# Patient Record
Sex: FEMALE | Race: WHITE | Employment: OTHER | URBAN - METROPOLITAN AREA
[De-identification: names, ages, dates, MRNs, and addresses within clinical notes are randomized per-mention and may not be internally consistent; named-entity substitution may affect disease eponyms.]

---

## 2017-02-17 ENCOUNTER — HOSPITAL ENCOUNTER (OUTPATIENT)
Dept: MAMMOGRAPHY | Age: 77
Discharge: HOME | End: 2017-02-17
Attending: FAMILY MEDICINE
Payer: MEDICARE

## 2017-02-17 DIAGNOSIS — Z78.0 POSTMENOPAUSAL: ICD-10-CM

## 2017-02-17 PROCEDURE — 77080 DXA BONE DENSITY AXIAL: CPT

## 2017-07-28 ENCOUNTER — HOSPITAL ENCOUNTER (OUTPATIENT)
Dept: GENERAL RADIOLOGY | Age: 77
Discharge: HOME | End: 2017-07-28
Attending: FAMILY MEDICINE
Payer: MEDICARE

## 2017-07-28 ENCOUNTER — HOSPITAL ENCOUNTER (OUTPATIENT)
Dept: CT IMAGING | Age: 77
Discharge: HOME | End: 2017-07-28
Attending: FAMILY MEDICINE
Payer: MEDICARE

## 2017-07-28 DIAGNOSIS — G89.29 CHRONIC PAIN OF BOTH KNEES: ICD-10-CM

## 2017-07-28 DIAGNOSIS — M25.562 CHRONIC PAIN OF BOTH KNEES: ICD-10-CM

## 2017-07-28 DIAGNOSIS — M25.561 CHRONIC PAIN OF BOTH KNEES: ICD-10-CM

## 2017-07-28 DIAGNOSIS — R05.9 COUGH: ICD-10-CM

## 2017-07-28 PROCEDURE — 71020 XR CHEST PA LAT: CPT

## 2017-07-28 PROCEDURE — 73700 CT LOWER EXTREMITY W/O DYE: CPT

## 2017-07-28 NOTE — PROGRESS NOTES
Per Dr. Cornelio Arteaga: Shows a small area that is not getting full air to lung tissue; no tumor or infection. Radiologist recommended recheck CXR in 4 weeks. - Notified, verbalizes understanding. Future CXR ordered.

## 2017-07-28 NOTE — PROGRESS NOTES
Per Dr. Cathy Bone: Shows significant arthritis. No tear. Would follow up with ortho as discussed. - Left detailed message on pt's voicemail, per merced.

## 2017-08-16 ENCOUNTER — HOSPITAL ENCOUNTER (OUTPATIENT)
Dept: GENERAL RADIOLOGY | Age: 77
Discharge: HOME | End: 2017-08-16
Attending: FAMILY MEDICINE
Payer: MEDICARE

## 2017-08-16 DIAGNOSIS — J98.11 ATELECTASIS: ICD-10-CM

## 2017-08-16 PROCEDURE — 71020 XR CHEST PA LAT: CPT

## 2017-08-17 NOTE — PROGRESS NOTES
Per Dr. Elvia Romano: Shows scarring; likely from previous infection. Radiologist suggested CT to confirm.- Notified, verbalizes understanding. CT ordered.

## 2017-08-25 ENCOUNTER — HOSPITAL ENCOUNTER (OUTPATIENT)
Dept: CT IMAGING | Age: 77
Discharge: HOME | End: 2017-08-25
Attending: FAMILY MEDICINE
Payer: MEDICARE

## 2017-08-25 DIAGNOSIS — R93.89 ABNORMAL CXR: ICD-10-CM

## 2017-08-25 PROCEDURE — 71250 CT THORAX DX C-: CPT

## 2017-11-02 ENCOUNTER — HOSPITAL ENCOUNTER (OUTPATIENT)
Age: 77
Setting detail: OUTPATIENT SURGERY
Discharge: HOME | End: 2017-11-02
Attending: INTERNAL MEDICINE | Admitting: INTERNAL MEDICINE
Payer: MEDICARE

## 2017-11-02 VITALS
DIASTOLIC BLOOD PRESSURE: 57 MMHG | HEART RATE: 70 BPM | RESPIRATION RATE: 16 BRPM | TEMPERATURE: 99.1 F | SYSTOLIC BLOOD PRESSURE: 140 MMHG | BODY MASS INDEX: 30.29 KG/M2 | WEIGHT: 193 LBS | OXYGEN SATURATION: 95 % | HEIGHT: 67 IN

## 2017-11-02 DIAGNOSIS — R93.89 ABNORMAL CT OF THE CHEST: ICD-10-CM

## 2017-11-02 PROCEDURE — 77030022556 HC FCPS BIOP TIS ENDOSC BSC -B: Performed by: INTERNAL MEDICINE

## 2017-11-02 PROCEDURE — 77030012699 HC VLV SUC CNTRL OCOA -A: Performed by: INTERNAL MEDICINE

## 2017-11-02 PROCEDURE — 88341 IMHCHEM/IMCYTCHM EA ADD ANTB: CPT | Performed by: INTERNAL MEDICINE

## 2017-11-02 PROCEDURE — 31624 DX BRONCHOSCOPE/LAVAGE: CPT | Performed by: INTERNAL MEDICINE

## 2017-11-02 PROCEDURE — 88342 IMHCHEM/IMCYTCHM 1ST ANTB: CPT | Performed by: INTERNAL MEDICINE

## 2017-11-02 PROCEDURE — 99153 MOD SED SAME PHYS/QHP EA: CPT | Performed by: INTERNAL MEDICINE

## 2017-11-02 PROCEDURE — 88112 CYTOPATH CELL ENHANCE TECH: CPT | Performed by: INTERNAL MEDICINE

## 2017-11-02 PROCEDURE — 74011250636 HC RX REV CODE- 250/636: Performed by: INTERNAL MEDICINE

## 2017-11-02 PROCEDURE — 88305 TISSUE EXAM BY PATHOLOGIST: CPT | Performed by: INTERNAL MEDICINE

## 2017-11-02 PROCEDURE — 74011250636 HC RX REV CODE- 250/636

## 2017-11-02 PROCEDURE — 76040000025: Performed by: INTERNAL MEDICINE

## 2017-11-02 PROCEDURE — 31628 BRONCHOSCOPY/LUNG BX EACH: CPT | Performed by: INTERNAL MEDICINE

## 2017-11-02 PROCEDURE — G0500 MOD SEDAT ENDO SERVICE >5YRS: HCPCS | Performed by: INTERNAL MEDICINE

## 2017-11-02 PROCEDURE — 74011000250 HC RX REV CODE- 250: Performed by: INTERNAL MEDICINE

## 2017-11-02 RX ORDER — LIDOCAINE HYDROCHLORIDE 40 MG/ML
SOLUTION TOPICAL AS NEEDED
Status: DISCONTINUED | OUTPATIENT
Start: 2017-11-02 | End: 2017-11-02 | Stop reason: HOSPADM

## 2017-11-02 RX ORDER — SODIUM CHLORIDE 9 MG/ML
25 INJECTION, SOLUTION INTRAVENOUS CONTINUOUS
Status: DISCONTINUED | OUTPATIENT
Start: 2017-11-02 | End: 2017-11-02 | Stop reason: HOSPADM

## 2017-11-02 RX ORDER — LIDOCAINE HYDROCHLORIDE 20 MG/ML
JELLY TOPICAL AS NEEDED
Status: DISCONTINUED | OUTPATIENT
Start: 2017-11-02 | End: 2017-11-02 | Stop reason: HOSPADM

## 2017-11-02 RX ORDER — SODIUM CHLORIDE 0.9 % (FLUSH) 0.9 %
5-10 SYRINGE (ML) INJECTION AS NEEDED
Status: DISCONTINUED | OUTPATIENT
Start: 2017-11-02 | End: 2017-11-02 | Stop reason: HOSPADM

## 2017-11-02 RX ORDER — FENTANYL CITRATE 50 UG/ML
50 INJECTION, SOLUTION INTRAMUSCULAR; INTRAVENOUS
Status: DISCONTINUED | OUTPATIENT
Start: 2017-11-02 | End: 2017-11-02 | Stop reason: HOSPADM

## 2017-11-02 RX ORDER — SODIUM CHLORIDE 0.9 % (FLUSH) 0.9 %
5-10 SYRINGE (ML) INJECTION EVERY 8 HOURS
Status: DISCONTINUED | OUTPATIENT
Start: 2017-11-02 | End: 2017-11-02 | Stop reason: HOSPADM

## 2017-11-02 RX ORDER — MIDAZOLAM HYDROCHLORIDE 1 MG/ML
.25-5 INJECTION, SOLUTION INTRAMUSCULAR; INTRAVENOUS
Status: DISCONTINUED | OUTPATIENT
Start: 2017-11-02 | End: 2017-11-02 | Stop reason: HOSPADM

## 2017-11-02 RX ADMIN — MIDAZOLAM HYDROCHLORIDE 1 MG: 1 INJECTION, SOLUTION INTRAMUSCULAR; INTRAVENOUS at 13:52

## 2017-11-02 RX ADMIN — LIDOCAINE HYDROCHLORIDE 1 ML: 20 JELLY TOPICAL at 13:36

## 2017-11-02 RX ADMIN — FENTANYL CITRATE 50 MCG: 50 INJECTION, SOLUTION INTRAMUSCULAR; INTRAVENOUS at 13:47

## 2017-11-02 RX ADMIN — FENTANYL CITRATE 50 MCG: 50 INJECTION, SOLUTION INTRAMUSCULAR; INTRAVENOUS at 13:28

## 2017-11-02 RX ADMIN — MIDAZOLAM HYDROCHLORIDE 1 MG: 1 INJECTION, SOLUTION INTRAMUSCULAR; INTRAVENOUS at 13:28

## 2017-11-02 RX ADMIN — FENTANYL CITRATE 25 MCG: 50 INJECTION, SOLUTION INTRAMUSCULAR; INTRAVENOUS at 13:35

## 2017-11-02 RX ADMIN — FENTANYL CITRATE 25 MCG: 50 INJECTION, SOLUTION INTRAMUSCULAR; INTRAVENOUS at 13:34

## 2017-11-02 RX ADMIN — LIDOCAINE HYDROCHLORIDE 5 ML: 40 SOLUTION TOPICAL at 13:35

## 2017-11-02 RX ADMIN — FENTANYL CITRATE 50 MCG: 50 INJECTION, SOLUTION INTRAMUSCULAR; INTRAVENOUS at 13:41

## 2017-11-02 RX ADMIN — SODIUM CHLORIDE 25 ML/HR: 900 INJECTION, SOLUTION INTRAVENOUS at 13:32

## 2017-11-02 RX ADMIN — FENTANYL CITRATE 25 MCG: 50 INJECTION, SOLUTION INTRAMUSCULAR; INTRAVENOUS at 13:31

## 2017-11-02 RX ADMIN — MIDAZOLAM HYDROCHLORIDE 1 MG: 1 INJECTION, SOLUTION INTRAMUSCULAR; INTRAVENOUS at 13:31

## 2017-11-02 NOTE — H&P
HISTORY AND PHYSICAL      [facility]    [facility]    Date of Admission:  11/2/2017    The patient's chart is reviewed and the patient is discussed with the staff. Subjective:     Patient is a 76 y.o.  female presents for Bronch. Patient has lesion on chest CT in RUL area and need to r/o mass. Review of Systems:  -Fever  -Headaches  -Chest pain  -Dyspnea,- wheezing,+ cough  -Abdominal pain,- constipation  -Leg swelling  All other organ systems grossly normal.      Patient Active Problem List   Diagnosis Code    CAD (coronary artery disease) I25.10    Hypertension I10    GERD (gastroesophageal reflux disease) K21.9    Spinal stenosis M48.00    Peripheral neuropathy G62.9    Arthritis M19.90    Chronic pain G89.29    Obesity (BMI 30-39. 9) E66.9    Pacemaker Z95.0    Encounter for weaning from ventilator ([address] Z99.11    S/P AVR Z95.2    Critical aortic valve stenosis I35.0    Paroxysmal atrial fibrillation (HCC) I48.0    Anemia, unspecified D64.9    Postoperative anemia due to acute blood loss D62    Nonrheumatic aortic valve stenosis I35.0    Atrial fibrillation (HCC) I48.91    Cardiac pacemaker Z95.0    Sick sinus syndrome (HCC) I49.5       Prior to Admission Medications   Prescriptions Last Dose Informant Patient Reported? Taking? ELIQUIS 5 mg tablet 10/26/2017 at Unknown time  No Yes   Sig: TAKE 1 TABLET TWICE DAILY   Medical Information ID Tag misc Unknown at Unknown time  No No   Sig: DISABLED PLACARD   amLODIPine (NORVASC) 5 mg tablet 11/2/2017 at Unknown time  No Yes   Sig: Take 1 Tab by mouth daily. aspirin 81 mg CpDR 10/26/2017 at Unknown time  Yes Yes   Sig: Take 1 Tab by mouth every morning. Indications: continue   budesonide-formoterol (SYMBICORT) 160-4.5 mcg/actuation HFAA 11/1/2017 at Unknown time  Yes Yes   Sig: Take 2 Puffs by inhalation two (2) times a day.    docusate sodium (COLACE) 100 mg capsule 11/1/2017 at Unknown time  Yes Yes Sig: Take 100 mg by mouth as needed. furosemide (LASIX) 20 mg tablet 11/1/2017 at Unknown time  No Yes   Sig: Take 1 Tab by mouth every morning. Patient taking differently: Take 20 mg by mouth every morning. As needed   nitroglycerin (NITROSTAT) 0.4 mg SL tablet Unknown at Unknown time  Yes No   Sig: by SubLINGual route every five (5) minutes as needed for Chest Pain. omeprazole (PRILOSEC) 20 mg capsule 11/1/2017 at Unknown time  No Yes   Sig: TAKE 1 CAPSULE TWICE DAILY   pravastatin (PRAVACHOL) 10 mg tablet 11/1/2017 at Unknown time  No Yes   Sig: Take 1 Tab by mouth nightly. Indications: hypercholesterolemia   sotalol (BETAPACE) 160 mg tablet 11/2/2017 at Unknown time  No Yes   Sig: Take 1 Tab by mouth two (2) times a day. Facility-Administered Medications: None       Past Medical History:   Diagnosis Date    [facility]ib [facility]     NILA (acute kidney injury) ([facility] 6/8/2013    Anemia, unspecified 8/14/2015    Aortic stenosis     Arthritis     osteoarthritis    Atrial fibrillation ([facility] 9/30/2015    MAZE     Blurry vision, bilateral 6/8/2013    Cardiac pacemaker     Biotronik MRI compatible (dual chamber)     Constipation     Critical aortic valve stenosis 8/14/2015    8/13/15 (Dr Tanmay Ashby) 1. Left and right sided maze. Please note that the left pulmonary veins were not done due to difficult anatomy. 2. Clipping, left atrial appendage. 3. Aortic valve replacement with a 23 mm Magna Ease Goyo-Oneill pericardial valve.     GERD (gastroesophageal reflux disease)     Hypertension     Hyponatremia 0/3/1112    Metabolic encephalopathy 7/5/3518    Neuropathy     Pacemaker 8/13/2015    Pancreatic cyst     Paroxysmal atrial fibrillation (HCC) 8/14/2015    Rheumatic aortic stenosis     Sick sinus syndrome (HCC)     SOB (shortness of breath) on exertion     cannot climb flight of stairs or walk to mailbox    Syncope and collapse     Syncope and collapse     TIA (transient ischemic attack) Past Surgical History:   Procedure Laterality Date    HX AORTIC VALVE REPLACEMENT  8/2015    magna Ease Goyo -Oneill valve    HX CHOLECYSTECTOMY      HX COLONOSCOPY  April 2013    with EGD, Dr. Arnold Delarosa [id number]  2013, 2015    HX HYSTERECTOMY      HX PACEMAKER  9/24/14    Biotronik MRI compatible (dual chamber)     HX TONSILLECTOMY       Social History     Social History    Marital status:      Spouse name: N/A    Number of children: N/A    Years of education: N/A     Occupational History    has never worked outside the home      Social History Main Topics    Smoking status: Never Smoker    Smokeless tobacco: Never Used    Alcohol use No    Drug use: No    Sexual activity: Not on file     Other Topics Concern    Not on file     Social History Narrative    Lives with her daughter     Family History   Problem Relation Age of Onset    Diabetes Mother     Heart Disease Mother     Hypertension Mother     Diabetes Sister     Hypertension Sister     Heart Disease Sister     Diabetes Brother     Cancer Brother      pancreatic cancer    Heart Disease Father      Allergies   Allergen Reactions    Sulfa (Sulfonamide Antibiotics) Rash    Augmentin [Amoxicillin-Pot Clavulanate] Nausea Only    Lamisil [Terbinafine] Rash    Lisinopril Cough    Prevacid [Lansoprazole] Rash     flushed       Current Facility-Administered Medications   Medication Dose Route Frequency    lidocaine (XYLOCAINE) 4 % (40 mg/mL) topical solution   Topical PRN    lidocaine (XYLOCAINE) 2 % jelly   Mucous Membrane PRN    sodium chloride (NS) flush 5-10 mL  5-10 mL IntraVENous Q8H    sodium chloride (NS) flush 5-10 mL  5-10 mL IntraVENous PRN    0.9% sodium chloride infusion  25 mL/hr IntraVENous CONTINUOUS    midazolam (VERSED) injection 0.25-5 mg  0.25-5 mg IntraVENous Multiple    fentaNYL citrate (PF) injection 50 mcg  50 mcg IntraVENous Multiple           Objective: Vitals:    11/02/17 1353 11/02/17 1354 11/02/17 1358 11/02/17 1403   BP: 195/88 177/78 137/63 155/67   Pulse: 69 69 69 70   Resp: 14 15 13 14   Temp:       SpO2: 97% 96% 97% 99%   Weight:       Height:           PHYSICAL EXAM     Constitutional:  the patient is well developed and in no acute distress  EENMT:  Sclera clear, pupils equal, oral mucosa moist  Respiratory:decreased sounds. NO wheezing. Cardiovascular:  RRR without M,G,R  Gastrointestinal: soft and non-tender; with positive bowel sounds. Musculoskeletal: warm without cyanosis. There is no lower leg edema. Skin:  no jaundice or rashes, no* wounds   Neurologic: no gross neuro deficits     Psychiatric:  alert and oriented x 3    CXR:      No results for input(s): WBC, HGB, HCT, PLT, INR, HGBEXT, HCTEXT, PLTEXT in the last 72 hours. No lab exists for component: INREXT  No results for input(s): NA, K, CL, GLU, CO2, BUN, CREA, MG, PHOS, CA, TROIQ, ALB, TBIL, TBILI, GPT, ALT, SGOT, BNPP in the last 72 hours. No lab exists for component: TROIP  No results for input(s): PH, PCO2, PO2, HCO3 in the last 72 hours. No results for input(s): LCAD, LAC in the last 72 hours. Assessment:  (Medical Decision Making)     Hospital Problems  Date Reviewed: 9/29/2017    None        RUL lung lesion  --see below    Lung nodules  --in RUL as well    Plan:  (Medical Decision Making)     --bronch to inspect RUL today with possible biopsy    More than 50% of the time documented was spent in face-to-face contact with the patient and in the care of the patient on the floor/unit where the patient is located.     Sarah Montoya MD

## 2017-11-02 NOTE — ROUTINE PROCESS
Discharge instructions given to friend, [facility]. IV discontinued and skin c/d/i. Pt is on room air with oxygen saturation level at 92%.

## 2017-11-02 NOTE — DISCHARGE INSTRUCTIONS
RESPIRATORY CARE - BRONCHOSCOPY - DISCHARGE INSTRUCTIONS      You received a lot of numbing medication for your throat and nose, and you also received medication to make you sleepy during your procedure. Because of this and because the bronchoscopy may have irritated your airways, we ask that you follow these directions:    1. Do not eat or drink until  330 . After that, you may have what you please. You may want to try some liquids first, because your throat may be a little sore. 2. Medication may cause drowsiness for several hours, therefore, do not drive or                  operate machinery for remainder of the day. No alcohol today. 3. You may cough up more mucus than usual and you may see some blood, but                   this is expected and should subside by the following day. 4. If severe throat irritation, coughing, or bleeding continue, call your doctor. 5.         If you run a fever greater than 102, call [facility] at [phone]. 6.         Dr. Ara Rangel has asked that you:                A. Call the doctor's office at [phone] for problems. B. Call Dr Campos Reid next week for results of bronchoscopy at [phone]. Discharge Medications:  Resume Eliquis on Saturday       Any additional instructions:  Motrin or tylenol for fever. See Dr Campos Reid in office as scheduled.     Instructions given to Bijal Harlan and other family members  Instructions given by:  Lavelle Ovalle RT

## 2017-11-02 NOTE — PROCEDURES
Procedure: Bronchoscopy    Time Out Done -- Correct Patient Identified. Everyone Agrees. Anesthesia- 3mg Versed, 225 mcg Fentanyl  Indication- abnormal CT with ? RUL lung lesion. Post Procedure diagnosis: abnormal CT with ? RUL lung lesion. Instrument-  Olympus bronchosope     Procedure   The flexible fiberoptic bronchoscope was introduced through the mouth with bite block . Advanced to vocal cords and noted with normal anatomy and function. Advanced down trachea to diamond. Diamond sharp without any pathology noted. Advanced to left main stem bronchus and down to JOHN and LLL. Noted normal anatomy and segments. Minimal secretions cleared with saline. Scope then passed to Right main stem bronchus. Noted normal RUL anatomy and noted to have cobblestone pattern in RUL superior segment. BAL done and then biopsy x 2 done. Had bleeding but stopped with cold saline. Then advanced to bronchus intermedius and noted RML and RLL with normal segments. Did have secretion taht were thicker in most right sided segment especially from cobble stone segment. Patient did have moderate secretions that needed to be cleared with normal saline. Patient tolerated procedure well, no complications. EBL -- about 5 ml and stopped with cold saline.      BAL: sent for  Gram Stain  Cultures both routine and fungal.   AFB  Cytology  CD4:CD8      Manas Donovan MD

## 2017-11-02 NOTE — IP AVS SNAPSHOT
[address] [id number] [address] 
[phone] Patient: Delilah Richard MRN: [id number] About your hospitalization You were admitted on:  2017 You last received care in the:  [facility] You were discharged on:  2017 Why you were hospitalized Your primary diagnosis was:  Not on File Your diagnoses also included:  Abnormal Ct Of The Chest  
  
Things You Need To Do (next 8 weeks) Tuesday Dec 19, 2017 REMOTE DEVICE CHECK ORDERS ONLY ENCOUNTER with JOEL REMOTE PACER 34 at  9:15 AM  
Please remember THIS REMOTE CHECK IS COMPLETED FROM HOME - YOU WILL NOT COME TO THE OFFICE FOR THIS APPOINTMENT. In preparation for this check, please ensure your monitor box is working appropriately. If your monitor requires you to send a transmission, please make sure it is sent by 11:00AM on this day so we can have it processed and resulted to your doctor without delay. If you have a question or problem with the monitor box, please contact your respective company:   [address] [place] [address]/[employer] - [phone]  [employer]/Home [employer] - [phone]  [employer] - [phone]  [employer]/[facility] [phone]  If you have any further questions or need to move this appointment, we are happy to help and can be reached at [phone]. Where:  [facility] ([address] [place] [address]) Thursday Dec 21, 2017 Office Visit with Alejandro Wallace MD at  2:15 PM  
Where:  [address] ([address] [place] [address]) Discharge Orders None A check lars indicates which time of day the medication should be taken. My Medications ASK your physician about these medications Instructions Each Dose to Equal  
 Morning Noon Evening Bedtime  
 amLODIPine 5 mg tablet Commonly known as:  Mary Ellen Cardenas Your last dose was: Your next dose is: Take 1 Tab by mouth daily. 5 mg  
    
   
   
   
  
 aspirin 81 mg Cpdr  
   
Your last dose was: Your next dose is: Take 1 Tab by mouth every morning. Indications: continue 1 Tab  
    
   
   
   
  
 docusate sodium 100 mg capsule Commonly known as:  Aide Caban Your last dose was: Your next dose is: Take 100 mg by mouth as needed. 100 mg  
    
   
   
   
  
 ELIQUIS 5 mg tablet Generic drug:  apixaban Your last dose was: Your next dose is: TAKE 1 TABLET TWICE DAILY  
     
   
   
   
  
 furosemide 20 mg tablet Commonly known as:  LASIX Your last dose was: Your next dose is: Take 1 Tab by mouth every morning. 20 mg  
    
   
   
   
  
 Medical Information ID Tag Misc Your last dose was: Your next dose is:    
   
   
 DISABLED PLACARD  
     
   
   
   
  
 nitroglycerin 0.4 mg SL tablet Commonly known as:  NITROSTAT Your last dose was: Your next dose is:    
   
   
 by SubLINGual route every five (5) minutes as needed for Chest Pain. omeprazole 20 mg capsule Commonly known as:  PRILOSEC Your last dose was: Your next dose is: TAKE 1 CAPSULE TWICE DAILY pravastatin 10 mg tablet Commonly known as:  PRAVACHOL Your last dose was: Your next dose is: Take 1 Tab by mouth nightly. Indications: hypercholesterolemia 10 mg  
    
   
   
   
  
 sotalol 160 mg tablet Commonly known as:  Alphia Sin Your last dose was: Your next dose is: Take 1 Tab by mouth two (2) times a day. 160 mg  
    
   
   
   
  
 SYMBICORT 160-4.5 mcg/actuation [facility]aa Generic drug:  budesonide-formoterol Your last dose was: Your next dose is: Take 2 Puffs by inhalation two (2) times a day. 2 Puff Discharge Instructions RESPIRATORY CARE - BRONCHOSCOPY - DISCHARGE INSTRUCTIONS You received a lot of numbing medication for your throat and nose, and you also received medication to make you sleepy during your procedure. Because of this and because the bronchoscopy may have irritated your airways, we ask that you follow these directions: 1. Do not eat or drink until  330 . After that, you may have what you please. You may want to try some liquids first, because your throat may be a little sore. 2. Medication may cause drowsiness for several hours, therefore, do not drive or                  operate machinery for remainder of the day. No alcohol today. 3. You may cough up more mucus than usual and you may see some blood, but                   this is expected and should subside by the following day. 4. If severe throat irritation, coughing, or bleeding continue, call your doctor. 5.         If you run a fever greater than 102, call [facility] at [phone]. 6.         Dr. Annie Palacio has asked that you: A. Call the doctor's office at [phone] for problems. B. Call Dr Rex Ventura next week for results of bronchoscopy at [phone]. Discharge Medications: 
Resume Eliquis on Saturday Any additional instructions:  Motrin or tylenol for fever. See Dr Rex Ventura in office as scheduled. Instructions given to Sae Loy and other family members Instructions given by:  RT Josiah Introducing [facility]! Melissa Shah introduces [employer] patient portal. Now you can access parts of your medical record, email your doctor's office, and request medication refills online. 1. In your internet browser, go to [email or web address] 2. Click on the First Time User? Click Here link in the Sign In box.  You will see the New Member Sign Up page. 3. Enter your [employer] Access Code exactly as it appears below. You will not need to use this code after youve completed the sign-up process. If you do not sign up before the expiration date, you must request a new code. · [employer] Access Code: [email or web address] Expires: 1/22/2018  2:16 PM 
 
4. Enter the last four digits of your Social Security Number (xxxx) and Date of Birth (mm/dd/yyyy) as indicated and click Submit. You will be taken to the next sign-up page. 5. Create a WiN MSt ID. This will be your [employer]ge login ID and cannot be changed, so think of one that is secure and easy to remember. 6. Create a [employer] password. You can change your password at any time. 7. Enter your Password Reset Question and Answer. This can be used at a later time if you forget your password. 8. Enter your e-mail address. You will receive e-mail notification when new information is available in [address] 9. Click Sign Up. You can now view and download portions of your medical record. 10. Click the Download Summary menu link to download a portable copy of your medical information. If you have questions, please visit the Frequently Asked Questions section of the [employer]ge website. Remember, [employer]ge is NOT to be used for urgent needs. For medical emergencies, dial 911. Now available from your iPhone and Android! Providers Seen During Your Hospitalization Provider Specialty Primary office phone Skylar Rangel, MD Pulmonary Disease [phone] Your Primary Care Physician (PCP) Primary Care Physician Office Phone Office Fax Ness Ley [phone] You are allergic to the following Allergen Reactions Sulfa (Sulfonamide Antibiotics) Rash Augmentin (Amoxicillin-Pot Clavulanate) Nausea Only Lamisil (Terbinafine) Rash Lisinopril Cough Prevacid (Lansoprazole) Rash  
 flushed Recent Documentation Height Weight BMI OB Status Smoking Status 1.702 m 87.5 kg 30.23 kg/m2 Hysterectomy Never Smoker Emergency Contacts Name Discharge Info Relation Home Work Mobile Zaria Clemons  Other Relative [6] [phone] Braydon Jenkins  Son [22] [phone] Anne Morocho DISCHARGE CAREGIVER [3] Friend [5]   [phone] Patient Belongings The following personal items are in your possession at time of discharge: 
  Dental Appliances: Lowers, Uppers  Visual Aid: Glasses Please provide this summary of care documentation to your next provider. Signatures-by signing, you are acknowledging that this After Visit Summary has been reviewed with you and you have received a copy. Patient Signature:  ____________________________________________________________ Date:  ____________________________________________________________  
  
[facility] Provider Signature:  ____________________________________________________________ Date:  ____________________________________________________________

## 2018-01-15 ENCOUNTER — HOSPITAL ENCOUNTER (OUTPATIENT)
Dept: CT IMAGING | Age: 78
Discharge: HOME | End: 2018-01-15
Attending: INTERNAL MEDICINE
Payer: MEDICARE

## 2018-01-15 DIAGNOSIS — R91.1 LUNG NODULE: ICD-10-CM

## 2018-01-15 PROCEDURE — 71250 CT THORAX DX C-: CPT

## 2018-05-14 ENCOUNTER — HOSPITAL ENCOUNTER (OUTPATIENT)
Dept: SURGERY | Age: 78
Discharge: HOME | End: 2018-05-14
Payer: MEDICARE

## 2018-05-14 ENCOUNTER — HOSPITAL ENCOUNTER (OUTPATIENT)
Dept: PHYSICAL THERAPY | Age: 78
Discharge: HOME | End: 2018-05-14
Payer: MEDICARE

## 2018-05-14 DIAGNOSIS — E66.9 OBESITY (BMI 30-39.9): Chronic | ICD-10-CM

## 2018-05-14 DIAGNOSIS — R06.83 SNORING: Primary | ICD-10-CM

## 2018-05-14 LAB
ANION GAP SERPL CALC-SCNC: 13 MMOL/L (ref 7–16)
APPEARANCE UR: CLEAR
APTT PPP: 42.4 SEC (ref 23.2–35.3)
BACTERIA SPEC CULT: NORMAL
BACTERIA URNS QL MICRO: 0 /HPF
BILIRUB UR QL: NEGATIVE
BUN SERPL-MCNC: 13 MG/DL (ref 8–23)
CALCIUM SERPL-MCNC: 9.1 MG/DL (ref 8.3–10.4)
CASTS URNS QL MICRO: ABNORMAL /LPF
CHLORIDE SERPL-SCNC: 96 MMOL/L (ref 98–107)
CO2 SERPL-SCNC: 24 MMOL/L (ref 21–32)
COLOR UR: YELLOW
CREAT SERPL-MCNC: 1.11 MG/DL (ref 0.6–1)
EPI CELLS #/AREA URNS HPF: ABNORMAL /HPF
ERYTHROCYTE [DISTWIDTH] IN BLOOD BY AUTOMATED COUNT: 13.5 % (ref 11.9–14.6)
GLUCOSE SERPL-MCNC: 136 MG/DL (ref 65–100)
GLUCOSE UR STRIP.AUTO-MCNC: NEGATIVE MG/DL
HCT VFR BLD AUTO: 37.7 % (ref 35.8–46.3)
HGB BLD-MCNC: 12.6 G/DL (ref 11.7–15.4)
HGB UR QL STRIP: NEGATIVE
INR PPP: 1.7
KETONES UR QL STRIP.AUTO: NEGATIVE MG/DL
LEUKOCYTE ESTERASE UR QL STRIP.AUTO: ABNORMAL
MCH RBC QN AUTO: 27.6 PG (ref 26.1–32.9)
MCHC RBC AUTO-ENTMCNC: 33.4 G/DL (ref 31.4–35)
MCV RBC AUTO: 82.7 FL (ref 79.6–97.8)
NITRITE UR QL STRIP.AUTO: NEGATIVE
PH UR STRIP: 5.5 [PH] (ref 5–9)
PLATELET # BLD AUTO: 404 K/UL (ref 150–450)
PMV BLD AUTO: 9.4 FL (ref 10.8–14.1)
POTASSIUM SERPL-SCNC: 4 MMOL/L (ref 3.5–5.1)
PROT UR STRIP-MCNC: NEGATIVE MG/DL
PROTHROMBIN TIME: 20.8 SEC (ref 11.5–14.5)
RBC # BLD AUTO: 4.56 M/UL (ref 4.05–5.25)
RBC #/AREA URNS HPF: ABNORMAL /HPF
SERVICE CMNT-IMP: NORMAL
SODIUM SERPL-SCNC: 133 MMOL/L (ref 136–145)
SP GR UR REFRACTOMETRY: 1.01 (ref 1–1.02)
UROBILINOGEN UR QL STRIP.AUTO: 1 EU/DL (ref 0.2–1)
WBC # BLD AUTO: 17 K/UL (ref 4.3–11.1)
WBC URNS QL MICRO: ABNORMAL /HPF

## 2018-05-14 PROCEDURE — 36415 COLL VENOUS BLD VENIPUNCTURE: CPT | Performed by: ORTHOPAEDIC SURGERY

## 2018-05-14 PROCEDURE — 80048 BASIC METABOLIC PNL TOTAL CA: CPT | Performed by: ORTHOPAEDIC SURGERY

## 2018-05-14 PROCEDURE — 87641 MR-STAPH DNA AMP PROBE: CPT | Performed by: ORTHOPAEDIC SURGERY

## 2018-05-14 PROCEDURE — 85610 PROTHROMBIN TIME: CPT | Performed by: ORTHOPAEDIC SURGERY

## 2018-05-14 PROCEDURE — 97161 PT EVAL LOW COMPLEX 20 MIN: CPT

## 2018-05-14 PROCEDURE — 81001 URINALYSIS AUTO W/SCOPE: CPT | Performed by: ORTHOPAEDIC SURGERY

## 2018-05-14 PROCEDURE — 85730 THROMBOPLASTIN TIME PARTIAL: CPT | Performed by: ORTHOPAEDIC SURGERY

## 2018-05-14 PROCEDURE — 77030027138 HC INCENT SPIROMETER -A

## 2018-05-14 PROCEDURE — G8978 MOBILITY CURRENT STATUS: HCPCS

## 2018-05-14 PROCEDURE — G8979 MOBILITY GOAL STATUS: HCPCS

## 2018-05-14 PROCEDURE — 85027 COMPLETE CBC AUTOMATED: CPT | Performed by: ORTHOPAEDIC SURGERY

## 2018-05-14 PROCEDURE — G8980 MOBILITY D/C STATUS: HCPCS

## 2018-05-14 RX ORDER — BENZONATATE 100 MG/1
100 CAPSULE ORAL
COMMUNITY
End: 2018-05-15 | Stop reason: SDUPTHER

## 2018-05-14 RX ORDER — GUAIFENESIN AND PSEUDOEPHEDRINE HCL 1200; 120 MG/1; MG/1
1 TABLET, EXTENDED RELEASE ORAL DAILY
COMMUNITY
End: 2018-05-15 | Stop reason: ALTCHOICE

## 2018-05-14 NOTE — PERIOP NOTES
Lab results within anesthesia guidelines except for elevated WBC result and elevated PT/PTT- Ann Marie Maldonado, joint [place] NP notified of pt's elevated WBC result- came to discuss POC with pt- pt will contact PCP today for treatment and follow-up for possible infection. Will have anesthesia review elevated PT/PTT per protocol. MRSA swab result pending. All results faxed to pt's PCP, Carmelina Haji MD, per surgeon's order.      Recent Results (from the past 12 hour(s))   CBC W/O DIFF    Collection Time: 05/14/18 10:07 AM   Result Value Ref Range    WBC 17.0 (H) 4.3 - 11.1 K/uL    RBC 4.56 4.05 - 5.25 M/uL    HGB 12.6 11.7 - 15.4 g/dL    HCT 37.7 35.8 - 46.3 %    MCV 82.7 79.6 - 97.8 FL    MCH 27.6 26.1 - 32.9 PG    MCHC 33.4 31.4 - 35.0 g/dL    RDW 13.5 11.9 - 14.6 %    PLATELET 927 503 - 673 K/uL    MPV 9.4 (L) 10.8 - 20.5 FL   METABOLIC PANEL, BASIC    Collection Time: 05/14/18 10:07 AM   Result Value Ref Range    Sodium 133 (L) 136 - 145 mmol/L    Potassium 4.0 3.5 - 5.1 mmol/L    Chloride 96 (L) 98 - 107 mmol/L    CO2 24 21 - 32 mmol/L    Anion gap 13 7 - 16 mmol/L    Glucose 136 (H) 65 - 100 mg/dL    BUN 13 8 - 23 MG/DL    Creatinine 1.11 (H) 0.6 - 1.0 MG/DL    GFR est AA >60 >60 ml/min/1.73m2    GFR est non-AA 50 (L) >60 ml/min/1.73m2    Calcium 9.1 8.3 - 10.4 MG/DL   PROTHROMBIN TIME + INR    Collection Time: 05/14/18 10:07 AM   Result Value Ref Range    Prothrombin time 20.8 (H) 11.5 - 14.5 sec    INR 1.7     PTT    Collection Time: 05/14/18 10:07 AM   Result Value Ref Range    aPTT 42.4 (H) 23.2 - 35.3 SEC   URINALYSIS W/ RFLX MICROSCOPIC    Collection Time: 05/14/18 10:07 AM   Result Value Ref Range    Color YELLOW      Appearance CLEAR      Specific gravity 1.010 1.001 - 1.023      pH (UA) 5.5 5.0 - 9.0      Protein NEGATIVE  NEG mg/dL    Glucose NEGATIVE  mg/dL    Ketone NEGATIVE  NEG mg/dL    Bilirubin NEGATIVE  NEG      Blood NEGATIVE  NEG      Urobilinogen 1.0 0.2 - 1.0 EU/dL    Nitrites NEGATIVE NEG      Leukocyte Esterase SMALL (A) NEG      WBC 3-5 0 /hpf    RBC 0-3 0 /hpf    Epithelial cells 0-3 0 /hpf    Bacteria 0 0 /hpf    Casts 0-3 0 /lpf

## 2018-05-14 NOTE — PROGRESS NOTES
Deepali Coats  : [birth year](20 y.o.) [address] at [address] [place] [address], [address]86 [facility]  [phone]       Physical Therapy Prehab Plan of Treatment and Evaluation Summary:2018    ICD-10: Treatment Diagnosis:   · Pain in Right Knee (M25.561)  · Stiffness of Right Knee, Not elsewhere classified (M25.661)  · Difficulty in walking, Not elsewhere classified (R26.2)  · Other abnormalities of gait and mobility (R26.89)  Precautions/Allergies:   Sulfa (sulfonamide antibiotics); Augmentin [amoxicillin-pot clavulanate]; Lamisil [terbinafine]; Lisinopril; and Prevacid [lansoprazole]  MEDICAL/REFERRING DIAGNOSIS:  Unilateral primary osteoarthritis, right knee [M17.11]  REFERRING PHYSICIAN: Donato Lopez., *  DATE OF SURGERY: 18   Assessment:   Comments:  Pain in right knee joint with decreased independence with functional mobility. Pt plans to go to her niece's home following hospital stay. PROBLEM LIST (Impacting functional limitations):  Ms. Sparkle Beth presents with the following right lower extremity(s) problems:  1. Transfers  2. Gait  3. Strength  4. Range of Motion  5. Pain   INTERVENTIONS PLANNED:  1. Home Exercise Program  2. Educational Discussion     TREATMENT PLAN: Effective Dates: 2018 TO 2018. Frequency/Duration: Patient to continue to perform home exercise program at least twice per day up until her surgery. GOALS: (Goals have been discussed and agreed upon with patient.)  Discharge Goals: Time Frame: 1 Day  1. Patient will demonstrate independence with a home exercise program designed to increase strength, range of motion and pain control to minimize functional deficits and optimize patient for total joint replacement. Rehabilitation Potential For Stated Goals: Good  Regarding [facility] therapy, I certify that the treatment plan above will be carried out by a therapist or under their direction.   Thank you for this referral,  Naresh Cleary PT               HISTORY:   Present Symptoms:  Pain Intensity 1: 0   History of Present Injury/Illness (Reason for Referral):  Medical/Referring Diagnosis: Unilateral primary osteoarthritis, right knee [M17.11]   Past Medical History/Comorbidities:   Ms. Leandra Lopes  has a past medical history of Adverse effect of anesthesia; [facility]) [facility] (06/08/2013); Allergic rhinitis; Anemia, unspecified (8/14/2015); Aortic stenosis; Atrial fibrillation ([facility] (09/30/2015); Bell's palsy (2013); Blurry vision, bilateral (6/8/2013); Cardiac pacemaker; Constipation; Critical aortic valve stenosis (8/14/2015); GERD (gastroesophageal reflux disease); Hypertension; Hyponatremia (9/7/2013); Metabolic encephalopathy (0/9/3658); Neuropathy; Osteoarthritis; Pancreatic cyst; Paroxysmal atrial fibrillation ([facility] (8/14/2015); Pulmonary nodule; Sick sinus syndrome (HCC); SOB (shortness of breath) on exertion (2015); Spinal stenosis, lumbar; Syncope and collapse (2015); and TIA (transient ischemic attack) (2013). Ms. Leandra Lopes  has a past surgical history that includes hx hysterectomy (1994); hx cholecystectomy (2000); hx tonsillectomy (1951); hx colonoscopy (April 2013); hx pacemaker (9/24/14); hx endoscopy; hx heart catheterization (2013, 2015); and hx aortic valve replacement (8/2015).   Social History/Living Environment:   Home Environment: Private residence  # Steps to Enter: 0  One/Two Story Residence: One story  Living Alone: No  Support Systems: None (niece)  Patient Expects to be Discharged to[de-identified] Private residence  Current DME Used/Available at Home: Raised toilet seat  Tub or Shower Type: Tub/Shower combination  Work/Activity:  retired  Dominant Side:  RIGHT  Current Medications:  See Pre-assessment nursing note   Number of Personal Factors/Comorbidities that affect the Plan of Care: 0: LOW COMPLEXITY   EXAMINATION:   ADLs (Current Functional Status):   Ambulation:  [x] Independent  [] Walk Indoors Only  [] Walk Outdoors  [] Use Assistive Device  [] Use Wheelchair Only Dressing:  [x] [address] from Someone for:  [] Sock/Shoes  [] Pants  [] Everything   Bathing/Showering:   [x] Independent  [] Requires Assistance from Someone  [] [address] Only Household Activities:  [] Routine house and yard work  [x] Light Housework Only  [] None   Observation/Orthostatic Postural Assessment:   Within defined limits   ROM/Flexibility:   Gross Assessment: Yes  AROM: Generally decreased, functional                LLE Assessment  LLE Assessment (WDL): Within defined limits      RLE AROM  R Knee Flexion: 105  R Knee Extension: 20   Strength:   Gross Assessment: Yes  Strength: Generally decreased, functional                  Functional Mobility:    Gross Assessment: Yes  Coordination: Generally decreased, functional    Gait Description (WDL): Within defined limits  Stand to Sit: Independent  Sit to Stand: Independent  Distance (ft): 1000 Feet (ft)  Ambulation - Level of Assistance: Independent  Gait Abnormalities: Antalgic          Balance:    Sitting: Intact  Standing: Intact   Body Structures Involved:  1. Bones  2. Joints  3. Muscles  4. Ligaments Body Functions Affected:  1. Neuromusculoskeletal  2. Movement Related Activities and Participation Affected:  1. Mobility   Number of elements that affect the Plan of Care: 4+: HIGH COMPLEXITY   CLINICAL PRESENTATION:   Presentation: Stable and uncomplicated: LOW COMPLEXITY   CLINICAL DECISION MAKING:   Outcome Measure: Tool Used: Lower Extremity Functional Scale (LEFS)  Score:  Initial: 36/80 Most Recent: X/80 (Date: -- )   Interpretation of Score: 20 questions each scored on a 5 point scale with 0 representing \"extreme difficulty or unable to perform\" and 4 representing \"no difficulty\". The lower the score, the greater the functional disability. 80/80 represents no disability. Minimal detectable change is 9 points.   Score 80 79-65 64-49 48-33 32-17 16-1 0   Modifier CH CI CJ CK CL CM CN     ? Mobility - Walking and Moving Around:     - CURRENT STATUS: CK - 40%-59% impaired, limited or restricted    - GOAL STATUS: CK - 40%-59% impaired, limited or restricted    - D/C STATUS:  CK - 40%-59% impaired, limited or restricted  Medical Necessity:   · Ms. Rey Amaya is expected to optimize her lower extremity strength and ROM in preparation for joint replacement surgery. Reason for Services/Other Comments:  · Achieve baseline assesment of musculoskeletal system, functional mobility and home environment. , educate in PT HEP in preparation for surgery, educate in hospital plan of care. Use of outcome tool(s) and clinical judgement create a POC that gives a: Clear prediction of patient's progress: LOW COMPLEXITY   TREATMENT:   Treatment/Session Assessment:  Patient was instructed in PT- HEP to increase strength and ROM in LEs. Answered all questions. · Post session pain:  0  · Compliance with Program/Exercises: compliant most of the time.   Total Treatment Duration:  PT Patient Time In/Time Out  Time In: 1000  Time Out: [address] [facility]

## 2018-05-14 NOTE — PERIOP NOTES
Patient verified name, , and surgery as listed in [facility]. Type 3 surgery, joint PAT assessment complete. Labs per surgeon: CBC, BMP, PT/PTT, UA and MRSA swab collected  Labs per anesthesia protocol: no additional labs needed  EKG: done 17 at [address] 121 Cardiology- tracing in EMR and within anesthesia guidelines    Pt with hx of atrial fibrillation and aortic stenosis- s/p pacemaker () and AVR (). Last cardiac office note (17), ECHO (17), remote pacer check (3/26/18)- normal fxn, in office pacer check (17)- normal fxn and not pacer dependent in EMR for anesthesia reference. Telephone encounter 18 from cardiologist states \"low risk for surgery, ok to hold eliquis. \"    Hibiclens and instructions to return bottle on DOS given per hospital policy. Nasal Swab collected per MD order and instructions for Mupirocin nasal ointment if required. Patient provided with handouts including Guide to Surgery, Pain Management, Hand Hygiene, Blood Transfusion Education, and [place] [facility] Brochure. Patient answered medical/surgical history questions at their best of ability. All prior to admission medications documented in Connect [facility]. Original medication prescription bottles visualized during patient appointment. Patient instructed to hold all vitamins 7 days prior to surgery and NSAIDS 5 days prior to surgery. Medications to be held: eliquis x 3 days. Patient instructed to continue previous medications as prescribed prior to surgery and to take the following medications the day of surgery according to anesthesia guidelines with a small sip of water: amlodipine, sotalol, symbicort, omeprazole, tessalon perles, mucinex PRN. Patient teach back successful and patient demonstrates knowledge of instruction.

## 2018-05-15 VITALS
RESPIRATION RATE: 14 BRPM | DIASTOLIC BLOOD PRESSURE: 76 MMHG | OXYGEN SATURATION: 97 % | HEART RATE: 70 BPM | WEIGHT: 185.38 LBS | BODY MASS INDEX: 29.1 KG/M2 | SYSTOLIC BLOOD PRESSURE: 173 MMHG | TEMPERATURE: 97.5 F | HEIGHT: 67 IN

## 2018-05-15 NOTE — PERIOP NOTES
Dr. Tanner Cochran, anesthesia, reviewed PT/PTT from (5/14/18) - ordered repeat PT/PTT upon arrival DOS.

## 2018-05-15 NOTE — PERIOP NOTES
5/14/2018  1:09 PM - Av, Lab In [employer]   Component Results   Component Value Flag Ref Range Units Status   Special Requests: NO SPECIAL REQUESTS     Final   Culture result:      Final   SA target not detected.                                 A MRSA NEGATIVE, SA NEGATIVE test result does not preclude MRSA or SA nasal colonization.

## 2018-05-17 NOTE — ADVANCED PRACTICE NURSE
Total Joint Surgery Preoperative Chart Review      Patient ID:  Elan Mancini  [id number]  68 y.o.  1940  Surgeon: Dr. Alexis Young  Date of Surgery: 6/6/2018  Procedure: Total Right Knee Arthroplasty  Primary Care Physician: Addison Rico [phone]  Specialty Physician(s):      Subjective:   Elan Mancini is a [de-identified] y.o. WHITE OR  female who presents for preoperative evaluation for Total Right Knee arthroplasty. This is a preoperative chart review note based on data collected by the nurse at the surgical Pre-Assessment visit. Past Medical History:   Diagnosis Date    Adverse effect of anesthesia     delayed awakening    [facility]A [facility]) ([facility] 06/08/2013    pt reports after TIA    Allergic rhinitis     has inhaler daily (pt denies asthma)    Anemia, unspecified 8/14/2015    Aortic stenosis     sp AVR 2015    Atrial fibrillation ([facility] 09/30/2015    s/p MAZE     Bell's palsy 2013    Blurry vision, bilateral 6/8/2013    Cardiac pacemaker     Biotronik MRI compatible (dual chamber)     Constipation     Critical aortic valve stenosis 8/14/2015    8/13/15 (Dr Stephani Reyna) 1. Left and right sided maze. Please note that the left pulmonary veins were not done due to difficult anatomy. 2. Clipping, left atrial appendage. 3. Aortic valve replacement with a 23 mm Magna Ease Goyo-Oneill pericardial valve.     GERD (gastroesophageal reflux disease)     managed with medication    Hypertension     Hyponatremia 0/3/2090    Metabolic encephalopathy 3/6/4726    Neuropathy     Osteoarthritis     Pancreatic cyst     Paroxysmal atrial fibrillation (HCC) 8/14/2015    Pulmonary nodule     benign per pulm note (1/2018)    Sick sinus syndrome (HCC)     SOB (shortness of breath) on exertion 2015    cannot climb flight of stairs or walk to mailbox- s/p AVR and pacemaker (pt reports no problems at this time 5/2018)    Spinal stenosis, lumbar     Syncope and collapse 2015    TIA (transient ischemic attack) 2013    pt reports bells palsy started at same time      Past Surgical History:   Procedure Laterality Date    HX AORTIC VALVE REPLACEMENT  8/2015    magna Ease Goyo -Oneill valve    HX CHOLECYSTECTOMY  2000    HX COLONOSCOPY  April 2013    with EGD, Dr. Damien Delarosa [facility]  2013, 2015    HX HYSTERECTOMY  1994    HX PACEMAKER  9/24/14    Biotronik MRI compatible (dual chamber)     HX TONSILLECTOMY  1951     Family History   Problem Relation Age of Onset    Diabetes Mother     Heart Disease Mother     Hypertension Mother     Diabetes Sister     Hypertension Sister     Heart Disease Sister     Diabetes Brother     Cancer Brother      pancreatic cancer    Heart Disease Father     Diabetes Son     Diabetes Son       Social History   Substance Use Topics    Smoking status: Never Smoker    Smokeless tobacco: Never Used    Alcohol use No       Prior to Admission medications    Medication Sig Start Date End Date Taking? Authorizing Provider   omeprazole (PRILOSEC) 20 mg capsule Take 1 Cap by mouth two (2) times a day. 2/9/18  Yes Samuel Maldonado MD   sotalol (BETAPACE) 160 mg tablet Take 1 Tab by mouth two (2) times a day. 12/21/17  Yes Samuel Maldonado MD   pravastatin (PRAVACHOL) 10 mg tablet Take 1 Tab by mouth nightly. Indications: hypercholesterolemia 12/21/17  Yes Samuel Maldonado MD   furosemide (LASIX) 20 mg tablet Take 1 Tab by mouth every morning. 12/21/17  Yes Smauel Maldonado MD   apixaban (ELIQUIS) 5 mg tablet TAKE 1 TABLET TWICE DAILY 12/21/17  Yes Samuel Maldonado MD   budesonide-formoterol (SYMBICORT) 160-4.5 mcg/actuation HFAA Take 2 Puffs by inhalation two (2) times a day. Yes Historical Provider   nitroglycerin (NITROSTAT) 0.4 mg SL tablet by SubLINGual route every five (5) minutes as needed for Chest Pain. Yes Historical Provider   aspirin 81 mg CpDR Take 1 Tab by mouth nightly.  Indications: continue   Yes Historical Provider   docusate sodium (COLACE) 100 mg capsule Take 100 mg by mouth every evening. Yes Phys Other, MD   amLODIPine (NORVASC) 5 mg tablet Take 1 Tab by mouth daily. 5/17/18   Ludwin Smith MD   benzonatate (TESSALON) 100 mg capsule Take 1 Cap by mouth every four (4) hours as needed for Cough. 5/15/18   Lucas Girard MD   fluticasone (CUTIVATE) 0.05 % topical cream Apply  to affected area two (2) times a day for 10 days. 5/15/18 5/25/18  Lucas Girard MD   albuterol (PROVENTIL HFA, VENTOLIN HFA, PROAIR HFA) 90 mcg/actuation inhaler Take 2 Puffs by inhalation every four (4) hours as needed for Wheezing or Shortness of Breath. 5/15/18   Lucas Girard MD   azithromycin (ZITHROMAX Z-INDIA) 250 mg tablet TAD 5/15/18 5/20/18  Lucas Girard MD   [facility]) 10 mg dose pack TAD over 6 days 5/16/18 5/22/18  Lucas Girard MD   Medical Information ID Tag misc DISABLED PLACARD 7/25/17   Lucas Girard MD     Allergies   Allergen Reactions    Sulfa (Sulfonamide Antibiotics) Rash    Augmentin [Amoxicillin-Pot Clavulanate] Nausea Only    Lamisil [Terbinafine] Rash    Lisinopril Cough    Prevacid [Lansoprazole] Rash     flushed          Objective:     Physical Exam:   Visit Vitals    /76 (BP 1 Location: Left arm, BP Patient Position: At rest;Sitting)    Pulse 70    Temp 97.5 °F (36.4 °C)    Resp 14    Ht 5' 7\" (1.702 m)    Wt 84.1 kg (185 lb 6 oz)    SpO2 97%    BMI 29.03 kg/m2         ECG:    EKG Results     None          Data Review:   Labs:   Results for Nasir Mitchell (MRN [id number]) as of 5/17/2018 11:47   Ref.  Range 5/14/2018 10:07   WBC Latest Ref Range: 4.3 - 11.1 K/uL 17.0 (H)   RBC Latest Ref Range: 4.05 - 5.25 M/uL 4.56   HGB Latest Ref Range: 11.7 - 15.4 g/dL 12.6   HCT Latest Ref Range: 35.8 - 46.3 % 37.7   MCV Latest Ref Range: 79.6 - 97.8 FL 82.7   MCH Latest Ref Range: 26.1 - 32.9 PG 27.6   MCHC Latest Ref Range: 31.4 - 35.0 g/dL 33.4   RDW Latest Ref Range: 11.9 - 14.6 % 13.5   PLATELET Latest Ref Range: 150 - 450 K/uL 404   MPV Latest Ref Range: 10.8 - 14.1 FL 9.4 (L)     Results for Sheng Gonzales (MRN [id number]) as of 5/17/2018 11:47   Ref. Range 5/14/2018 10:07   Sodium Latest Ref Range: 136 - 145 mmol/L 133 (L)   Potassium Latest Ref Range: 3.5 - 5.1 mmol/L 4.0   Chloride Latest Ref Range: 98 - 107 mmol/L 96 (L)   CO2 Latest Ref Range: 21 - 32 mmol/L 24   Anion gap Latest Ref Range: 7 - 16 mmol/L 13   Glucose Latest Ref Range: 65 - 100 mg/dL 136 (H)   BUN Latest Ref Range: 8 - 23 MG/DL 13   Creatinine Latest Ref Range: 0.6 - 1.0 MG/DL 1.11 (H)   Calcium Latest Ref Range: 8.3 - 10.4 MG/DL 9.1   GFR est non-AA Latest Ref Range: >60 ml/min/1.73m2 50 (L)   GFR est AA Latest Ref Range: >60 ml/min/1.73m2 >60       Problem List:  )  Patient Active Problem List   Diagnosis Code    CAD (coronary artery disease) I25.10    Hypertension I10    GERD (gastroesophageal reflux disease) K21.9    Spinal stenosis M48.00    Peripheral neuropathy G62.9    Arthritis M19.90    Chronic pain G89.29    Obesity (BMI 30-39. 9) E66.9    Pacemaker Z95.0    S/P AVR Z95.2    Paroxysmal atrial fibrillation (HCC) I48.0    Anemia, unspecified D64.9    Atrial fibrillation (HCC) I48.91    Cardiac pacemaker Z95.0    Sick sinus syndrome (HCC) I49.5    Abnormal CT of the chest R93.8       Total Joint Surgery Pre-Assessment Recommendations:           Patient with multiple comorbidities including:  Advanced age [de-identified], TIA, Atrial fibrillation and CAD  Patient would benefit from inpatient hospitalization with total knee surgery. Patient not feeling well in general with chest congestion and elevated WBC. Discussed with patient that she needed to be evaluated by PCP. Albuterol every 6 hours as need during hospitalization.    Flutter valve treatment for possible URI    Signed By: EM Aaron    May 17, 2018

## 2018-05-21 NOTE — PERIOP NOTES
Pt had appointment with PCP 5/15/18- dx bronchitis, Rx proair, zithromax zpack , and medrol dose pack. Pt to f/u in 10 days (May 25).

## 2018-05-29 NOTE — H&P
[id number] [facility]  Pre Operative History and Physical Exam    Patient ID:  Juan Hargrove  [id number]  63 y.o.  1940    Today: May 29, 2018       Assessment:   1. Arthritis of the right knee        Plan:    1. Proceed with scheduled Procedure(s) (LRB):  RIGHT KNEE ARTHROPLASTY TOTAL/MAYA (Right)            CC:  Right knee pain    HPI:   The patient has end stage arthritis of the right knee. The patient was evaluated and examined during a consultation prior to this office visit. There have been no changes to the patient's orthopedic condition since the initial consultation. The patient has failed previous conservative treatment for this condition including antiinflammatories , and lifestyle modifications. The necessity for joint replacement is present. The patient will be admitted the day of surgery for Procedure(s) (LRB):  RIGHT KNEE ARTHROPLASTY TOTAL/MAYA (Right)      Past Medical/Surgical History:  Past Medical History:   Diagnosis Date    Adverse effect of anesthesia     delayed awakening    NILA (acute kidney injury) ([facility] [address] 06/08/2013    pt reports after TIA    Allergic rhinitis     has inhaler daily (pt denies asthma)    Anemia, unspecified 8/14/2015    Aortic stenosis     sp AVR 2015    Atrial fibrillation ([facility] [address] 09/30/2015    s/p MAZE     Bell's palsy 2013    Blurry vision, bilateral 6/8/2013    Cardiac pacemaker     Biotronik MRI compatible (dual chamber)     Constipation     Critical aortic valve stenosis 8/14/2015    8/13/15 (Dr Jose Cruz Mckeon) 1. Left and right sided maze. Please note that the left pulmonary veins were not done due to difficult anatomy. 2. Clipping, left atrial appendage. 3. Aortic valve replacement with a 23 mm Magna Ease Goyo-Oneill pericardial valve.     GERD (gastroesophageal reflux disease)     managed with medication    Hypertension     Hyponatremia 2/0/2805    Metabolic encephalopathy 0/2/2390    Neuropathy     Osteoarthritis     Pancreatic cyst     Paroxysmal atrial fibrillation ([facility] [address] 8/14/2015    Pulmonary nodule     benign per pulm note (1/2018)    Sick sinus syndrome (HCC)     SOB (shortness of breath) on exertion 2015    cannot climb flight of stairs or walk to mailbox- s/p AVR and pacemaker (pt reports no problems at this time 5/2018)    Spinal stenosis, lumbar     Syncope and collapse 2015    TIA (transient ischemic attack) 2013    pt reports bells palsy started at same time     Past Surgical History:   Procedure Laterality Date    HX AORTIC VALVE REPLACEMENT  8/2015    magna Ease Goyo -Oneill valve    HX CHOLECYSTECTOMY  2000    HX COLONOSCOPY  April 2013    with EGD, Dr. Russell Ramsay  2013, 2015    HX HYSTERECTOMY  1994    HX PACEMAKER  9/24/14    Biotronik MRI compatible (dual chamber)     HX TONSILLECTOMY  1951        Allergies: Allergies   Allergen Reactions    Sulfa (Sulfonamide Antibiotics) Rash    Augmentin [Amoxicillin-Pot Clavulanate] Nausea Only    Lamisil [Terbinafine] Rash    Lisinopril Cough    Prevacid [Lansoprazole] Rash     flushed        Physical Exam:   General: NAD, Alert, Oriented, Appears their stated age     [de-identified]: NC/AT, PERRL    Skin: No rashes, lesions or wounds seen      Psych: normal affect      Heart: Regular Rate, Rhythm     Lungs: unlabored respirations, normal breath sounds     Abdomen: Soft and non-distended     Ortho: Pain with limited ROM of the right knee    Neuro: no focal defects, sensation is equal bilaterally     Lymph: no lymphadenopathy     Meds:   No current facility-administered medications for this encounter. Current Outpatient Prescriptions   Medication Sig    Brompheniramine-Pseudoeph-DM (BROMFED DM) 2-30-10 mg/5 mL syrup Take 5 mL by mouth every six (6) hours as needed for Other (congestion) for up to 10 days.  amLODIPine (NORVASC) 5 mg tablet Take 1 Tab by mouth daily.     benzonatate (TESSALON) 100 mg capsule Take 1 Cap by mouth every four (4) hours as needed for Cough.  albuterol (PROVENTIL HFA, VENTOLIN HFA, PROAIR HFA) 90 mcg/actuation inhaler Take 2 Puffs by inhalation every four (4) hours as needed for Wheezing or Shortness of Breath.  omeprazole (PRILOSEC) 20 mg capsule Take 1 Cap by mouth two (2) times a day.  sotalol (BETAPACE) 160 mg tablet Take 1 Tab by mouth two (2) times a day.  pravastatin (PRAVACHOL) 10 mg tablet Take 1 Tab by mouth nightly. Indications: hypercholesterolemia    furosemide (LASIX) 20 mg tablet Take 1 Tab by mouth every morning.  apixaban (ELIQUIS) 5 mg tablet TAKE 1 TABLET TWICE DAILY    budesonide-formoterol (SYMBICORT) 160-4.5 mcg/actuation HFAA Take 2 Puffs by inhalation two (2) times a day.  Medical Information ID Tag misc DISABLED PLACARD    nitroglycerin (NITROSTAT) 0.4 mg SL tablet by SubLINGual route every five (5) minutes as needed for Chest Pain.  aspirin 81 mg CpDR Take 1 Tab by mouth nightly. Indications: continue    docusate sodium (COLACE) 100 mg capsule Take 100 mg by mouth every evening.          Labs:  Hospital Outpatient Visit on 05/14/2018   Component Date Value Ref Range Status    WBC 05/14/2018 17.0* 4.3 - 11.1 K/uL Final    RBC 05/14/2018 4.56  4.05 - 5.25 M/uL Final    HGB 05/14/2018 12.6  11.7 - 15.4 g/dL Final    HCT 05/14/2018 37.7  35.8 - 46.3 % Final    MCV 05/14/2018 82.7  79.6 - 97.8 FL Final    MCH 05/14/2018 27.6  26.1 - 32.9 PG Final    MCHC 05/14/2018 33.4  31.4 - 35.0 g/dL Final    RDW 05/14/2018 13.5  11.9 - 14.6 % Final    PLATELET 42/48/4962 915  150 - 450 K/uL Final    MPV 05/14/2018 9.4* 10.8 - 14.1 FL Final    Sodium 05/14/2018 133* 136 - 145 mmol/L Final    Potassium 05/14/2018 4.0  3.5 - 5.1 mmol/L Final    Chloride 05/14/2018 96* 98 - 107 mmol/L Final    CO2 05/14/2018 24  21 - 32 mmol/L Final    Anion gap 05/14/2018 13  7 - 16 mmol/L Final    Glucose 05/14/2018 136* 65 - 100 mg/dL Final    Comment: 47 - 60 mg/dl Consistent with, but not fully diagnostic of hypoglycemia. 101 - 125 mg/dl Impaired fasting glucose/consistent with pre-diabetes mellitus  > 126 mg/dl Fasting glucose consistent with overt diabetes mellitus      BUN 05/14/2018 13  8 - 23 MG/DL Final    Creatinine 05/14/2018 1.11* 0.6 - 1.0 MG/DL Final    GFR est AA 05/14/2018 >60  >60 ml/min/1.73m2 Final    GFR est non-AA 05/14/2018 50* >60 ml/min/1.73m2 Final    Comment: (NOTE)  Estimated GFR is calculated using the Modification of Diet in Renal   Disease (MDRD) Study equation, reported for both  Americans   (GFRAA) and non- Americans (GFRNA), and normalized to 1.73m2   body surface area. The physician must decide which value applies to   the patient. The MDRD study equation should only be used in   individuals age 25 or older. It has not been validated for the   following: pregnant women, patients with serious comorbid conditions,   or on certain medications, or persons with extremes of body size,   muscle mass, or nutritional status.       Calcium 05/14/2018 9.1  8.3 - 10.4 MG/DL Final    Prothrombin time 05/14/2018 20.8* 11.5 - 14.5 sec Final    INR 05/14/2018 1.7    Final    Comment: Suggested therapeutic INR range:  Venous thrombosis and embolus  2.0-3.0  Prosthetic heart valve         2.5-3.5  ** Note new reference range and method **      aPTT 05/14/2018 42.4* 23.2 - 35.3 SEC Final    Comment: Heparin Therapeutic Range = 74 - 123 seconds  In addition to factor deficiency, monitoring heparin therapy, etc., evaluation of a prolonged aPTT result should include consideration of preanalytic variables such as heparin flush contamination, specimen integrity issues, etc.  ** Note new reference range and method **      Color 05/14/2018 YELLOW    Final    Appearance 05/14/2018 CLEAR    Final    Specific gravity 05/14/2018 1.010  1.001 - 1.023   Final    pH (UA) 05/14/2018 5.5  5.0 - 9.0   Final    Protein 05/14/2018 NEGATIVE   NEG mg/dL Final    Glucose 05/14/2018 NEGATIVE   mg/dL Final    Ketone 05/14/2018 NEGATIVE   NEG mg/dL Final    Bilirubin 05/14/2018 NEGATIVE   NEG   Final    Blood 05/14/2018 NEGATIVE   NEG   Final    Urobilinogen 05/14/2018 1.0  0.2 - 1.0 EU/dL Final    Nitrites 05/14/2018 NEGATIVE   NEG   Final    Leukocyte Esterase 05/14/2018 SMALL* NEG   Final    WBC 05/14/2018 3-5  0 /hpf Final    RBC 05/14/2018 0-3  0 /hpf Final    Epithelial cells 05/14/2018 0-3  0 /hpf Final    Bacteria 05/14/2018 0  0 /hpf Final    Casts 05/14/2018 0-3  0 /lpf Final    HYALINE    Special Requests: 05/14/2018 NO SPECIAL REQUESTS    Final    Culture result: 05/14/2018 SA target not detected. A MRSA NEGATIVE, SA NEGATIVE test result does not preclude MRSA or SA nasal colonization. Final                 Patient Active Problem List   Diagnosis Code    CAD (coronary artery disease) I25.10    Hypertension I10    GERD (gastroesophageal reflux disease) K21.9    Spinal stenosis M48.00    Peripheral neuropathy G62.9    Arthritis M19.90    Chronic pain G89.29    Obesity (BMI 30-39. 9) E66.9    Pacemaker Z95.0    S/P AVR Z95.2    Paroxysmal atrial fibrillation (HCC) I48.0    Anemia, unspecified D64.9    Atrial fibrillation [facility]) I48.91    Cardiac pacemaker Z95.0    Sick sinus syndrome (HCC) I49.5    Abnormal CT of the chest R93.8         Signed By: GATITO Ennis  May 29, 2018

## 2018-05-30 NOTE — PERIOP NOTES
Per EMR, pt contacted PCP office 5/21/18- still with s/sx. Per communication note in EMR:    Jaden Nguyen RN        5/21/18 1:01 PM   Note      Per Dr. Berry Salvage: Add Bomfed DM 6oz 1 tsp q6h prn congestion; can still use tessalon pearles and inhaler as needed. F/u if no improvement or if s/s worsen. - Notified, verbalizes understanding. Bromfed ERx.

## 2018-06-05 ENCOUNTER — ANESTHESIA EVENT (OUTPATIENT)
Dept: SURGERY | Age: 78
DRG: 470 | End: 2018-06-05
Payer: MEDICARE

## 2018-06-06 ENCOUNTER — HOSPITAL ENCOUNTER (INPATIENT)
Age: 78
LOS: 2 days | Discharge: HOME HEALTH | DRG: 470 | End: 2018-06-08
Attending: ORTHOPAEDIC SURGERY | Admitting: ORTHOPAEDIC SURGERY
Payer: MEDICARE

## 2018-06-06 ENCOUNTER — HOME HEALTH ADMISSION (OUTPATIENT)
Dept: HOME HEALTH SERVICES | Facility: HOME HEALTH | Age: 78
End: 2018-06-06
Payer: MEDICARE

## 2018-06-06 ENCOUNTER — ANESTHESIA (OUTPATIENT)
Dept: SURGERY | Age: 78
DRG: 470 | End: 2018-06-06
Payer: MEDICARE

## 2018-06-06 DIAGNOSIS — Z96.651 STATUS POST TOTAL RIGHT KNEE REPLACEMENT: Primary | ICD-10-CM

## 2018-06-06 DIAGNOSIS — I48.0 PAROXYSMAL ATRIAL FIBRILLATION (HCC): Chronic | ICD-10-CM

## 2018-06-06 DIAGNOSIS — M17.11 PRIMARY OSTEOARTHRITIS OF RIGHT KNEE: ICD-10-CM

## 2018-06-06 DIAGNOSIS — I10 ESSENTIAL HYPERTENSION: ICD-10-CM

## 2018-06-06 DIAGNOSIS — Z95.0 PACEMAKER: Chronic | ICD-10-CM

## 2018-06-06 LAB
ABO + RH BLD: NORMAL
APTT PPP: 28.5 SEC (ref 23.2–35.3)
BLOOD GROUP ANTIBODIES SERPL: NORMAL
GLUCOSE BLD STRIP.AUTO-MCNC: 108 MG/DL (ref 65–100)
HGB BLD-MCNC: 10.6 G/DL (ref 11.7–15.4)
INR BLD: 1 (ref 0.9–1.2)
PT BLD: 11.7 SECS (ref 9.6–11.6)
SPECIMEN EXP DATE BLD: NORMAL

## 2018-06-06 PROCEDURE — 94760 N-INVAS EAR/PLS OXIMETRY 1: CPT

## 2018-06-06 PROCEDURE — 74011250636 HC RX REV CODE- 250/636: Performed by: PHYSICIAN ASSISTANT

## 2018-06-06 PROCEDURE — 77030002912 HC SUT ETHBND J&J -A: Performed by: ORTHOPAEDIC SURGERY

## 2018-06-06 PROCEDURE — 97161 PT EVAL LOW COMPLEX 20 MIN: CPT

## 2018-06-06 PROCEDURE — 77030003665 HC NDL SPN BBMI -A: Performed by: ANESTHESIOLOGY

## 2018-06-06 PROCEDURE — 97116 GAIT TRAINING THERAPY: CPT

## 2018-06-06 PROCEDURE — 77030034849: Performed by: ORTHOPAEDIC SURGERY

## 2018-06-06 PROCEDURE — 76010010054 HC POST OP PAIN BLOCK: Performed by: ORTHOPAEDIC SURGERY

## 2018-06-06 PROCEDURE — 77030011640 HC PAD GRND REM COVD -A: Performed by: ORTHOPAEDIC SURGERY

## 2018-06-06 PROCEDURE — 74011000250 HC RX REV CODE- 250

## 2018-06-06 PROCEDURE — 99221 1ST HOSP IP/OBS SF/LOW 40: CPT | Performed by: PHYSICAL MEDICINE & REHABILITATION

## 2018-06-06 PROCEDURE — 74011250636 HC RX REV CODE- 250/636

## 2018-06-06 PROCEDURE — 77030006720 HC BLD PAT RMR ZIMM -B: Performed by: ORTHOPAEDIC SURGERY

## 2018-06-06 PROCEDURE — 74011250636 HC RX REV CODE- 250/636: Performed by: ORTHOPAEDIC SURGERY

## 2018-06-06 PROCEDURE — 77030003602 HC NDL NRV BLK BBMI -B: Performed by: ANESTHESIOLOGY

## 2018-06-06 PROCEDURE — 74011250636 HC RX REV CODE- 250/636: Performed by: ANESTHESIOLOGY

## 2018-06-06 PROCEDURE — 76942 ECHO GUIDE FOR BIOPSY: CPT | Performed by: ORTHOPAEDIC SURGERY

## 2018-06-06 PROCEDURE — 77030035236 HC SUT PDS STRATFX BARB J&J -B: Performed by: ORTHOPAEDIC SURGERY

## 2018-06-06 PROCEDURE — 76210000016 HC OR PH I REC 1 TO 1.5 HR: Performed by: ORTHOPAEDIC SURGERY

## 2018-06-06 PROCEDURE — 94762 N-INVAS EAR/PLS OXIMTRY CONT: CPT

## 2018-06-06 PROCEDURE — 97165 OT EVAL LOW COMPLEX 30 MIN: CPT

## 2018-06-06 PROCEDURE — 77030013727 HC IRR FAN PULSVC ZIMM -B: Performed by: ORTHOPAEDIC SURGERY

## 2018-06-06 PROCEDURE — 77030006789 HC BLD SAW OSC STRY -C: Performed by: ORTHOPAEDIC SURGERY

## 2018-06-06 PROCEDURE — 74011000250 HC RX REV CODE- 250: Performed by: ANESTHESIOLOGY

## 2018-06-06 PROCEDURE — 74011000250 HC RX REV CODE- 250: Performed by: ORTHOPAEDIC SURGERY

## 2018-06-06 PROCEDURE — 86900 BLOOD TYPING SEROLOGIC ABO: CPT | Performed by: PHYSICIAN ASSISTANT

## 2018-06-06 PROCEDURE — 77030019557 HC ELECTRD VES SEAL MEDT -F: Performed by: ORTHOPAEDIC SURGERY

## 2018-06-06 PROCEDURE — 85730 THROMBOPLASTIN TIME PARTIAL: CPT | Performed by: ORTHOPAEDIC SURGERY

## 2018-06-06 PROCEDURE — 65270000029 HC RM PRIVATE

## 2018-06-06 PROCEDURE — C1776 JOINT DEVICE (IMPLANTABLE): HCPCS | Performed by: ORTHOPAEDIC SURGERY

## 2018-06-06 PROCEDURE — 74011000258 HC RX REV CODE- 258: Performed by: ORTHOPAEDIC SURGERY

## 2018-06-06 PROCEDURE — 77030037363 HC FEM INST CR  DISP STRY -C: Performed by: ORTHOPAEDIC SURGERY

## 2018-06-06 PROCEDURE — 77030011208: Performed by: ORTHOPAEDIC SURGERY

## 2018-06-06 PROCEDURE — 76010000162 HC OR TIME 1.5 TO 2 HR INTENSV-TIER 1: Performed by: ORTHOPAEDIC SURGERY

## 2018-06-06 PROCEDURE — 77030002966 HC SUT PDS J&J -A: Performed by: ORTHOPAEDIC SURGERY

## 2018-06-06 PROCEDURE — 77030020782 HC GWN BAIR PAWS FLX 3M -B: Performed by: ANESTHESIOLOGY

## 2018-06-06 PROCEDURE — 77030025452 HC KT TIB SZR TRTH DSP STRY -B: Performed by: ORTHOPAEDIC SURGERY

## 2018-06-06 PROCEDURE — 0SRC0J9 REPLACEMENT OF RIGHT KNEE JOINT WITH SYNTHETIC SUBSTITUTE, CEMENTED, OPEN APPROACH: ICD-10-PCS | Performed by: ORTHOPAEDIC SURGERY

## 2018-06-06 PROCEDURE — 85018 HEMOGLOBIN: CPT | Performed by: PHYSICIAN ASSISTANT

## 2018-06-06 PROCEDURE — 77030013819 HC MX SYS CEM ZIMM -B: Performed by: ORTHOPAEDIC SURGERY

## 2018-06-06 PROCEDURE — 77030031139 HC SUT VCRL2 J&J -A: Performed by: ORTHOPAEDIC SURGERY

## 2018-06-06 PROCEDURE — 85610 PROTHROMBIN TIME: CPT

## 2018-06-06 PROCEDURE — 77030012935 HC DRSG AQUACEL BMS -B: Performed by: ORTHOPAEDIC SURGERY

## 2018-06-06 PROCEDURE — 77030018836 HC SOL IRR NACL ICUM -A: Performed by: ORTHOPAEDIC SURGERY

## 2018-06-06 PROCEDURE — 82962 GLUCOSE BLOOD TEST: CPT

## 2018-06-06 PROCEDURE — 76060000034 HC ANESTHESIA 1.5 TO 2 HR: Performed by: ORTHOPAEDIC SURGERY

## 2018-06-06 PROCEDURE — 74011250637 HC RX REV CODE- 250/637: Performed by: PHYSICIAN ASSISTANT

## 2018-06-06 PROCEDURE — 77030008467 HC STPLR SKN COVD -B: Performed by: ORTHOPAEDIC SURGERY

## 2018-06-06 PROCEDURE — 77030037364 HC TIB INST CR  DISP STRY -C: Performed by: ORTHOPAEDIC SURGERY

## 2018-06-06 PROCEDURE — C1713 ANCHOR/SCREW BN/BN,TIS/BN: HCPCS | Performed by: ORTHOPAEDIC SURGERY

## 2018-06-06 PROCEDURE — 77030007880 HC KT SPN EPDRL BBMI -B: Performed by: ANESTHESIOLOGY

## 2018-06-06 DEVICE — FEMORAL DISTAL FIXATION PEG
Type: IMPLANTABLE DEVICE | Site: KNEE | Status: FUNCTIONAL
Brand: TRIATHLON

## 2018-06-06 DEVICE — UNIVERSAL TIBIAL BASEPLATE
Type: IMPLANTABLE DEVICE | Site: KNEE | Status: FUNCTIONAL
Brand: TRIATHLON

## 2018-06-06 DEVICE — TIBIAL BEARING INSERT
Type: IMPLANTABLE DEVICE | Site: KNEE | Status: FUNCTIONAL
Brand: TRIATHLON

## 2018-06-06 DEVICE — POSTERIOR STABILIZED FEMORAL
Type: IMPLANTABLE DEVICE | Site: KNEE | Status: FUNCTIONAL
Brand: TRIATHLON

## 2018-06-06 DEVICE — CEMENT BNE FL 20ML MONMR 40GM -- SIMPLEX P: Type: IMPLANTABLE DEVICE | Site: KNEE | Status: FUNCTIONAL

## 2018-06-06 DEVICE — COMPNT PAT ASYM TRIATHLN 38X11 --: Type: IMPLANTABLE DEVICE | Site: KNEE | Status: FUNCTIONAL

## 2018-06-06 RX ORDER — CEFAZOLIN SODIUM/WATER 2 G/20 ML
2 SYRINGE (ML) INTRAVENOUS ONCE
Status: COMPLETED | OUTPATIENT
Start: 2018-06-06 | End: 2018-06-06

## 2018-06-06 RX ORDER — AMOXICILLIN 250 MG
2 CAPSULE ORAL DAILY
Status: DISCONTINUED | OUTPATIENT
Start: 2018-06-07 | End: 2018-06-08 | Stop reason: HOSPADM

## 2018-06-06 RX ORDER — SODIUM CHLORIDE 9 MG/ML
50 INJECTION, SOLUTION INTRAVENOUS CONTINUOUS
Status: DISCONTINUED | OUTPATIENT
Start: 2018-06-06 | End: 2018-06-06 | Stop reason: HOSPADM

## 2018-06-06 RX ORDER — ROPIVACAINE HYDROCHLORIDE 5 MG/ML
INJECTION, SOLUTION EPIDURAL; INFILTRATION; PERINEURAL AS NEEDED
Status: DISCONTINUED | OUTPATIENT
Start: 2018-06-06 | End: 2018-06-06 | Stop reason: HOSPADM

## 2018-06-06 RX ORDER — ACETAMINOPHEN 500 MG
1000 TABLET ORAL EVERY 6 HOURS
Status: DISCONTINUED | OUTPATIENT
Start: 2018-06-07 | End: 2018-06-08 | Stop reason: HOSPADM

## 2018-06-06 RX ORDER — KETOROLAC TROMETHAMINE 30 MG/ML
INJECTION, SOLUTION INTRAMUSCULAR; INTRAVENOUS AS NEEDED
Status: DISCONTINUED | OUTPATIENT
Start: 2018-06-06 | End: 2018-06-06 | Stop reason: HOSPADM

## 2018-06-06 RX ORDER — HYDROMORPHONE HYDROCHLORIDE 2 MG/ML
0.5 INJECTION, SOLUTION INTRAMUSCULAR; INTRAVENOUS; SUBCUTANEOUS
Status: DISCONTINUED | OUTPATIENT
Start: 2018-06-06 | End: 2018-06-06 | Stop reason: HOSPADM

## 2018-06-06 RX ORDER — SODIUM CHLORIDE 0.9 % (FLUSH) 0.9 %
5-10 SYRINGE (ML) INJECTION EVERY 8 HOURS
Status: DISCONTINUED | OUTPATIENT
Start: 2018-06-06 | End: 2018-06-08 | Stop reason: HOSPADM

## 2018-06-06 RX ORDER — DIPHENHYDRAMINE HCL 25 MG
25 CAPSULE ORAL
Status: DISCONTINUED | OUTPATIENT
Start: 2018-06-06 | End: 2018-06-08 | Stop reason: HOSPADM

## 2018-06-06 RX ORDER — ONDANSETRON 2 MG/ML
INJECTION INTRAMUSCULAR; INTRAVENOUS AS NEEDED
Status: DISCONTINUED | OUTPATIENT
Start: 2018-06-06 | End: 2018-06-06 | Stop reason: HOSPADM

## 2018-06-06 RX ORDER — FAMOTIDINE 20 MG/1
20 TABLET, FILM COATED ORAL ONCE
Status: DISCONTINUED | OUTPATIENT
Start: 2018-06-06 | End: 2018-06-06 | Stop reason: HOSPADM

## 2018-06-06 RX ORDER — NITROGLYCERIN 0.4 MG/1
0.4 TABLET SUBLINGUAL
Status: DISCONTINUED | OUTPATIENT
Start: 2018-06-06 | End: 2018-06-08 | Stop reason: HOSPADM

## 2018-06-06 RX ORDER — MIDAZOLAM HYDROCHLORIDE 1 MG/ML
2 INJECTION, SOLUTION INTRAMUSCULAR; INTRAVENOUS
Status: DISCONTINUED | OUTPATIENT
Start: 2018-06-06 | End: 2018-06-06 | Stop reason: HOSPADM

## 2018-06-06 RX ORDER — OXYCODONE HYDROCHLORIDE 5 MG/1
5 TABLET ORAL
Status: DISCONTINUED | OUTPATIENT
Start: 2018-06-06 | End: 2018-06-06 | Stop reason: HOSPADM

## 2018-06-06 RX ORDER — SOTALOL HYDROCHLORIDE 80 MG/1
160 TABLET ORAL 2 TIMES DAILY
Status: DISCONTINUED | OUTPATIENT
Start: 2018-06-06 | End: 2018-06-08 | Stop reason: HOSPADM

## 2018-06-06 RX ORDER — DEXAMETHASONE SODIUM PHOSPHATE 10 MG/ML
INJECTION INTRAMUSCULAR; INTRAVENOUS AS NEEDED
Status: DISCONTINUED | OUTPATIENT
Start: 2018-06-06 | End: 2018-06-06 | Stop reason: HOSPADM

## 2018-06-06 RX ORDER — HYDROMORPHONE HYDROCHLORIDE 2 MG/1
2 TABLET ORAL
Qty: 40 TAB | Refills: 0 | Status: SHIPPED | OUTPATIENT
Start: 2018-06-06 | End: 2018-06-08

## 2018-06-06 RX ORDER — LIDOCAINE HYDROCHLORIDE 10 MG/ML
0.1 INJECTION INFILTRATION; PERINEURAL AS NEEDED
Status: DISCONTINUED | OUTPATIENT
Start: 2018-06-06 | End: 2018-06-06 | Stop reason: HOSPADM

## 2018-06-06 RX ORDER — DOCUSATE SODIUM 100 MG/1
100 CAPSULE, LIQUID FILLED ORAL EVERY EVENING
Status: DISCONTINUED | OUTPATIENT
Start: 2018-06-06 | End: 2018-06-08 | Stop reason: HOSPADM

## 2018-06-06 RX ORDER — HYDROMORPHONE HYDROCHLORIDE 2 MG/1
2 TABLET ORAL
Status: DISCONTINUED | OUTPATIENT
Start: 2018-06-06 | End: 2018-06-07

## 2018-06-06 RX ORDER — NALOXONE HYDROCHLORIDE 0.4 MG/ML
.2-.4 INJECTION, SOLUTION INTRAMUSCULAR; INTRAVENOUS; SUBCUTANEOUS
Status: DISCONTINUED | OUTPATIENT
Start: 2018-06-06 | End: 2018-06-08 | Stop reason: HOSPADM

## 2018-06-06 RX ORDER — AMLODIPINE BESYLATE 5 MG/1
5 TABLET ORAL DAILY
Status: DISCONTINUED | OUTPATIENT
Start: 2018-06-07 | End: 2018-06-08 | Stop reason: HOSPADM

## 2018-06-06 RX ORDER — HYDROMORPHONE HYDROCHLORIDE 1 MG/ML
1 INJECTION, SOLUTION INTRAMUSCULAR; INTRAVENOUS; SUBCUTANEOUS
Status: DISCONTINUED | OUTPATIENT
Start: 2018-06-06 | End: 2018-06-07

## 2018-06-06 RX ORDER — SODIUM CHLORIDE, SODIUM LACTATE, POTASSIUM CHLORIDE, CALCIUM CHLORIDE 600; 310; 30; 20 MG/100ML; MG/100ML; MG/100ML; MG/100ML
100 INJECTION, SOLUTION INTRAVENOUS CONTINUOUS
Status: DISCONTINUED | OUTPATIENT
Start: 2018-06-06 | End: 2018-06-06 | Stop reason: HOSPADM

## 2018-06-06 RX ORDER — DIPHENHYDRAMINE HYDROCHLORIDE 50 MG/ML
12.5 INJECTION, SOLUTION INTRAMUSCULAR; INTRAVENOUS ONCE
Status: DISCONTINUED | OUTPATIENT
Start: 2018-06-06 | End: 2018-06-06 | Stop reason: HOSPADM

## 2018-06-06 RX ORDER — ALBUTEROL SULFATE 0.83 MG/ML
2.5 SOLUTION RESPIRATORY (INHALATION)
Status: DISCONTINUED | OUTPATIENT
Start: 2018-06-06 | End: 2018-06-08 | Stop reason: HOSPADM

## 2018-06-06 RX ORDER — SODIUM CHLORIDE 0.9 % (FLUSH) 0.9 %
5-10 SYRINGE (ML) INJECTION AS NEEDED
Status: DISCONTINUED | OUTPATIENT
Start: 2018-06-06 | End: 2018-06-06 | Stop reason: HOSPADM

## 2018-06-06 RX ORDER — PROPOFOL 10 MG/ML
INJECTION, EMULSION INTRAVENOUS
Status: DISCONTINUED | OUTPATIENT
Start: 2018-06-06 | End: 2018-06-06 | Stop reason: HOSPADM

## 2018-06-06 RX ORDER — HYDRALAZINE HYDROCHLORIDE 20 MG/ML
10 INJECTION INTRAMUSCULAR; INTRAVENOUS
Status: DISCONTINUED | OUTPATIENT
Start: 2018-06-06 | End: 2018-06-08 | Stop reason: HOSPADM

## 2018-06-06 RX ORDER — DEXAMETHASONE SODIUM PHOSPHATE 100 MG/10ML
10 INJECTION INTRAMUSCULAR; INTRAVENOUS ONCE
Status: ACTIVE | OUTPATIENT
Start: 2018-06-07 | End: 2018-06-08

## 2018-06-06 RX ORDER — OXYCODONE AND ACETAMINOPHEN 5; 325 MG/1; MG/1
1 TABLET ORAL AS NEEDED
Status: DISCONTINUED | OUTPATIENT
Start: 2018-06-06 | End: 2018-06-06 | Stop reason: HOSPADM

## 2018-06-06 RX ORDER — SODIUM CHLORIDE 0.9 % (FLUSH) 0.9 %
5-10 SYRINGE (ML) INJECTION EVERY 8 HOURS
Status: DISCONTINUED | OUTPATIENT
Start: 2018-06-06 | End: 2018-06-06 | Stop reason: HOSPADM

## 2018-06-06 RX ORDER — ALBUTEROL SULFATE 2.5 MG/.5ML
2.5 SOLUTION RESPIRATORY (INHALATION)
Status: DISCONTINUED | OUTPATIENT
Start: 2018-06-06 | End: 2018-06-06

## 2018-06-06 RX ORDER — ACETAMINOPHEN 10 MG/ML
1000 INJECTION, SOLUTION INTRAVENOUS ONCE
Status: COMPLETED | OUTPATIENT
Start: 2018-06-06 | End: 2018-06-06

## 2018-06-06 RX ORDER — BENZONATATE 100 MG/1
100 CAPSULE ORAL
Status: DISCONTINUED | OUTPATIENT
Start: 2018-06-06 | End: 2018-06-08 | Stop reason: HOSPADM

## 2018-06-06 RX ORDER — ASPIRIN 81 MG/1
81 TABLET ORAL DAILY
Status: COMPLETED | OUTPATIENT
Start: 2018-06-06 | End: 2018-06-06

## 2018-06-06 RX ORDER — ALBUTEROL SULFATE 90 UG/1
2 AEROSOL, METERED RESPIRATORY (INHALATION)
Status: DISCONTINUED | OUTPATIENT
Start: 2018-06-06 | End: 2018-06-08 | Stop reason: HOSPADM

## 2018-06-06 RX ORDER — ROPIVACAINE HYDROCHLORIDE 2 MG/ML
INJECTION, SOLUTION EPIDURAL; INFILTRATION; PERINEURAL AS NEEDED
Status: DISCONTINUED | OUTPATIENT
Start: 2018-06-06 | End: 2018-06-06 | Stop reason: HOSPADM

## 2018-06-06 RX ORDER — SODIUM CHLORIDE 0.9 % (FLUSH) 0.9 %
5-10 SYRINGE (ML) INJECTION AS NEEDED
Status: DISCONTINUED | OUTPATIENT
Start: 2018-06-06 | End: 2018-06-08 | Stop reason: HOSPADM

## 2018-06-06 RX ORDER — BUPIVACAINE HYDROCHLORIDE 7.5 MG/ML
INJECTION, SOLUTION INTRASPINAL AS NEEDED
Status: DISCONTINUED | OUTPATIENT
Start: 2018-06-06 | End: 2018-06-06 | Stop reason: HOSPADM

## 2018-06-06 RX ORDER — SODIUM CHLORIDE 9 MG/ML
100 INJECTION, SOLUTION INTRAVENOUS CONTINUOUS
Status: DISPENSED | OUTPATIENT
Start: 2018-06-06 | End: 2018-06-08

## 2018-06-06 RX ORDER — PANTOPRAZOLE SODIUM 40 MG/1
40 TABLET, DELAYED RELEASE ORAL
Status: DISCONTINUED | OUTPATIENT
Start: 2018-06-07 | End: 2018-06-08 | Stop reason: HOSPADM

## 2018-06-06 RX ORDER — FENTANYL CITRATE 50 UG/ML
25 INJECTION, SOLUTION INTRAMUSCULAR; INTRAVENOUS ONCE
Status: COMPLETED | OUTPATIENT
Start: 2018-06-06 | End: 2018-06-06

## 2018-06-06 RX ORDER — FUROSEMIDE 20 MG/1
20 TABLET ORAL DAILY
Status: DISCONTINUED | OUTPATIENT
Start: 2018-06-07 | End: 2018-06-08 | Stop reason: HOSPADM

## 2018-06-06 RX ORDER — ONDANSETRON 2 MG/ML
4 INJECTION INTRAMUSCULAR; INTRAVENOUS
Status: DISCONTINUED | OUTPATIENT
Start: 2018-06-06 | End: 2018-06-08 | Stop reason: HOSPADM

## 2018-06-06 RX ORDER — EPHEDRINE SULFATE 50 MG/ML
INJECTION, SOLUTION INTRAVENOUS AS NEEDED
Status: DISCONTINUED | OUTPATIENT
Start: 2018-06-06 | End: 2018-06-06 | Stop reason: HOSPADM

## 2018-06-06 RX ORDER — NEOMYCIN AND POLYMYXIN B SULFATES 40; 200000 MG/ML; [USP'U]/ML
SOLUTION IRRIGATION AS NEEDED
Status: DISCONTINUED | OUTPATIENT
Start: 2018-06-06 | End: 2018-06-06 | Stop reason: HOSPADM

## 2018-06-06 RX ORDER — CEFAZOLIN SODIUM/WATER 2 G/20 ML
2 SYRINGE (ML) INTRAVENOUS EVERY 8 HOURS
Status: COMPLETED | OUTPATIENT
Start: 2018-06-06 | End: 2018-06-07

## 2018-06-06 RX ORDER — BUDESONIDE 0.5 MG/2ML
2 INHALANT ORAL
Status: DISCONTINUED | OUTPATIENT
Start: 2018-06-06 | End: 2018-06-06 | Stop reason: ALTCHOICE

## 2018-06-06 RX ORDER — FLUTICASONE PROPIONATE AND SALMETEROL 250; 50 UG/1; UG/1
1 POWDER RESPIRATORY (INHALATION)
Status: DISCONTINUED | OUTPATIENT
Start: 2018-06-06 | End: 2018-06-08 | Stop reason: HOSPADM

## 2018-06-06 RX ORDER — MIDAZOLAM HYDROCHLORIDE 1 MG/ML
2 INJECTION, SOLUTION INTRAMUSCULAR; INTRAVENOUS ONCE
Status: COMPLETED | OUTPATIENT
Start: 2018-06-06 | End: 2018-06-06

## 2018-06-06 RX ADMIN — EPHEDRINE SULFATE 5 MG: 50 INJECTION, SOLUTION INTRAVENOUS at 10:32

## 2018-06-06 RX ADMIN — EPHEDRINE SULFATE 5 MG: 50 INJECTION, SOLUTION INTRAVENOUS at 10:38

## 2018-06-06 RX ADMIN — SODIUM CHLORIDE 100 ML/HR: 900 INJECTION, SOLUTION INTRAVENOUS at 14:27

## 2018-06-06 RX ADMIN — EPHEDRINE SULFATE 10 MG: 50 INJECTION, SOLUTION INTRAVENOUS at 11:30

## 2018-06-06 RX ADMIN — HYDROMORPHONE HYDROCHLORIDE 2 MG: 2 TABLET ORAL at 22:51

## 2018-06-06 RX ADMIN — SODIUM CHLORIDE, SODIUM LACTATE, POTASSIUM CHLORIDE, AND CALCIUM CHLORIDE: 600; 310; 30; 20 INJECTION, SOLUTION INTRAVENOUS at 09:01

## 2018-06-06 RX ADMIN — EPHEDRINE SULFATE 5 MG: 50 INJECTION, SOLUTION INTRAVENOUS at 11:07

## 2018-06-06 RX ADMIN — Medication 2 G: at 10:10

## 2018-06-06 RX ADMIN — DEXAMETHASONE SODIUM PHOSPHATE 5 MG: 10 INJECTION INTRAMUSCULAR; INTRAVENOUS at 09:45

## 2018-06-06 RX ADMIN — MIDAZOLAM HYDROCHLORIDE 1 MG: 1 INJECTION, SOLUTION INTRAMUSCULAR; INTRAVENOUS at 09:43

## 2018-06-06 RX ADMIN — LIDOCAINE HYDROCHLORIDE 0.1 ML: 10 INJECTION, SOLUTION INFILTRATION; PERINEURAL at 07:36

## 2018-06-06 RX ADMIN — ACETAMINOPHEN 1000 MG: 500 TABLET, FILM COATED ORAL at 22:52

## 2018-06-06 RX ADMIN — Medication 2 G: at 16:26

## 2018-06-06 RX ADMIN — ACETAMINOPHEN 1000 MG: 10 INJECTION, SOLUTION INTRAVENOUS at 16:25

## 2018-06-06 RX ADMIN — FLUTICASONE PROPIONATE AND SALMETEROL 1 PUFF: 250; 50 POWDER RESPIRATORY (INHALATION) at 20:00

## 2018-06-06 RX ADMIN — DOCUSATE SODIUM 100 MG: 100 CAPSULE, LIQUID FILLED ORAL at 16:25

## 2018-06-06 RX ADMIN — ASPIRIN 81 MG: 81 TABLET, COATED ORAL at 20:42

## 2018-06-06 RX ADMIN — SODIUM CHLORIDE, SODIUM LACTATE, POTASSIUM CHLORIDE, AND CALCIUM CHLORIDE: 600; 310; 30; 20 INJECTION, SOLUTION INTRAVENOUS at 10:31

## 2018-06-06 RX ADMIN — EPHEDRINE SULFATE 5 MG: 50 INJECTION, SOLUTION INTRAVENOUS at 10:43

## 2018-06-06 RX ADMIN — EPHEDRINE SULFATE 5 MG: 50 INJECTION, SOLUTION INTRAVENOUS at 10:48

## 2018-06-06 RX ADMIN — EPHEDRINE SULFATE 5 MG: 50 INJECTION, SOLUTION INTRAVENOUS at 10:18

## 2018-06-06 RX ADMIN — EPHEDRINE SULFATE 5 MG: 50 INJECTION, SOLUTION INTRAVENOUS at 10:26

## 2018-06-06 RX ADMIN — ONDANSETRON 4 MG: 2 INJECTION INTRAMUSCULAR; INTRAVENOUS at 11:47

## 2018-06-06 RX ADMIN — PROPOFOL 75 MCG/KG/MIN: 10 INJECTION, EMULSION INTRAVENOUS at 10:25

## 2018-06-06 RX ADMIN — BUPIVACAINE HYDROCHLORIDE 1.8 ML: 7.5 INJECTION, SOLUTION INTRASPINAL at 10:16

## 2018-06-06 RX ADMIN — EPHEDRINE SULFATE 5 MG: 50 INJECTION, SOLUTION INTRAVENOUS at 10:20

## 2018-06-06 RX ADMIN — SOTALOL HYDROCHLORIDE 160 MG: 80 TABLET ORAL at 20:42

## 2018-06-06 RX ADMIN — ROPIVACAINE HYDROCHLORIDE 10 ML: 5 INJECTION, SOLUTION EPIDURAL; INFILTRATION; PERINEURAL at 09:45

## 2018-06-06 RX ADMIN — FENTANYL CITRATE 25 MCG: 50 INJECTION INTRAMUSCULAR; INTRAVENOUS at 09:43

## 2018-06-06 RX ADMIN — SODIUM CHLORIDE, SODIUM LACTATE, POTASSIUM CHLORIDE, AND CALCIUM CHLORIDE 100 ML/HR: 600; 310; 30; 20 INJECTION, SOLUTION INTRAVENOUS at 07:24

## 2018-06-06 NOTE — PERIOP NOTES
TRANSFER - OUT REPORT:    Verbal report given to [facility], RN on Cris Grijalva  being transferred to [facility] for routine post - op       Report consisted of patients Situation, Background, Assessment and   Recommendations(SBAR). Information from the following report(s) OR Summary, Procedure Summary, Intake/Output and MAR was reviewed with the receiving nurse. Opportunity for questions and clarification was provided.       Patient transported with:   O2 @ 2 liters

## 2018-06-06 NOTE — PERIOP NOTES
TRANSFER - OUT REPORT:    Verbal report given to Pia Pablo on Debby Frausto  being transferred to [facility] for routine progression of care       Report consisted of patients Situation, Background, Assessment and   Recommendations(SBAR). Information from the following report(s) Kardex, MAR and Recent Results was reviewed with the receiving nurse. Lines:   Peripheral IV 06/06/18 Left Wrist (Active)   Site Assessment Clean, dry, & intact 6/6/2018  7:22 AM   Phlebitis Assessment 0 6/6/2018  7:22 AM   Infiltration Assessment 0 6/6/2018  7:22 AM   Dressing Status Clean, dry, & intact 6/6/2018  7:22 AM   Dressing Type Transparent;Tape 6/6/2018  7:22 AM   Hub Color/Line Status Pink;Patent; Infusing 6/6/2018  7:22 AM   Action Taken Blood drawn 6/6/2018  7:22 AM        Opportunity for questions and clarification was provided.       Patient transported with:   [employer]

## 2018-06-06 NOTE — PROGRESS NOTES
Problem: Self Care Deficits Care Plan (Adult)  Goal: *Acute Goals and Plan of Care (Insert Text)  GOALS:   DISCHARGE GOALS (in preparation for going home/rehab):  3 days  1. Ms. Rey Amaya will perform one lower body dressing activity with minimal assistance required to demonstrate improved functional mobility and safety. 2.  Ms. Rey Amaya will perform one lower body bathing activity with minimal assistance required to demonstrate improved functional mobility and safety. 3.  Ms. Rey Amaya will perform toileting/toilet transfer with contact guard assistance to demonstrate improved functional mobility and safety. 4.  Ms. Rey Amaya will perform shower transfer with contact guard assistance to demonstrate improved functional mobility and safety. JOINT CAMP OCCUPATIONAL THERAPY TKA: Initial Assessment and PM 6/6/2018  INPATIENT: Hospital Day: 1  Payor: Laura Palma / Plan: [facility] HUMANA MEDICARE CHOICE PPO/PFFS / Product Type: Managed Care Medicare /      NAME/AGE/GENDER: Juan Hargrove is a [de-identified] y.o. female   PRIMARY DIAGNOSIS:  Primary osteoarthritis of right knee [M17.11]   Procedure(s) and Anesthesia Type:     * RIGHT KNEE ARTHROPLASTY TOTAL - Spinal (Right)  ICD-10: Treatment Diagnosis:    · Pain in Right Knee (M25.561)  · Stiffness of Right Knee, Not elsewhere classified (M25.661)  · Generalized Muscle Weakness (M62.81)  · Other lack of cordination (R27.8)      ASSESSMENT:     Ms. Rey Amaya is s/p right TKA and presents with decreased weight bearing on right LE and decreased independence with functional mobility and activities of daily living as compared to baseline level of function and safety. Patient would benefit from skilled Occupational Therapy to maximize independence and safety with self-care task and functional mobility.   Pt would also benefit from education on adaptive equipment and safety precautions in preparation for going home or for recommendations for post-hospital rehab program.  Patient plans for further rehab at home with home health services and good family support . OT reviewed therapy schedule and plan of care with patient. Patient was able to transfer and preform self care skills as charted below. Patient instructed to call for assistance when needing to get up from the bed and all needs in reach. Patient verbalized understanding of call light. This section established at most recent assessment   PROBLEM LIST (Impairments causing functional limitations):  1. Decreased Strength  2. Decreased ADL/Functional Activities  3. Decreased Transfer Abilities  4. Increased Pain  5. Increased Fatigue  6. Decreased Flexibility/Joint Mobility  7. Decreased Knowledge of Precautions   INTERVENTIONS PLANNED: (Benefits and precautions of occupational therapy have been discussed with the patient.)  1. Activities of daily living training  2. Adaptive equipment training  3. Balance training  4. Clothing management  5. Donning&doffing training  6. Theraputic activity     TREATMENT PLAN: Frequency/Duration: Follow patient 1x to address above goals. Rehabilitation Potential For Stated Goals: Excellent     RECOMMENDED REHABILITATION/EQUIPMENT: (at time of discharge pending progress): Continue Skilled Therapy and Home Health: Physical Therapy. OCCUPATIONAL PROFILE AND HISTORY:   History of Present Injury/Illness (Reason for Referral): Pt presents this date s/p (Right) TKA. Past Medical History/Comorbidities:   Ms. Dewanda Scheuermann  has a past medical history of Adverse effect of anesthesia; NILA (acute kidney injury) ([facility] [address] (06/08/2013); Allergic rhinitis; Anemia, unspecified (8/14/2015); Aortic stenosis; Atrial fibrillation ([facility] [address] (09/30/2015); Bell's palsy (2013); Blurry vision, bilateral (6/8/2013); Cardiac pacemaker; Constipation; Critical aortic valve stenosis (8/14/2015); GERD (gastroesophageal reflux disease); Hypertension; Hyponatremia (9/7/2013); Metabolic encephalopathy (7/0/9347); Neuropathy; Osteoarthritis;  Pancreatic cyst; Paroxysmal atrial fibrillation ([facility] [address] (8/14/2015); Pulmonary nodule; Sick sinus syndrome (HCC); SOB (shortness of breath) on exertion (2015); Spinal stenosis, lumbar; 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 Ms. Alyx Butler  has a past surgical history that includes hx hysterectomy (1994); hx cholecystectomy (2000); hx tonsillectomy (1951); hx colonoscopy (April 2013); hx pacemaker (9/24/14); hx endoscopy; hx heart catheterization (2013, 2015); and hx aortic valve replacement (8/2015). Social History/Living Environment:   Home Environment: Private residence  # Steps to Enter: 0  One/Two Story Residence: One story  Living Alone: No  Support Systems: Family member(s)  Patient Expects to be Discharged to[de-identified] Private residence  Current DME Used/Available at Home: Raised toilet seat  Tub or Shower Type: Tub/Shower combination  Prior Level of Function/Work/Activity:  Indep with self care and functional mobility     Number of Personal Factors/Comorbidities that affect the Plan of Care: Brief history (0):  LOW COMPLEXITY   ASSESSMENT OF OCCUPATIONAL PERFORMANCE[de-identified]   Most Recent Physical Functioning:                        Coordination  Fine Motor Skills-Upper: Left Intact; Right Intact  Gross Motor Skills-Upper: Left Intact; Right Intact         Mental Status  Neurologic State: Alert  Orientation Level: Oriented X4  Cognition: Appropriate decision making; Appropriate for age attention/concentration; Appropriate safety awareness; Follows commands  Perception: Appears intact  Perseveration: No perseveration noted  Safety/Judgement: Awareness of environment; Fall prevention                Basic ADLs (From Assessment) Complex ADLs (From Assessment)   Basic ADL  Feeding: Supervision  Oral Facial Hygiene/Grooming: Supervision  Bathing: Moderate assistance  Upper Body Dressing: Minimum assistance  Lower Body Dressing: Moderate assistance  Toileting:  Total assistance     Grooming/Bathing/Dressing Activities of Daily Living     Cognitive Retraining  Safety/Judgement: Awareness of environment; Fall prevention                 Functional Transfers  Toilet Transfer : Minimum assistance  Shower Transfer: Minimum assistance     Bed/Mat Mobility  Supine to Sit: Contact guard assistance  Sit to Stand: Minimum assistance  Bed to Chair: Minimum assistance         Physical Skills Involved:  1. Range of Motion  2. Balance  3. Strength Cognitive Skills Affected (resulting in the inability to perform in a timely and safe manner):  1. None Psychosocial Skills Affected:  1. None   Number of elements that affect the Plan of Care: 1-3:  LOW COMPLEXITY   CLINICAL DECISION MAKIN[address] [place] [address] AM-PAC 6 Clicks   Daily Activity Inpatient Short Form  How much help from another person does the patient currently need. .. Total A Lot A Little None   1. Putting on and taking off regular lower body clothing? [] 1   [x] 2   [] 3   [] 4   2. Bathing (including washing, rinsing, drying)? [] 1   [x] 2   [] 3   [] 4   3. Toileting, which includes using toilet, bedpan or urinal?   [] 1   [x] 2   [] 3   [] 4   4. Putting on and taking off regular upper body clothing? [] 1   [] 2   [x] 3   [] 4   5. Taking care of personal grooming such as brushing teeth? [] 1   [] 2   [x] 3   [] 4   6. Eating meals? [] 1   [] 2   [] 3   [x] 4   © , Trustees of [address] [place] [address], under license to [employer]. All rights reserved     Score:  Initial: 16 Most Recent: X (Date: -- )    Interpretation of Tool:  Represents activities that are increasingly more difficult (i.e. Bed mobility, Transfers, Gait). Score 24 23 22-20 19-15 14-10 9-7 6     Modifier CH CI CJ CK CL CM CN      ?  Self Care:     - CURRENT STATUS: CK - 40%-59% impaired, limited or restricted    - GOAL STATUS: CJ - 20%-39% impaired, limited or restricted    - D/C STATUS:  ---------------To be determined---------------  Payor: Lilian Hairston / Plan: [facility] HUMANA MEDICARE CHOICE PPO/PFFS / Product Type: Managed Care Medicare /      Medical Necessity:     · Patient is expected to demonstrate progress in balance, coordination and functional technique to decrease assistance required with self care and functional mobility. Reason for Services/Other Comments:  · Patient continues to require skilled intervention due to decreased self care and functional mobility. Use of outcome tool(s) and clinical judgement create a POC that gives a: LOW COMPLEXITY            TREATMENT:   (In addition to Assessment/Re-Assessment sessions the following treatments were rendered)     Pre-treatment Symptoms/Complaints:  None  Pain: Initial:   Pain Intensity 1: 2  Pain Location 1: Knee  Pain Orientation 1: Right  Pain Intervention(s) 1: Repositioned  Post Session:  5     Assessment/Reassessment only, no treatment provided today    Treatment/Session Assessment:     Response to Treatment:  Tolerated well. Education:  [] Home Exercises  [x] Fall Precautions  [] Hip Precautions [] Going Home Video  [x] Knee/Hip Prosthesis Review  [x] Walker Management/Safety [x] Adaptive Equipment as Needed       Interdisciplinary Collaboration:   o Physical Therapist  o Occupational Therapist  o Registered Nurse    After treatment position/precautions:   o Up in chair  o Bed/Chair-wheels locked  o Caregiver at bedside  o Call light within reach  o RN notified  o Family at bedside     Compliance with Program/Exercises: compliant all of the time. Recommendations/Intent for next treatment session:  Treatment next visit will focus on increasing Ms. Tracey Damon independence with bed mobility, transfers, self care, functional mobility, modalities for pain, and patient education.       Total Treatment Duration:  OT Patient Time In/Time Out  Time In: 1410  [facility]: [facility] [place]

## 2018-06-06 NOTE — PERIOP NOTES
Betadine lavage: 17.5cc of betadine lot # [id number], exp. Date 06/19,  in 500cc of . 9NS Lot # [id number], exp.  Date : 1mar2021

## 2018-06-06 NOTE — PROGRESS NOTES
Spiritual Care initial visit made by [facility]. Prayer and support given. Leonides elliott. YIFAN Duff. Div  / Bereavement Coordinator

## 2018-06-06 NOTE — ANESTHESIA PROCEDURE NOTES
Spinal Block    Start time: 6/6/2018 10:11 AM  End time: 6/6/2018 10:16 AM  Performed by: Germania Corral  Authorized by: Germania Corral     Pre-procedure:   Indications: at surgeon's request and primary anesthetic  Preanesthetic Checklist: patient identified, risks and benefits discussed, anesthesia consent, patient being monitored and timeout performed    Timeout Time: 10:11          Spinal Block:   Patient Position:  Seated  Prep Region:  Lumbar  Prep: chlorhexidine      Location:  L3-4  Technique:  Single shot  Local:  Lidocaine 1%  Local Dose (mL):  5    Needle:   Needle Type:  Pencan  Needle Gauge:  25 G  Attempts:  2      Events: CSF confirmed, no blood with aspiration and no paresthesia        Assessment:  Insertion:  Uncomplicated  Patient tolerance:  Patient tolerated the procedure well with no immediate complications  Unsuccessful midline, successful right paramedian first attempt

## 2018-06-06 NOTE — PROGRESS NOTES
[facility] - INPATIENT  Face to Face Encounter    Patients Name: Elvia Reagan    YOB: 1940    Ordering Physician: Glo Flowers    Primary Diagnosis: Primary osteoarthritis of right knee [M17.11] S/P R TKA    Date of Face to Face:   6/6/2018                                  Face to Face Encounter findings are related to primary reason for home care:   yes. 1. I certify that the patient needs intermittent care as follows: physical therapy: strengthening, stretching/ROM, transfer training, gait/stair training, balance training and pt/caregiver education    2. I certify that this patient is homebound, that is: 1) patient requires the use of a walker device, special transportation, or assistance of another to leave the home; or 2) patient's condition makes leaving the home medically contraindicated; and 3) patient has a normal inability to leave the home and leaving the home requires considerable and taxing effort. Patient may leave the home for infrequent and short duration for medical reasons, and occasional absences for non-medical reasons. Homebound status is due to the following functional limitations: Patient's ambulation limited secondary to severe pain and requires the use of an assistive device and the assistance of a caregiver for safe completion. Patient with strength and ROM deficits limiting ambulation endurance requiring the use of an assistive device and the assistance of a caregiver. Patient deemed temporarily homebound secondary to increased risk for infection when leaving home and going out into the community. 3. I certify that this patient is under my care and that I, or a nurse practitioner or  [id number], or clinical nurse specialist, or certified nurse midwife, working with me, had a Face-to-Face Encounter that meets the physician Face-to-Face Encounter requirements.   The following are the clinical findings from the [address] [place] [address] encounter that support the need for skilled services and is a summary of the encounter:     See [address]  6/6/2018      THE FOLLOWING TO BE COMPLETED BY THE COMMUNITY PHYSICIAN:    I concur with the findings described above from the F2F encounter that this patient is homebound and in need of a skilled service.     Certifying Physician: _____________________________________      Printed Certifying Physician Name: _____________________________________    Date: _________________

## 2018-06-06 NOTE — CONSULTS
Physical Medicine & Rehabilitation Note-consult    Patient: Deepali Coats MRN: [id number]  SSN: [id number]    YOB: 1940  Age: [de-identified] y.o. Sex: female      Admit Date: 6/6/2018  Admitting Physician: Radha Wang MD    Medical Decision Making/Plan/Recommend: Gait impairment s/p right total knee arthroplasty. Post op PT/OT report no major barriers. Progress is steady. Continue PT, OT to focus on ROM, strengthening, mobility, transfers, gait training. Continued rehab at home via [facility] PT would be reasonable. She plans to discharge to niece's home temporarily. Chief Complaint : Gait dysfunction secondary to below. Admit Diagnosis: Primary osteoarthritis of right knee [M17.11]  right total knee arthroplasty  6/6/2018  Pain  DVT risk  Post op hemorrhagic anemia  Hypertension ()  Pacemaker (8/13/2015)  Paroxysmal atrial fibrillation ([address] (8/14/2015)      Overview: S/p MAZE  Osteoarthritis  Acute Rehab Dx:  Gait impairment  Mobility and ambulation deficits  Self Care/ADL deficits    Medical Dx:  Past Medical History:   Diagnosis Date    Adverse effect of anesthesia     delayed awakening    NILA (acute kidney injury) ([address] 06/08/2013    pt reports after TIA    Allergic rhinitis     has inhaler daily (pt denies asthma)    Anemia, unspecified 8/14/2015    Aortic stenosis     sp AVR 2015    Atrial fibrillation ([address] 09/30/2015    s/p MAZE     Bell's palsy 2013    Blurry vision, bilateral 6/8/2013    Cardiac pacemaker     Biotronik MRI compatible (dual chamber)     Constipation     Critical aortic valve stenosis 8/14/2015    8/13/15 (Dr Sudha Will) 1. Left and right sided maze. Please note that the left pulmonary veins were not done due to difficult anatomy. 2. Clipping, left atrial appendage. 3. Aortic valve replacement with a 23 mm Magna Ease Goyo-Oneill pericardial valve.     GERD (gastroesophageal reflux disease)     managed with medication    Hypertension     Hyponatremia 9/0/3692    Metabolic encephalopathy 0/1/0009    Neuropathy     Osteoarthritis     Pancreatic cyst     Paroxysmal atrial fibrillation (HCC) 8/14/2015    Pulmonary nodule     benign per pulm note (1/2018)    Sick sinus syndrome (HCC)     SOB (shortness of breath) on exertion 2015    cannot climb flight of stairs or walk to mailbox- s/p AVR and pacemaker (pt reports no problems at this time 5/2018)    Spinal stenosis, lumbar     Status post total right knee replacement 6/6/2018    Syncope and collapse 2015    TIA (transient ischemic attack) 2013    pt reports bells palsy started at same time     Subjective:     Date of Evaluation:  June 7, 2018    HPI: Long Phillips is a [de-identified] y.o. female patient at [address] [place] [address] who was admitted on 6/6/2018  by Bradly Duane., MD with below mentioned medical history, is being seen for Physical Medicine and Rehabilitation consult. Long Phillips  underwent a right total knee arthroplasty per Dr. Bradly Duane., MD on 6/6/2018 due to severe joint p[ain and debility. The patient's post operative course has been medically uncomplicated. Long Phillips is seen and examined today. Medical Records reviewed. Patient is made WBAT RLE. She is starting to mobilize with post op acute PT and OT and is making steady functional gains but still shows significant functional deficits due to right knee pain, decreased ROM and strength. Pt denies any history of DVT/PE. Patient denies any other pre morbid functional deficits. Patient has been independent with ambulation, prior to admission, limited by right knee pain.         Current Level of Function:   bed mobility - CGA, transfers - CGA, decreased balance , ambulation - 61' with RW and CGA    Prior Level of Function/Work/Activity:  Independent with ambulation    Family History   Problem Relation Age of Onset    Diabetes Mother     Heart Disease Mother     Hypertension Mother     Diabetes Sister    Deborah Penny Hypertension Sister     Heart Disease Sister     Diabetes Brother     Cancer Brother      pancreatic cancer    Heart Disease Father     Diabetes Son     Diabetes Son       Social History   Substance Use Topics    Smoking status: Never Smoker    Smokeless tobacco: Never Used    Alcohol use No     Past Surgical History:   Procedure Laterality Date    HX AORTIC VALVE REPLACEMENT  8/2015    magna Ease Goyo -Oneill valve    HX CHOLECYSTECTOMY  2000    HX COLONOSCOPY  April 2013    with EGD, Dr. Bhavna Mayes  2013, 2015    HX HYSTERECTOMY  1994    HX PACEMAKER  9/24/14    Biotronik MRI compatible (dual chamber)     HX TONSILLECTOMY  1951      Prior to Admission medications    Medication Sig Start Date End Date Taking? Authorizing Provider   HYDROmorphone (DILAUDID) 2 mg tablet Take 1 Tab by mouth every four (4) hours as needed. Max Daily Amount: 12 mg. 6/6/18  Yes GATITO Beth   amLODIPine (NORVASC) 5 mg tablet Take 1 Tab by mouth daily. 5/17/18  Yes Glenard Burkitt, MD   benzonatate (TESSALON) 100 mg capsule Take 1 Cap by mouth every four (4) hours as needed for Cough. 5/15/18  Yes Kayy Hernandez MD   albuterol (PROVENTIL HFA, VENTOLIN HFA, PROAIR HFA) 90 mcg/actuation inhaler Take 2 Puffs by inhalation every four (4) hours as needed for Wheezing or Shortness of Breath. 5/15/18  Yes Kayy Hernandez MD   omeprazole (PRILOSEC) 20 mg capsule Take 1 Cap by mouth two (2) times a day. 2/9/18  Yes Glenard Burkitt, MD   sotalol (BETAPACE) 160 mg tablet Take 1 Tab by mouth two (2) times a day. 12/21/17  Yes Glenard Burkitt, MD   pravastatin (PRAVACHOL) 10 mg tablet Take 1 Tab by mouth nightly. Indications: hypercholesterolemia 12/21/17  Yes Glenard Burkitt, MD   furosemide (LASIX) 20 mg tablet Take 1 Tab by mouth every morning.  12/21/17  Yes Glenard Burkitt, MD   budesonide-formoterol (SYMBICORT) 160-4.5 mcg/actuation HFAA Take 2 Puffs by inhalation two (2) times a day. Yes Historical Provider   Medical Information ID Tag misc DISABLED PLACARD 17  Yes Nereyda Chambers MD   aspirin 81 mg CpDR Take 1 Tab by mouth nightly. Indications: continue   Yes Historical Provider   docusate sodium (COLACE) 100 mg capsule Take 100 mg by mouth every evening. Yes Mehran Magana MD   apixaban (ELIQUIS) 5 mg tablet TAKE 1 TABLET TWICE DAILY 17   Juliet Berg MD   nitroglycerin (NITROSTAT) 0.4 mg SL tablet by SubLINGual route every five (5) minutes as needed for Chest Pain. Historical Provider     Allergies   Allergen Reactions    Sulfa (Sulfonamide Antibiotics) Rash    Augmentin [Amoxicillin-Pot Clavulanate] Nausea Only    Lamisil [Terbinafine] Rash    Lisinopril Cough    Prevacid [Lansoprazole] Rash     flushed        Review of Systems: +right knee pain, +antalgic gait. Denies chest pain, shortness of breath, cough, headache, visual problems, abdominal pain, dysurea, calf pain. Pertinent positives are as noted in the medical records and unremarkable otherwise. Objective:     Vitals:  Blood pressure 159/74, pulse 70, temperature 95.8 °F (35.4 °C), resp. rate 18, height 5' 7\" (1.702 m), weight 185 lb 6 oz (84.1 kg), SpO2 97 %, not currently breastfeeding. Temp (24hrs), Av.8 °F (36.6 °C), Min:95.8 °F (35.4 °C), Max:99.7 °F (37.6 °C)    BMI (calculated): 29 (18 0602)   Intake and Output:   1901 -  0700  In: 1800 [I.V.:1800]  Out: 9368 [Urine:3375]    Physical Exam:   General: Alert and age appropriately oriented. No acute cardio respiratory distress. HEENT: Normocephalic, no conjunctival pallor, no scleral icterus  Oral mucosa moist without cyanosis, no JVD   Lungs: Clear to auscultation bilaterally. Respiration even and unlabored   Heart: Regular rate and rhythm, S1, S2   No  murmurs, clicks, rub or gallops   Abdomen: Soft, non-tender, non-distended.     Genitourinary: defered   Neuromuscular:      Grossly no focal motor deficits. Right knee extension strong  Right ankle dorsiflexion 5/5  Right ankle plantarflexion 5/5  No sensory deficits distally BLE to soft touch. Skin/extremity: No calf tenderness BLE. No rashes, no marginal erythema.                                                                                          Labs/Studies:  Recent Results (from the past 72 hour(s))   POC PT/INR    Collection Time: 06/06/18  7:18 AM   Result Value Ref Range    Prothrombin time (POC) 11.7 (H) 9.6 - 11.6 SECS    INR (POC) 1.0 0.9 - 1.2     GLUCOSE, POC    Collection Time: 06/06/18  7:19 AM   Result Value Ref Range    Glucose (POC) 108 (H) 65 - 100 mg/dL   TYPE & SCREEN    Collection Time: 06/06/18  7:37 AM   Result Value Ref Range    Crossmatch Expiration 06/09/2018     ABO/Rh(D) A POSITIVE     Antibody screen NEG    PTT    Collection Time: 06/06/18  7:37 AM   Result Value Ref Range    aPTT 28.5 23.2 - 35.3 SEC   HEMOGLOBIN    Collection Time: 06/06/18  7:34 PM   Result Value Ref Range    HGB 10.6 (L) 11.7 - 15.4 g/dL   HEMOGLOBIN    Collection Time: 06/07/18  3:44 AM   Result Value Ref Range    HGB 9.3 (L) 11.7 - 94.3 g/dL   METABOLIC PANEL, BASIC    Collection Time: 06/07/18  3:44 AM   Result Value Ref Range    Sodium 135 (L) 136 - 145 mmol/L    Potassium 4.1 3.5 - 5.1 mmol/L    Chloride 103 98 - 107 mmol/L    CO2 25 21 - 32 mmol/L    Anion gap 7 7 - 16 mmol/L    Glucose 121 (H) 65 - 100 mg/dL    BUN 11 8 - 23 MG/DL    Creatinine 0.99 0.6 - 1.0 MG/DL    GFR est AA >60 >60 ml/min/1.73m2    GFR est non-AA 57 (L) >60 ml/min/1.73m2    Calcium 7.9 (L) 8.3 - 10.4 MG/DL       Functional Assessment:  Reviewed participation and progress in therapies  Gross Assessment  AROM: Generally decreased, functional (06/06/18 1455)  Strength: Generally decreased, functional (06/06/18 1455)  Coordination: Generally decreased, functional (06/06/18 1455)   Bed Mobility  Supine to Sit: Contact guard assistance (06/07/18 0800)  Sit to Supine:  (left up in chair) (06/07/18 0800)   Balance  Sitting: Intact (06/07/18 0854)  Standing: With support;Pull to stand (06/07/18 0854)   Grooming  Grooming Assistance: Supervision/set up (06/07/18 0850)  Brushing Teeth: Supervision/set-up (06/07/18 0850)  Brushing/Combing Hair: Supervision/set-up (06/07/18 0850)       Toileting  Toileting Assistance: Supervision/set up (06/07/18 3590)   Bed/Mat Mobility  Supine to Sit: Contact guard assistance (06/07/18 0800)  Sit to Supine:  (left up in chair) (06/07/18 0800)  Sit to Stand: Contact guard assistance (06/07/18 0854)  Bed to Chair: Contact guard assistance (06/07/18 0854)     Ambulation:  Gait  Gait Abnormalities: Antalgic (06/07/18 0800)  Ambulation - Level of Assistance: Contact guard assistance (06/07/18 0800)  Distance (ft): 60 Feet (ft) (06/07/18 0800)  Duration: 15 Minutes (06/07/18 0800)    Impression/Plan:     Principal Problem:    Status post total right knee replacement (6/6/2018)    Active Problems:    Hypertension ()      Pacemaker (8/13/2015)      Paroxysmal atrial fibrillation ([facility] (8/14/2015)      Overview: S/p MAZE      Sick sinus syndrome ([facility] (4/20/2016)      Osteoarthritis of right knee (6/6/2018)        Current Facility-Administered Medications   Medication Dose Route Frequency Provider Last Rate Last Dose    HYDROmorphone (DILAUDID) tablet 2 mg  2 mg Oral Q3H PRN Gissel Guaman MD   2 mg at 06/07/18 0540    albuterol (PROVENTIL VENTOLIN) nebulizer solution 2.5 mg  2.5 mg Nebulization Q6H PRN Gissel Guaman MD        albuterol (PROVENTIL HFA, VENTOLIN HFA, PROAIR HFA) inhaler 2 Puff (Patient Supplied)  2 Puff Inhalation Q4H PRN GATITO Nielsen        amLODIPine (NORVASC) tablet 5 mg  5 mg Oral DAILY GATITO Nielsen   5 mg at 06/07/18 0850    apixaban (ELIQUIS) tablet 5 mg  5 mg Oral Q12H GATITO Nielsen   5 mg at 06/07/18 0850    furosemide (LASIX) tablet 20 mg  20 mg Oral DAILY GATITO Nielsen   20 mg at 06/07/18 5871  docusate sodium (COLACE) capsule 100 mg  100 mg Oral QPM GATITO Cheek   100 mg at 06/06/18 1625    benzonatate (TESSALON) capsule 100 mg  100 mg Oral Q4H PRN GATITO Cheek        nitroglycerin (NITROSTAT) tablet 0.4 mg  0.4 mg SubLINGual Q5MIN PRN GATITO Cheek        pantoprazole (PROTONIX) tablet 40 mg  40 mg Oral ACB Paolo Cheek   40 mg at 06/07/18 0540    sotalol (BETAPACE) tablet 160 mg  160 mg Oral BID GATITO Cheek   160 mg at 06/07/18 0849    0.9% sodium chloride infusion  100 mL/hr IntraVENous CONTINUOUS GATITO Cheek 100 mL/hr at 06/06/18 1427 100 mL/hr at 06/06/18 1427    sodium chloride (NS) flush 5-10 mL  5-10 mL IntraVENous Q8H GATITO Cheek   5 mL at 06/07/18 0710    sodium chloride (NS) flush 5-10 mL  5-10 mL IntraVENous PRN GATITO Cheek        acetaminophen (TYLENOL) tablet 1,000 mg  1,000 mg Oral Q6H GATITO Cheek   1,000 mg at 06/07/18 0540    HYDROmorphone (PF) (DILAUDID) injection 1 mg  1 mg IntraVENous Q3H PRN GATITO Cheek        naloxone [facility]) injection 0.2-0.4 mg  0.2-0.4 mg IntraVENous Q10MIN PRN GATIOT Cheek        dexamethasone (DECADRON) injection 10 mg  10 mg IntraVENous ONCE Paolo Cheek        [facility]etron [facility] injection 4 mg  4 mg IntraVENous Q4H PRN GATITO Cheek   4 mg at 06/07/18 0709    diphenhydrAMINE (BENADRYL) capsule 25 mg  25 mg Oral Q4H PRN GATITO Cheek        senna-docusate (PERICOLACE) 8.6-50 mg per tablet 2 Tab  2 Tab Oral DAILY Paolo Cheek   2 Tab at 06/07/18 1409    hydrALAZINE (APRESOLINE) 20 mg/mL injection 10 mg  10 mg IntraVENous Q6H PRN Leatha Palacios MD        fluticasone-salmeterol (ADVAIR) 250mcg-50mcg/puff (Patient Supplied)  1 Puff Inhalation BID RT Jasmina Mills MD   1 Puff at 06/07/18 0906        Recommendations: Plan for home discharge with [place] PT.    Continue Acute Rehab Program.  Coordination of rehab/medical care.  Counseling of Physical Medicine & Rehab care issues management. Rehabilitation Management/ Medical Management: 1. Devices:Walkers, Type: Rolling Walker  2. Consult:Rehab team including PT, OT,  and . 3. Disposition Rehab-discussed with patient. 4. Thigh-high or knee-high ABELARDO's when out of bed. 5. DVT Prophylaxis - aspirin 81mg bid x 30days. 6. Incentive spirometer Q1H while awake  7. Post op hemorrhagic anemia- monitor. hgb 9.3  8. Activity: WBAT RLE  9. Planned Labs: CBC,BMP  10. Pain Control:  Continue with scheduled tylenol, celebrex and  PRN meds. Continue current management. 11. Wound Care: Keep right TKA wound clean and dry and reinforce dressing PRN. May remove Aquacel 1 week post op ad replace with new one. Remove staples 12-14 post surgery, when incision appears appropriately closed and apply benzoin and 1/2\" steristrips. Follow up with ORTHO per instructions. Thank you for the opportunity to participate in the care of this patient.     Signed By: Little Jones MD     June 7, 2018

## 2018-06-06 NOTE — IP AVS SNAPSHOT
[address] [place] [address] 
[phone] Patient: Nitin Welch MRN: [id number] KOFFI:[birth year] About your hospitalization You were admitted on:  June 6, 2018 You last received care in the:  [facility] You were discharged on:  June 8, 2018 Why you were hospitalized Your primary diagnosis was:  Status Post Total Right Knee Replacement Your diagnoses also included:  Osteoarthritis Of Right Knee, Sick Sinus Syndrome (Hcc), Pacemaker, Paroxysmal Atrial Fibrillation (Hcc), Hypertension Follow-up Information Follow up With Details Comments Contact Info Rhett Downing MD  As needed [address] 
[phone] Jasmina Mills MD  As scheduled by office [place] [address] 
[phone] [address]  Will contact you within 48 hrs [facility] [address] [facility] [address] 
[phone] Your Scheduled Appointments Tuesday June 26, 2018  2:00 PM EDT  
REMOTE DEVICE CHECK ORDERS ONLY ENCOUNTER with DWAINE REMOTE PACER [address] [facility] CARDIOLOGY [place] OFFICE ([address] [facility]) [address] 
[address] [place] [address]  
[phone] Please remember THIS REMOTE CHECK IS COMPLETED FROM HOME - YOU WILL NOT COME TO THE OFFICE FOR THIS APPOINTMENT. In preparation for this check, please ensure your monitor box is working appropriately. If your monitor requires you to send a transmission, please make sure it is sent by 11:00AM on this day so we can have it processed and resulted to your doctor without delay.   If you have a question or problem with the monitor box, please contact your respective company:   [employer] - [phone]  [employer] - [phone]  [employer] - [phone]  [facility] [phone]  If you have any further questions or need to move this appointment, we are happy to help and can be reached at [phone]. Friday June 29, 2018 11:45 AM EDT Office Visit with Morgan Rothman MD  
[address] ([address] [place] [address]) [address] [place] [address] 
[address] 79  
[phone] Discharge Orders Procedure Order Date Status Priority Quantity Spec Type Associated Dx CALL YOUR DOCTOR For: Temperature greater than 100.4., Severe uncontrolled pain. , Persistant nausea and vomiting., Persistant dizziness or light-headedness. , Hives, Difficulty breathing, headache, or visual disturbances. , Redness, tenderness, or s. .. 06/06/18 1424 Normal Routine 1  Status post total right knee replacement [[id number]] Questions: For:  Temperature greater than 100.4. For:  Severe uncontrolled pain. For:  Persistant nausea and vomiting. For:  Persistant dizziness or light-headedness. For:  Cordelia Quirk For:  Difficulty breathing, headache, or visual disturbances. For:  Redness, tenderness, or signs of infection. ACTIVITY AFTER DISCHARGE Patient should: Restrict driving, Restrict lifting, Other (specify) 06/06/18 1424 Normal Routine 1  Status post total right knee replacement [[id number]] Questions: Patient should:  Restrict driving Patient should:  Restrict lifting Patient should: Other (specify) DRESSING, CHANGE SPECIFY 06/06/18 1424 Normal Routine 1  Status post total right knee replacement [[id number]] Comments:  Routine dressing changes. Notify if excessive drainage. If staples are present they are to be removed 10 days post surgery and steri strips applied. REFERRAL TO HOME HEALTH 06/06/18 1424 Normal Routine 1  Status post total right knee replacement [[id number]]  REFERRAL TO PHYSICAL THERAPY 06/06/18 1424 Normal Routine 1  Status post total right knee replacement [[id number]] Comments:  Referral to Home PT A check lars indicates which time of day the medication should be taken. My Medications START taking these medications Instructions Each Dose to Equal  
 Morning Noon Evening Bedtime  
 traMADol 50 mg tablet Commonly known as:  ULTRAM  
Your next dose is: Today Take 1 Tab by mouth every six (6) hours as needed. Max Daily Amount: 200 mg.  
 50 mg CONTINUE taking these medications Instructions Each Dose to Equal  
 Morning Noon Evening Bedtime  
 albuterol 90 mcg/actuation inhaler Commonly known as:  PROVENTIL HFA, VENTOLIN HFA, PROAIR HFA Your next dose is: Today Take 2 Puffs by inhalation every four (4) hours as needed for Wheezing or Shortness of Breath. 2 Puff  
    
   
   
  
   
  
 amLODIPine 5 mg tablet Commonly known as:  Yves Inks Your next dose is:  Tomorrow Take 1 Tab by mouth daily. 5 mg  
    
  
   
   
   
  
 apixaban 5 mg tablet Commonly known as:  Chas Sylvia Your next dose is: Today TAKE 1 TABLET TWICE DAILY  
     
   
   
  
   
  
 benzonatate 100 mg capsule Commonly known as:  TESSALON Your next dose is: Today Take 1 Cap by mouth every four (4) hours as needed for Cough. 100 mg  
    
   
   
  
   
  
 docusate sodium 100 mg capsule Commonly known as:  Shadi Parody Your next dose is: Today Take 100 mg by mouth every evening. 100 mg  
    
   
   
  
   
  
 furosemide 20 mg tablet Commonly known as:  LASIX Your next dose is:  Tomorrow Take 1 Tab by mouth every morning. 20 mg  
    
  
   
   
   
  
 Medical Information ID Tag Misc DISABLED PLACARD  
     
   
   
   
  
 nitroglycerin 0.4 mg SL tablet Commonly known as:  NITROSTAT Your next dose is: Today  
   
 by SubLINGual route every five (5) minutes as needed for Chest Pain. omeprazole 20 mg capsule Commonly known as:  PRILOSEC Your next dose is: Today Take 1 Cap by mouth two (2) times a day. 20 mg  
    
   
   
  
   
  
 pravastatin 10 mg tablet Commonly known as:  PRAVACHOL Your next dose is: Today Take 1 Tab by mouth nightly. Indications: hypercholesterolemia 10 mg  
    
   
   
   
  
  
 sotalol 160 mg tablet Commonly known as:  Lysle Anselmo Your next dose is: Today Take 1 Tab by mouth two (2) times a day. 160 mg  
    
   
   
  
   
  
 SYMBICORT 160-4.5 mcg/actuation Hfaa Generic drug:  budesonide-formoterol Your next dose is: Today Take 2 Puffs by inhalation two (2) times a day. 2 Puff STOP taking these medications   
 aspirin 81 mg Cpdr  
   
  
  
  
Where to Get Your Medications Information on where to get these meds will be given to you by the nurse or doctor. ! Ask your nurse or doctor about these medications  
  traMADol 50 mg tablet Opioid Education Prescription Opioids: What You Need to Know: 
 
Prescription opioids can be used to help relieve moderate-to-severe pain and are often prescribed following a surgery or injury, or for certain health conditions. These medications can be an important part of treatment but also come with serious risks. Opioids are strong pain medicines. Examples include hydrocodone, oxycodone, fentanyl, and morphine. Heroin is an example of an illegal opioid. It is important to work with your health care provider to make sure you are getting the safest, most effective care. WHAT ARE THE RISKS AND SIDE EFFECTS OF OPIOID USE? Prescription opioids carry serious risks of addiction and overdose, especially with prolonged use. An opioid overdose, often marked by slow breathing, can cause sudden death. The use of prescription opioids can have a number of side effects as well, even when taken as directed. · Tolerance-meaning you might need to take more of a medication for the same pain relief · Physical dependence-meaning you have symptoms of withdrawal when the medication is stopped. Withdrawal symptoms can include nausea, sweating, chills, diarrhea, stomach cramps, and muscle aches. Withdrawal can last up to several weeks, depending on which drug you took and how long you took it. · Increased sensitivity to pain · Constipation · Nausea, vomiting, and dry mouth · Sleepiness and dizziness · Confusion · Depression · Low levels of testosterone that can result in lower sex drive, energy, and strength · Itching and sweating RISKS ARE GREATER WITH:      
· History of drug misuse, substance use disorder, or overdose · Mental health conditions (such as depression or anxiety) · Sleep apnea · Older age (72 years or older) · Pregnancy Avoid alcohol while taking prescription opioids. Also, unless specifically advised by your health care provider, medications to avoid include: · Benzodiazepines (such as Xanax or Valium) · Muscle relaxants (such as Soma or Flexeril) · Hypnotics (such as Ambien or Lunesta) · Other prescription opioids KNOW YOUR OPTIONS Talk to your health care provider about ways to manage your pain that don't involve prescription opioids. Some of these options may actually work better and have fewer risks and side effects. Options may include: 
· Pain relievers such as acetaminophen, ibuprofen, and naproxen · Some medications that are also used for depression or seizures · Physical therapy and exercise · Counseling to help patients learn how to cope better with triggers of pain and stress. · Application of heat or cold compress · Massage therapy · Relaxation techniques Be Informed Make sure you know the name of your medication, how much and how often to take it, and its potential risks & side effects.  
 
IF YOU ARE PRESCRIBED OPIOIDS FOR PAIN: 
 · Never take opioids in greater amounts or more often than prescribed. Remember the goal is not to be pain-free but to manage your pain at a tolerable level. · Follow up with your primary care provider to: · Work together to create a plan on how to manage your pain. · Talk about ways to help manage your pain that don't involve prescription opioids. · Talk about any and all concerns and side effects. · Help prevent misuse and abuse. · Never sell or share prescription opioids · Help prevent misuse and abuse. · Store prescription opioids in a secure place and out of reach of others (this may include visitors, children, friends, and family). · Safely dispose of unused/unwanted prescription opioids: Find your community drug take-back program or your pharmacy mail-back program, or flush them down the toilet, following guidance from the Food and Drug Administration (www.fda.gov/Drugs/ResourcesForYou). · Visit www.cdc.gov/drugoverdose to learn about the risks of opioid abuse and overdose. · If you believe you may be struggling with addiction, tell your health care provider and ask for guidance or call [employer] at 5-701-500-FJFN. Discharge Instructions [facility] [employer] Patient Discharge Instructions Viviana Pineda / [id number] : 1940 Admitted 2018 Discharged: 2018 IF YOU HAVE ANY PROBLEMS ONCE YOU ARE AT HOME CALL THE FOLLOWING NUMBERS:  
Main office number: [phone] Medications · The medications you are to continue on are listed on the medication reconciliation sheet. · Narcotic pain medications as well as supplemental iron can cause constipation. If this occurs try stopping the narcotic pain medication and/or the iron. · It is important that you take the medication exactly as they are prescribed. · Medications which increase your risk of blood clots are listed to stop for 5 weeks after surgery as well as medications or supplements which increase your risk of bleeding complications. · Keep your medication in the bottles provided by the pharmacist and keep a list of the medication names, dosages, and times to be taken in your wallet. · Do not take other medications without consulting your doctor. Important Information Do NOT smoke as this will greatly increase your risk of infection! Resume your prehospital diet. If you have excessive nausea or vomitting call your doctor's office Leg swelling and warmth is normal for 6 months after surgery. If you experience swelling in your leg elevate you leg while laying down with your toes above your heart. If you have sudden onset severe swelling with leg pain call our office. The stitches deep inside take approximately 6 months to dissolve. There will be sharp shooting, stinging and burning pain. This is normal and will resolve between 3-6 months after surgery. 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 You may try melatonin, an over-the-counter sleep aid or benadryl to help with sleep. Most patients will resume sleeping through the night 8 weeks after surgery. Home Physical Therapy is arranged. Home Health will contact you within 48 hrs of discharge that you have chosen. If you have not received a call within this time frame please contact that provider you chose. You should be given this information before you leave the hospital.  
 
You are at a risk for falls. Use the rolling walker when walking. Patients who have had a joint replacement should not drive if they are still taking narcotic pain mediation during the daytime hours. Most patients wean themselves off of pain medication within 2-5 weeks after surgery. When to Call the office - If you have a temperature greater then 101 - Uncontrolled vomiting - Loose control of your bladder or bowel function - Are unable to bear any weight  
- Need a pain medication refill DISCHARGE SUMMARY from Nurse The following personal items collected during your admission are returned to you:  
Dental Appliance: Dental Appliances: Lowers, Uppers, With patient Vision: Visual Aid: Glasses Hearing Aid:   na 
Jewelry: Jewelry: None Clothing: Clothing: At bedside Other Valuables: Other Valuables: Cell Phone (in car) Valuables sent to safe:   na 
 
PATIENT INSTRUCTIONS: 
 
After general anesthesia or intravenous sedation, for 24 hours or while taking prescription Narcotics: · Limit your activities · Do not drive and operate hazardous machinery · Do not make important personal or business decisions · Do  not drink alcoholic beverages · If you have not urinated within 8 hours after discharge, please contact your surgeon on call. Report the following to your surgeon: 
· Excessive pain, swelling, redness or odor of or around the surgical area · Temperature over 101 · Nausea and vomiting lasting longer than 4 hours or if unable to take medications · Any signs of decreased circulation or nerve impairment to extremity: change in color, persistent  numbness, tingling, coldness or increase pain · Any questions, call office @ [phone] Keep scheduled follow up appointment. If need to change, call office @ [phone]. *  Please give a list of your current medications to your Primary Care Provider. *  Please update this list whenever your medications are discontinued, doses are 
    changed, or new medications (including over-the-counter products) are added. *  Please carry medication information at all times in case of emergency situations. Total Knee Replacement: What to Expect at Home Your Recovery When you leave the hospital, you should be able to move around with a walker or crutches. But you will need someone to help you at home for the next few weeks or until you have more energy and can move around better. If there is no one to help you at home, you may go to a rehabilitation center. You will go home with a bandage and stitches or staples. Change the bandage as your doctor tells you to. Your doctor will remove your stitches or staples 10 to 21 days after your surgery. You may still have some mild pain, and the area may be swollen for 3 to 6 months after surgery. Your knee will continue to improve for 6 to 12 months. You will probably use a walker for 1 to 3 weeks and then use crutches. When you are ready, you can use a cane. You will probably be able to walk on your own in 4 to 8 weeks. You will need to do months of physical rehabilitation (rehab) after a knee replacement. Rehab will help you strengthen the muscles of the knee and help you regain movement. After you recover, your artificial knee will allow you to do normal daily activities with less pain or no pain at all. You may be able to hike, dance, ride a bike, and play golf. Talk to your doctor about whether you can do more strenuous activities. Always tell your caregivers that you have an artificial knee. How long it will take to walk on your own, return to normal activities, and go back to work depends on your health and how well your rehabilitation (rehab) program goes. The better you do with your rehab exercises, the quicker you will get your strength and movement back. This care sheet gives you a general idea about how long it will take for you to recover. But each person recovers at a different pace. Follow the steps below to get better as quickly as possible. How can you care for yourself at home? Activity ? · Rest when you feel tired. You may take a nap, but do not stay in bed all day. When you sit, use a chair with arms. You can use the arms to help you stand up. ? · Work with your physical therapist to find the best way to exercise. You may be able to take frequent, short walks using crutches or a walker. What you can do as your knee heals will depend on whether your new knee is cemented or uncemented. You may not be able to do certain things for a while if your new knee is uncemented. ? · After your knee has healed enough, you can do more strenuous activities with caution. ¨ You can golf, but use a golf cart, and do not wear shoes with spikes. ¨ You can bike on a flat road or on a stationary bike. Avoid biking up hills. ¨ Your doctor may suggest that you stay away from activities that put stress on your knee. These include tennis or badminton, squash or racquetball, contact sports like football, jumping (such as in basketball), jogging, or running. ¨ Avoid activities where you might fall. These include horseback riding, skiing, and mountain biking. ? · Do not sit for more than 1 hour at a time. Get up and walk around for a while before you sit again. If you must sit for a long time, prop up your leg with a chair or footstool. This will help you avoid swelling. ? · Ask your doctor when you can shower. You may need to take sponge baths until your stitches or staples have been removed. ? · Ask your doctor when you can drive again. It may take up to 8 weeks after knee replacement surgery before it is safe for you to drive. ? · When you get into a car, sit on the edge of the seat. Then pull in your legs, and turn to face the front. ? · You should be able to do many everyday activities 3 to 6 weeks after your surgery. You will probably need to take 4 to 16 weeks off from work. When you can go back to work depends on the type of work you do and how you feel. ? · Ask your doctor when it is okay for you to have sex. ? · Do not lift anything heavier than 10 pounds and do not lift weights for 12 weeks. Diet ? · By the time you leave the hospital, you should be eating your normal diet. If your stomach is upset, try bland, low-fat foods like plain rice, broiled chicken, toast, and yogurt. Your doctor may suggest that you take iron and vitamin supplements. ? · Drink plenty of fluids (unless your doctor tells you not to). ? · Eat healthy foods, and watch your portion sizes. Try to stay at your ideal weight. Too much weight puts more stress on your new knee. ? · You may notice that your bowel movements are not regular right after your surgery. This is common. Try to avoid constipation and straining with bowel movements. You may want to take a fiber supplement every day. If you have not had a bowel movement after a couple of days, ask your doctor about taking a mild laxative. Medicines ? · Your doctor will tell you if and when you can restart your medicines. He or she will also give you instructions about taking any new medicines. ? · If you take blood thinners, such as warfarin (Coumadin), clopidogrel (Plavix), or aspirin, be sure to talk to your doctor. He or she will tell you if and when to start taking those medicines again. Make sure that you understand exactly what your doctor wants you to do.  
? · Your doctor may give you a blood-thinning medicine to prevent blood clots. If you take a blood thinner, be sure you get instructions about how to take your medicine safely. Blood thinners can cause serious bleeding problems. This medicine could be in pill form or as a shot (injection). If a shot is necessary, your doctor will tell you how to do this. ? · Be safe with medicines. Take pain medicines exactly as directed. ¨ If the doctor gave you a prescription medicine for pain, take it as prescribed. ¨ If you are not taking a prescription pain medicine, ask your doctor if you can take an over-the-counter medicine. ¨ Plan to take your pain medicine 30 minutes before exercises.  It is easier to prevent pain before it starts than to stop it once it has started. ? · If you think your pain medicine is making you sick to your stomach: 
¨ Take your medicine after meals (unless your doctor has told you not to). ¨ Ask your doctor for a different pain medicine. ? · If your doctor prescribed antibiotics, take them as directed. Do not stop taking them just because you feel better. You need to take the full course of antibiotics. Incision care ? · You will have a bandage over the cut (incision) and staples or stitches. Take the bandage off when your doctor says it is okay. ? · Your doctor will remove the staples or stitches 10 days to 3 weeks after the surgery and replace them with strips of tape. Leave the tape on for a week or until it falls off. Exercise ? · Your rehab program will give you a number of exercises to do to help you get back your knee's range of motion and strength. Always do them as your therapist tells you. Ice and elevation ? · For pain and swelling, put ice or a cold pack on the area for 10 to 20 minutes at a time. Put a thin cloth between the ice and your skin. Other instructions ? · Continue to wear your support stockings as your doctor says. These help to prevent blood clots. The length of time that you will have to wear them depends on your activity level and the amount of swelling. ? · You have metal pieces in your knee. These may set off some airport metal detectors. Carry a medical alert card that says you have an artificial joint, just in case. Follow-up care is a key part of your treatment and safety. Be sure to make and go to all appointments, and call your doctor if you are having problems. It's also a good idea to know your test results and keep a list of the medicines you take. When should you call for help? Call 911 anytime you think you may need emergency care. For example, call if: 
? · You passed out (lost consciousness). ? · You have severe trouble breathing. ? · You have sudden chest pain and shortness of breath, or you cough up blood. ?Call your doctor now or seek immediate medical care if: 
? · You have signs of infection, such as: 
¨ Increased pain, swelling, warmth, or redness. ¨ Red streaks leading from the incision. ¨ Pus draining from the incision. ¨ A fever. ? · You have signs of a blood clot, such as: 
¨ Pain in your calf, back of the knee, thigh, or groin. ¨ Redness and swelling in your leg or groin. ? · Your incision comes open and begins to bleed, or the bleeding increases. ? · You have pain that does not get better after you take pain medicine. ? Watch closely for changes in your health, and be sure to contact your doctor if: 
? · You do not have a bowel movement after taking a laxative. Where can you learn more? Go to http://leif-luis.info/. Enter N110 in the search box to learn more about \"Total Knee Replacement: What to Expect at Home. \" Current as of: March 21, 2017 Content Version: 11.4 © 5980-1633 [employer]. Care instructions adapted under license by [employer] (which disclaims liability or warranty for this information). If you have questions about a medical condition or this instruction, always ask your healthcare professional. [address] any warranty or liability for your use of this information. These are general instructions for a healthy lifestyle: No smoking/ No tobacco products/ Avoid exposure to second hand smoke Surgeon General's Warning:  Quitting smoking now greatly reduces serious risk to your health. Obesity, smoking, and sedentary lifestyle greatly increases your risk for illness A healthy diet, regular physical exercise & weight monitoring are important for maintaining a healthy lifestyle You may be retaining fluid if you have a history of heart failure or if you experience any of the following symptoms:  Weight gain of 3 pounds or more overnight or 5 pounds in a week, increased swelling in our hands or feet or shortness of breath while lying flat in bed. Please call your doctor as soon as you notice any of these symptoms; do not wait until your next office visit. Recognize signs and symptoms of STROKE: 
 
F-face looks uneven A-arms unable to move or move even S-speech slurred or non-existent T-time-call 911 as soon as signs and symptoms begin-DO NOT go Back to bed or wait to see if you get better-TIME IS BRAIN. The discharge information has been reviewed with the patient. The patient verbalized understanding. Information obtained by : 
I understand that if any problems occur once I am at home I am to contact my physician. I understand and acknowledge receipt of the instructions indicated above. Physician's or R.N.'s Signature                                                                  Date/Time Patient or Representative Signature                                                          Date/Time Introducing [facility]! Dear Cedrick Hatfield: 
Thank you for requesting a Gradient X account. Our records indicate that you already have an active Gradient X account. You can access your account anytime at [email or web address] Did you know that you can access your hospital and ER discharge instructions at any time in Gradient X? You can also review all of your test results from your hospital stay or ER visit. Additional Information If you have questions, please visit the Frequently Asked Questions section of the [employer] website at [email or web address] [email or web address] "[email or web address][email or web address] Remember, NightOwlt is NOT to be used for urgent needs. For medical emergencies, dial 911. Now available from your iPhone and Android! Introducing Omari Huber As a [employer] patient, I wanted to make you aware of our electronic visit tool called Omari Huber. [employer] 24/7 allows you to connect within minutes with a medical provider 24 hours a day, seven days a week via a mobile device or tablet or logging into a secure website from your computer. You can access Omari Huber from anywhere in the [place]. A virtual visit might be right for you when you have a simple condition and feel like you just dont want to get out of bed, or cant get away from work for an appointment, when your regular [employer] provider is not available (evenings, weekends or holidays), or when youre out of town and need minor care. Electronic visits cost only $49 and if the [employer] 24/7 provider determines a prescription is needed to treat your condition, one can be electronically transmitted to a nearby pharmacy*. Please take a moment to enroll today if you have not already done so. The enrollment process is free and takes just a few minutes. To enroll, please download the [employer] 24/7 robert to your tablet or phone, or visit [email or web address] to enroll on your computer. And, as an [address] [place] [address] patient with a [employer] account, the results of your visits will be scanned into your electronic medical record and your primary care provider will be able to view the scanned results. We urge you to continue to see your regular [employer] provider for your ongoing medical care. And while your primary care provider may not be the one available when you seek a Omari Huber virtual visit, the peace of mind you get from getting a real diagnosis real time can be priceless. For more information on Omari Huber, view our Frequently Asked Questions (FAQs) at [email or web address]. Sincerely, 
 
Emre Franklin MD 
Chief Medical Officer [employer] *:  certain medications cannot be prescribed via Omari Huber Providers Seen During Your Hospitalization Provider Specialty Primary office phone Yovanny Delgado MD Orthopedic Surgery [phone] Your Primary Care Physician (PCP) Primary Care Physician Office Phone Office Fax Pilar Martinez [phone] You are allergic to the following Allergen Reactions Sulfa (Sulfonamide Antibiotics) Rash Augmentin (Amoxicillin-Pot Clavulanate) Nausea Only Lamisil (Terbinafine) Rash Lisinopril Cough Prevacid (Lansoprazole) Rash  
 flushed Recent Documentation Height Weight Breastfeeding? BMI OB Status Smoking Status 1.702 m 84.1 kg No 29.03 kg/m2 Hysterectomy Never Smoker Emergency Contacts Name Discharge Info Relation Home Work Mobile Ophelia Morse (Dtr In Law)  Other Relative [6] [phone] Bryan Perone  Son [22] [phone] Anne Morocho DISCHARGE CAREGIVER [3] Friend [5] [phone] Marilee Stark  Other Relative [6] [phone] Patient Belongings The following personal items are in your possession at time of discharge: 
  Dental Appliances: Lowers, Uppers, With patient  Visual Aid: Glasses   Hearing Aids/Status: Does not own  Home Medications: Kept at bedside   Jewelry: None  Clothing: At bedside    Other Valuables: Cell Phone (in car) Please provide this summary of care documentation to your next provider. Signatures-by signing, you are acknowledging that this After Visit Summary has been reviewed with you and you have received a copy.   
  
 
  
    
    
 Patient Signature: ____________________________________________________________ Date:  ____________________________________________________________  
  
Surjit Brake Provider Signature:  ____________________________________________________________ Date:  ____________________________________________________________

## 2018-06-06 NOTE — OP NOTES
[facility]  Total Knee Arthroplasty  Patient:Belinda Morelos   : 1940  Medical Record Number:[id number]  Pre-operative Diagnosis:  Primary osteoarthritis of right knee [M17.11]  Post-operative Diagnosis: Osteoarthritis of right knee  Location: [address] [place] [address]  Surgeon: Zandra Snider MD  Assistant: Cordelia Villagomez PA-C    Anesthesia: Spinal and FNB    Procedure: Procedure(s) (LRB):  RIGHT KNEE ARTHROPLASTY TOTAL (Right)    The complexity of the total joint surgery requires the use of a first assistant for positioning, retraction and expertise in closure. Tourniquet Time: 0 minutes  EBL: 250cc  Findings: severe degenerative arthritis, patellar osteophytes, posterior femoral osteophytes. This procedure required an IT band release   BMI: Body mass index is 29.03 kg/(m^2). Giovana Andrade was brought to the operating room and positioned on the operating table. She was anethestized with anesthesia. A freedman catheter was placed preoperatively and IV antibiotics was administered. Prior to the incision being made a timeout was called identifying the patient, procedure ,operative side and surgeon. .The operative leg was prepped and draped in the usual sterile manner. An anterior longitudinal incision was accomplished just medial to the tibial tubercle and extending approximal 6 centimeters proximal to the superior pole of the patella. A medial parapatellar capsular incision was performed. The medial capsular flap was elevated around to the insertion of the semimembranous tendon. The patella was everted and the knee flexed and externally rotated. The medial and external menisci were excised. The lateral half of the fat pad excised and the patella femoral ligament was released. The anterior cruciate ligament was resected and the posterior cruciate ligament was substituted. Using extramedullary instrumentation, the tibial cut was accomplished with appropriate posterior slope. Approxiamately 9mm of bone was removed from the high side of the tibia. The distal femur was next addressed. A drill hole was made above the intracondolar notch. Using appropriate intramedullary instrumentation,a five degree valgus distal cut was accomplished. The femur was sized. The anterior and posterior cuts were then made about the distal femur. The osteophytes were removed from the tibial and femoral surfaces. The flexion and extension gaps were assessed with the appropriate spacer blocks. Additional surgical procedures included: none. The flexion and extension gaps were deemed appropriately balanced. The appropriate cutting blocks were then utilized to perform the anterior chamfer, posterior chamfer and notch cuts, with appropriate lateral tranlation accomplished for the patellofemoral groove. The tibia baseplate was sized. The tibial base plate was pinned into place with the appropriate external rotation and stem site prepared. A preliminary range of motion was accomplished with  trial components. The patient was able to obtain full extension as well as appropriate flexion. The patient's ligaments were stable in flexion and extension to medial and lateral stressing and the alignment was through the appropriate mechanical axis. The patella was then everted. The bone was resected to accomodate the three peg  patella button. A trial reduction revealed appropriate tracking through the patellofemoral groove with no lateral retinacular release being accomplished. All trial components were removed. The knee was irrigated. There were no femoral deficiencies. There were no tibial deficiencies. No augmentation was utilized. Two packages of cement were mixed and the permanent Tibial and Femoral components were cemented into place. The patella component was then  cemented in place.     [place] Face knee was placed through range of motion and noted to be stable as mentioned above with the trail components. The wound was dry, therefore no drain was used. The operative knee was injected with 60cc of Naropin, 10 cc's of morphine and 1 cc of 30mg of Toradol. The knee was then soaked with a diluted betadine solution for approximately 3 min. This was then thoroughly irrigated. The capsular layer was closed using a #1 PDS suture. The knee joint was then injected with transexamic acid. The subcutaneous layers were closed using 2-0 Stratafix suture. Finally the skin was closed using 3-0 Vicryl and skin staples, which were applied in occlusive fashion and sterile bandage applied. An Iceman cryo pad was applied on the operative leg. Sponge count and needle counts were correct. Gamani Edilia left the operating room     Implants:   Implant Name Type Inv.  Item Serial No.  Lot No. LRB No. Used   COMPNT PAT ASYM TRIATHLN [id number] --  - [id number]  COMPNT PAT ASYM TRIATHLN 38X11 --  [id number] [employer] HOW [id number] Right 1   COMPNT FEM PS ABI TRIATHLN 4 R --  - [id number]  COMPNT FEM PS ABI TRIATHLN 4 R --  [id number] [employer] HOW [id number] Right 1   PEG FIX FEM DSTL TRIATHLN --  - [id number]  PEG FIX FEM DSTL TRIATHLN --  [id number] [employer] HOW [id number] Right 1   BASEPLT TIB UNIV TRIATHLN 5 --  - [id number]  BASEPLT TIB UNIV TRIATHLN 5 --  [id number] [employer] HOW [id number] Right 1   CEMENT BNE FL 20ML MONMR 40GM -- SIMPLEX P - [id number]  CEMENT BNE FL 20ML MONMR 40GM -- SIMPLEX P [id number] [employer] HOW [id number] Right 1   CEMENT BNE FL 20ML MONMR 40GM -- SIMPLEX P - [id number]  CEMENT BNE FL 20ML MONMR 40GM -- SIMPLEX P [id number] [employer] HOW [id number] Right 1   INSERT TIB PS TRIATHLN 5 9MM --  - [id number]   INSERT TIB PS TRIATHLN 5 9MM --  [id number] [employer] HOW [id number] Right 1           Signed By: Anjali Polo MD  6/6/2018,  12:02 PM

## 2018-06-06 NOTE — ANESTHESIA PROCEDURE NOTES
Peripheral Block    Start time: 6/6/2018 9:43 AM  End time: 6/6/2018 9:45 AM  Performed by: Jonh López  Authorized by: Jonh López       Pre-procedure: Indications: at surgeon's request and post-op pain management    Preanesthetic Checklist: patient identified, risks and benefits discussed, site marked, timeout performed, anesthesia consent given and patient being monitored    Timeout Time: 09:43          Block Type:   Block Type:   Adductor canal  Laterality:  Right  Monitoring:  Standard ASA monitoring, continuous pulse ox, frequent vital sign checks, heart rate, oxygen and responsive to questions  Injection Technique:  Single shot  Procedures: ultrasound guided and nerve stimulator    Patient Position: supine  Prep: chlorhexidine    Location:  Mid thigh  Needle Type:  Stimuplex  Needle Gauge:  22 G  Needle Localization:  Nerve stimulator and ultrasound guidance  Motor Response: minimal motor response >0.4 mA    Medication Injected:  0.25%  ropivacaine  Volume (mL):  20  dexamethasone    Assessment:  Number of attempts:  1  Injection Assessment:  Local visualized surrounding nerve on ultrasound, negative aspiration for blood, no intravascular symptoms, ultrasound image on chart and incremental injection every 5 mL  Patient tolerance:  Patient tolerated the procedure well with no immediate complications

## 2018-06-06 NOTE — PERIOP NOTES
TRANSFER - IN REPORT:    Verbal report received from [facility] on Elan Mancini  being received from ortho for routine progression of care      Report consisted of patients Situation, Background, Assessment and   Recommendations(SBAR). Information from the following report(s) SBAR was reviewed with the receiving nurse. Opportunity for questions and clarification was provided. Assessment completed upon patients arrival to unit and care assumed.

## 2018-06-06 NOTE — ANESTHESIA PREPROCEDURE EVALUATION
Anesthetic History   No history of anesthetic complications            Review of Systems / Medical History  Patient summary reviewed and pertinent labs reviewed    Pulmonary  Within defined limits                 Neuro/Psych         TIA     Cardiovascular    Hypertension: well controlled  Valvular problems/murmurs (s/p AV replacement  2015)      Dysrhythmias : atrial flutter  Pacemaker (pacer)    Exercise tolerance: <4 METS  Comments: SSS  Pacer checked 2 months ago, not dependent    2/17 EF normal   GI/Hepatic/Renal     GERD: well controlled    Renal disease (hx ARF after TIA): ARF       Endo/Other        Morbid obesity, arthritis and anemia     Other Findings   Comments: Palsy right facial tic  Neuropathy    Has been off Effient for 3 days         Physical Exam    Airway  Mallampati: I  TM Distance: 4 - 6 cm  Neck ROM: normal range of motion   Mouth opening: Normal     Cardiovascular  Regular rate and rhythm,  S1 and S2 normal,  no murmur, click, rub, or gallop  Rhythm: regular  Rate: normal         Dental    Dentition: Full lower dentures and Full upper dentures     Pulmonary  Breath sounds clear to auscultation               Abdominal  GI exam deferred       Other Findings            Anesthetic Plan    ASA: 3  Anesthesia type: spinal            Anesthetic plan and risks discussed with: Patient

## 2018-06-06 NOTE — ANESTHESIA POSTPROCEDURE EVALUATION
Post-Anesthesia Evaluation and Assessment    Patient: Jenny Wilkins MRN: [id number]  SSN: [id number]    YOB: 1940  Age: [de-identified] y.o. Sex: female       Cardiovascular Function/Vital Signs  Visit Vitals    /67    Pulse 69    Temp 37.6 °C (99.7 °F)    Resp 16    Ht 5' 7\" (1.702 m)    Wt 84.1 kg (185 lb 6 oz)    SpO2 99%    BMI 29.03 kg/m2       Patient is status post spinal anesthesia for Procedure(s):  RIGHT KNEE ARTHROPLASTY TOTAL. Nausea/Vomiting: None    Postoperative hydration reviewed and adequate. Pain:  Pain Scale 1: Numeric (0 - 10) (06/06/18 1255)  Pain Intensity 1: 0 (06/06/18 1255)   Managed    Neurological Status:   Neuro (WDL): Exceptions to WDL (06/06/18 1245)  Neuro  Neurologic State: Alert (06/06/18 1245)  Orientation Level: Oriented X4 (06/06/18 1245)  LUE Motor Response: Purposeful (06/06/18 1245)  LLE Motor Response: Pharmacologically paralyzed (06/06/18 1245)  RUE Motor Response: Purposeful (06/06/18 1245)  RLE Motor Response: Pharmacologically paralyzed (06/06/18 1245)   At baseline    Mental Status and Level of Consciousness: Arousable    Pulmonary Status:   O2 Device: Nasal cannula (06/06/18 1255)   Adequate oxygenation and airway patent    Complications related to anesthesia: None    Post-anesthesia assessment completed.  No concerns    Signed By: Panfilo Dunbar MD     June 6, 2018

## 2018-06-06 NOTE — PROGRESS NOTES
Problem: Mobility Impaired (Adult and Pediatric)  Goal: *Acute Goals and Plan of Care (Insert Text)  GOALS (1-4 days):  (1.)Ms. James Levi will move from supine to sit and sit to supine  in bed with INDEPENDENT. (2.)Ms. James Levi will transfer from bed to chair and chair to bed with INDEPENDENT using the least restrictive device. (3.)Ms. James Levi will ambulate with INDEPENDENT for 250 feet with the least restrictive device. (4.)Ms. James Levi will ambulate up/down 3 steps with bilateral  railing with MINIMAL ASSIST with no device. (5.)Ms. James Levi will increase right knee ROM to 5°-80°.  ________________________________________________________________________________________________    Outcome: Progressing Towards Goal    PHYSICAL THERAPY [facility]: Initial Assessment 6/6/2018  INPATIENT: Hospital Day: 1  Payor: Gabriel Oconnor / Plan: [facility] HU[employer]NA MEDICARE CHOICE PPO/PFFS / Product Type: Managed Care Medicare /      NAME/AGE/GENDER: Giovana Andrade is a [de-identified] y.o. female   PRIMARY DIAGNOSIS:  Primary osteoarthritis of right knee [M17.11]   Procedure(s) and Anesthesia Type:     * RIGHT KNEE ARTHROPLASTY TOTAL - Spinal (Right)  ICD-10: Treatment Diagnosis:    · Pain in Right Knee (M25.561)  · Stiffness of Right Knee, Not elsewhere classified (M25.661)  · Difficulty in walking, Not elsewhere classified (R26.2)  · Other abnormalities of gait and mobility (R26.89)      ASSESSMENT:     Ms. James Levi presents status post right total knee replacement with decreased independence with functional mobility. Therapy will maximize independence with functional mobility. Pt progressing with ambulation to bedside chair. This section established at most recent assessment   PROBLEM LIST (Impairments causing functional limitations):  1. Decreased Strength  2. Decreased Transfer Abilities  3. Decreased Ambulation Ability/Technique  4. Decreased Balance  5. Increased Pain  6.  Decreased Activity Tolerance   INTERVENTIONS PLANNED: (Benefits and precautions of physical therapy have been discussed with the patient.)  1. Bed Mobility  2. Gait Training  3. Home Exercise Program (HEP)  4. Therapeutic Exercise/Strengthening  5. Transfer Training  6. Range of Motion: active/assisted/passive  7. Therapeutic Activities  8. Group Therapy     TREATMENT PLAN: Frequency/Duration: Follow patient BID for duration of hospital stay to address above goals. Rehabilitation Potential For Stated Goals: Good     RECOMMENDED REHABILITATION/EQUIPMENT: (at time of discharge pending progress): Home Health: Physical Therapy. HISTORY:   History of Present Injury/Illness (Reason for Referral):  Pt is status post right total knee replacement. Past Medical History/Comorbidities:   Ms. Juliet Clark  has a past medical history of Adverse effect of anesthesia; NILA (acute kidney injury) ([facility] [address] (06/08/2013); Allergic rhinitis; Anemia, unspecified (8/14/2015); Aortic stenosis; Atrial fibrillation ([facility] [address] (09/30/2015); Bell's palsy (2013); Blurry vision, bilateral (6/8/2013); Cardiac pacemaker; Constipation; Critical aortic valve stenosis (8/14/2015); GERD (gastroesophageal reflux disease); Hypertension; Hyponatremia (9/7/2013); Metabolic encephalopathy (1/9/7532); Neuropathy; Osteoarthritis; Pancreatic cyst; Paroxysmal atrial fibrillation ([facility] [address] (8/14/2015); Pulmonary nodule; Sick sinus syndrome (HCC); SOB (shortness of breath) on exertion (2015); Spinal stenosis, lumbar; 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 Ms. Juliet Clark  has a past surgical history that includes hx hysterectomy (1994); hx cholecystectomy (2000); hx tonsillectomy (1951); hx colonoscopy (April 2013); hx pacemaker (9/24/14); hx endoscopy; hx heart catheterization (2013, 2015); and hx aortic valve replacement (8/2015).   Social History/Living Environment:   Home Environment: Private residence  # Steps to Enter: 0  One/Two Story Residence: One story  Living Alone: No  Support Systems: Family member(s)  Patient Expects to be Discharged to[de-identified] Private residence  Current DME Used/Available at Home: Raised toilet seat  Tub or Shower Type: Tub/Shower combination  Prior Level of Function/Work/Activity:  Independent with ambulation. Number of Personal Factors/Comorbidities that affect the Plan of Care: 0: LOW COMPLEXITY   EXAMINATION:   Most Recent Physical Functioning:   Gross Assessment: Yes  Gross Assessment  AROM: Generally decreased, functional  Strength: Generally decreased, functional  Coordination: Generally decreased, functional        RLE AROM  R Knee Flexion: 60  R Knee Extension: 30            Bed Mobility  Supine to Sit: Contact guard assistance    Transfers  Sit to Stand: Minimum assistance  Stand to Sit: Minimum assistance  Bed to Chair: Minimum assistance    Balance  Sitting: Intact  Standing: Pull to stand; With support    Posture  Posture (WDL): Within defined limits            Distance (ft): 60 Feet (ft)  Ambulation - Level of Assistance: Minimal assistance;Assist x2  Gait Abnormalities: Antalgic        Braces/Orthotics: none    Right Knee Cold  Type: Cryocuff      Body Structures Involved:  1. Bones  2. Joints  3. Muscles  4. Ligaments Body Functions Affected:  1. Neuromusculoskeletal  2. Movement Related Activities and Participation Affected:  1. Mobility   Number of elements that affect the Plan of Care: 4+: HIGH COMPLEXITY   CLINICAL PRESENTATION:   Presentation: Stable and uncomplicated: LOW COMPLEXITY   CLINICAL DECISION MAKIN [facility] [address] [id number] AM-PAC 6 Clicks   Basic Mobility Inpatient Short Form  How much difficulty does the patient currently have. .. Unable A Lot A Little None   1. Turning over in bed (including adjusting bedclothes, sheets and blankets)? [] 1   [] 2   [x] 3   [] 4   2. Sitting down on and standing up from a chair with arms ( e.g., wheelchair, bedside commode, etc.)   [] 1   [] 2   [x] 3   [] 4   3.   Moving from lying on back to sitting on the side of the bed? [] 1   [] 2   [x] 3   [] 4   How much help from another person does the patient currently need. .. Total A Lot A Little None   4. Moving to and from a bed to a chair (including a wheelchair)? [] 1   [] 2   [x] 3   [] 4   5. Need to walk in hospital room? [] 1   [] 2   [x] 3   [] 4   6. Climbing 3-5 steps with a railing? [] 1   [] 2   [x] 3   [] 4   © 2007, Trustees of [address] [place] [address] [address] [address], under license to [employer]. All rights reserved        Score:  Initial: 18 Most Recent: X (Date: -- )    Interpretation of Tool:  Represents activities that are increasingly more difficult (i.e. Bed mobility, Transfers, Gait). Score 24 23 22-20 19-15 14-10 9-7 6     Modifier CH CI CJ CK CL CM CN      ? Mobility - Walking and Moving Around:     - CURRENT STATUS: CK - 40%-59% impaired, limited or restricted    - GOAL STATUS: CK - 40%-59% impaired, limited or restricted    - D/C STATUS:  CK - 40%-59% impaired, limited or restricted  Payor: Shanel Alex / Plan: Marquez Granado PPO/PFFS / Product Type: [employer] Medicare /      Medical Necessity:     · Skilled intervention continues to be required due to decreased mobility ability. Reason for Services/Other Comments:  · Patient continues to require skilled intervention due to decreased mobility ability. Use of outcome tool(s) and clinical judgement create a POC that gives a: Clear prediction of patient's progress: LOW COMPLEXITY            TREATMENT:   (In addition to Assessment/Re-Assessment sessions the following treatments were rendered)     Pre-treatment Symptoms/Complaints:  2/10  Pain: Initial:   Pain Intensity 1: 2  Pain Location 1: Knee  Pain Orientation 1: Right  Post Session:  2/10     Gait Training (  15 minutes):  Gait training to improve and/or restore physical functioning as related to mobility.   Ambulated 60 Feet (ft) with Minimal assistance;Assist x2   Assessment/Reassessment only     Date:  6/6 Date:   Date:     ACTIVITY/EXERCISE AM PM AM PM AM PM   GROUP THERAPY  []  []  []  []  []  []   Ankle Pumps 10        Quad Sets 10        Gluteal Sets 10        Hip ABd/ADduction 10        Straight Leg Raises 10        Knee Slides 10        Short Arc Quads         Long Arc Quads         Chair Slides                  B = bilateral; AA = active assistive; A = active; P = passive      Treatment/Session Assessment:     Response to Treatment:  Pt agreeable to ambulate. Education:  [x] Home Exercises  [] Fall Precautions  [] Hip Precautions [] D/C Instruction Review  [] Knee/Hip Prosthesis Review  [x] Walker Management/Safety [] Adaptive Equipment as Needed       Interdisciplinary Collaboration:   o Physical Therapist  o Occupational Therapist  o Registered Nurse    After treatment position/precautions:   o Up in chair  o Bed/Chair-wheels locked  o Caregiver at bedside  o Call light within reach  o RN notified    Compliance with Program/Exercises: compliant most of the time. Recommendations/Intent for next treatment session:  Treatment next visit will focus on increasing Ms. Yogi Backer independence with bed mobility, transfers, gait training, strength/ROM exercises, modalities for pain, and patient education.       Total Treatment Duration:  PT Patient Time In/Time Out  Time In: 1420  Time Out: 777 [facility], PT

## 2018-06-06 NOTE — PROGRESS NOTES
TRANSFER - IN REPORT:    Verbal report received from Ramesh Johnson RN on Taveras Sickle  being received from PACU for routine post - op      Report consisted of patients Situation, Background, Assessment and   Recommendations(SBAR). Information from the following report(s) SBAR, Intake/Output and MAR was reviewed with the receiving nurse. Opportunity for questions and clarification was provided. Assessment completed upon patients arrival to unit and care assumed. Oriented to room, bed controls, and how to order meals. No complaints. Moving feet. Aquacel to R knee with iceman. SCDs to LEs. Yellow gripper socks to feet and instructed not to get up without staff to assist. Family member in room. Pt using IS. To call for pain medication when needed.

## 2018-06-06 NOTE — PROGRESS NOTES
Care Management Interventions  PCP Verified by CM: Yes  Mode of Transport at Discharge: Self  Transition of Care Consult (CM Consult): [address]: Yes  Discharge Durable Medical Equipment: Yes  Physical Therapy Consult: Yes  Current Support Network: Own Home, Family Lives [place], Relative's Home  Confirm Follow Up Transport: Family  Plan discussed with Pt/Family/Caregiver: Yes  Freedom of Choice Offered: Yes  Discharge Location  Discharge Placement: Home with home health    CM met with patient who is S/P Right TKA. Patient states that she's going to her niece's home following discharge. Niece's name is Krista Lea, and she lives at [address]. Patient states that she has a BSC and her son bought her a \"reacher. \" Patient states that she will need a rolling walker with fixed wheels. Order placed to [facility] Patient states that she's see by Dr. Mirna Joel, her last appointment was 3 weeks ago. Referral to [facility] per patient's request. No further needs identified at this time.      Nimco Tyson, [address] [facility]    [address] [facility]  [phone]

## 2018-06-06 NOTE — H&P
The patient has end stage arthritis of the right knee. The patient was see and examined and there are no changes to the patient's orthopedic condition. They have tried conservative treatment for this condition; including antiinflammatories and lifestyle modifications and have failed. The necessity for the joint replacement is still present, and the H&P from the office is still current.  The patient will be admitted today for Procedure(s) (LRB):  RIGHT KNEE ARTHROPLASTY TOTAL/MAYA/ FNB (Right)

## 2018-06-06 NOTE — PROGRESS NOTES
06/06/18 1408   Oxygen Therapy   O2 Sat (%) 99 %   Pulse via Oximetry 70 beats per minute   O2 Device Room air   O2 Flow Rate (L/min) 0 l/min   Pre-Treatment   Breath Sounds Bilateral Clear   Patient achieved  1500   Ml/sec on IS. Patient encouraged to do 10 breaths every hour while awake-patient agreed and demonstrated. No shortness of breath or distress noted. BS are clear b/l. [facility] notes reviewed- monitor #6 placed at pt's bedside.

## 2018-06-06 NOTE — CONSULTS
HOSPITALIST H&P/CONSULT  NAME:  Elan Mancini   Age:  [de-identified] y.o.  :   1940   MRN:   [id number]  PCP: Addison Rico MD  Consulting MD:  Treatment Team: Attending Provider: Raymond Kinsey MD; Consulting Provider: Augusto Wade MD; Utilization Review: Sunil Logan RN; Consulting Provider: Morena Meléndez MD; Consulting Provider: Maribel Sow MD  HPI:   Patient is a pleasant [de-identified] with PMH significant for, but not limited to, atrial fibrillation, SSS s/p pacemaker, hypertension, peripheral neuropathy who is s/p R TKA. Hospitalist Service was consulted for routine post-operative evaluation. Patient feels well, and is about to eat lunch on my evaluation. She reports that she has already walked out to the carter. She has no complaints. Denies any chest pain or shortness of breath. We discussed her heart history, including h/o afib, pacemaker, and she has follow-up already scheduled with him in the next month or so. Patient completed pre-surgical therapy, and is doing excellent thus far post-operatively. Complete ROS done and is as stated in HPI or otherwise negative  Past Medical History:   Diagnosis Date    Adverse effect of anesthesia     delayed awakening    NILA (acute kidney injury) ([address] 2013    pt reports after TIA    Allergic rhinitis     has inhaler daily (pt denies asthma)    Anemia, unspecified 2015    Aortic stenosis     sp AVR     Atrial fibrillation ([address] 2015    s/p MAZE     Bell's palsy     Blurry vision, bilateral 2013    Cardiac pacemaker     Biotronik MRI compatible (dual chamber)     Constipation     Critical aortic valve stenosis 2015    8/13/15 (Dr Stephani Reyna) 1. Left and right sided maze. Please note that the left pulmonary veins were not done due to difficult anatomy. 2. Clipping, left atrial appendage. 3. Aortic valve replacement with a 23 mm Magna Ease Goyo-Oneill pericardial valve.     GERD (gastroesophageal reflux disease)     managed with medication    Hypertension     Hyponatremia 0/7/3852    Metabolic encephalopathy 6/2/9924    Neuropathy     Osteoarthritis     Pancreatic cyst     Paroxysmal atrial fibrillation (HCC) 8/14/2015    Pulmonary nodule     benign per pulm note (1/2018)    Sick sinus syndrome (HCC)     SOB (shortness of breath) on exertion 2015    cannot climb flight of stairs or walk to mailbox- s/p AVR and pacemaker (pt reports no problems at this time 5/2018)    Spinal stenosis, lumbar     Status post total right knee replacement 6/6/2018    Syncope and collapse 2015    TIA (transient ischemic attack) 2013    pt reports bells palsy started at same time      Past Surgical History:   Procedure Laterality Date    HX AORTIC VALVE REPLACEMENT  8/2015    magna Ease Goyo -Oneill valve    HX CHOLECYSTECTOMY  2000    HX COLONOSCOPY  April 2013    with EGD, Dr. Ryan Chow  2013, 2015    HX HYSTERECTOMY  1994    HX PACEMAKER  9/24/14    Biotronik MRI compatible (dual chamber)     HX TONSILLECTOMY  1951      Prior to Admission Medications   Prescriptions Last Dose Informant Patient Reported? Taking? Medical Information ID Tag misc 6/6/2018 at Unknown time  No Yes   Sig: DISABLED PLACARD   albuterol (PROVENTIL HFA, VENTOLIN HFA, PROAIR HFA) 90 mcg/actuation inhaler 6/5/2018 at Unknown time  No Yes   Sig: Take 2 Puffs by inhalation every four (4) hours as needed for Wheezing or Shortness of Breath. amLODIPine (NORVASC) 5 mg tablet 6/6/2018 at Unknown time  No Yes   Sig: Take 1 Tab by mouth daily. apixaban (ELIQUIS) 5 mg tablet 6/3/2018  No No   Sig: TAKE 1 TABLET TWICE DAILY   aspirin 81 mg CpDR 6/6/2018 at Unknown time  Yes Yes   Sig: Take 1 Tab by mouth nightly. Indications: continue   benzonatate (TESSALON) 100 mg capsule 6/6/2018 at Unknown time  No Yes   Sig: Take 1 Cap by mouth every four (4) hours as needed for Cough. budesonide-formoterol (SYMBICORT) 160-4.5 mcg/actuation HFAA 2018 at Unknown time  Yes Yes   Sig: Take 2 Puffs by inhalation two (2) times a day. docusate sodium (COLACE) 100 mg capsule 2018 at Unknown time  Yes Yes   Sig: Take 100 mg by mouth every evening. furosemide (LASIX) 20 mg tablet 2018 at Unknown time  No Yes   Sig: Take 1 Tab by mouth every morning. nitroglycerin (NITROSTAT) 0.4 mg SL tablet Not Taking at Unknown time  Yes No   Sig: by SubLINGual route every five (5) minutes as needed for Chest Pain. omeprazole (PRILOSEC) 20 mg capsule 2018 at Unknown time  No Yes   Sig: Take 1 Cap by mouth two (2) times a day. pravastatin (PRAVACHOL) 10 mg tablet 2018 at Unknown time  No Yes   Sig: Take 1 Tab by mouth nightly. Indications: hypercholesterolemia   sotalol (BETAPACE) 160 mg tablet 2018 at Unknown time  No Yes   Sig: Take 1 Tab by mouth two (2) times a day.       Facility-Administered Medications: None     Allergies   Allergen Reactions    Sulfa (Sulfonamide Antibiotics) Rash    Augmentin [Amoxicillin-Pot Clavulanate] Nausea Only    Lamisil [Terbinafine] Rash    Lisinopril Cough    Prevacid [Lansoprazole] Rash     flushed      Social History   Substance Use Topics    Smoking status: Never Smoker    Smokeless tobacco: Never Used    Alcohol use No      Family History   Problem Relation Age of Onset    Diabetes Mother     Heart Disease Mother     Hypertension Mother     Diabetes Sister     Hypertension Sister     Heart Disease Sister     Diabetes Brother     Cancer Brother      pancreatic cancer    Heart Disease Father     Diabetes Son     Diabetes Son       Objective:     Visit Vitals    /81    Pulse 70    Temp 97.4 °F (36.3 °C)    Resp 16    Ht 5' 7\" (1.702 m)    Wt 84.1 kg (185 lb 6 oz)    SpO2 99%    Breastfeeding No    BMI 29.03 kg/m2      Temp (24hrs), Av.5 °F (36.9 °C), Min:97.4 °F (36.3 °C), Max:99.7 °F (37.6 °C)    Oxygen Therapy  O2 Sat (%): 99 % (06/06/18 1408)  Pulse via Oximetry: 70 beats per minute (06/06/18 1408)  O2 Device: Room air (06/06/18 1408)  O2 Flow Rate (L/min): 0 l/min (06/06/18 1408)  Physical Exam:  General:    Alert, cooperative, no distress, appears stated age. Head:   Normocephalic, without obvious abnormality, atraumatic. Nose:  Nares normal. No drainage or sinus tenderness. Lungs:   Clear to auscultation bilaterally. No Wheezing or Rhonchi. No rales. Heart:   Regular rate and rhythm,  no murmur, rub or gallop. Abdomen:   Soft, non-tender. Not distended. Bowel sounds normal.   Extremities: No cyanosis. No edema. No clubbing  Skin:     Texture, turgor normal. No rashes or lesions. Not Jaundiced  Neurologic: Alert and oriented x 3, no focal deficits   Data Review:   Recent Results (from the past 24 hour(s))   POC PT/INR    Collection Time: 06/06/18  7:18 AM   Result Value Ref Range    Prothrombin time (POC) 11.7 (H) 9.6 - 11.6 SECS    INR (POC) 1.0 0.9 - 1.2     GLUCOSE, POC    Collection Time: 06/06/18  7:19 AM   Result Value Ref Range    Glucose (POC) 108 (H) 65 - 100 mg/dL   TYPE & SCREEN    Collection Time: 06/06/18  7:37 AM   Result Value Ref Range    Crossmatch Expiration 06/09/2018     ABO/Rh(D) A POSITIVE     Antibody screen NEG    PTT    Collection Time: 06/06/18  7:37 AM   Result Value Ref Range    aPTT 28.5 23.2 - 35.3 SEC     Imaging /Procedures /Studies     Assessment and Plan:      Active Hospital Problems    Diagnosis Date Noted    Osteoarthritis of right knee 06/06/2018    Status post total right knee replacement 06/06/2018    Sick sinus syndrome ([address] 04/20/2016    Paroxysmal atrial fibrillation ([facility] 08/14/2015     S/p MAZE      Pacemaker 08/13/2015    Hypertension        PLAN  S/P R TKA  - Doing very well  - Already has ambulated  - Motivated for therapy    Atrial Fibrillation  - Rate controlled on exam with even regular rhythm  - Continue home medications  - Has f/u already scheduled with her cardiologist upcoming    SSS  - Has pacemaker  - No issues    HTN  - BP elevated  - Restart home meds as ordered  - PRN hydralazine    Appreciate consultation for this pleasant lady. She is doing quite well post-operatively. Anticipate uneventful post-operative period. If additional medical concerns arise, please feel free to contact me.     Signed By: Shani Torrez MD     June 6, 2018

## 2018-06-07 LAB
ANION GAP SERPL CALC-SCNC: 7 MMOL/L (ref 7–16)
BUN SERPL-MCNC: 11 MG/DL (ref 8–23)
CALCIUM SERPL-MCNC: 7.9 MG/DL (ref 8.3–10.4)
CHLORIDE SERPL-SCNC: 103 MMOL/L (ref 98–107)
CO2 SERPL-SCNC: 25 MMOL/L (ref 21–32)
CREAT SERPL-MCNC: 0.99 MG/DL (ref 0.6–1)
GLUCOSE SERPL-MCNC: 121 MG/DL (ref 65–100)
HGB BLD-MCNC: 9.3 G/DL (ref 11.7–15.4)
MM INDURATION POC: 0 MM (ref 0–5)
POTASSIUM SERPL-SCNC: 4.1 MMOL/L (ref 3.5–5.1)
PPD POC: NORMAL NEGATIVE
SODIUM SERPL-SCNC: 135 MMOL/L (ref 136–145)

## 2018-06-07 PROCEDURE — 97116 GAIT TRAINING THERAPY: CPT

## 2018-06-07 PROCEDURE — 85018 HEMOGLOBIN: CPT | Performed by: PHYSICIAN ASSISTANT

## 2018-06-07 PROCEDURE — 74011250636 HC RX REV CODE- 250/636: Performed by: PHYSICIAN ASSISTANT

## 2018-06-07 PROCEDURE — 65270000029 HC RM PRIVATE

## 2018-06-07 PROCEDURE — 97535 SELF CARE MNGMENT TRAINING: CPT

## 2018-06-07 PROCEDURE — 74011250637 HC RX REV CODE- 250/637: Performed by: ORTHOPAEDIC SURGERY

## 2018-06-07 PROCEDURE — 97110 THERAPEUTIC EXERCISES: CPT

## 2018-06-07 PROCEDURE — 74011250637 HC RX REV CODE- 250/637: Performed by: PHYSICIAN ASSISTANT

## 2018-06-07 PROCEDURE — 97150 GROUP THERAPEUTIC PROCEDURES: CPT

## 2018-06-07 PROCEDURE — 80048 BASIC METABOLIC PNL TOTAL CA: CPT | Performed by: PHYSICIAN ASSISTANT

## 2018-06-07 PROCEDURE — 36415 COLL VENOUS BLD VENIPUNCTURE: CPT | Performed by: PHYSICIAN ASSISTANT

## 2018-06-07 PROCEDURE — 94762 N-INVAS EAR/PLS OXIMTRY CONT: CPT

## 2018-06-07 RX ORDER — HYDROMORPHONE HYDROCHLORIDE 2 MG/1
2 TABLET ORAL
Status: DISCONTINUED | OUTPATIENT
Start: 2018-06-07 | End: 2018-06-07

## 2018-06-07 RX ORDER — TRAMADOL HYDROCHLORIDE 50 MG/1
50 TABLET ORAL
Status: DISCONTINUED | OUTPATIENT
Start: 2018-06-07 | End: 2018-06-08 | Stop reason: HOSPADM

## 2018-06-07 RX ADMIN — PANTOPRAZOLE SODIUM 40 MG: 40 TABLET, DELAYED RELEASE ORAL at 05:40

## 2018-06-07 RX ADMIN — HYDROMORPHONE HYDROCHLORIDE 2 MG: 2 TABLET ORAL at 05:40

## 2018-06-07 RX ADMIN — SENNOSIDES AND DOCUSATE SODIUM 2 TABLET: 8.6; 5 TABLET ORAL at 11:00

## 2018-06-07 RX ADMIN — FLUTICASONE PROPIONATE AND SALMETEROL 1 PUFF: 250; 50 POWDER RESPIRATORY (INHALATION) at 20:00

## 2018-06-07 RX ADMIN — ONDANSETRON 4 MG: 2 INJECTION INTRAMUSCULAR; INTRAVENOUS at 14:06

## 2018-06-07 RX ADMIN — APIXABAN 5 MG: 5 TABLET, FILM COATED ORAL at 11:00

## 2018-06-07 RX ADMIN — SOTALOL HYDROCHLORIDE 160 MG: 80 TABLET ORAL at 20:32

## 2018-06-07 RX ADMIN — AMLODIPINE BESYLATE 5 MG: 5 TABLET ORAL at 11:00

## 2018-06-07 RX ADMIN — SOTALOL HYDROCHLORIDE 160 MG: 80 TABLET ORAL at 11:00

## 2018-06-07 RX ADMIN — ONDANSETRON 4 MG: 2 INJECTION INTRAMUSCULAR; INTRAVENOUS at 07:09

## 2018-06-07 RX ADMIN — HYDROMORPHONE HYDROCHLORIDE 2 MG: 2 TABLET ORAL at 02:28

## 2018-06-07 RX ADMIN — Medication 2 G: at 02:28

## 2018-06-07 RX ADMIN — ONDANSETRON 4 MG: 2 INJECTION INTRAMUSCULAR; INTRAVENOUS at 18:20

## 2018-06-07 RX ADMIN — Medication 5 ML: at 07:10

## 2018-06-07 RX ADMIN — TRAMADOL HYDROCHLORIDE 50 MG: 50 TABLET, FILM COATED ORAL at 18:20

## 2018-06-07 RX ADMIN — Medication 5 ML: at 14:06

## 2018-06-07 RX ADMIN — FLUTICASONE PROPIONATE AND SALMETEROL 1 PUFF: 250; 50 POWDER RESPIRATORY (INHALATION) at 09:06

## 2018-06-07 RX ADMIN — ACETAMINOPHEN 1000 MG: 500 TABLET, FILM COATED ORAL at 05:40

## 2018-06-07 RX ADMIN — APIXABAN 5 MG: 5 TABLET, FILM COATED ORAL at 20:32

## 2018-06-07 RX ADMIN — FUROSEMIDE 20 MG: 20 TABLET ORAL at 11:00

## 2018-06-07 RX ADMIN — TRAMADOL HYDROCHLORIDE 50 MG: 50 TABLET, FILM COATED ORAL at 11:30

## 2018-06-07 NOTE — PROGRESS NOTES
Dilaudid 2 mg po for c/o pain. Slept at intervals during shift. No further c/o voiced. No change in NV status noted. Medley cath D/Cd. IV hepwelled. Family member at bedside. Call light within reach.

## 2018-06-07 NOTE — PROGRESS NOTES
Problem: Self Care Deficits Care Plan (Adult)  Goal: *Acute Goals and Plan of Care (Insert Text)  GOALS:   DISCHARGE GOALS (in preparation for going home/rehab):  3 days  1. Ms. Rosanne Lassiter will perform one lower body dressing activity with minimal assistance required to demonstrate improved functional mobility and safety. met  2. Ms. Rosanne Lassiter will perform one lower body bathing activity with minimal assistance required to demonstrate improved functional mobility and safety. met  3. Ms. Rosanne Lassiter will perform toileting/toilet transfer with contact guard assistance to demonstrate improved functional mobility and safety. met  4. Ms. Rosanne Lassiter will perform shower transfer with contact guard assistance to demonstrate improved functional mobility and safety. met    JOINT CAMP OCCUPATIONAL THERAPY TKA: Daily Note, Discharge and AM 6/7/2018  INPATIENT: Hospital Day: 2  Payor: Denisse Mata / Plan: [id number] PPO/PFFS / Product Type: [employer] Medicare /      NAME/AGE/GENDER: Elias Hoover is a [de-identified] y.o. female   PRIMARY DIAGNOSIS:  Primary osteoarthritis of right knee [M17.11]   Procedure(s) and Anesthesia Type:     * RIGHT KNEE ARTHROPLASTY TOTAL - Spinal (Right)  ICD-10: Treatment Diagnosis:    · Pain in Right Knee (M25.561)  · Stiffness of Right Knee, Not elsewhere classified (M25.661)  · Generalized Muscle Weakness (M62.81)  · Other lack of cordination (R27.8)      ASSESSMENT:     Ms. Rosanne Lassiter  is s/p R TKA and presents with decreased weight bearing on R LE and decreased independence with functional mobility and activities of daily living. Patient completed shower and dressing as charter below in ADL grid and is ambulating with rolling walker and contact guard assist.  Patient has met 4/4 goals and plans to return home with good family support. Family able to provide patient with appropriate level of assistance at this time.   OT reviewed safety precautions throughout session and therapy schedule for the remainder of today. Patient instructed to call for assistance when needing to get up from recliner and all needs in reach. Patient verbalized understanding of call light. This section established at most recent assessment   PROBLEM LIST (Impairments causing functional limitations):  1. Decreased Strength  2. Decreased ADL/Functional Activities  3. Decreased Transfer Abilities  4. Increased Pain  5. Increased Fatigue  6. Decreased Flexibility/Joint Mobility  7. Decreased Knowledge of Precautions   INTERVENTIONS PLANNED: (Benefits and precautions of occupational therapy have been discussed with the patient.)  1. Activities of daily living training  2. Adaptive equipment training  3. Balance training  4. Clothing management  5. Donning&doffing training  6. Theraputic activity     TREATMENT PLAN: Frequency/Duration: Follow patient 1x to address above goals. Rehabilitation Potential For Stated Goals: Excellent     RECOMMENDED REHABILITATION/EQUIPMENT: (at time of discharge pending progress): Continue Skilled Therapy and Home Health: Physical Therapy. OCCUPATIONAL PROFILE AND HISTORY:   History of Present Injury/Illness (Reason for Referral): Pt presents this date s/p (Right) TKA. Past Medical History/Comorbidities:   Ms. Hardik Mensah  has a past medical history of Adverse effect of anesthesia; NILA (acute kidney injury) ([facility] [address] (06/08/2013); Allergic rhinitis; Anemia, unspecified (8/14/2015); Aortic stenosis; Atrial fibrillation ([address] (09/30/2015); Bell's palsy (2013); Blurry vision, bilateral (6/8/2013); Cardiac pacemaker; Constipation; Critical aortic valve stenosis (8/14/2015); GERD (gastroesophageal reflux disease); Hypertension; Hyponatremia (9/7/2013); Metabolic encephalopathy (0/8/9278); Neuropathy; Osteoarthritis; Pancreatic cyst; Paroxysmal atrial fibrillation ([address] (8/14/2015); Pulmonary nodule; Sick sinus syndrome (HCC); SOB (shortness of breath) on exertion (2015);  Spinal stenosis, lumbar; 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 Ms. Rosanne Lassiter  has a past surgical history that includes hx hysterectomy (1994); hx cholecystectomy (2000); hx tonsillectomy (1951); hx colonoscopy (April 2013); hx pacemaker (9/24/14); hx endoscopy; hx heart catheterization (2013, 2015); and hx aortic valve replacement (8/2015). Social History/Living Environment:   Home Environment: Private residence  # Steps to Enter: 0  One/Two Story Residence: One story  Living Alone: No  Support Systems: Family member(s)  Patient Expects to be Discharged to[de-identified] Private residence  Current DME Used/Available at Home: Raised toilet seat  Tub or Shower Type: Tub/Shower combination  Prior Level of Function/Work/Activity:  Indep with self care and functional mobility     Number of Personal Factors/Comorbidities that affect the Plan of Care: Brief history (0):  LOW COMPLEXITY   ASSESSMENT OF OCCUPATIONAL PERFORMANCE[de-identified]   Most Recent Physical Functioning:   Balance  Sitting: Intact  Standing: With support;Pull to stand                              Mental Status  Neurologic State: Alert  Orientation Level: Oriented X4  Cognition: Follows commands  Perception: Appears intact  Perseveration: No perseveration noted  Safety/Judgement: Awareness of environment; Fall prevention                Basic ADLs (From Assessment) Complex ADLs (From Assessment)   Basic ADL  Feeding: Supervision  Oral Facial Hygiene/Grooming: Supervision  Bathing: Moderate assistance  Type of Bath: Chlorhexidine (CHG), Full, Shower  Upper Body Dressing: Minimum assistance  Lower Body Dressing: Moderate assistance  Toileting: Total assistance     Grooming/Bathing/Dressing Activities of Daily Living   Grooming  Grooming Assistance: Supervision/set up  Brushing Teeth: Supervision/set-up  Brushing/Combing Hair: Supervision/set-up Cognitive Retraining  Safety/Judgement: Awareness of environment; Fall prevention   Upper Body Bathing  Bathing Assistance: Supervision/set-up  Position Performed: Seated in chair  Cues: Verbal cues provided  Adaptive Equipment: Grab bar; Shower chair; Long handled sponge     Lower Body Bathing  Bathing Assistance: Supervision/set-up  Perineal  : Supervision/set-up  Cues: Verbal cues provided  Adaptive Equipment: Grab bar;Long handled sponge  Lower Body : Supervision/set-up  Position Performed: Bending forward method;Seated in chair  Cues: Verbal cues provided  Adaptive Equipment: Grab bar;Long handled sponge; Shower chair Toileting  Toileting Assistance: Supervision/set up   Upper Body Dressing Assistance  Dressing Assistance: Supervision/set-up  Bra: Supervision/set-up  Pullover Shirt: Supervision/set-up Functional Transfers  Bathroom Mobility: Contact guard assistance  Toilet Transfer : Contact guard assistance  Shower Transfer: Contact guard assistance  Cues: Verbal cues provided  Adaptive Equipment: Grab bars; Bedside commode; Shower chair with back   Lower Body Dressing Assistance  Dressing Assistance: Minimum assistance  Underpants: Minimum assistance  Pants With Elastic Waist: Minimum assistance  Socks: Compensatory technique training ( socks)  Position Performed: Seated in chair;Standing  Adaptive Equipment Used: Grab bar;Long handled shoe horn;Walker Bed/Mat Mobility  Supine to Sit: Contact guard assistance  Sit to Supine:  (left up in chair)  Sit to Stand: Contact guard assistance  Bed to Chair: Contact guard assistance         Physical Skills Involved:  1. Range of Motion  2. Balance  3. Strength Cognitive Skills Affected (resulting in the inability to perform in a timely and safe manner):  1. None Psychosocial Skills Affected:  1.  None   Number of elements that affect the Plan of Care: 1-3:  LOW COMPLEXITY   CLINICAL DECISION MAKIN [facility] [address] [id number] AM-PAC 6 Clicks   Daily Activity Inpatient Short Form  How much help from another person does the patient currently need... Total A Lot A Little None   1. Putting on and taking off regular lower body clothing? [] 1   [x] 2   [] 3   [] 4   2. Bathing (including washing, rinsing, drying)? [] 1   [x] 2   [] 3   [] 4   3. Toileting, which includes using toilet, bedpan or urinal?   [] 1   [x] 2   [] 3   [] 4   4. Putting on and taking off regular upper body clothing? [] 1   [] 2   [x] 3   [] 4   5. Taking care of personal grooming such as brushing teeth? [] 1   [] 2   [x] 3   [] 4   6. Eating meals? [] 1   [] 2   [] 3   [x] 4   © 2007, Trustees of [address] [place] [address] [address] [address], under license to [employer]. All rights reserved     Score:  Initial: 16 Most Recent: X (Date: -- )    Interpretation of Tool:  Represents activities that are increasingly more difficult (i.e. Bed mobility, Transfers, Gait). Score 24 23 22-20 19-15 14-10 9-7 6     Modifier CH CI CJ CK CL CM CN      ? Self Care:     - CURRENT STATUS: CK - 40%-59% impaired, limited or restricted    - GOAL STATUS: CJ - 20%-39% impaired, limited or restricted    - D/C STATUS:  ---------------To be determined---------------  Payor: [employer] MEDICARE / Plan: [facility] MEDICARE CHOICE PPO/PFFS / Product Type: Managed Care Medicare /      Medical Necessity:     · Patient is expected to demonstrate progress in balance, coordination and functional technique to decrease assistance required with self care and functional mobility. Reason for Services/Other Comments:  · Patient continues to require skilled intervention due to decreased self care and functional mobility. Use of outcome tool(s) and clinical judgement create a POC that gives a: LOW COMPLEXITY            TREATMENT:   (In addition to Assessment/Re-Assessment sessions the following treatments were rendered)     Pre-treatment Symptoms/Complaints:   Tolerated shower; complained of nausea  Pain: Initial:   Pain Intensity 1: 0  Post Session:  0     Self Care: (40): Procedure(s) (per grid) utilized to improve and/or restore self-care/home management as related to dressing, bathing, toileting and grooming. Required minimal verbal and   cueing to facilitate activities of daily living skills and compensatory activities. Treatment/Session Assessment:     Response to Treatment:  Tolerated well. Education:  [] Home Exercises  [x] Fall Precautions  [] Hip Precautions [] Going Home Video  [x] Knee/Hip Prosthesis Review  [x] Walker Management/Safety [x] Adaptive Equipment as Needed       Interdisciplinary Collaboration:   o Occupational Therapist  o Registered Nurse    After treatment position/precautions:   o Up in chair  o Bed/Chair-wheels locked  o Caregiver at bedside  o Call light within reach  o RN notified     Compliance with Program/Exercises: compliant all of the time. Recommendations: Pt doing well all goals met and will do well at home with support from family. Patient will be discharged home with home health PT. No further Occupational Therapy warranted, will discharge Occupational Therapy services.       Total Treatment Duration:  OT Patient Time In/Time Out  Time In: 0800  Time Out: 2907 Bernice Hernandez OT

## 2018-06-07 NOTE — PROGRESS NOTES
2018         Post Op day: 1 Day Post-OpProcedure(s) (LRB):  RIGHT KNEE ARTHROPLASTY TOTAL (Right)      Admit Date: 2018  Admit Diagnosis: Primary osteoarthritis of right knee [M17.11]    LAB:    Recent Results (from the past 24 hour(s))   HEMOGLOBIN    Collection Time: 18  7:34 PM   Result Value Ref Range    HGB 10.6 (L) 11.7 - 15.4 g/dL   HEMOGLOBIN    Collection Time: 18  3:44 AM   Result Value Ref Range    HGB 9.3 (L) 11.7 - 56.4 g/dL   METABOLIC PANEL, BASIC    Collection Time: 18  3:44 AM   Result Value Ref Range    Sodium 135 (L) 136 - 145 mmol/L    Potassium 4.1 3.5 - 5.1 mmol/L    Chloride 103 98 - 107 mmol/L    CO2 25 21 - 32 mmol/L    Anion gap 7 7 - 16 mmol/L    Glucose 121 (H) 65 - 100 mg/dL    BUN 11 8 - 23 MG/DL    Creatinine 0.99 0.6 - 1.0 MG/DL    GFR est AA >60 >60 ml/min/1.73m2    GFR est non-AA 57 (L) >60 ml/min/1.73m2    Calcium 7.9 (L) 8.3 - 10.4 MG/DL     Vital Signs:    Patient Vitals for the past 8 hrs:   BP Temp Pulse Resp SpO2   18 0359 102/56 98.1 °F (36.7 °C) 60 16 97 %     Temp (24hrs), Av.2 °F (36.8 °C), Min:97.4 °F (36.3 °C), Max:99.7 °F (37.6 °C)    Body mass index is 29.03 kg/(m^2).   Pain Control:   Pain Assessment  Pain Scale 1: Numeric (0 - 10)  Pain Intensity 1: 4  Pain Onset 1: at rest  Pain Location 1: Knee  Pain Orientation 1: Right  Pain Description 1: Aching  Pain Intervention(s) 1: Medication (see MAR)    Subjective: Doing well, No complaints, No SOB, No Chest Pain, No Nausea or Vomitting     Objective: Vital Signs are Stable, No Acute Distress, Alert and Oriented, Dressing is Dry,  Neurovascular exam is normal.       PT/OT:            Assistive Device: Walker (comment)  RLE AROM  R Knee Flexion: 60  R Knee Extension: 30             Weight Bearing Status: WBAT    Meds:  [unfilled]  [unfilled]  [unfilled]    Assessment:   Patient Active Problem List   Diagnosis Code    CAD (coronary artery disease) I25.10    Hypertension I10    GERD (gastroesophageal reflux disease) K21.9    Spinal stenosis M48.00    Peripheral neuropathy G62.9    Arthritis M19.90    Chronic pain G89.29    Obesity (BMI 30-39. 9) E66.9    Pacemaker Z95.0    S/P AVR Z95.2    Paroxysmal atrial fibrillation (HCC) I48.0    Anemia, unspecified D64.9    Atrial fibrillation (HCC) I48.91    Cardiac pacemaker Z95.0    Sick sinus syndrome (HCC) I49.5    Abnormal CT of the chest R93.8    Osteoarthritis of right knee M17.11    Status post total right knee replacement Z96.651             Plan: Continue Physical Therapy, Monitor  Hbg, home tomorrow.           Signed By: Radha Wang MD

## 2018-06-07 NOTE — PROGRESS NOTES
Problem: Mobility Impaired (Adult and Pediatric)  Goal: *Acute Goals and Plan of Care (Insert Text)  GOALS (1-4 days):  (1.)Ms. Sparkle Beth will move from supine to sit and sit to supine  in bed with INDEPENDENT. (2.)Ms. Sparkle Beth will transfer from bed to chair and chair to bed with INDEPENDENT using the least restrictive device. (3.)Ms. Sparkle Beth will ambulate with INDEPENDENT for 250 feet with the least restrictive device. (4.)Ms. Sparkle Beth will ambulate up/down 3 steps with bilateral  railing with MINIMAL ASSIST with no device. (5.)Ms. Sparkle Beth will increase right knee ROM to 5°-80°.  ________________________________________________________________________________________________    Outcome: Progressing Towards Goal    PHYSICAL THERAPY Joint camp tKa: Daily Note, PM 6/7/2018  INPATIENT: Hospital Day: 2  Payor: [address] / Plan: [address] Jordan PPO/PFFS / Product Type: [employer] Medicare /      NAME/AGE/GENDER: Deepali Coats is a [de-identified] y.o. female   PRIMARY DIAGNOSIS:  Primary osteoarthritis of right knee [M17.11]   Procedure(s) and Anesthesia Type:     * RIGHT KNEE ARTHROPLASTY TOTAL - Spinal (Right)  ICD-10: Treatment Diagnosis:    · Pain in Right Knee (M25.561)  · Stiffness of Right Knee, Not elsewhere classified (M25.661)  · Difficulty in walking, Not elsewhere classified (R26.2)  · Other abnormalities of gait and mobility (R26.89)      ASSESSMENT:     Ms. Sparkle Beth is making good progress with gait and TK exercises. She will continue to work toward her goals. This section established at most recent assessment   PROBLEM LIST (Impairments causing funtional limitations):  1. Decreased Strength  2. Decreased Transfer Abilities  3. Decreased Ambulation Ability/Technique  4. Decreased Balance  5. Increased Pain  6. Decreased Activity Tolerance   INTERVENTIONS PLANNED: (Benefits and precautions of physical therapy have been discussed with the patient.)  1. Bed Mobility  2. Gait Training  3.  Home Exercise Program (HEP)  4. Therapeutic Exercise/Strengthening  5. Transfer Training  6. Range of Motion: active/assisted/passive  7. Therapeutic Activities  8. Group Therapy     TREATMENT PLAN: Frequency/Duration: Follow patient BID for duration of hospital stay to address above goals. Rehabilitation Potential For Stated Goals: Good     RECOMMENDED REHABILITATION/EQUIPMENT: (at time of discharge pending progress): Home Health: Physical Therapy. HISTORY:   History of Present Injury/Illness (Reason for Referral):  Pt is status post right total knee replacement. Past Medical History/Comorbidities:   Ms. Lee Glynn  has a past medical history of Adverse effect of anesthesia; NILA (acute kidney injury) ([facility] [address] (06/08/2013); Allergic rhinitis; Anemia, unspecified (8/14/2015); Aortic stenosis; [facility] (09/30/2015); Bell's palsy (2013); Blurry vision, bilateral (6/8/2013); Cardiac pacemaker; Constipation; Critical aortic valve stenosis (8/14/2015); GERD (gastroesophageal reflux disease); Hypertension; Hyponatremia (9/7/2013); Metabolic encephalopathy (3/0/9839); Neuropathy; Osteoarthritis; Pancreatic cyst; Paroxysmal atrial fibrillation ([facility] (8/14/2015); Pulmonary nodule; Sick sinus syndrome (HCC); SOB (shortness of breath) on exertion (2015); Spinal stenosis, lumbar; 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 Ms. Lee Glynn  has a past surgical history that includes hx hysterectomy (1994); hx cholecystectomy (2000); hx tonsillectomy (1951); hx colonoscopy (April 2013); hx pacemaker (9/24/14); hx endoscopy; hx heart catheterization (2013, 2015); and hx aortic valve replacement (8/2015).   Social History/Living Environment:   Home Environment: Private residence  # Steps to Enter: 0  One/Two Story Residence: One story  Living Alone: No  Support Systems: Family member(s)  Patient Expects to be Discharged to[de-identified] Private residence  Current DME Used/Available at Home: Raised toilet seat  Tub or Shower Type: Tub/Shower combination  Prior Level of Function/Work/Activity:  Independent with ambulation. Number of Personal Factors/Comorbidities that affect the Plan of Care: 0: LOW COMPLEXITY   EXAMINATION:   Most Recent Physical Functioning:               RLE AROM  R Knee Flexion: 80  R Knee Extension: 15            Bed Mobility  Supine to Sit: Contact guard assistance  Sit to Supine:  (left up in chair)    Transfers  Sit to Stand: Contact guard assistance  Stand to Sit: Contact guard assistance  Bed to Chair: Contact guard assistance    Balance  Sitting: Intact  Standing: With support;Pull to stand              Weight Bearing Status  Right Side Weight Bearing: As tolerated  Distance (ft): 150 Feet (ft)  Ambulation - Level of Assistance: Contact guard assistance  Gait Abnormalities: Antalgic        Braces/Orthotics: none    Right Knee Cold  Type: Cryocuff      Body Structures Involved:  1. Bones  2. Joints  3. Muscles  4. Ligaments Body Functions Affected:  1. Neuromusculoskeletal  2. Movement Related Activities and Participation Affected:  1. Mobility   Number of elements that affect the Plan of Care: 4+: HIGH COMPLEXITY   CLINICAL PRESENTATION:   Presentation: Stable and uncomplicated: LOW COMPLEXITY   CLINICAL DECISION MAKIN[address] [place] [address]-PAC 6 Clicks   Basic Mobility Inpatient Short Form  How much difficulty does the patient currently have. .. Unable A Lot A Little None   1. Turning over in bed (including adjusting bedclothes, sheets and blankets)? [] 1   [] 2   [x] 3   [] 4   2. Sitting down on and standing up from a chair with arms ( e.g., wheelchair, bedside commode, etc.)   [] 1   [] 2   [x] 3   [] 4   3. Moving from lying on back to sitting on the side of the bed? [] 1   [] 2   [x] 3   [] 4   How much help from another person does the patient currently need. .. Total A Lot A Little None   4.   Moving to and from a bed to a chair (including a wheelchair)? [] 1   [] 2   [x] 3   [] 4   5. Need to walk in hospital room? [] 1   [] 2   [x] 3   [] 4   6. Climbing 3-5 steps with a railing? [] 1   [] 2   [x] 3   [] 4   © 2007, Trustees of [address] [place] [address] [address] [address], under license to [employer]. All rights reserved        Score:  Initial: 18 Most Recent: X (Date: -- )    Interpretation of Tool:  Represents activities that are increasingly more difficult (i.e. Bed mobility, Transfers, Gait). Score 24 23 22-20 19-15 14-10 9-7 6     Modifier CH CI CJ CK CL CM CN      ? Mobility - Walking and Moving Around:     - CURRENT STATUS: CK - 40%-59% impaired, limited or restricted    - GOAL STATUS: CK - 40%-59% impaired, limited or restricted    - D/C STATUS:  CK - 40%-59% impaired, limited or restricted  Payor: Bryant Hays / Plan: Royer Remy PPO/PFFS / Product Type: [employer] Medicare /      Medical Necessity:     · Skilled intervention continues to be required due to decreased mobility ability. Reason for Services/Other Comments:  · Patient continues to require skilled intervention due to decreased mobility ability. Use of outcome tool(s) and clinical judgement create a POC that gives a: Clear prediction of patient's progress: LOW COMPLEXITY            TREATMENT:   (In addition to Assessment/Re-Assessment sessions the following treatments were rendered)     Pre-treatment Symptoms/Complaints:  2/10  Pain: Initial:   Pain Intensity 1: 0  Post Session:       Gait Training (15 Minutes ):  Gait training to improve and/or restore physical functioning as related to mobility. Ambulated 150 Feet (ft) with Contact guard assistance Therapeutic Exercise: (45 Minutes (group therapy)):  Exercises per grid below to improve strength. Required minimal verbal cues to promote proper body alignment. Progressed range as indicated.            Date:  6/6 Date:  6/7   Date:     ACTIVITY/EXERCISE AM PM AM PM AM PM   GROUP THERAPY  []  [] []  [x]  []  []   Ankle Pumps 10  15 15     Quad Sets 10  15 15     Gluteal Sets 10  15 15     Hip ABd/ADduction 10  15 15     Straight Leg Raises 10  15 15     Knee Slides 10  15 15     Short Arc Quads   15 15     Long Arc Quads         Chair Slides    15              B = bilateral; AA = active assistive; A = active; P = passive      Treatment/Session Assessment:     Response to Treatment:  Pt tolerated group therapy well    Education:  [x] Home Exercises  [] Fall Precautions  [] Hip Precautions [] D/C Instruction Review  [x] Knee/Hip Prosthesis Review  [x] Walker Management/Safety [] Adaptive Equipment as Needed       Interdisciplinary Collaboration:   o Physical Therapy Assistant  o Registered Nurse    After treatment position/precautions:   o Up in chair  o Bed/Chair-wheels locked  o Caregiver at bedside  o Call light within reach  o RN notified    Compliance with Program/Exercises: compliant most of the time. Recommendations/Intent for next treatment session:  Treatment next visit will focus on increasing Ms. Darrell Garcia independence with bed mobility, transfers, gait training, strength/ROM exercises, modalities for pain, and patient education.       Total Treatment Duration:  PT Patient Time In/Time Out  Time In: 1300  Time Out: 9[address] Delacruz, PTA

## 2018-06-07 NOTE — PROGRESS NOTES
Up to BR and back to bed with assistance. Medicated for pain with tramadol and for nausea with zofran IV. Iceman to knee. No further changes. NV checks WDL.

## 2018-06-07 NOTE — PROGRESS NOTES
Has been to group therapy. Back to room. Continues to have nausea, medicated with zofran IV. Resting in recliner.

## 2018-06-07 NOTE — PROGRESS NOTES
In recliner. Has been vomiting. Iceman to knee. R knee with aquacel intact with small spots of drainage.

## 2018-06-07 NOTE — PROGRESS NOTES
06/07/18 0911   Oxygen Therapy   O2 Sat (%) 97 %   Pulse via Oximetry 70 beats per minute   O2 Device Room air   O2 Flow Rate (L/min) 0 l/min   Good npc. Pt working on IS. Pt encouraged to do 10 breaths per hour while awake on IS. B/S clear. No respiratory distress noted at this time. Pt. Has home meds. Took  advair without any complications.

## 2018-06-07 NOTE — PROGRESS NOTES
Problem: Mobility Impaired (Adult and Pediatric)  Goal: *Acute Goals and Plan of Care (Insert Text)  GOALS (1-4 days):  (1.)Ms. James Levi will move from supine to sit and sit to supine  in bed with INDEPENDENT. (2.)Ms. James Levi will transfer from bed to chair and chair to bed with INDEPENDENT using the least restrictive device. (3.)Ms. James Levi will ambulate with INDEPENDENT for 250 feet with the least restrictive device. (4.)Ms. James Levi will ambulate up/down 3 steps with bilateral  railing with MINIMAL ASSIST with no device. (5.)Ms. James Levi will increase right knee ROM to 5°-80°.  ________________________________________________________________________________________________    Outcome: Progressing Towards Goal    PHYSICAL THERAPY [facility]: Daily Note, AM 6/7/2018  INPATIENT: Hospital Day: 2  Payor: Gabriel Oconnor / Plan: [id number] PPO/PFFS / Product Type: [employer] /      NAME/AGE/GENDER: Giovana Andrade is a [de-identified] y.o. female   PRIMARY DIAGNOSIS:  Primary osteoarthritis of right knee [M17.11]   Procedure(s) and Anesthesia Type:     * RIGHT KNEE ARTHROPLASTY TOTAL - Spinal (Right)  ICD-10: Treatment Diagnosis:    · Pain in Right Knee (M25.561)  · Stiffness of Right Knee, Not elsewhere classified (M25.661)  · Difficulty in walking, Not elsewhere classified (R26.2)  · Other abnormalities of gait and mobility (R26.89)      ASSESSMENT:     Ms. James Levi is supine upon arrival.  She performs exercises in the bed without any problems. She increase her distance using RW with CGA and no LOB. She return to chair with call light near. She will sit up for awhile and then come to group therapy. This section established at most recent assessment   PROBLEM LIST (Impairments causing functional limitations):  1. Decreased Strength  2. Decreased Transfer Abilities  3. Decreased Ambulation Ability/Technique  4. Decreased Balance  5. Increased Pain  6.  Decreased Activity Tolerance   INTERVENTIONS PLANNED: (Benefits and precautions of physical therapy have been discussed with the patient.)  1. Bed Mobility  2. Gait Training  3. Home Exercise Program (HEP)  4. Therapeutic Exercise/Strengthening  5. Transfer Training  6. Range of Motion: active/assisted/passive  7. Therapeutic Activities  8. Group Therapy     TREATMENT PLAN: Frequency/Duration: Follow patient BID for duration of hospital stay to address above goals. Rehabilitation Potential For Stated Goals: Good     RECOMMENDED REHABILITATION/EQUIPMENT: (at time of discharge pending progress): Home Health: Physical Therapy. HISTORY:   History of Present Injury/Illness (Reason for Referral):  Pt is status post right total knee replacement. Past Medical History/Comorbidities:   Ms. Lee Glynn  has a past medical history of Adverse effect of anesthesia; NILA (acute kidney injury) ([facility] (06/08/2013); Allergic rhinitis; Anemia, unspecified (8/14/2015); Aortic stenosis; Atrial fibrillation ([facility] (09/30/2015); Bell's palsy (2013); Blurry vision, bilateral (6/8/2013); Cardiac pacemaker; Constipation; Critical aortic valve stenosis (8/14/2015); GERD (gastroesophageal reflux disease); Hypertension; Hyponatremia (9/7/2013); Metabolic encephalopathy (2/6/6603); Neuropathy; Osteoarthritis; Pancreatic cyst; Paroxysmal atrial fibrillation ([facility] (8/14/2015); Pulmonary nodule; Sick sinus syndrome (HCC); SOB (shortness of breath) on exertion (2015); Spinal stenosis, lumbar; 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 Ms. Lee Glynn  has a past surgical history that includes hx hysterectomy (1994); hx cholecystectomy (2000); hx tonsillectomy (1951); hx colonoscopy (April 2013); hx pacemaker (9/24/14); hx endoscopy; hx heart catheterization (2013, 2015); and hx aortic valve replacement (8/2015).   Social History/Living Environment:   Home Environment: Private residence  # Steps to Enter: 0  One/Two Story Residence: [facility] story  Living Alone: No  Support Systems: Family member(s)  Patient Expects to be Discharged to[de-identified] Private residence  Current DME Used/Available at Home: Raised toilet seat  Tub or Shower Type: Tub/Shower combination  Prior Level of Function/Work/Activity:  Independent with ambulation. Number of Personal Factors/Comorbidities that affect the Plan of Care: 0: LOW COMPLEXITY   EXAMINATION:   Most Recent Physical Functioning:                            Bed Mobility  Supine to Sit: Contact guard assistance  Sit to Supine:  (left up in chair)    Transfers  Sit to Stand: Contact guard assistance  Stand to Sit: Contact guard assistance  Bed to Chair: Contact guard assistance                      Distance (ft): 60 Feet (ft)  Ambulation - Level of Assistance: Contact guard assistance  Gait Abnormalities: Antalgic        Braces/Orthotics: none    Right Knee Cold  Type: Cryocuff      Body Structures Involved:  1. Bones  2. Joints  3. Muscles  4. Ligaments Body Functions Affected:  1. Neuromusculoskeletal  2. Movement Related Activities and Participation Affected:  1. Mobility   Number of elements that affect the Plan of Care: 4+: HIGH COMPLEXITY   CLINICAL PRESENTATION:   Presentation: Stable and uncomplicated: LOW COMPLEXITY   CLINICAL DECISION MAKIN[address] [place] [address]-PAC 6 Clicks   Basic Mobility Inpatient Short Form  How much difficulty does the patient currently have. .. Unable A Lot A Little None   1. Turning over in bed (including adjusting bedclothes, sheets and blankets)? [] 1   [] 2   [x] 3   [] 4   2. Sitting down on and standing up from a chair with arms ( e.g., wheelchair, bedside commode, etc.)   [] 1   [] 2   [x] 3   [] 4   3. Moving from lying on back to sitting on the side of the bed? [] 1   [] 2   [x] 3   [] 4   How much help from another person does the patient currently need. .. Total A Lot A Little None   4. Moving to and from a bed to a chair (including a wheelchair)?    [] 1   [] 2   [x] 3 [] 4   5. Need to walk in hospital room? [] 1   [] 2   [x] 3   [] 4   6. Climbing 3-5 steps with a railing? [] 1   [] 2   [x] 3   [] 4   © 2007, Trustees of [address] [place] [address] [address] [id number], under license to [employer] All rights reserved        Score:  Initial: 18 Most Recent: X (Date: -- )    Interpretation of Tool:  Represents activities that are increasingly more difficult (i.e. Bed mobility, Transfers, Gait). Score 24 23 22-20 19-15 14-10 9-7 6     Modifier CH CI CJ CK CL CM CN      ? Mobility - Walking and Moving Around:     - CURRENT STATUS: CK - 40%-59% impaired, limited or restricted    - GOAL STATUS: CK - 40%-59% impaired, limited or restricted    - D/C STATUS:  CK - 40%-59% impaired, limited or restricted  Payor: Sly Mckee / Plan: [employer] PPO/PFFS / Product Type: [employer] Medicare /      Medical Necessity:     · Skilled intervention continues to be required due to decreased mobility ability. Reason for Services/Other Comments:  · Patient continues to require skilled intervention due to decreased mobility ability. Use of outcome tool(s) and clinical judgement create a POC that gives a: Clear prediction of patient's progress: LOW COMPLEXITY            TREATMENT:   (In addition to Assessment/Re-Assessment sessions the following treatments were rendered)     Pre-treatment Symptoms/Complaints:  2/10  Pain: Initial:   Pain Intensity 1: 2 (about the same)  Post Session:       Gait Training (15 Minutes ):  Gait training to improve and/or restore physical functioning as related to mobility. Ambulated 60 Feet (ft) with Contact guard assistance Therapeutic Exercise: (15 Minutes):  Exercises per grid below to improve strength. Required minimal verbal cues to promote proper body alignment. Progressed range as indicated.            Date:  6/6 Date:  6/7   Date:     ACTIVITY/EXERCISE AM PM AM PM AM PM   GROUP THERAPY  []  []  []  []  []  []   Ankle Pumps 10  15      Quad Sets 10  15      Gluteal Sets 10  15      Hip ABd/ADduction 10  15      Straight Leg Raises 10  15      Knee Slides 10  15      Short Arc Quads   15      Long Arc Quads         Chair Slides                  B = bilateral; AA = active assistive; A = active; P = passive      Treatment/Session Assessment:     Response to Treatment:  Pt tolerated treatment well    Education:  [x] Home Exercises  [] Fall Precautions  [] Hip Precautions [] D/C Instruction Review  [] Knee/Hip Prosthesis Review  [x] Walker Management/Safety [] Adaptive Equipment as Needed       Interdisciplinary Collaboration:   o Physical Therapy Assistant  o Registered Nurse    After treatment position/precautions:   o Up in chair  o Bed/Chair-wheels locked  o Caregiver at bedside  o Call light within reach  o RN notified    Compliance with Program/Exercises: compliant most of the time. Recommendations/Intent for next treatment session:  Treatment next visit will focus on increasing Ms. Justus Galvez independence with bed mobility, transfers, gait training, strength/ROM exercises, modalities for pain, and patient education.       Total Treatment Duration:  PT Patient Time In/Time Out  Time In: 0700  Time Out: 0730    Lisa Delacruz, PTA

## 2018-06-08 VITALS
OXYGEN SATURATION: 97 % | HEIGHT: 67 IN | HEART RATE: 70 BPM | RESPIRATION RATE: 18 BRPM | WEIGHT: 185.38 LBS | SYSTOLIC BLOOD PRESSURE: 139 MMHG | DIASTOLIC BLOOD PRESSURE: 70 MMHG | BODY MASS INDEX: 29.1 KG/M2 | TEMPERATURE: 97.7 F

## 2018-06-08 LAB
HGB BLD-MCNC: 9.5 G/DL (ref 11.7–15.4)
MM INDURATION POC: 0 MM (ref 0–5)
PPD POC: NORMAL NEGATIVE

## 2018-06-08 PROCEDURE — 36415 COLL VENOUS BLD VENIPUNCTURE: CPT | Performed by: PHYSICIAN ASSISTANT

## 2018-06-08 PROCEDURE — 74011250637 HC RX REV CODE- 250/637: Performed by: ORTHOPAEDIC SURGERY

## 2018-06-08 PROCEDURE — 85018 HEMOGLOBIN: CPT | Performed by: PHYSICIAN ASSISTANT

## 2018-06-08 PROCEDURE — 74011250637 HC RX REV CODE- 250/637: Performed by: PHYSICIAN ASSISTANT

## 2018-06-08 PROCEDURE — 74011250636 HC RX REV CODE- 250/636: Performed by: PHYSICIAN ASSISTANT

## 2018-06-08 PROCEDURE — 97150 GROUP THERAPEUTIC PROCEDURES: CPT

## 2018-06-08 PROCEDURE — 97116 GAIT TRAINING THERAPY: CPT

## 2018-06-08 PROCEDURE — 94760 N-INVAS EAR/PLS OXIMETRY 1: CPT

## 2018-06-08 RX ORDER — TRAMADOL HYDROCHLORIDE 50 MG/1
50 TABLET ORAL
Qty: 40 TAB | Refills: 0 | Status: SHIPPED | OUTPATIENT
Start: 2018-06-08 | End: 2021-02-18 | Stop reason: ALTCHOICE

## 2018-06-08 RX ADMIN — ACETAMINOPHEN 1000 MG: 500 TABLET, FILM COATED ORAL at 01:21

## 2018-06-08 RX ADMIN — FUROSEMIDE 20 MG: 20 TABLET ORAL at 08:44

## 2018-06-08 RX ADMIN — AMLODIPINE BESYLATE 5 MG: 5 TABLET ORAL at 08:44

## 2018-06-08 RX ADMIN — SOTALOL HYDROCHLORIDE 160 MG: 80 TABLET ORAL at 08:44

## 2018-06-08 RX ADMIN — ACETAMINOPHEN 1000 MG: 500 TABLET, FILM COATED ORAL at 06:04

## 2018-06-08 RX ADMIN — TRAMADOL HYDROCHLORIDE 50 MG: 50 TABLET, FILM COATED ORAL at 01:21

## 2018-06-08 RX ADMIN — ONDANSETRON 4 MG: 2 INJECTION INTRAMUSCULAR; INTRAVENOUS at 02:55

## 2018-06-08 RX ADMIN — FLUTICASONE PROPIONATE AND SALMETEROL 1 PUFF: 250; 50 POWDER RESPIRATORY (INHALATION) at 08:04

## 2018-06-08 RX ADMIN — SENNOSIDES AND DOCUSATE SODIUM 2 TABLET: 8.6; 5 TABLET ORAL at 08:44

## 2018-06-08 RX ADMIN — ACETAMINOPHEN 1000 MG: 500 TABLET, FILM COATED ORAL at 13:43

## 2018-06-08 RX ADMIN — APIXABAN 5 MG: 5 TABLET, FILM COATED ORAL at 08:44

## 2018-06-08 RX ADMIN — PANTOPRAZOLE SODIUM 40 MG: 40 TABLET, DELAYED RELEASE ORAL at 06:04

## 2018-06-08 NOTE — PROGRESS NOTES
2018         Post Op day: 2 Days Post-OpProcedure(s) (LRB):  RIGHT KNEE ARTHROPLASTY TOTAL (Right)      Admit Date: 2018  Admit Diagnosis: Primary osteoarthritis of right knee [M17.11]    LAB:    Recent Results (from the past 24 hour(s))   PLEASE READ & DOCUMENT PPD TEST IN 24 HRS    Collection Time: 18 10:36 AM   Result Value Ref Range    PPD  Negative    mm Induration 0 mm   HEMOGLOBIN    Collection Time: 18  4:05 AM   Result Value Ref Range    HGB 9.5 (L) 11.7 - 15.4 g/dL     Vital Signs:    Patient Vitals for the past 8 hrs:   BP Temp Pulse Resp SpO2   18 0400 102/60 98.1 °F (36.7 °C) 60 16 95 %     Temp (24hrs), Av °F (36.7 °C), Min:95.8 °F (35.4 °C), Max:98.8 °F (37.1 °C)    Body mass index is 29.03 kg/(m^2). Pain Control:   Pain Assessment  Pain Scale 1: Numeric (0 - 10)  Pain Intensity 1: 4  Pain Onset 1: at rest  Pain Location 1: Knee  Pain Orientation 1: Right  Pain Description 1: Aching  Pain Intervention(s) 1: Medication (see MAR)    Subjective: Doing well, No complaints, No SOB, No Chest Pain, patient has had nausea and vomiting. Objective: Vital Signs are Stable, No Acute Distress, Alert and Oriented, Dressing is Dry,  Neurovascular exam is normal.       PT/OT:            Assistive Device: Walker (comment)  RLE AROM  R Knee Flexion: 80  R Knee Extension: 15             Weight Bearing Status: WBAT    Meds:  [unfilled]  [unfilled]  [unfilled]    Assessment:   Patient Active Problem List   Diagnosis Code    CAD (coronary artery disease) I25.10    Hypertension I10    GERD (gastroesophageal reflux disease) K21.9    Spinal stenosis M48.00    Peripheral neuropathy G62.9    Arthritis M19.90    Chronic pain G89.29    Obesity (BMI 30-39. 9) E66.9    Pacemaker Z95.0    S/P AVR Z95.2    Paroxysmal atrial fibrillation (HCC) I48.0    Anemia, unspecified D64.9    Atrial fibrillation (HCC) I48.91    Cardiac pacemaker Z95.0    Sick sinus syndrome (HCC) I49.5    Abnormal CT of the chest R93.8    Osteoarthritis of right knee M17.11    Status post total right knee replacement Z96.651             Plan: Continue Physical Therapy, Monitor  Hbg, p[ain meds changed to Tramadol and tylenol only.           Signed By: Orestes Padilla MD

## 2018-06-08 NOTE — PROGRESS NOTES
06/08/18 0804   Oxygen Therapy   O2 Sat (%) 97 %   Pulse via Oximetry 70 beats per minute   O2 Device Room air   Patient achieved   1250     Ml/sec on IS. Patient encouraged to do every hour while awake-patient agreed and demonstrated. No shortness of breath or distress noted. BS are clear b/l.

## 2018-06-08 NOTE — DISCHARGE INSTRUCTIONS
[facility] [place]   Patient Discharge Instructions    Markie Yanes / [id number] : 1940    Admitted 2018 Discharged: 2018     IF YOU HAVE ANY PROBLEMS ONCE YOU ARE AT HOME CALL THE FOLLOWING NUMBERS:   Main office number: [phone]      Medications    · The medications you are to continue on are listed on the medication reconciliation sheet. · Narcotic pain medications as well as supplemental iron can cause constipation. If this occurs try stopping the narcotic pain medication and/or the iron. · It is important that you take the medication exactly as they are prescribed. · Medications which increase your risk of blood clots are listed to stop for 5 weeks after surgery as well as medications or supplements which increase your risk of bleeding complications. · Keep your medication in the bottles provided by the pharmacist and keep a list of the medication names, dosages, and times to be taken in your wallet. · Do not take other medications without consulting your doctor. Important Information    Do NOT smoke as this will greatly increase your risk of infection! Resume your prehospital diet. If you have excessive nausea or vomitting call your doctor's office     Leg swelling and warmth is normal for 6 months after surgery. If you experience swelling in your leg elevate you leg while laying down with your toes above your heart. If you have sudden onset severe swelling with leg pain call our office. The stitches deep inside take approximately 6 months to dissolve. There will be sharp shooting, stinging and burning pain. This is normal and will resolve between 3-6 months after surgery. 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 You may try melatonin, an over-the-counter sleep aid or benadryl to help with sleep. Most patients will resume sleeping through the night 8 weeks after surgery. Home Physical Therapy is arranged.  Home Health will contact you within 48 hrs of discharge that you have chosen. If you have not received a call within this time frame please contact that provider you chose. You should be given this information before you leave the hospital.     You are at a risk for falls. Use the rolling walker when walking. Patients who have had a joint replacement should not drive if they are still taking narcotic pain mediation during the daytime hours. Most patients wean themselves off of pain medication within 2-5 weeks after surgery. When to Call the office    - If you have a temperature greater then 101  - Uncontrolled vomiting   - Loose control of your bladder or bowel function  - Are unable to bear any weight   - Need a pain medication refill       DISCHARGE SUMMARY from Nurse    The following personal items collected during your admission are returned to you:   Dental Appliance: Dental Appliances: Lowers, Uppers, With patient  Vision: Visual Aid: Glasses  Hearing Aid:   na  Jewelry: Jewelry: None  Clothing: Clothing: At bedside  Other Valuables: Other Valuables: Cell Phone (in car)  Valuables sent to safe:   na    PATIENT INSTRUCTIONS:    After general anesthesia or intravenous sedation, for 24 hours or while taking prescription Narcotics:  · Limit your activities  · Do not drive and operate hazardous machinery  · Do not make important personal or business decisions  · Do  not drink alcoholic beverages  · If you have not urinated within 8 hours after discharge, please contact your surgeon on call.     Report the following to your surgeon:  · Excessive pain, swelling, redness or odor of or around the surgical area  · Temperature over 101  · Nausea and vomiting lasting longer than 4 hours or if unable to take medications  · Any signs of decreased circulation or nerve impairment to extremity: change in color, persistent  numbness, tingling, coldness or increase pain  · Any questions, call office @ [phone]      Keep scheduled follow up appointment. If need to change, call office @ [phone]. *  Please give a list of your current medications to your Primary Care Provider. *  Please update this list whenever your medications are discontinued, doses are      changed, or new medications (including over-the-counter products) are added. *  Please carry medication information at all times in case of emergency situations. Total Knee Replacement: What to Expect at [address] [place] [address]    When you leave the hospital, you should be able to move around with a walker or crutches. But you will need someone to help you at home for the next few weeks or until you have more energy and can move around better. If there is no one to help you at home, you may go to a rehabilitation center. You will go home with a bandage and stitches or staples. Change the bandage as your doctor tells you to. Your doctor will remove your stitches or staples 10 to 21 days after your surgery. You may still have some mild pain, and the area may be swollen for 3 to 6 months after surgery. Your knee will continue to improve for 6 to 12 months. You will probably use a walker for 1 to 3 weeks and then use crutches. When you are ready, you can use a cane. You will probably be able to walk on your own in 4 to 8 weeks. You will need to do months of physical rehabilitation (rehab) after a knee replacement. Rehab will help you strengthen the muscles of the knee and help you regain movement. After you recover, your artificial knee will allow you to do normal daily activities with less pain or no pain at all. You may be able to hike, dance, ride a bike, and play golf. Talk to your doctor about whether you can do more strenuous activities. Always tell your caregivers that you have an artificial knee. How long it will take to walk on your own, return to normal activities, and go back to work depends on your health and how well your rehabilitation (rehab) program goes. The better you do with your rehab exercises, the quicker you will get your strength and movement back. This care sheet gives you a general idea about how long it will take for you to recover. But each person recovers at a different pace. Follow the steps below to get better as quickly as possible. How can you care for yourself at home? Activity  ? · Rest when you feel tired. You may take a nap, but do not stay in bed all day. When you sit, use a chair with arms. You can use the arms to help you stand up. ? · Work with your physical therapist to find the best way to exercise. You may be able to take frequent, short walks using crutches or a walker. What you can do as your knee heals will depend on whether your new knee is cemented or uncemented. You may not be able to do certain things for a while if your new knee is uncemented. ? · After your knee has healed enough, you can do more strenuous activities with caution. ¨ You can golf, but use a golf cart, and do not wear shoes with spikes. ¨ You can bike on a flat road or on a stationary bike. Avoid biking up hills. ¨ Your doctor may suggest that you stay away from activities that put stress on your knee. These include tennis or badminton, squash or racquetball, contact sports like football, jumping (such as in basketball), jogging, or running. ¨ Avoid activities where you might fall. These include horseback riding, skiing, and mountain biking. ? · Do not sit for more than 1 hour at a time. Get up and walk around for a while before you sit again. If you must sit for a long time, prop up your leg with a chair or footstool. This will help you avoid swelling. ? · Ask your doctor when you can shower. You may need to take sponge baths until your stitches or staples have been removed. ? · Ask your doctor when you can drive again. It may take up to 8 weeks after knee replacement surgery before it is safe for you to drive.    ? · When you get into a car, sit on the edge of the seat. Then pull in your legs, and turn to face the front. ? · You should be able to do many everyday activities 3 to 6 weeks after your surgery. You will probably need to take 4 to 16 weeks off from work. When you can go back to work depends on the type of work you do and how you feel. ? · Ask your doctor when it is okay for you to have sex. ? · Do not lift anything heavier than 10 pounds and do not lift weights for 12 weeks. Diet  ? · By the time you leave the hospital, you should be eating your normal diet. If your stomach is upset, try bland, low-fat foods like plain rice, broiled chicken, toast, and yogurt. Your doctor may suggest that you take iron and vitamin supplements. ? · Drink plenty of fluids (unless your doctor tells you not to). ? · Eat healthy foods, and watch your portion sizes. Try to stay at your ideal weight. Too much weight puts more stress on your new knee. ? · You may notice that your bowel movements are not regular right after your surgery. This is common. Try to avoid constipation and straining with bowel movements. You may want to take a fiber supplement every day. If you have not had a bowel movement after a couple of days, ask your doctor about taking a mild laxative. Medicines  ? · Your doctor will tell you if and when you can restart your medicines. He or she will also give you instructions about taking any new medicines. ? · If you take blood thinners, such as warfarin (Coumadin), clopidogrel (Plavix), or aspirin, be sure to talk to your doctor. He or she will tell you if and when to start taking those medicines again. Make sure that you understand exactly what your doctor wants you to do.   ? · Your doctor may give you a blood-thinning medicine to prevent blood clots. If you take a blood thinner, be sure you get instructions about how to take your medicine safely. Blood thinners can cause serious bleeding problems.  This medicine could be in pill form or as a shot (injection). If a shot is necessary, your doctor will tell you how to do this. ? · Be safe with medicines. Take pain medicines exactly as directed. ¨ If the doctor gave you a prescription medicine for pain, take it as prescribed. ¨ If you are not taking a prescription pain medicine, ask your doctor if you can take an over-the-counter medicine. ¨ Plan to take your pain medicine 30 minutes before exercises. It is easier to prevent pain before it starts than to stop it once it has started. ? · If you think your pain medicine is making you sick to your stomach:  ¨ Take your medicine after meals (unless your doctor has told you not to). ¨ Ask your doctor for a different pain medicine. ? · If your doctor prescribed antibiotics, take them as directed. Do not stop taking them just because you feel better. You need to take the full course of antibiotics. Incision care  ? · You will have a bandage over the cut (incision) and staples or stitches. Take the bandage off when your doctor says it is okay. ? · Your doctor will remove the staples or stitches 10 days to 3 weeks after the surgery and replace them with strips of tape. Leave the tape on for a week or until it falls off. Exercise  ? · Your rehab program will give you a number of exercises to do to help you get back your knee's range of motion and strength. Always do them as your therapist tells you. Ice and elevation  ? · For pain and swelling, put ice or a cold pack on the area for 10 to 20 minutes at a time. Put a thin cloth between the ice and your skin. Other instructions  ? · Continue to wear your support stockings as your doctor says. These help to prevent blood clots. The length of time that you will have to wear them depends on your activity level and the amount of swelling. ? · You have metal pieces in your knee. These may set off some airport metal detectors.  Carry a medical alert card that says you have an artificial joint, just in case. Follow-up care is a key part of your treatment and safety. Be sure to make and go to all appointments, and call your doctor if you are having problems. It's also a good idea to know your test results and keep a list of the medicines you take. When should you call for help? Call 911 anytime you think you may need emergency care. For example, call if:  ? · You passed out (lost consciousness). ? · You have severe trouble breathing. ? · You have sudden chest pain and shortness of breath, or you cough up blood. ?Call your doctor now or seek immediate medical care if:  ? · You have signs of infection, such as:  ¨ Increased pain, swelling, warmth, or redness. ¨ Red streaks leading from the incision. ¨ Pus draining from the incision. ¨ A fever. ? · You have signs of a blood clot, such as:  ¨ Pain in your calf, back of the knee, thigh, or groin. ¨ Redness and swelling in your leg or groin. ? · Your incision comes open and begins to bleed, or the bleeding increases. ? · You have pain that does not get better after you take pain medicine. ? Watch closely for changes in your health, and be sure to contact your doctor if:  ? · You do not have a bowel movement after taking a laxative. Where can you learn more? Go to http://leif-luis.info/. Enter V211 in the search box to learn more about \"Total Knee Replacement: What to Expect at Home. \"  Current as of: March 21, 2017  Content Version: 11.4  © 0082-0317 [employer]. Care instructions adapted under license by [employer] (which disclaims liability or warranty for this information). If you have questions about a medical condition or this instruction, always ask your healthcare professional. [place] [address] any warranty or liability for your use of this information.         These are general instructions for a healthy lifestyle:    No smoking/ No tobacco products/ Avoid exposure to second hand smoke    Surgeon General's Warning:  Quitting smoking now greatly reduces serious risk to your health. Obesity, smoking, and sedentary lifestyle greatly increases your risk for illness    A healthy diet, regular physical exercise & weight monitoring are important for maintaining a healthy lifestyle    You may be retaining fluid if you have a history of heart failure or if you experience any of the following symptoms:  Weight gain of 3 pounds or more overnight or 5 pounds in a week, increased swelling in our hands or feet or shortness of breath while lying flat in bed. Please call your doctor as soon as you notice any of these symptoms; do not wait until your next office visit. Recognize signs and symptoms of STROKE:    F-face looks uneven    A-arms unable to move or move even    S-speech slurred or non-existent    T-time-call 911 as soon as signs and symptoms begin-DO NOT go       Back to bed or wait to see if you get better-TIME IS BRAIN. The discharge information has been reviewed with the patient. The patient verbalized understanding. Information obtained by :  I understand that if any problems occur once I am at home I am to contact my physician. I understand and acknowledge receipt of the instructions indicated above.                                                                                                                                            Physician's or R.N.'s Signature                                                                  Date/Time                                                                                                                                              Patient or Representative Signature                                                          Date/Time

## 2018-06-08 NOTE — PROGRESS NOTES
Resting in bed. Looks better and feels better. Ate some toast this am. No complaints at present time. NV checks WDL. Aquacel to knee intact with small spots of drainage. Iceman to knee. Family in room.

## 2018-06-08 NOTE — PROGRESS NOTES
Problem: Mobility Impaired (Adult and Pediatric)  Goal: *Acute Goals and Plan of Care (Insert Text)  GOALS (1-4 days):  (1.)Ms. Juliet Clark will move from supine to sit and sit to supine  in bed with INDEPENDENT. (2.)Ms. Juliet Clark will transfer from bed to chair and chair to bed with INDEPENDENT using the least restrictive device. (3.)Ms. Juliet Clark will ambulate with INDEPENDENT for 250 feet with the least restrictive device. (4.)Ms. Juliet Clark will ambulate up/down 3 steps with bilateral  railing with MINIMAL ASSIST with no device. (5.)Ms. Juliet Clark will increase right knee ROM to 5°-80°.  ________________________________________________________________________________________________    Outcome: Progressing Towards Goal    PHYSICAL THERAPY [facility] [place] [facility]: Daily Note, AM 6/8/2018  INPATIENT: Hospital Day: 3  Payor: Juarez Morrison / Plan: [id number] PPO/PFFS / Product Type: "[employer] Care Medicare /      NAME/AGE/GENDER: Vu Clark is a [de-identified] y.o. female   PRIMARY DIAGNOSIS:  Primary osteoarthritis of right knee [M17.11]   Procedure(s) and Anesthesia Type:     * RIGHT KNEE ARTHROPLASTY TOTAL - Spinal (Right)  ICD-10: Treatment Diagnosis:    · Pain in Right Knee (M25.561)  · Stiffness of Right Knee, Not elsewhere classified (M25.661)  · Difficulty in walking, Not elsewhere classified (R26.2)  · Other abnormalities of gait and mobility (R26.89)      ASSESSMENT:     Ms. Juliet Clark has made good progress with gait and TK exercises. She has met some of her goals. She is ready for D/C. This section established at most recent assessment   PROBLEM LIST (Impairments causing funtional limitations):   1. Decreased Strength  2. Decreased Transfer Abilities  3. Decreased Ambulation Ability/Technique  4. Decreased Balance  5. Increased Pain  6. Decreased Activity Tolerance   INTERVENTIONS PLANNED: (Benefits and precautions of physical therapy have been discussed with the patient.)  1. Bed Mobility  2. Gait Training  3.  Home Exercise Program (HEP)  4. Therapeutic Exercise/Strengthening  5. Transfer Training  6. Range of Motion: active/assisted/passive  7. Therapeutic Activities  8. Group Therapy     TREATMENT PLAN: Frequency/Duration: Follow patient BID for duration of hospital stay to address above goals. Rehabilitation Potential For Stated Goals: Good     RECOMMENDED REHABILITATION/EQUIPMENT: (at time of discharge pending progress): Home Health: Physical Therapy. HISTORY:   History of Present Injury/Illness (Reason for Referral):  Pt is status post right total knee replacement. Past Medical History/Comorbidities:   Ms. Hardik Mensah  has a past medical history of Adverse effect of anesthesia; NILA (acute kidney injury) ([facility] (06/08/2013); Allergic rhinitis; Anemia, unspecified (8/14/2015); Aortic stenosis; At[facility] (09/30/2015); Bell's palsy (2013); Blurry vision, bilateral (6/8/2013); Cardiac pacemaker; Constipation; Critical aortic valve stenosis (8/14/2015); GERD (gastroesophageal reflux disease); Hypertension; Hyponatremia (9/7/2013); Metabolic encephalopathy (8/3/4231); Neuropathy; Osteoarthritis; Pancreatic cyst; Paroxysmal atrial fibrillation ([facility] (8/14/2015); Pulmonary nodule; Sick sinus syndrome (HCC); SOB (shortness of breath) on exertion (2015); Spinal stenosis, lumbar; 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 Ms. Hardik Mensah  has a past surgical history that includes hx hysterectomy (1994); hx cholecystectomy (2000); hx tonsillectomy (1951); hx colonoscopy (April 2013); hx pacemaker (9/24/14); hx endoscopy; hx heart catheterization (2013, 2015); and hx aortic valve replacement (8/2015).   Social History/Living Environment:   Home Environment: Private residence  # Steps to Enter: 0  One/Two Story Residence: One story  Living Alone: No  Support Systems: Family member(s)  Patient Expects to be Discharged to[de-identified] Private residence  Current DME Used/Available at Home: Raised toilet seat  Tub or Shower Type: Tub/Shower combination  Prior Level of Function/Work/Activity:  Independent with ambulation. Number of Personal Factors/Comorbidities that affect the Plan of Care: 0: LOW COMPLEXITY   EXAMINATION:   Most Recent Physical Functioning:               RLE AROM  R Knee Flexion: 89  R Knee Extension: 9                 Transfers  Sit to Stand: Stand-by assistance  Stand to Sit: Stand-by assistance  Bed to Chair: Stand-by assistance                   Weight Bearing Status  Right Side Weight Bearing: As tolerated  Distance (ft): 284 Feet (ft)  Ambulation - Level of Assistance: Stand-by assistance  Gait Abnormalities: Antalgic        Braces/Orthotics: none    Right Knee Cold  Type: Cryocuff      Body Structures Involved:  1. Bones  2. Joints  3. Muscles  4. Ligaments Body Functions Affected:  1. Neuromusculoskeletal  2. Movement Related Activities and Participation Affected:  1. Mobility   Number of elements that affect the Plan of Care: 4+: HIGH COMPLEXITY   CLINICAL PRESENTATION:   Presentation: Stable and uncomplicated: LOW COMPLEXITY   CLINICAL DECISION MAKIN [facility] [address] [id number] AM-PAC 6 Clicks   Basic Mobility Inpatient Short Form  How much difficulty does the patient currently have. .. Unable A Lot A Little None   1. Turning over in bed (including adjusting bedclothes, sheets and blankets)? [] 1   [] 2   [x] 3   [] 4   2. Sitting down on and standing up from a chair with arms ( e.g., wheelchair, bedside commode, etc.)   [] 1   [] 2   [x] 3   [] 4   3. Moving from lying on back to sitting on the side of the bed? [] 1   [] 2   [x] 3   [] 4   How much help from another person does the patient currently need. .. Total A Lot A Little None   4. Moving to and from a bed to a chair (including a wheelchair)? [] 1   [] 2   [x] 3   [] 4   5. Need to walk in hospital room? [] 1   [] 2   [x] 3   [] 4   6. Climbing 3-5 steps with a railing?    [] 1   [] 2   [x] 3   [] 4   © 2007, Trustees of [address] [place] [address] [address] [address], under license to [employer]. All rights reserved        Score:  Initial: 18 Most Recent: X (Date: -- )    Interpretation of Tool:  Represents activities that are increasingly more difficult (i.e. Bed mobility, Transfers, Gait). Score 24 23 22-20 19-15 14-10 9-7 6     Modifier CH CI CJ CK CL CM CN      ? Mobility - Walking and Moving Around:     - CURRENT STATUS: CK - 40%-59% impaired, limited or restricted    - GOAL STATUS: CK - 40%-59% impaired, limited or restricted    - D/C STATUS:  CK - 40%-59% impaired, limited or restricted  Payor: Juarez Morrison / Plan: Jessica De La Garza PPO/PFFS / Product Type: [employer] Care Medicare /      Medical Necessity:     · Skilled intervention continues to be required due to decreased mobility ability. Reason for Services/Other Comments:  · Patient continues to require skilled intervention due to decreased mobility ability. Use of outcome tool(s) and clinical judgement create a POC that gives a: Clear prediction of patient's progress: LOW COMPLEXITY            TREATMENT:   (In addition to Assessment/Re-Assessment sessions the following treatments were rendered)     Pre-treatment Symptoms/Complaints:  2/10  Pain: Initial:   Pain Intensity 1: 0 (0/10 after therapy)  Post Session:       Gait Training (15 Minutes ):  Gait training to improve and/or restore physical functioning as related to mobility. Ambulated 284 Feet (ft) with Stand-by assistance Therapeutic Exercise: (45 Minutes (group)):  Exercises per grid below to improve strength. Required minimal verbal cues to promote proper body alignment. Progressed range as indicated.            Date:  6/6 Date:  6/7   Date:  6/8     ACTIVITY/EXERCISE AM PM AM PM AM PM   GROUP THERAPY  []  []  []  [x]  [x]  []   Ankle Pumps 10  15 15 20    Quad Sets 10  15 15 20    Gluteal Sets 10  15 15 20    Hip ABd/ADduction 10  15 15 20    Straight Leg Raises 10 15 15 20    Knee Slides 10  15 15 20    Short Arc Quads   15 15 20    Long Arc Quads         Chair Slides    15 20             B = bilateral; AA = active assistive; A = active; P = passive      Treatment/Session Assessment:     Response to Treatment:  Pt tolerated session well, she is ready for D/C. Education:  [x] Home Exercises  [] Fall Precautions  [] Hip Precautions [] D/C Instruction Review  [x] Knee/Hip Prosthesis Review  [x] Walker Management/Safety [] Adaptive Equipment as Needed       Interdisciplinary Collaboration:   o Physical Therapy Assistant  o Registered Nurse    After treatment position/precautions:   o Up in chair  o Bed/Chair-wheels locked  o Caregiver at bedside  o Call light within reach  o RN notified    Compliance with Program/Exercises: compliant most of the time. Recommendations/Intent for next treatment session:  Treatment next visit will focus on increasing Ms. Tracey Damon independence with bed mobility, transfers, gait training, strength/ROM exercises, modalities for pain, and patient education.       Total Treatment Duration:  PT Patient Time In/Time Out  Time In: 1015  Time Out: [address] Cisneros PTA

## 2018-06-08 NOTE — PROGRESS NOTES
Slept at intervals during shift. No further c/o voiced. No change in NV status noted. Family member remain at bedside. Call light within reach.

## 2018-06-08 NOTE — PROGRESS NOTES
No further nausea with therapy or lunch. Given prescription for tramadol and instructed how to take. Has follow up appt already scheduled with Dr Yordan Leigh. Home health to see pt for therapy. Instructed to call doctor if having fever, excessive drainage, numbness or other problems. Aquacel intact to R knee with scant drainage. I have reviewed discharge instructions with the patient and family. The patient and family verbalized understanding. Taken to car via wheelchair. Discharged home.

## 2018-06-09 ENCOUNTER — HOME CARE VISIT (OUTPATIENT)
Dept: SCHEDULING | Facility: HOME HEALTH | Age: 78
End: 2018-06-09
Payer: MEDICARE

## 2018-06-09 PROCEDURE — 3331090001 HH PPS REVENUE CREDIT

## 2018-06-09 PROCEDURE — G0151 HHCP-SERV OF PT,EA 15 MIN: HCPCS

## 2018-06-09 PROCEDURE — 3331090002 HH PPS REVENUE DEBIT

## 2018-06-09 PROCEDURE — 400013 HH SOC

## 2018-06-10 PROCEDURE — 3331090002 HH PPS REVENUE DEBIT

## 2018-06-10 PROCEDURE — 3331090001 HH PPS REVENUE CREDIT

## 2018-06-11 ENCOUNTER — HOME CARE VISIT (OUTPATIENT)
Dept: SCHEDULING | Facility: HOME HEALTH | Age: 78
End: 2018-06-11
Payer: MEDICARE

## 2018-06-11 VITALS
TEMPERATURE: 97.5 F | HEART RATE: 73 BPM | DIASTOLIC BLOOD PRESSURE: 72 MMHG | SYSTOLIC BLOOD PRESSURE: 126 MMHG | RESPIRATION RATE: 21 BRPM

## 2018-06-11 PROCEDURE — 3331090001 HH PPS REVENUE CREDIT

## 2018-06-11 PROCEDURE — 3331090002 HH PPS REVENUE DEBIT

## 2018-06-11 PROCEDURE — G0151 HHCP-SERV OF PT,EA 15 MIN: HCPCS

## 2018-06-12 PROCEDURE — 3331090002 HH PPS REVENUE DEBIT

## 2018-06-12 PROCEDURE — 3331090001 HH PPS REVENUE CREDIT

## 2018-06-13 ENCOUNTER — HOME CARE VISIT (OUTPATIENT)
Dept: SCHEDULING | Facility: HOME HEALTH | Age: 78
End: 2018-06-13
Payer: MEDICARE

## 2018-06-13 VITALS
RESPIRATION RATE: 18 BRPM | HEART RATE: 69 BPM | TEMPERATURE: 97.7 F | SYSTOLIC BLOOD PRESSURE: 144 MMHG | DIASTOLIC BLOOD PRESSURE: 82 MMHG

## 2018-06-13 PROCEDURE — 3331090001 HH PPS REVENUE CREDIT

## 2018-06-13 PROCEDURE — G0157 HHC PT ASSISTANT EA 15: HCPCS

## 2018-06-13 PROCEDURE — 3331090002 HH PPS REVENUE DEBIT

## 2018-06-14 PROCEDURE — 3331090001 HH PPS REVENUE CREDIT

## 2018-06-14 PROCEDURE — 3331090002 HH PPS REVENUE DEBIT

## 2018-06-15 ENCOUNTER — HOME CARE VISIT (OUTPATIENT)
Dept: SCHEDULING | Facility: HOME HEALTH | Age: 78
End: 2018-06-15
Payer: MEDICARE

## 2018-06-15 VITALS
TEMPERATURE: 97.8 F | HEART RATE: 68 BPM | SYSTOLIC BLOOD PRESSURE: 124 MMHG | DIASTOLIC BLOOD PRESSURE: 76 MMHG | RESPIRATION RATE: 18 BRPM

## 2018-06-15 PROCEDURE — G0157 HHC PT ASSISTANT EA 15: HCPCS

## 2018-06-15 PROCEDURE — 3331090001 HH PPS REVENUE CREDIT

## 2018-06-15 PROCEDURE — 3331090002 HH PPS REVENUE DEBIT

## 2018-06-16 PROCEDURE — 3331090001 HH PPS REVENUE CREDIT

## 2018-06-16 PROCEDURE — 3331090002 HH PPS REVENUE DEBIT

## 2018-06-17 PROCEDURE — 3331090001 HH PPS REVENUE CREDIT

## 2018-06-17 PROCEDURE — 3331090002 HH PPS REVENUE DEBIT

## 2018-06-18 ENCOUNTER — HOME CARE VISIT (OUTPATIENT)
Dept: SCHEDULING | Facility: HOME HEALTH | Age: 78
End: 2018-06-18
Payer: MEDICARE

## 2018-06-18 VITALS
TEMPERATURE: 98.1 F | SYSTOLIC BLOOD PRESSURE: 124 MMHG | RESPIRATION RATE: 18 BRPM | DIASTOLIC BLOOD PRESSURE: 72 MMHG | HEART RATE: 69 BPM

## 2018-06-18 PROCEDURE — G0157 HHC PT ASSISTANT EA 15: HCPCS

## 2018-06-18 PROCEDURE — 3331090001 HH PPS REVENUE CREDIT

## 2018-06-18 PROCEDURE — 3331090002 HH PPS REVENUE DEBIT

## 2018-06-19 PROCEDURE — 3331090002 HH PPS REVENUE DEBIT

## 2018-06-19 PROCEDURE — 3331090001 HH PPS REVENUE CREDIT

## 2018-06-20 ENCOUNTER — HOME CARE VISIT (OUTPATIENT)
Dept: SCHEDULING | Facility: HOME HEALTH | Age: 78
End: 2018-06-20
Payer: MEDICARE

## 2018-06-20 VITALS
RESPIRATION RATE: 16 BRPM | HEART RATE: 70 BPM | TEMPERATURE: 97.7 F | SYSTOLIC BLOOD PRESSURE: 118 MMHG | DIASTOLIC BLOOD PRESSURE: 70 MMHG

## 2018-06-20 PROCEDURE — 3331090002 HH PPS REVENUE DEBIT

## 2018-06-20 PROCEDURE — G0157 HHC PT ASSISTANT EA 15: HCPCS

## 2018-06-20 PROCEDURE — 3331090001 HH PPS REVENUE CREDIT

## 2018-06-21 PROCEDURE — 3331090001 HH PPS REVENUE CREDIT

## 2018-06-21 PROCEDURE — 3331090002 HH PPS REVENUE DEBIT

## 2018-06-22 PROCEDURE — 3331090001 HH PPS REVENUE CREDIT

## 2018-06-22 PROCEDURE — 3331090002 HH PPS REVENUE DEBIT

## 2018-06-23 PROCEDURE — 3331090002 HH PPS REVENUE DEBIT

## 2018-06-23 PROCEDURE — 3331090001 HH PPS REVENUE CREDIT

## 2018-06-24 PROCEDURE — 3331090001 HH PPS REVENUE CREDIT

## 2018-06-24 PROCEDURE — 3331090002 HH PPS REVENUE DEBIT

## 2018-06-25 PROCEDURE — 3331090001 HH PPS REVENUE CREDIT

## 2018-06-25 PROCEDURE — A4649 SURGICAL SUPPLIES: HCPCS

## 2018-06-25 PROCEDURE — 3331090002 HH PPS REVENUE DEBIT

## 2018-06-26 ENCOUNTER — HOME CARE VISIT (OUTPATIENT)
Dept: HOME HEALTH SERVICES | Facility: HOME HEALTH | Age: 78
End: 2018-06-26
Payer: MEDICARE

## 2018-06-26 ENCOUNTER — HOME CARE VISIT (OUTPATIENT)
Dept: SCHEDULING | Facility: HOME HEALTH | Age: 78
End: 2018-06-26
Payer: MEDICARE

## 2018-06-26 VITALS
HEART RATE: 69 BPM | RESPIRATION RATE: 18 BRPM | SYSTOLIC BLOOD PRESSURE: 122 MMHG | DIASTOLIC BLOOD PRESSURE: 78 MMHG | TEMPERATURE: 98 F

## 2018-06-26 PROCEDURE — G0157 HHC PT ASSISTANT EA 15: HCPCS

## 2018-06-26 PROCEDURE — 3331090002 HH PPS REVENUE DEBIT

## 2018-06-26 PROCEDURE — 3331090001 HH PPS REVENUE CREDIT

## 2018-06-27 PROCEDURE — 3331090002 HH PPS REVENUE DEBIT

## 2018-06-27 PROCEDURE — 3331090001 HH PPS REVENUE CREDIT

## 2018-06-28 ENCOUNTER — HOME CARE VISIT (OUTPATIENT)
Dept: SCHEDULING | Facility: HOME HEALTH | Age: 78
End: 2018-06-28
Payer: MEDICARE

## 2018-06-28 ENCOUNTER — HOME CARE VISIT (OUTPATIENT)
Dept: HOME HEALTH SERVICES | Facility: HOME HEALTH | Age: 78
End: 2018-06-28
Payer: MEDICARE

## 2018-06-28 VITALS
SYSTOLIC BLOOD PRESSURE: 122 MMHG | HEART RATE: 78 BPM | DIASTOLIC BLOOD PRESSURE: 72 MMHG | TEMPERATURE: 97.7 F | RESPIRATION RATE: 18 BRPM

## 2018-06-28 PROCEDURE — 3331090002 HH PPS REVENUE DEBIT

## 2018-06-28 PROCEDURE — 3331090001 HH PPS REVENUE CREDIT

## 2018-06-28 PROCEDURE — G0157 HHC PT ASSISTANT EA 15: HCPCS

## 2018-06-29 PROCEDURE — 3331090002 HH PPS REVENUE DEBIT

## 2018-06-29 PROCEDURE — 3331090001 HH PPS REVENUE CREDIT

## 2018-06-30 PROCEDURE — 3331090002 HH PPS REVENUE DEBIT

## 2018-06-30 PROCEDURE — 3331090001 HH PPS REVENUE CREDIT

## 2018-07-01 ENCOUNTER — HOME CARE VISIT (OUTPATIENT)
Dept: SCHEDULING | Facility: HOME HEALTH | Age: 78
End: 2018-07-01
Payer: MEDICARE

## 2018-07-01 PROCEDURE — 3331090001 HH PPS REVENUE CREDIT

## 2018-07-01 PROCEDURE — 3331090002 HH PPS REVENUE DEBIT

## 2018-07-01 PROCEDURE — G0151 HHCP-SERV OF PT,EA 15 MIN: HCPCS

## 2018-07-02 PROCEDURE — 3331090001 HH PPS REVENUE CREDIT

## 2018-07-02 PROCEDURE — 3331090002 HH PPS REVENUE DEBIT

## 2018-07-03 PROCEDURE — 3331090002 HH PPS REVENUE DEBIT

## 2018-07-03 PROCEDURE — 3331090001 HH PPS REVENUE CREDIT

## 2018-07-04 PROCEDURE — 3331090002 HH PPS REVENUE DEBIT

## 2018-07-04 PROCEDURE — 3331090001 HH PPS REVENUE CREDIT

## 2018-07-05 PROCEDURE — 3331090002 HH PPS REVENUE DEBIT

## 2018-07-05 PROCEDURE — 3331090001 HH PPS REVENUE CREDIT

## 2018-07-06 PROCEDURE — 3331090002 HH PPS REVENUE DEBIT

## 2018-07-06 PROCEDURE — 3331090001 HH PPS REVENUE CREDIT

## 2018-07-07 PROCEDURE — 3331090001 HH PPS REVENUE CREDIT

## 2018-07-07 PROCEDURE — 3331090002 HH PPS REVENUE DEBIT

## 2018-07-08 PROCEDURE — 3331090002 HH PPS REVENUE DEBIT

## 2018-07-08 PROCEDURE — 3331090001 HH PPS REVENUE CREDIT

## 2018-07-09 PROCEDURE — 3331090002 HH PPS REVENUE DEBIT

## 2018-07-09 PROCEDURE — 3331090001 HH PPS REVENUE CREDIT

## 2018-07-10 PROCEDURE — 3331090001 HH PPS REVENUE CREDIT

## 2018-07-10 PROCEDURE — 3331090002 HH PPS REVENUE DEBIT

## 2018-07-11 PROCEDURE — 3331090002 HH PPS REVENUE DEBIT

## 2018-07-11 PROCEDURE — 3331090001 HH PPS REVENUE CREDIT

## 2018-07-12 PROCEDURE — 3331090002 HH PPS REVENUE DEBIT

## 2018-07-12 PROCEDURE — 3331090001 HH PPS REVENUE CREDIT

## 2018-07-13 PROCEDURE — 3331090002 HH PPS REVENUE DEBIT

## 2018-07-13 PROCEDURE — 3331090001 HH PPS REVENUE CREDIT

## 2018-07-14 PROCEDURE — 3331090001 HH PPS REVENUE CREDIT

## 2018-07-14 PROCEDURE — 3331090002 HH PPS REVENUE DEBIT

## 2018-07-15 PROCEDURE — 3331090002 HH PPS REVENUE DEBIT

## 2018-07-15 PROCEDURE — 3331090001 HH PPS REVENUE CREDIT

## 2018-07-16 PROCEDURE — 3331090002 HH PPS REVENUE DEBIT

## 2018-07-16 PROCEDURE — 3331090001 HH PPS REVENUE CREDIT

## 2018-07-17 PROCEDURE — 3331090002 HH PPS REVENUE DEBIT

## 2018-07-17 PROCEDURE — 3331090001 HH PPS REVENUE CREDIT

## 2018-07-18 PROCEDURE — 3331090001 HH PPS REVENUE CREDIT

## 2018-07-18 PROCEDURE — 3331090002 HH PPS REVENUE DEBIT

## 2018-07-19 PROCEDURE — 3331090001 HH PPS REVENUE CREDIT

## 2018-07-19 PROCEDURE — 3331090002 HH PPS REVENUE DEBIT

## 2020-07-09 PROBLEM — E78.5 DYSLIPIDEMIA: Chronic | Status: ACTIVE | Noted: 2020-07-09

## 2021-03-10 ENCOUNTER — HOSPITAL ENCOUNTER (OUTPATIENT)
Dept: MRI IMAGING | Age: 81
Discharge: HOME | End: 2021-03-10
Attending: FAMILY MEDICINE
Payer: MEDICARE

## 2021-03-10 ENCOUNTER — HOSPITAL ENCOUNTER (OUTPATIENT)
Dept: CT IMAGING | Age: 81
Discharge: HOME | End: 2021-03-10
Attending: FAMILY MEDICINE
Payer: MEDICARE

## 2021-03-10 DIAGNOSIS — G45.9 TIA (TRANSIENT ISCHEMIC ATTACK): ICD-10-CM

## 2021-03-10 DIAGNOSIS — R10.31 RLQ ABDOMINAL PAIN: ICD-10-CM

## 2021-03-10 LAB — CREAT BLD-MCNC: 0.7 MG/DL (ref 0.8–1.5)

## 2021-03-10 PROCEDURE — 74177 CT ABD & PELVIS W/CONTRAST: CPT

## 2021-03-10 PROCEDURE — 82565 ASSAY OF CREATININE: CPT

## 2021-03-10 PROCEDURE — 70551 MRI BRAIN STEM W/O DYE: CPT

## 2021-03-10 PROCEDURE — 74011000636 HC RX REV CODE- 636: Performed by: FAMILY MEDICINE

## 2021-03-10 PROCEDURE — 74011000258 HC RX REV CODE- 258: Performed by: FAMILY MEDICINE

## 2021-03-10 RX ORDER — SODIUM CHLORIDE 0.9 % (FLUSH) 0.9 %
10 SYRINGE (ML) INJECTION
Status: COMPLETED | OUTPATIENT
Start: 2021-03-10 | End: 2021-03-10

## 2021-03-10 RX ADMIN — Medication 10 ML: at 10:41

## 2021-03-10 RX ADMIN — DIATRIZOATE MEGLUMINE AND DIATRIZOATE SODIUM 15 ML: 660; 100 LIQUID ORAL; RECTAL at 10:41

## 2021-03-10 RX ADMIN — SODIUM CHLORIDE 100 ML: 900 INJECTION, SOLUTION INTRAVENOUS at 10:41

## 2021-03-10 RX ADMIN — IOPAMIDOL 100 ML: 755 INJECTION, SOLUTION INTRAVENOUS at 10:41

## 2021-03-11 NOTE — PROGRESS NOTES
Unable to confirm scheduled procedure on 3/12/20 with patient. No answer, message left on telephone number in chart.

## 2021-03-11 NOTE — PROGRESS NOTES
Per Dr. Prince Cao: Normal except for small lesion on breast. If has not had mammogram in last year would do so. - Notified, verbalizes understanding. Mammogram ordered.

## 2021-03-12 ENCOUNTER — ANESTHESIA EVENT (OUTPATIENT)
Dept: ENDOSCOPY | Age: 81
End: 2021-03-12
Payer: MEDICARE

## 2021-03-12 ENCOUNTER — ANESTHESIA (OUTPATIENT)
Dept: ENDOSCOPY | Age: 81
End: 2021-03-12
Payer: MEDICARE

## 2021-03-12 ENCOUNTER — HOSPITAL ENCOUNTER (OUTPATIENT)
Age: 81
Setting detail: OUTPATIENT SURGERY
Discharge: HOME | End: 2021-03-12
Attending: INTERNAL MEDICINE | Admitting: INTERNAL MEDICINE
Payer: MEDICARE

## 2021-03-12 VITALS
RESPIRATION RATE: 18 BRPM | TEMPERATURE: 98 F | DIASTOLIC BLOOD PRESSURE: 96 MMHG | SYSTOLIC BLOOD PRESSURE: 179 MMHG | HEART RATE: 71 BPM | OXYGEN SATURATION: 99 %

## 2021-03-12 PROCEDURE — 77030021593 HC FCPS BIOP ENDOSC BSC -A: Performed by: INTERNAL MEDICINE

## 2021-03-12 PROCEDURE — 2709999900 HC NON-CHARGEABLE SUPPLY: Performed by: INTERNAL MEDICINE

## 2021-03-12 PROCEDURE — 88305 TISSUE EXAM BY PATHOLOGIST: CPT

## 2021-03-12 PROCEDURE — 77030013991 HC SNR POLYP ENDOSC BSC -A: Performed by: INTERNAL MEDICINE

## 2021-03-12 PROCEDURE — 76060000032 HC ANESTHESIA 0.5 TO 1 HR: Performed by: INTERNAL MEDICINE

## 2021-03-12 PROCEDURE — 76040000026: Performed by: INTERNAL MEDICINE

## 2021-03-12 PROCEDURE — 88312 SPECIAL STAINS GROUP 1: CPT

## 2021-03-12 PROCEDURE — 74011000250 HC RX REV CODE- 250: Performed by: NURSE ANESTHETIST, CERTIFIED REGISTERED

## 2021-03-12 PROCEDURE — 74011250636 HC RX REV CODE- 250/636: Performed by: NURSE ANESTHETIST, CERTIFIED REGISTERED

## 2021-03-12 PROCEDURE — 74011250636 HC RX REV CODE- 250/636: Performed by: ANESTHESIOLOGY

## 2021-03-12 RX ORDER — PROPOFOL 10 MG/ML
INJECTION, EMULSION INTRAVENOUS
Status: DISCONTINUED | OUTPATIENT
Start: 2021-03-12 | End: 2021-03-12 | Stop reason: HOSPADM

## 2021-03-12 RX ORDER — LIDOCAINE HYDROCHLORIDE 20 MG/ML
INJECTION, SOLUTION EPIDURAL; INFILTRATION; INTRACAUDAL; PERINEURAL AS NEEDED
Status: DISCONTINUED | OUTPATIENT
Start: 2021-03-12 | End: 2021-03-12 | Stop reason: HOSPADM

## 2021-03-12 RX ORDER — PROPOFOL 10 MG/ML
INJECTION, EMULSION INTRAVENOUS AS NEEDED
Status: DISCONTINUED | OUTPATIENT
Start: 2021-03-12 | End: 2021-03-12 | Stop reason: HOSPADM

## 2021-03-12 RX ORDER — SODIUM CHLORIDE, SODIUM LACTATE, POTASSIUM CHLORIDE, CALCIUM CHLORIDE 600; 310; 30; 20 MG/100ML; MG/100ML; MG/100ML; MG/100ML
100 INJECTION, SOLUTION INTRAVENOUS CONTINUOUS
Status: CANCELLED | OUTPATIENT
Start: 2021-03-12

## 2021-03-12 RX ORDER — SODIUM CHLORIDE, SODIUM LACTATE, POTASSIUM CHLORIDE, CALCIUM CHLORIDE 600; 310; 30; 20 MG/100ML; MG/100ML; MG/100ML; MG/100ML
100 INJECTION, SOLUTION INTRAVENOUS CONTINUOUS
Status: DISCONTINUED | OUTPATIENT
Start: 2021-03-12 | End: 2021-03-12 | Stop reason: HOSPADM

## 2021-03-12 RX ADMIN — PROPOFOL 20 MG: 10 INJECTION, EMULSION INTRAVENOUS at 08:30

## 2021-03-12 RX ADMIN — PHENYLEPHRINE HYDROCHLORIDE 180 MCG: 10 INJECTION INTRAVENOUS at 08:53

## 2021-03-12 RX ADMIN — PHENYLEPHRINE HYDROCHLORIDE 120 MCG: 10 INJECTION INTRAVENOUS at 08:51

## 2021-03-12 RX ADMIN — SODIUM CHLORIDE, SODIUM LACTATE, POTASSIUM CHLORIDE, AND CALCIUM CHLORIDE 100 ML/HR: 600; 310; 30; 20 INJECTION, SOLUTION INTRAVENOUS at 07:37

## 2021-03-12 RX ADMIN — PHENYLEPHRINE HYDROCHLORIDE 60 MCG: 10 INJECTION INTRAVENOUS at 08:48

## 2021-03-12 RX ADMIN — LIDOCAINE HYDROCHLORIDE 100 MG: 20 INJECTION, SOLUTION EPIDURAL; INFILTRATION; INTRACAUDAL; PERINEURAL at 08:27

## 2021-03-12 RX ADMIN — PROPOFOL 180 MCG/KG/MIN: 10 INJECTION, EMULSION INTRAVENOUS at 08:27

## 2021-03-12 RX ADMIN — PROPOFOL 50 MG: 10 INJECTION, EMULSION INTRAVENOUS at 08:27

## 2021-03-12 NOTE — DISCHARGE INSTRUCTIONS
Gastrointestinal Esophagogastroduodenoscopy (EGD) - Upper Exam Discharge Instructions    1. Call Dr. Konstantin Souza at [phone] for any problems or questions. 2. Contact the doctor's office for follow up appointment as directed. 3. Medication may cause drowsiness for several hours, therefore:  · Do not drive or operate machinery for remainder of the day. · No alcohol today. · Do not make any important or legal decisions for 24 hours. · Do not sign any legal documents for 24 hours. 5. Ordinarily, you may resume regular diet and activity after exam unless otherwise              specified by your physician. 6. For mild soreness in your throat you may use Cepacol throat lozenges or warm               salt-water gargles as needed. Instructions given to Lu Huber and other family members. Gastrointestinal Colonoscopy/Flexible Sigmoidoscopy - Lower Exam Discharge Instructions  1. Call Dr. Konstantin Souza at [phone] for any problems or questions. 2. Contact the doctors office for follow up appointment as directed  3. Medication may cause drowsiness for several hours, therefore:  · Do not drive or operate machinery for reminder of the day. · No alcohol today. · Do not make any important or legal decisions for 24 hours. · Do not sign any legal documents for 24 hours. 4. Ordinarily, you may resume regular diet and activity after exam unless otherwise specified by your physician. 5. Because of air put into your colon during exam, you may experience some abdominal distension, relieved by the passage of gas, for several hours. 6. Contact your physician if you have any of the following:  · Excessive amount of bleeding - large amount when having a bowel movement. Occasional specks of blood with bowel movement would not be unusual.  · Severe abdominal pain  · Fever or Chills  7.  Polyp Removal - follow these additional instructions    · Soft diet for 24 hours, then resume regular diet   · Take Metamucil - 1 tablespoon in juice every morning for 3 days  · No Aspirin, Advil, Aleve, Nuprin, Ibuprofen, or medications that contain these drugs for 2 weeks. Tylenol is okay. Any additional instructions:    1. Follow up in office in 3 months. 2. Eliquis tomorrow 3/13. 3. Soft foods until tomorrow. Instructions given to Veena Swenson and other family members.

## 2021-03-12 NOTE — ANESTHESIA PREPROCEDURE EVALUATION
Anesthetic History   No history of anesthetic complications            Review of Systems / Medical History  Patient summary reviewed and pertinent labs reviewed    Pulmonary  Within defined limits                 Neuro/Psych         TIA     Cardiovascular    Hypertension: well controlled  Valvular problems/murmurs (s/p AV replacement  2015)      Dysrhythmias : atrial fibrillation  Pacemaker (pacer)    Exercise tolerance: <4 METS  Comments: SSS  Pacer checked 2 months ago, not dependent    1/2021 TTE preversed EF, functionally normal valves.     2/2021 pacer: DDDR lower rate 60, intrinsic 52bpm, occasional episodes of atach/fib   GI/Hepatic/Renal     GERD: well controlled    Renal disease (hx ARF after TIA): ARF       Endo/Other        Obesity, arthritis and anemia     Other Findings   Comments: Palsy right facial tic  Neuropathy    Has been off Effient for 3 days         Physical Exam    Airway  Mallampati: I  TM Distance: 4 - 6 cm  Neck ROM: normal range of motion   Mouth opening: Normal     Cardiovascular  Regular rate and rhythm,  S1 and S2 normal,  no murmur, click, rub, or gallop  Rhythm: regular  Rate: normal      Pertinent negatives: No murmur   Dental    Dentition: Full lower dentures and Full upper dentures     Pulmonary  Breath sounds clear to auscultation               Abdominal  GI exam deferred       Other Findings            Anesthetic Plan    ASA: 3  Anesthesia type: total IV anesthesia          Induction: Intravenous  Anesthetic plan and risks discussed with: Patient

## 2021-03-12 NOTE — H&P
PreProcedure H&P Update    Today's Date:  3/12/2021    CC:  GERD, Hx polyps, abd pain, constipation    Subjective:   HPI:  GERD, Hx polyps, abd pain, constipation    PMH:  Past Medical History:   Diagnosis Date    Adverse effect of anesthesia     delayed awakening    NILA (acute kidney injury) ([facility] 06/08/2013    pt reports after TIA    Allergic rhinitis     has inhaler daily (pt denies asthma)    Anemia, unspecified 08/14/2015    no recent infusions    Aortic stenosis     sp AVR 2015    Blurry vision, bilateral 6/8/2013    Cardiac pacemaker     Biotronik MRI compatible (dual chamber)  on Left chest    Constipation     Critical aortic valve stenosis 8/14/2015    8/13/15 (Dr Loree Stewart) 1. Left and right sided maze. Please note that the left pulmonary veins were not done due to difficult anatomy. 2. Clipping, left atrial appendage. 3. Aortic valve replacement with a 23 mm Magna Ease Goyo-Oneill pericardial valve.     Current use of long term anticoagulation     Eliquis    GERD (gastroesophageal reflux disease)     managed with medication    H/O maze procedure 09/30/2015    History of Bell's palsy 2013    History of TIA (transient ischemic attack) 2013    pt reports bells palsy started at same time    Hypertension     Hyponatremia 5/8/2044    Metabolic encephalopathy 0/6/0596    Neuropathy     Osteoarthritis     Pancreatic cyst     Paroxysmal atrial fibrillation ([facility] 8/14/2015    Pulmonary nodule     benign per pulm note (1/2018)    Sick sinus syndrome (HCC)     SOB (shortness of breath) on exertion 2015    cannot climb flight of stairs or walk to mailbox- s/p AVR and pacemaker (pt reports no problems at this time 5/2018)    Spinal stenosis, lumbar     Status post total right knee replacement 6/6/2018    Syncope and collapse 2015       Medications:   Current Facility-Administered Medications   Medication Dose Route Frequency    lactated Ringers infusion  100 mL/hr IntraVENous CONTINUOUS Objective:   Vitals:  Visit Vitals  BP (!) 191/83   Pulse 69   Temp 98.2 °F (36.8 °C)   Resp 18   SpO2 99%     Exam:  General appearance: alert, cooperative, no distress  Lungs: clear to auscultation bilaterally anteriorly  Heart: regular rate and rhythm  Abdomen: soft, non-tender. Bowel sounds normal. No masses, no organomegaly      Data Review (Labs):    No results for input(s): WBC, HGB, HCT, PLT, MCV, NA, K, CL, CO2, BUN, CREA, CA, GLU, AP, TBIL, CBIL, ALB, TP, AML, LPSE, PTP, INR, APTT, HGBEXT, HCTEXT, PLTEXT, INREXT in the last 72 hours. No lab exists for component: SGOT, GPT, DBIL    Plan:       GERD, Hx polyps, abd pain, constipation  Proceed with EGD and colonoscopy.

## 2021-03-12 NOTE — ANESTHESIA POSTPROCEDURE EVALUATION
Procedure(s):  ESOPHAGOGASTRODUODENOSCOPY (EGD)  COLONOSCOPY/ 30  ESOPHAGOGASTRODUODENAL (EGD) BIOPSY  ENDOSCOPIC POLYPECTOMY. total IV anesthesia    Anesthesia Post Evaluation      Multimodal analgesia: multimodal analgesia used between 6 hours prior to anesthesia start to PACU discharge  Patient location during evaluation: bedside  Patient participation: complete - patient participated  Level of consciousness: awake and responsive to light touch  Pain management: adequate  Airway patency: patent  Anesthetic complications: no  Cardiovascular status: acceptable, hemodynamically stable, blood pressure returned to baseline and stable  Respiratory status: acceptable, unassisted, spontaneous ventilation and nonlabored ventilation  Hydration status: acceptable        INITIAL Post-op Vital signs:   Vitals Value Taken Time   /61 03/12/21 0914   Temp 36.7 °C (98 °F) 03/12/21 0904   Pulse 69 03/12/21 0919   Resp 16 03/12/21 0914   SpO2 100 % 03/12/21 0919   Vitals shown include unvalidated device data.

## 2021-03-12 NOTE — ROUTINE PROCESS
VSS. Patient denies any complaints at this time. Discharge education provided to patient and friend, Deangelo Walden. Patient discharged out via wheelchair with tech.

## 2021-03-12 NOTE — PROCEDURES
Esophagogastroduodenoscopy    DATE of PROCEDURE: 3/12/2021    INDICATION: chronic GERD    POSTPROCEDURE DIAGNOSIS: gastritis    MEDICATIONS ADMINISTERED: MAC anesthesia (see anesthesia report)    INSTRUMENT:    PROCEDURE:  After obtaining informed consent, the patient was placed in the left lateral position and sedated. The endoscope was advanced under direct vision without difficulty. The esophagus, stomach (including retroflexed views) and duodenum were evaluated. The patient was taken to the recovery area in stable condition. FINDINGS:  ESOPHAGUS: Less than 1 cm proximal extension of columnar epithelium but no esophagitis: cold biopsies taken. STOMACH: mild gastritis without erosions. Cold biopsies taken.   DUODENUM: normal    Estimated blood loss: 0-minimal   Specimens obtained during procedure: yes    PLAN: Office visit with me routine

## 2021-03-12 NOTE — PROCEDURES
COLONOSCOPY    DATE of PROCEDURE: 3/12/2021    INDICATION: abd pain, Hx TA polyp in 2013, constipation    POSTPROCEDURE DIAGNOSIS: polyps; diverticulosis    MEDICATION:   MAC anesthesia (see anesthesia report)    INSTRUMENT: CF    PROCEDURE:  The endoscope was advanced to the cecum where the appendiceal orifice and ileocecal valve were identified.  On withdrawal, the colon was carefully visualized in a 360 degree fashion, looking between folds and proximal and distal aspect of the folds. The patient was taken to the recovery area in stable condition.    FINDINGS:  Rectum: normal  Sigmoid: diverticulosis  Descending Colon: diverticulosis  Transverse Colon: Two 5-6 mm sized polyps removed with snare using cautery; distal diminutive polyp removed with cold forceps  Ascending Colon: Diminutive polyps removed with cold forceps  Cecum: normal  Terminal ileum: not entered    Bowel Prep: good  Estimated blood loss: 0-minimal   Specimens obtained during procedure: yes    ASSESSMENT/PLAN: No further routine colonoscopy due to age.

## 2021-03-31 ENCOUNTER — HOSPITAL ENCOUNTER (OUTPATIENT)
Dept: MAMMOGRAPHY | Age: 81
Discharge: HOME | End: 2021-03-31
Attending: FAMILY MEDICINE
Payer: MEDICARE

## 2021-03-31 DIAGNOSIS — Z12.31 ENCOUNTER FOR SCREENING MAMMOGRAM FOR MALIGNANT NEOPLASM OF BREAST: ICD-10-CM

## 2021-03-31 DIAGNOSIS — N63.20 LEFT BREAST MASS: ICD-10-CM

## 2021-03-31 PROCEDURE — 77066 DX MAMMO INCL CAD BI: CPT

## 2021-03-31 PROCEDURE — 76642 ULTRASOUND BREAST LIMITED: CPT

## 2021-09-30 ENCOUNTER — HOSPITAL ENCOUNTER (OUTPATIENT)
Dept: MAMMOGRAPHY | Age: 81
Discharge: HOME | End: 2021-09-30
Attending: FAMILY MEDICINE
Payer: MEDICARE

## 2021-09-30 DIAGNOSIS — N63.20 LEFT BREAST MASS: ICD-10-CM

## 2021-09-30 DIAGNOSIS — R92.8 ABNORMAL MAMMOGRAM: ICD-10-CM

## 2021-09-30 PROCEDURE — 76642 ULTRASOUND BREAST LIMITED: CPT

## 2021-10-01 NOTE — PROGRESS NOTES
Per Dr. Pedro Cardona: Notify lesion is benign but schedule the diagnostic scan for follow up as recommended by radiology.- Notified via [employer] message.

## 2022-01-05 ENCOUNTER — APPOINTMENT (OUTPATIENT)
Dept: CT IMAGING | Age: 82
End: 2022-01-05
Attending: EMERGENCY MEDICINE
Payer: MEDICARE

## 2022-01-05 ENCOUNTER — HOSPITAL ENCOUNTER (EMERGENCY)
Age: 82
Discharge: HOME | End: 2022-01-06
Attending: EMERGENCY MEDICINE
Payer: MEDICARE

## 2022-01-05 ENCOUNTER — APPOINTMENT (OUTPATIENT)
Dept: GENERAL RADIOLOGY | Age: 82
End: 2022-01-05
Attending: EMERGENCY MEDICINE
Payer: MEDICARE

## 2022-01-05 DIAGNOSIS — R07.9 ACUTE CHEST PAIN: Primary | ICD-10-CM

## 2022-01-05 DIAGNOSIS — R91.8 RIGHT UPPER LOBE PULMONARY INFILTRATE: ICD-10-CM

## 2022-01-05 DIAGNOSIS — K21.9 GASTROESOPHAGEAL REFLUX DISEASE, UNSPECIFIED WHETHER ESOPHAGITIS PRESENT: ICD-10-CM

## 2022-01-05 LAB
ALBUMIN SERPL-MCNC: 3.8 G/DL (ref 3.2–4.6)
ALBUMIN/GLOB SERPL: 1 {RATIO} (ref 1.2–3.5)
ALP SERPL-CCNC: 113 U/L (ref 50–136)
ALT SERPL-CCNC: 29 U/L (ref 12–65)
ANION GAP SERPL CALC-SCNC: 9 MMOL/L (ref 7–16)
AST SERPL-CCNC: 26 U/L (ref 15–37)
BASOPHILS # BLD: 0.1 K/UL (ref 0–0.2)
BASOPHILS NFR BLD: 1 % (ref 0–2)
BILIRUB SERPL-MCNC: 0.7 MG/DL (ref 0.2–1.1)
BUN SERPL-MCNC: 17 MG/DL (ref 8–23)
CALCIUM SERPL-MCNC: 9.1 MG/DL (ref 8.3–10.4)
CHLORIDE SERPL-SCNC: 101 MMOL/L (ref 98–107)
CO2 SERPL-SCNC: 24 MMOL/L (ref 21–32)
CREAT SERPL-MCNC: 1.06 MG/DL (ref 0.6–1)
DIFFERENTIAL METHOD BLD: ABNORMAL
EOSINOPHIL # BLD: 0.3 K/UL (ref 0–0.8)
EOSINOPHIL NFR BLD: 2 % (ref 0.5–7.8)
ERYTHROCYTE [DISTWIDTH] IN BLOOD BY AUTOMATED COUNT: 12.9 % (ref 11.9–14.6)
GLOBULIN SER CALC-MCNC: 3.9 G/DL (ref 2.3–3.5)
GLUCOSE SERPL-MCNC: 134 MG/DL (ref 65–100)
HCT VFR BLD AUTO: 42 % (ref 35.8–46.3)
HGB BLD-MCNC: 13.9 G/DL (ref 11.7–15.4)
IMM GRANULOCYTES # BLD AUTO: 0 K/UL (ref 0–0.5)
IMM GRANULOCYTES NFR BLD AUTO: 0 % (ref 0–5)
LIPASE SERPL-CCNC: 27 U/L (ref 73–393)
LYMPHOCYTES # BLD: 2.7 K/UL (ref 0.5–4.6)
LYMPHOCYTES NFR BLD: 22 % (ref 13–44)
MAGNESIUM SERPL-MCNC: 2.1 MG/DL (ref 1.8–2.4)
MCH RBC QN AUTO: 27.6 PG (ref 26.1–32.9)
MCHC RBC AUTO-ENTMCNC: 33.1 G/DL (ref 31.4–35)
MCV RBC AUTO: 83.3 FL (ref 79.6–97.8)
MONOCYTES # BLD: 1.3 K/UL (ref 0.1–1.3)
MONOCYTES NFR BLD: 11 % (ref 4–12)
NEUTS SEG # BLD: 7.6 K/UL (ref 1.7–8.2)
NEUTS SEG NFR BLD: 64 % (ref 43–78)
NRBC # BLD: 0 K/UL (ref 0–0.2)
PLATELET # BLD AUTO: 285 K/UL (ref 150–450)
PMV BLD AUTO: 10.7 FL (ref 9.4–12.3)
POTASSIUM SERPL-SCNC: 4.3 MMOL/L (ref 3.5–5.1)
PROT SERPL-MCNC: 7.7 G/DL (ref 6.3–8.2)
RBC # BLD AUTO: 5.04 M/UL (ref 4.05–5.2)
SODIUM SERPL-SCNC: 134 MMOL/L (ref 136–145)
TROPONIN-HIGH SENSITIVITY: 7.6 PG/ML (ref 0–14)
WBC # BLD AUTO: 12 K/UL (ref 4.3–11.1)

## 2022-01-05 PROCEDURE — 85025 COMPLETE CBC W/AUTO DIFF WBC: CPT

## 2022-01-05 PROCEDURE — 83735 ASSAY OF MAGNESIUM: CPT

## 2022-01-05 PROCEDURE — 80053 COMPREHEN METABOLIC PANEL: CPT

## 2022-01-05 PROCEDURE — 94762 N-INVAS EAR/PLS OXIMTRY CONT: CPT

## 2022-01-05 PROCEDURE — 74011000636 HC RX REV CODE- 636: Performed by: EMERGENCY MEDICINE

## 2022-01-05 PROCEDURE — 93005 ELECTROCARDIOGRAM TRACING: CPT | Performed by: EMERGENCY MEDICINE

## 2022-01-05 PROCEDURE — 84484 ASSAY OF TROPONIN QUANT: CPT

## 2022-01-05 PROCEDURE — 83690 ASSAY OF LIPASE: CPT

## 2022-01-05 PROCEDURE — 74011000258 HC RX REV CODE- 258: Performed by: EMERGENCY MEDICINE

## 2022-01-05 PROCEDURE — 71275 CT ANGIOGRAPHY CHEST: CPT

## 2022-01-05 PROCEDURE — 99285 EMERGENCY DEPT VISIT HI MDM: CPT

## 2022-01-05 PROCEDURE — 71045 X-RAY EXAM CHEST 1 VIEW: CPT

## 2022-01-05 RX ORDER — SODIUM CHLORIDE 0.9 % (FLUSH) 0.9 %
5-10 SYRINGE (ML) INJECTION AS NEEDED
Status: DISCONTINUED | OUTPATIENT
Start: 2022-01-05 | End: 2022-01-06 | Stop reason: HOSPADM

## 2022-01-05 RX ORDER — SODIUM CHLORIDE 0.9 % (FLUSH) 0.9 %
5-10 SYRINGE (ML) INJECTION EVERY 8 HOURS
Status: DISCONTINUED | OUTPATIENT
Start: 2022-01-05 | End: 2022-01-06 | Stop reason: HOSPADM

## 2022-01-05 RX ORDER — SODIUM CHLORIDE 0.9 % (FLUSH) 0.9 %
10 SYRINGE (ML) INJECTION
Status: COMPLETED | OUTPATIENT
Start: 2022-01-05 | End: 2022-01-05

## 2022-01-05 RX ADMIN — IOPAMIDOL 100 ML: 755 INJECTION, SOLUTION INTRAVENOUS at 23:24

## 2022-01-05 RX ADMIN — Medication 10 ML: at 23:22

## 2022-01-05 RX ADMIN — SODIUM CHLORIDE 100 ML: 900 INJECTION, SOLUTION INTRAVENOUS at 23:24

## 2022-01-06 VITALS
HEART RATE: 60 BPM | OXYGEN SATURATION: 95 % | WEIGHT: 200 LBS | SYSTOLIC BLOOD PRESSURE: 153 MMHG | BODY MASS INDEX: 31.32 KG/M2 | DIASTOLIC BLOOD PRESSURE: 62 MMHG | TEMPERATURE: 98.9 F | RESPIRATION RATE: 15 BRPM

## 2022-01-06 LAB
ATRIAL RATE: 70 BPM
CALCULATED P AXIS, ECG09: 75 DEGREES
CALCULATED R AXIS, ECG10: 74 DEGREES
CALCULATED T AXIS, ECG11: 73 DEGREES
DIAGNOSIS, 93000: NORMAL
P-R INTERVAL, ECG05: 153 MS
Q-T INTERVAL, ECG07: 432 MS
QRS DURATION, ECG06: 88 MS
QTC CALCULATION (BEZET), ECG08: 467 MS
TROPONIN-HIGH SENSITIVITY: 12.2 PG/ML (ref 0–14)
VENTRICULAR RATE, ECG03: 70 BPM

## 2022-01-06 PROCEDURE — 74011250637 HC RX REV CODE- 250/637: Performed by: EMERGENCY MEDICINE

## 2022-01-06 PROCEDURE — 96374 THER/PROPH/DIAG INJ IV PUSH: CPT

## 2022-01-06 PROCEDURE — 74011250636 HC RX REV CODE- 250/636: Performed by: EMERGENCY MEDICINE

## 2022-01-06 PROCEDURE — 96375 TX/PRO/DX INJ NEW DRUG ADDON: CPT

## 2022-01-06 RX ORDER — SUCRALFATE 1 G/1
1 TABLET ORAL 4 TIMES DAILY
Qty: 20 TABLET | Refills: 0 | Status: SHIPPED | OUTPATIENT
Start: 2022-01-06

## 2022-01-06 RX ORDER — HYOSCYAMINE SULFATE 0.12 MG/1
0.25 TABLET SUBLINGUAL
Status: COMPLETED | OUTPATIENT
Start: 2022-01-06 | End: 2022-01-06

## 2022-01-06 RX ORDER — DOXYCYCLINE HYCLATE 100 MG
100 TABLET ORAL 2 TIMES DAILY
Qty: 14 TABLET | Refills: 0 | Status: SHIPPED | OUTPATIENT
Start: 2022-01-06 | End: 2022-01-06 | Stop reason: SDUPTHER

## 2022-01-06 RX ORDER — MORPHINE SULFATE 2 MG/ML
2 INJECTION, SOLUTION INTRAMUSCULAR; INTRAVENOUS
Status: COMPLETED | OUTPATIENT
Start: 2022-01-06 | End: 2022-01-06

## 2022-01-06 RX ORDER — DOXYCYCLINE HYCLATE 100 MG
100 TABLET ORAL 2 TIMES DAILY
Qty: 14 TABLET | Refills: 0 | Status: SHIPPED | OUTPATIENT
Start: 2022-01-06

## 2022-01-06 RX ORDER — ONDANSETRON 2 MG/ML
4 INJECTION INTRAMUSCULAR; INTRAVENOUS
Status: COMPLETED | OUTPATIENT
Start: 2022-01-06 | End: 2022-01-06

## 2022-01-06 RX ORDER — SUCRALFATE 1 G/1
1 TABLET ORAL 4 TIMES DAILY
Qty: 20 TABLET | Refills: 0 | Status: SHIPPED | OUTPATIENT
Start: 2022-01-06 | End: 2022-01-06 | Stop reason: SDUPTHER

## 2022-01-06 RX ORDER — MAG HYDROX/ALUMINUM HYD/SIMETH 200-200-20
30 SUSPENSION, ORAL (FINAL DOSE FORM) ORAL
Status: COMPLETED | OUTPATIENT
Start: 2022-01-06 | End: 2022-01-06

## 2022-01-06 RX ADMIN — ALUMINUM HYDROXIDE, MAGNESIUM HYDROXIDE, DIMETHICONE 30 ML: 200; 200; 20 LIQUID ORAL at 00:53

## 2022-01-06 RX ADMIN — HYOSCYAMINE SULFATE 0.25 MG: 0.12 TABLET ORAL; SUBLINGUAL at 00:53

## 2022-01-06 RX ADMIN — MORPHINE SULFATE 2 MG: 2 INJECTION, SOLUTION INTRAMUSCULAR; INTRAVENOUS at 00:53

## 2022-01-06 RX ADMIN — ONDANSETRON 4 MG: 2 INJECTION INTRAMUSCULAR; INTRAVENOUS at 00:53

## 2022-01-06 NOTE — ED TRIAGE NOTES
Pt to ED via [facility] for chest pain and neck pain. Pt initial BP was 228/110. EMS administered nitro x3 doses and  was taken at home. Pt reports numbness in her hands and feet over the past day. Pt reports chest pain started today off and on and then tonight it has been constant. EMS gave 4mg of zofran. Pt A&Ox4. Pt denies chest pain in triage. Pt paced on the monitor.

## 2022-01-06 NOTE — DISCHARGE INSTRUCTIONS
Call your cardiologist if you do not hear from them by tomorrow regarding recheck. Also call your primary care doctor for recheck as well. Take antibiotic until complete. Increase omeprazole to twice a day for the next 5 days. Prescription for Carafate may be helpful as well. Recheck sooner for worse chest pain shortness of breath or high fever.

## 2022-01-06 NOTE — ED NOTES
I have reviewed discharge instructions with the patient. The patient verbalized understanding. Patient left ED via Discharge Method: wheelchair to Home with (family). Opportunity for questions and clarification provided. Patient given 2 scripts. To continue your aftercare when you leave the hospital, you may receive an automated call from our care team to check in on how you are doing. This is a free service and part of our promise to provide the best care and service to meet your aftercare needs.  If you have questions, or wish to unsubscribe from this service please call [phone]. Thank you for Choosing our [employer] [facility] Department.

## 2022-03-16 ENCOUNTER — HOSPITAL ENCOUNTER (OUTPATIENT)
Dept: MAMMOGRAPHY | Age: 82
Discharge: HOME | End: 2022-03-16
Attending: FAMILY MEDICINE
Payer: MEDICARE

## 2022-03-16 DIAGNOSIS — N63.20 LEFT BREAST MASS: ICD-10-CM

## 2022-03-16 DIAGNOSIS — R92.8 ABNORMAL MAMMOGRAM: ICD-10-CM

## 2022-03-16 PROCEDURE — 77066 DX MAMMO INCL CAD BI: CPT

## 2022-03-16 PROCEDURE — 76642 ULTRASOUND BREAST LIMITED: CPT

## 2022-03-18 PROBLEM — M17.11 OSTEOARTHRITIS OF RIGHT KNEE: Status: ACTIVE | Noted: 2018-06-06

## 2022-03-19 PROBLEM — E78.5 DYSLIPIDEMIA: Status: ACTIVE | Noted: 2020-07-09

## 2022-03-19 PROBLEM — R93.89 ABNORMAL CT OF THE CHEST: Status: ACTIVE | Noted: 2017-11-02

## 2022-03-20 PROBLEM — Z96.651 STATUS POST TOTAL RIGHT KNEE REPLACEMENT: Status: ACTIVE | Noted: 2018-06-06

## 2022-05-11 DIAGNOSIS — E78.5 DYSLIPIDEMIA: Primary | ICD-10-CM

## 2022-06-09 ENCOUNTER — TELEPHONE (OUTPATIENT)
Dept: CARDIOLOGY CLINIC | Age: 82
End: 2022-06-09

## 2022-06-09 RX ORDER — SOTALOL HYDROCHLORIDE 160 MG/1
80 TABLET ORAL 2 TIMES DAILY
Qty: 90 TABLET | Refills: 3 | Status: ON HOLD | OUTPATIENT
Start: 2022-06-09 | End: 2022-09-29 | Stop reason: SDUPTHER

## 2022-06-09 NOTE — TELEPHONE ENCOUNTER
Requested Prescriptions     Signed Prescriptions Disp Refills    sotalol (BETAPACE) 160 MG tablet 90 tablet 3     Sig: Take 0.5 tablets by mouth 2 times daily     Authorizing Provider: René BECKER     Ordering User: Creta Canavan

## 2022-07-15 ENCOUNTER — PREP FOR PROCEDURE (OUTPATIENT)
Dept: ADMINISTRATIVE | Age: 82
End: 2022-07-15

## 2022-07-15 ENCOUNTER — TELEPHONE (OUTPATIENT)
Dept: CARDIOLOGY CLINIC | Age: 82
End: 2022-07-15

## 2022-07-15 RX ORDER — SODIUM CHLORIDE 0.9 % (FLUSH) 0.9 %
5-40 SYRINGE (ML) INJECTION EVERY 12 HOURS SCHEDULED
OUTPATIENT
Start: 2022-07-15

## 2022-07-15 RX ORDER — SODIUM CHLORIDE 9 MG/ML
INJECTION, SOLUTION INTRAVENOUS PRN
OUTPATIENT
Start: 2022-07-15

## 2022-07-15 RX ORDER — SODIUM CHLORIDE 0.9 % (FLUSH) 0.9 %
5-40 SYRINGE (ML) INJECTION PRN
OUTPATIENT
Start: 2022-07-15

## 2022-07-15 NOTE — TELEPHONE ENCOUNTER
Having an   EGD 7/22 NEEDS PERMISSION TO HOLD ELIQUIS 48HRS PRIOR. IS THIS OK? Patient Active Problem List   Diagnosis    Dyslipidemia    Osteoarthritis of right knee    Status post total right knee replacement    Abnormal CT of the chest    Sick sinus syndrome [facility])    Cardiac pacemaker   Biotronik MRI compatible (dual chamber)     S/P AVR   02/2017: Normal AVR     Paroxysmal atrial fibrillation (HCC)   S/p MAZE     Anemia, unspecified    CAD (coronary artery disease)   05/2015:  Moderate mLAD     Pacemaker    Hypertension    GERD (gastroesophageal reflux disease)    Spinal stenosis    Peripheral neuropathy    Arthritis   Osteoarthritis

## 2022-07-19 RX ORDER — FAMOTIDINE 40 MG/1
40 TABLET, FILM COATED ORAL NIGHTLY
COMMUNITY

## 2022-07-19 RX ORDER — POLYETHYLENE GLYCOL 3350 17 G/17G
17 POWDER, FOR SOLUTION ORAL DAILY
COMMUNITY

## 2022-07-19 NOTE — PERIOP NOTE
Patient verified name, , and procedure. Type: 1a; abbreviated assessment per anesthesia guidelines  Labs per surgeon: none  Labs per anesthesia: none  Patient has pacemaker, followed by Dr. Tim Barker. Last Interrogation 3/31/22, normal function. Pacemaker added to case posting. Instructed pt that they will be notified by the doctor office for time of arrival to GI lab. If any questions please call the GI lab at [phone]. Follow diet and prep instructions per office. May have clear liquids until 4 hours prior to time of arrival.    [place] Roxo or shower the night before and the am of surgery with antibacterial soap. No lotions, oils, powders, cologne on skin. No make up, eye make up or jewelry. Wear loose fitting comfortable, clean clothing. Must have adult present in building the entire time . Medications for the day of procedure Loratadine (Claritin), Omeprazole (Prilosec), Sotalol (Betapace), Albuterol sulfate HFA use and bring, Nitroglycerin (Nitrostat) use if needed and bring    Hold Eliquis as instructed by Cardiologist/GI. Clearance to hold 48 hours from Dr. Tim Barker in EHR    The following discharge instructions reviewed with patient: medication given during procedure may cause drowsiness for several hours, therefore, do not drive or operate machinery for remainder of the day, no alcohol on the day of your procedure, resume regular diet and activity unless otherwise directed, for mild sore throat you may use Cepacol throat lozenges or warm salt water gargles as needed, call your physician for any problems or questions. Patient verbalizes understanding.

## 2022-07-21 ENCOUNTER — ANESTHESIA EVENT (OUTPATIENT)
Dept: ENDOSCOPY | Age: 82
End: 2022-07-21
Payer: MEDICARE

## 2022-07-21 NOTE — PROGRESS NOTES
Procedure confirmed with patient. Aware of 0900 arrival time, where to arrive and  policy. Covid screening completed. No questions at this time.

## 2022-07-21 NOTE — ANESTHESIA PRE PROCEDURE
Department of Anesthesiology  Preprocedure Note       Name:  Diane Escalante   Age:  78 y.o.  :  1940                                          MRN:  [id number]         Date:  2022      Surgeon: Feng Montes De Oca):  Pacheco Weiss MD    Procedure: Procedure(s):  EGD ESOPHAGOGASTRODUODENOSCOPY Patient has Biotronik Pacemaker    Medications prior to admission:   Prior to Admission medications    Medication Sig Start Date End Date Taking?  Authorizing Provider   polyethylene glycol (GLYCOLAX) 17 GM/SCOOP powder Take 17 g by mouth 2 times daily   Yes Historical Provider, MD   famotidine (PEPCID) 40 MG tablet Take 40 mg by mouth at bedtime   Yes Historical Provider, MD   sotalol (BETAPACE) 160 MG tablet Take 0.5 tablets by mouth 2 times daily 22   Chinedu Whittaker MD   acetaminophen (TYLENOL) 500 MG tablet Take 1,000 mg by mouth every 6 hours as needed    Ar Automatic Reconciliation   albuterol sulfate  (90 Base) MCG/ACT inhaler Inhale 2 puffs into the lungs every 4 hours as needed 21   Ar Automatic Reconciliation   apixaban (ELIQUIS) 5 MG TABS tablet TAKE 1 TABLET TWICE DAILY 10/1/21   Ar Automatic Reconciliation   docusate (COLACE, DULCOLAX) 100 MG CAPS Take 100 mg by mouth nightly as needed    Ar Automatic Reconciliation   furosemide (LASIX) 20 MG tablet Take 20 mg by mouth as needed 21   Ar Automatic Reconciliation   loratadine (CLARITIN) 10 MG tablet Take 10 mg by mouth daily    Ar Automatic Reconciliation   losartan-hydroCHLOROthiazide (HYZAAR) 50-12.5 MG per tablet Take 1 tablet by mouth nightly 22   Ar Automatic Reconciliation   Naproxen Sodium 220 MG CAPS Take by mouth as needed    Ar Automatic Reconciliation   nitroGLYCERIN (NITROSTAT) 0.4 MG SL tablet Place under the tongue    Ar Automatic Reconciliation   omeprazole (PRILOSEC) 20 MG delayed release capsule Take 20 mg by mouth daily    Ar Automatic Reconciliation   pravastatin (PRAVACHOL) 10 MG tablet Take 10 mg by mouth at CAD (coronary artery disease) I25.10    GERD (gastroesophageal reflux disease) K21.9    Obesity (BMI 30-39. 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       Past Medical History:        Diagnosis Date    Adverse effect of anesthesia     delayed awakening    DARRELL (acute kidney injury) ([address] 06/08/2013    pt reports after TIA    Allergic rhinitis     has inhaler daily (pt denies asthma)    Anemia, unspecified 08/14/2015    no recent infusions    Aortic stenosis     sp AVR 2015    Blurry vision, bilateral 6/8/2013    CAD (coronary artery disease)     Cardiac pacemaker     Biotronik MRI compatible (dual chamber)  on Left chest    Cerebral artery occlusion with cerebral infarction ([address]     Constipation     Critical aortic valve stenosis 8/14/2015    8/13/15 (Dr Carvalho Beat) 1. Left and right sided maze. Please note that the left pulmonary veins were not done due to difficult anatomy. 2. Clipping, left atrial appendage. 3. Aortic valve replacement with a 23 mm Magna Ease Janet-Hudson pericardial valve.     Current use of long term anticoagulation     Eliquis    GERD (gastroesophageal reflux disease)     managed with medication    H/O maze procedure 09/30/2015    History of Bell's palsy 2013    History of TIA (transient ischemic attack) 2013    pt reports bells palsy started at same time    Hx of blood clots     Hypertension     Hyponatremia 9/7/2013    Menopause     Metabolic encephalopathy 2/2/8492    Neuropathy     Osteoarthritis     Pancreatic cyst     Paroxysmal atrial fibrillation ([address] 8/14/2015    Prolonged emergence from general anesthesia     Pulmonary nodule     benign per pulm note (1/2018)    Sick sinus syndrome (HCC)     SOB (shortness of breath) on exertion 2015    cannot climb flight of stairs or walk to mailbox- s/p AVR and pacemaker (pt reports no problems at this time 5/2018)    Spinal stenosis, lumbar     Status post total right knee replacement 6/6/2018    Syncope and collapse 2015       Past Surgical History:        Procedure Laterality Date    AORTIC VALVE REPLACEMENT  8/2015    magna Ease Janet -Hudson valve    CARDIAC CATHETERIZATION  2013, 2015    CHOLECYSTECTOMY  2000    COLONOSCOPY  April 2013    with EGD, Dr. Evelyn Pope N/A 3/12/2021    COLONOSCOPY/ 30 performed by Sakshi Joshi MD at [address] [place] [address], COLON, DIAGNOSTIC      HYSTERECTOMY (CERVIX STATUS UNKNOWN)  1994    PACEMAKER  9/24/14    Biotronik MRI compatible (dual chamber)     TONSILLECTOMY  1951    TOTAL KNEE ARTHROPLASTY Right 06/06/2018    UPPER GASTROINTESTINAL ENDOSCOPY         Social History:    Social History     Tobacco Use    Smoking status: Never    Smokeless tobacco: Never   Substance Use Topics    Alcohol use: No                                Counseling given: Not Answered      Vital Signs (Current):   Vitals:    07/19/22 1033   Weight: 179 lb (81.2 kg)   Height: 5' 7\" (1.702 m)                                              BP Readings from Last 3 Encounters:   03/31/22 132/88   01/25/22 (!) 178/105   01/13/22 (!) 146/90       NPO Status:                                                                                 BMI:   Wt Readings from Last 3 Encounters:   03/31/22 187 lb (84.8 kg)   01/25/22 193 lb (87.5 kg)   01/13/22 193 lb (87.5 kg)     Body mass index is 28.04 kg/m².     CBC:   Lab Results   Component Value Date/Time    WBC 12.0 01/05/2022 09:04 PM    RBC 5.04 01/05/2022 09:04 PM    HGB 13.9 01/05/2022 09:04 PM    HCT 42.0 01/05/2022 09:04 PM    MCV 83.3 01/05/2022 09:04 PM    RDW 12.9 01/05/2022 09:04 PM     01/05/2022 09:04 PM       CMP:   Lab Results   Component Value Date/Time     01/05/2022 09:04 PM    K 4.3 01/05/2022 09:04 PM     01/05/2022 09:04 PM    CO2 24 01/05/2022 09:04 PM    BUN 17 01/05/2022 09:04 PM    CREATININE 1.06 01/05/2022 09:04 PM    GFRAA >60 01/05/2022 09:04 PM    AGRATIO 1.0 01/05/2022 09:04 PM    LABGLOM >60 03/10/2021 10:04 AM    GLUCOSE 134 01/05/2022 09:04 PM    PROT 7.7 01/05/2022 09:04 PM    CALCIUM 9.1 01/05/2022 09:04 PM    BILITOT 0.7 01/05/2022 09:04 PM    ALKPHOS 113 01/05/2022 09:04 PM    AST 26 01/05/2022 09:04 PM    ALT 29 01/05/2022 09:04 PM       POC Tests: No results for input(s): POCGLU, POCNA, POCK, POCCL, POCBUN, POCHEMO, POCHCT in the last 72 hours. Coags: No results found for: PROTIME, INR, APTT    HCG (If Applicable): No results found for: PREGTESTUR, PREGSERUM, HCG, HCGQUANT     ABGs: No results found for: PHART, PO2ART, OSU9LBS, QTI1GZU, BEART, X4SYVMCE     Type & Screen (If Applicable):  No results found for: LABABO, LABRH    Drug/Infectious Status (If Applicable):  No results found for: HIV, HEPCAB    COVID-19 Screening (If Applicable):   Lab Results   Component Value Date/Time    COVID19 Not Detected 03/05/2021 01:54 PM           Anesthesia Evaluation  Patient summary reviewed and Nursing notes reviewed  Airway: Mallampati: III  TM distance: >3 FB   Neck ROM: full  Mouth opening: > = 3 FB   Dental:    (+) upper dentures and lower dentures      Pulmonary: breath sounds clear to auscultation                             Cardiovascular:  Exercise tolerance: poor (<4 METS),   (+) hypertension:, pacemaker: pacemaker, dysrhythmias (on Eliquis): atrial fibrillation, hyperlipidemia    Valvular problems/murmurs: s/p AVR. Rhythm: regular  Rate: normal    Stress test reviewed             ROS comment: Normal stress 1/22     Neuro/Psych:   (+) TIA,             GI/Hepatic/Renal:   (+) GERD:,           Endo/Other: Negative Endo/Other ROS                    Abdominal:             Vascular: Other Findings:           Anesthesia Plan      TIVA     ASA 3       Induction: intravenous.       Anesthetic plan and risks discussed with patient and child/children.                         Jesus Alberto Campos MD   7/21/2022

## 2022-07-22 ENCOUNTER — ANESTHESIA (OUTPATIENT)
Dept: ENDOSCOPY | Age: 82
End: 2022-07-22
Payer: MEDICARE

## 2022-07-22 ENCOUNTER — HOSPITAL ENCOUNTER (OUTPATIENT)
Age: 82
Setting detail: OUTPATIENT SURGERY
Discharge: HOME | End: 2022-07-22
Attending: INTERNAL MEDICINE | Admitting: INTERNAL MEDICINE
Payer: MEDICARE

## 2022-07-22 VITALS
OXYGEN SATURATION: 98 % | DIASTOLIC BLOOD PRESSURE: 85 MMHG | HEIGHT: 67 IN | RESPIRATION RATE: 18 BRPM | SYSTOLIC BLOOD PRESSURE: 175 MMHG | TEMPERATURE: 98 F | HEART RATE: 69 BPM | WEIGHT: 179 LBS | BODY MASS INDEX: 28.09 KG/M2

## 2022-07-22 PROCEDURE — 6360000002 HC RX W HCPCS

## 2022-07-22 PROCEDURE — 3609012700 HC EGD DILATION SAVORY: Performed by: INTERNAL MEDICINE

## 2022-07-22 PROCEDURE — 2709999900 HC NON-CHARGEABLE SUPPLY: Performed by: INTERNAL MEDICINE

## 2022-07-22 PROCEDURE — 2500000003 HC RX 250 WO HCPCS

## 2022-07-22 PROCEDURE — 2580000003 HC RX 258: Performed by: ANESTHESIOLOGY

## 2022-07-22 PROCEDURE — 3700000000 HC ANESTHESIA ATTENDED CARE: Performed by: INTERNAL MEDICINE

## 2022-07-22 PROCEDURE — 7100000011 HC PHASE II RECOVERY - ADDTL 15 MIN: Performed by: INTERNAL MEDICINE

## 2022-07-22 PROCEDURE — 7100000010 HC PHASE II RECOVERY - FIRST 15 MIN: Performed by: INTERNAL MEDICINE

## 2022-07-22 RX ORDER — PROPOFOL 10 MG/ML
INJECTION, EMULSION INTRAVENOUS PRN
Status: DISCONTINUED | OUTPATIENT
Start: 2022-07-22 | End: 2022-07-22 | Stop reason: SDUPTHER

## 2022-07-22 RX ORDER — LIDOCAINE HYDROCHLORIDE 20 MG/ML
INJECTION, SOLUTION EPIDURAL; INFILTRATION; INTRACAUDAL; PERINEURAL PRN
Status: DISCONTINUED | OUTPATIENT
Start: 2022-07-22 | End: 2022-07-22 | Stop reason: SDUPTHER

## 2022-07-22 RX ORDER — LIDOCAINE HYDROCHLORIDE 10 MG/ML
1 INJECTION, SOLUTION INFILTRATION; PERINEURAL
Status: DISCONTINUED | OUTPATIENT
Start: 2022-07-22 | End: 2022-07-22 | Stop reason: HOSPADM

## 2022-07-22 RX ORDER — SODIUM CHLORIDE 0.9 % (FLUSH) 0.9 %
5-40 SYRINGE (ML) INJECTION EVERY 12 HOURS SCHEDULED
Status: DISCONTINUED | OUTPATIENT
Start: 2022-07-22 | End: 2022-07-22 | Stop reason: HOSPADM

## 2022-07-22 RX ORDER — SODIUM CHLORIDE 0.9 % (FLUSH) 0.9 %
5-40 SYRINGE (ML) INJECTION PRN
Status: DISCONTINUED | OUTPATIENT
Start: 2022-07-22 | End: 2022-07-22 | Stop reason: HOSPADM

## 2022-07-22 RX ORDER — SODIUM CHLORIDE, SODIUM LACTATE, POTASSIUM CHLORIDE, CALCIUM CHLORIDE 600; 310; 30; 20 MG/100ML; MG/100ML; MG/100ML; MG/100ML
INJECTION, SOLUTION INTRAVENOUS CONTINUOUS
Status: DISCONTINUED | OUTPATIENT
Start: 2022-07-22 | End: 2022-07-22 | Stop reason: HOSPADM

## 2022-07-22 RX ORDER — SODIUM CHLORIDE 9 MG/ML
INJECTION, SOLUTION INTRAVENOUS PRN
Status: DISCONTINUED | OUTPATIENT
Start: 2022-07-22 | End: 2022-07-22 | Stop reason: HOSPADM

## 2022-07-22 RX ADMIN — PROPOFOL 40 MG: 10 INJECTION, EMULSION INTRAVENOUS at 11:46

## 2022-07-22 RX ADMIN — LIDOCAINE HYDROCHLORIDE 100 MG: 20 INJECTION, SOLUTION EPIDURAL; INFILTRATION; INTRACAUDAL; PERINEURAL at 11:46

## 2022-07-22 RX ADMIN — PROPOFOL 20 MG: 10 INJECTION, EMULSION INTRAVENOUS at 11:48

## 2022-07-22 RX ADMIN — SODIUM CHLORIDE, POTASSIUM CHLORIDE, SODIUM LACTATE AND CALCIUM CHLORIDE: 600; 310; 30; 20 INJECTION, SOLUTION INTRAVENOUS at 09:28

## 2022-07-22 RX ADMIN — PROPOFOL 20 MG: 10 INJECTION, EMULSION INTRAVENOUS at 11:49

## 2022-07-22 RX ADMIN — PROPOFOL 20 MG: 10 INJECTION, EMULSION INTRAVENOUS at 11:47

## 2022-07-22 ASSESSMENT — PAIN - FUNCTIONAL ASSESSMENT: PAIN_FUNCTIONAL_ASSESSMENT: NONE - DENIES PAIN

## 2022-07-22 NOTE — DISCHARGE INSTRUCTIONS
Gastrointestinal Esophagogastroduodenoscopy (EGD)/ Endoscopic Ultrasound(EUS)- Upper Exam Discharge Instructions    1. Call Dr. Judi Medellin  for any problems or questions. 2. Contact the doctor's office for follow up appointment as directed. 3. Medication may cause drowsiness for several hours, therefore, do not drive or operate machinery for remainder of the day. 4. No alcohol today. 5. Do not make any important decisions such as signing legal paperwork. 6. Ordinarily, you may resume regular diet and activity after exam unless otherwise specified by your physician. 7. For mild soreness in your throat you may use Cepacol throat lozenges or warm  salt-water gargles as needed. Any additional instructions:   Soft diet today. 2. Advance diet tomorrow as tolerated. 3. Omeprazole 40 mg daily. 4. Avoid NSAIDs ( medications such as Ibuprofen, Aleve, Aspirin, and Goody Powders) for 48 hours. 5. Repeat EGD for further dilation at [facility] in 4 weeks. Instructions given to Diane Escalante and other family members.

## 2022-07-22 NOTE — OP NOTE
Procedure:  Esophagogastroduodenoscopy    Date of Procedure:  7/22/2022    Patient:  Claudell Sewer     1940    Indication:  Dysphagia, GERD     Sedation:  MAC    Pre-Procedure Physical Exam:    Mental status:  alert and oriented  Airway:  normal oropharyngeal airway and neck mobility  CV:  regular rate and rhythm  Respiratory:  clear to auscultation    Procedure:  A History and Physical has been performed, and patient medication allergies have been reviewed. Risks of perforation, hemorrhage, adverse drug reaction, and aspiration were discussed. Informed consent was obtained for the procedure, including sedation. The patient was placed in the left lateral decubitus position. The heart rate, oxygen saturations, blood pressure, and response to care were monitored throughout the procedure. The gastroscope was passed through the mouth and advanced under direct vision to the second portion of the duodenum. A careful inspection was made as the gastroscope was withdrawn, including a retroflexed view of the proximal stomach. The patient tolerated the procedure well. Findings:     OROPHARYNX: Cords and pyriform recesses appear normal.   ESOPHAGUS: A benign-appearing intrinsic stenosis is seen in the proximal esophagus. This was dilated using a 48-Tamazight Feliz. Successful dilation was confirmed by the presence of mucosal disruption. STOMACH: On retroflexion, the flap-valve is classified as Hill Grade III, with a tissue fold present at the lesser curvature that is not prominent and an open hiatus. A sliding-type hiatal hernia is seen with axial height of 1 cm and transverse width of 1.5 cm. The fundus on antegrade and retroflexed views is normal. The body, antrum, and pylorus are normal.   DUODENUM: The bulb and second portions are normal.    Specimen:  No     Estimated Blood Loss:  Minimal    Implant:  None           Impression:    Esophageal stricture. Dilated. Small hiatal hernia. Plan:  1. Soft diet today. 2. Advance diet tomorrow as tolerated. 3. Omeprazole 40 mg daily. 4. Avoid NSAIDs for 48 hours. 5. Repeat EGD for further dilation at [facility] in 4 weeks.      Signed:  Riri Og MD  7/22/2022  11:51 AM

## 2022-07-22 NOTE — ANESTHESIA POSTPROCEDURE EVALUATION
Department of Anesthesiology  Postprocedure Note    Patient: Emma Burton  MRN: [id number]  YOB: 1940  Date of evaluation: 7/22/2022      Procedure Summary     Date: 07/22/22 Room / Location: [facility] ENDO 05 / [facility] ENDOSCOPY    Anesthesia Start: 1138 Anesthesia Stop: 6693    Procedure: EGD DILATION franks.  (Upper GI Region) Diagnosis:       Nausea      Early satiety      History of Liriano's esophagus      (Nausea [R11.0])    Surgeons: Elen Wright MD Responsible Provider: Shekhar Husain MD    Anesthesia Type: TIVA ASA Status: 3          Anesthesia Type: TIVA    Richard Phase I: Richard Score: 10    Richard Phase II: Richard Score: 10      Anesthesia Post Evaluation    Patient location during evaluation: PACU  Patient participation: complete - patient participated  Level of consciousness: awake and alert  Airway patency: patent  Nausea & Vomiting: no nausea and no vomiting  Complications: no  Cardiovascular status: hemodynamically stable  Respiratory status: acceptable  Hydration status: euvolemic  Multimodal analgesia pain management approach

## 2022-07-22 NOTE — PROGRESS NOTES
Spiritual Care visit. Initial Visit, Pre Surgery Consult. Visit and prayer before patient goes to surgery.     Visit by Bakari Terry M.Ed., Th.B. ,Staff

## 2022-07-22 NOTE — H&P
History and Physical for Procedure             Date: 7/22/2022     History of Present Illness:  Patient presents to undergo EGD. Past Medical History:   Diagnosis Date    Adverse effect of anesthesia     delayed awakening    DARRELL (acute kidney injury) ([facility] 06/08/2013    pt reports after TIA    Allergic rhinitis     has inhaler daily (pt denies asthma)    Anemia, unspecified 08/14/2015    no recent infusions    Aortic stenosis     sp AVR 2015    Blurry vision, bilateral 6/8/2013    CAD (coronary artery disease)     Cardiac pacemaker     Biotronik MRI compatible (dual chamber)  on Left chest    Cerebral artery occlusion with cerebral infarction [facility])     Constipation     Critical aortic valve stenosis 8/14/2015    8/13/15 (Dr Pedro Pollard) 1. Left and right sided maze. Please note that the left pulmonary veins were not done due to difficult anatomy. 2. Clipping, left atrial appendage. 3. Aortic valve replacement with a 23 mm Magna Ease Janet-Hudson pericardial valve.     Current use of long term anticoagulation     Eliquis    GERD (gastroesophageal reflux disease)     managed with medication    H/O maze procedure 09/30/2015    History of Bell's palsy 2013    History of TIA (transient ischemic attack) 2013    pt reports bells palsy started at same time    Hx of blood clots     Hypertension     Hyponatremia 9/7/2013    Menopause     Metabolic encephalopathy 5/2/8523    Neuropathy     Osteoarthritis     Pancreatic cyst     Paroxysmal atrial fibrillation ([facility] 8/14/2015    Prolonged emergence from general anesthesia     Pulmonary nodule     benign per pulm note (1/2018)    Sick sinus syndrome (HCC)     SOB (shortness of breath) on exertion 2015    cannot climb flight of stairs or walk to mailbox- s/p AVR and pacemaker (pt reports no problems at this time 5/2018)    Spinal stenosis, lumbar     Status post total right knee replacement 6/6/2018    Syncope and collapse 2015     Past Surgical History:   Procedure Laterality Date    AORTIC VALVE REPLACEMENT  8/2015    magna Ease Janet -Hudson valve    CARDIAC CATHETERIZATION  2013, 2015    CHOLECYSTECTOMY  2000    COLONOSCOPY  April 2013    with EGD, Dr. Beka Rdz N/A 3/12/2021    COLONOSCOPY/ 30 performed by Sailaja Olson MD at [facility] ENDOSCOPY    ENDOSCOPY, COLON, DIAGNOSTIC      HYSTERECTOMY (CERVIX STATUS UNKNOWN)  1994    PACEMAKER  9/24/14    Biotronik MRI compatible (dual chamber)     TONSILLECTOMY  1951    TOTAL KNEE ARTHROPLASTY Right 06/06/2018    UPPER GASTROINTESTINAL ENDOSCOPY       In and  Family History   Problem Relation Age of Onset    Heart Disease Sister     Diabetes Brother     Cancer Brother         pancreatic cancer    Heart Disease Father     Diabetes Son     Hypertension Sister     Breast Cancer Neg Hx     Diabetes Mother     Heart Disease Mother     Hypertension Mother     Diabetes Sister     Diabetes Son      Social History     Tobacco Use    Smoking status: Never    Smokeless tobacco: Never   Substance Use Topics    Alcohol use: No        Allergies   Allergen Reactions    Sulfa Antibiotics Rash    Amoxicillin Other (See Comments)    Amoxicillin-Pot Clavulanate Nausea Only    Clavulanic Acid Other (See Comments)    Lisinopril Other (See Comments)    Lansoprazole Rash     flushed    Terbinafine Rash     No current facility-administered medications for this encounter.      Current Outpatient Medications   Medication Sig    polyethylene glycol (GLYCOLAX) 17 GM/SCOOP powder Take 17 g by mouth 2 times daily    famotidine (PEPCID) 40 MG tablet Take 40 mg by mouth at bedtime    sotalol (BETAPACE) 160 MG tablet Take 0.5 tablets by mouth 2 times daily    acetaminophen (TYLENOL) 500 MG tablet Take 1,000 mg by mouth every 6 hours as needed    albuterol sulfate  (90 Base) MCG/ACT inhaler Inhale 2 puffs into the lungs every 4 hours as needed    apixaban (ELIQUIS) 5 MG TABS tablet TAKE 1 TABLET TWICE DAILY    docusate (COLACE, DULCOLAX) 100 MG CAPS Take 100 mg by mouth nightly as needed    furosemide (LASIX) 20 MG tablet Take 20 mg by mouth as needed    loratadine (CLARITIN) 10 MG tablet Take 10 mg by mouth daily    losartan-hydroCHLOROthiazide (HYZAAR) 50-12.5 MG per tablet Take 1 tablet by mouth nightly    Naproxen Sodium 220 MG CAPS Take by mouth as needed    nitroGLYCERIN (NITROSTAT) 0.4 MG SL tablet Place under the tongue    omeprazole (PRILOSEC) 20 MG delayed release capsule Take 20 mg by mouth daily    pravastatin (PRAVACHOL) 10 MG tablet Take 10 mg by mouth at bedtime        Review of Systems:  A detailed 10 organ review of systems is obtained with pertinent positives as listed in the History of Present Illness. All others are negative. Objective:     Physical Exam:  Ht 5' 7\" (1.702 m)   Wt 179 lb (81.2 kg)   BMI 28.04 kg/m²    General:  Alert and oriented. Heart: Regular rate and rhythm  Lungs:  Clear to auscultation bilaterally  Abdomen: Soft, nontender, nondistended    Impression/Plan:     Proceed with EGD as planned. I have discussed with the patient the technique, benefits, alternatives, and risks of these procedures, including medication reaction, immediate or delayed bleeding, or perforation of the gastrointestinal tract.      Signed By: Jerri Suarez MD     July 22, 2022

## 2022-08-15 ENCOUNTER — TELEPHONE (OUTPATIENT)
Dept: CARDIOLOGY CLINIC | Age: 82
End: 2022-08-15

## 2022-08-15 NOTE — TELEPHONE ENCOUNTER
Patient Active Problem List   Diagnosis    Dyslipidemia    Osteoarthritis of right knee    Status post total right knee replacement    Abnormal CT of the chest    Sick sinus syndrome [facility])    Cardiac pacemaker   Biotronik MRI compatible (dual chamber)     S/P AVR   02/2017: Normal AVR     Paroxysmal atrial fibrillation (HCC)   S/p MAZE     Anemia, unspecified    CAD (coronary artery disease)   05/2015:  Moderate mLAD     Pacemaker    Hypertension    GERD (gastroesophageal reflux disease)    Spinal stenosis    Peripheral neuropathy    Arthritis   Osteoarthritis

## 2022-08-17 RX ORDER — SODIUM CHLORIDE 9 MG/ML
INJECTION, SOLUTION INTRAVENOUS PRN
OUTPATIENT
Start: 2022-08-17

## 2022-08-17 RX ORDER — SODIUM CHLORIDE 0.9 % (FLUSH) 0.9 %
5-40 SYRINGE (ML) INJECTION EVERY 12 HOURS SCHEDULED
OUTPATIENT
Start: 2022-08-17

## 2022-08-17 RX ORDER — SODIUM CHLORIDE 0.9 % (FLUSH) 0.9 %
5-40 SYRINGE (ML) INJECTION PRN
OUTPATIENT
Start: 2022-08-17

## 2022-08-24 ENCOUNTER — APPOINTMENT (RX ONLY)
Dept: URBAN - METROPOLITAN AREA CLINIC 329 | Facility: CLINIC | Age: 82
Setting detail: DERMATOLOGY
End: 2022-08-24

## 2022-08-24 DIAGNOSIS — L82.1 OTHER SEBORRHEIC KERATOSIS: ICD-10-CM

## 2022-08-24 DIAGNOSIS — L21.8 OTHER SEBORRHEIC DERMATITIS: ICD-10-CM | Status: INADEQUATELY CONTROLLED

## 2022-08-24 DIAGNOSIS — Z71.89 OTHER SPECIFIED COUNSELING: ICD-10-CM

## 2022-08-24 DIAGNOSIS — L20.89 OTHER ATOPIC DERMATITIS: ICD-10-CM | Status: INADEQUATELY CONTROLLED

## 2022-08-24 PROBLEM — L20.84 INTRINSIC (ALLERGIC) ECZEMA: Status: ACTIVE | Noted: 2022-08-24

## 2022-08-24 PROCEDURE — 99204 OFFICE O/P NEW MOD 45 MIN: CPT

## 2022-08-24 PROCEDURE — ? SUNSCREEN RECOMMENDATIONS

## 2022-08-24 PROCEDURE — ? PRESCRIPTION

## 2022-08-24 PROCEDURE — ? FULL BODY SKIN EXAM

## 2022-08-24 PROCEDURE — ? PRESCRIPTION MEDICATION MANAGEMENT

## 2022-08-24 PROCEDURE — ? COUNSELING

## 2022-08-24 RX ORDER — KETOCONAZOLE 20 MG/ML
SHAMPOO, SUSPENSION TOPICAL
Qty: 120 | Refills: 5 | Status: ERX | COMMUNITY
Start: 2022-08-24

## 2022-08-24 RX ORDER — MOMETASONE FUROATE 1 MG/ML
SOLUTION TOPICAL
Qty: 60 | Refills: 4 | Status: ERX | COMMUNITY
Start: 2022-08-24

## 2022-08-24 RX ORDER — TRIAMCINOLONE ACETONIDE 1 MG/G
CREAM TOPICAL BID
Qty: 80 | Refills: 4 | Status: ERX | COMMUNITY
Start: 2022-08-24

## 2022-08-24 RX ORDER — ALCLOMETASONE DIPROPIONATE 0.5 MG/G
OINTMENT TOPICAL
Qty: 60 | Refills: 6 | Status: ERX | COMMUNITY
Start: 2022-08-24

## 2022-08-24 RX ADMIN — KETOCONAZOLE: 20 SHAMPOO, SUSPENSION TOPICAL at 00:00

## 2022-08-24 RX ADMIN — MOMETASONE FUROATE: 1 SOLUTION TOPICAL at 00:00

## 2022-08-24 RX ADMIN — TRIAMCINOLONE ACETONIDE: 1 CREAM TOPICAL at 00:00

## 2022-08-24 RX ADMIN — ALCLOMETASONE DIPROPIONATE: 0.5 OINTMENT TOPICAL at 00:00

## 2022-08-24 ASSESSMENT — LOCATION SIMPLE DESCRIPTION DERM
LOCATION SIMPLE: RIGHT PRETIBIAL REGION
LOCATION SIMPLE: LEFT THIGH
LOCATION SIMPLE: RIGHT EAR
LOCATION SIMPLE: LEFT EYEBROW
LOCATION SIMPLE: RIGHT FOREHEAD
LOCATION SIMPLE: RIGHT PRETIBIAL REGION
LOCATION SIMPLE: LEFT THIGH
LOCATION SIMPLE: RIGHT EYEBROW
LOCATION SIMPLE: LEFT KNEE
LOCATION SIMPLE: RIGHT THIGH
LOCATION SIMPLE: POSTERIOR NECK
LOCATION SIMPLE: ABDOMEN
LOCATION SIMPLE: RIGHT POSTERIOR THIGH
LOCATION SIMPLE: RIGHT UPPER BACK
LOCATION SIMPLE: LEFT FOREHEAD
LOCATION SIMPLE: LEFT POSTERIOR THIGH
LOCATION SIMPLE: CHEST
LOCATION SIMPLE: SUPERIOR FOREHEAD
LOCATION SIMPLE: LEFT EYEBROW
LOCATION SIMPLE: RIGHT EYEBROW
LOCATION SIMPLE: POSTERIOR SCALP
LOCATION SIMPLE: ANTERIOR SCALP
LOCATION SIMPLE: LEFT CALF
LOCATION SIMPLE: RIGHT THIGH
LOCATION SIMPLE: LEFT UPPER BACK
LOCATION SIMPLE: LEFT EAR

## 2022-08-24 ASSESSMENT — LOCATION DETAILED DESCRIPTION DERM
LOCATION DETAILED: LEFT ANTERIOR DISTAL THIGH
LOCATION DETAILED: RIGHT ANTERIOR PROXIMAL THIGH
LOCATION DETAILED: RIGHT CENTRAL EYEBROW
LOCATION DETAILED: RIGHT SUPERIOR MEDIAL FOREHEAD
LOCATION DETAILED: LEFT ANTERIOR DISTAL THIGH
LOCATION DETAILED: EPIGASTRIC SKIN
LOCATION DETAILED: LEFT CENTRAL EYEBROW
LOCATION DETAILED: LEFT KNEE
LOCATION DETAILED: LEFT SUPERIOR MEDIAL FOREHEAD
LOCATION DETAILED: LEFT CENTRAL EYEBROW
LOCATION DETAILED: SUPERIOR MID FOREHEAD
LOCATION DETAILED: RIGHT PROXIMAL PRETIBIAL REGION
LOCATION DETAILED: RIGHT ANTERIOR DISTAL THIGH
LOCATION DETAILED: LEFT DISTAL POSTERIOR THIGH
LOCATION DETAILED: RIGHT CENTRAL EYEBROW
LOCATION DETAILED: MID-FRONTAL SCALP
LOCATION DETAILED: POSTERIOR MID-PARIETAL SCALP
LOCATION DETAILED: RIGHT PROXIMAL PRETIBIAL REGION
LOCATION DETAILED: LEFT CRUS OF HELIX
LOCATION DETAILED: RIGHT CYMBA CONCHA
LOCATION DETAILED: LEFT MEDIAL UPPER BACK
LOCATION DETAILED: RIGHT INFERIOR MEDIAL UPPER BACK
LOCATION DETAILED: RIGHT SUPERIOR POSTERIOR NECK
LOCATION DETAILED: RIGHT INFERIOR MEDIAL FOREHEAD
LOCATION DETAILED: RIGHT TRIANGULAR FOSSA
LOCATION DETAILED: LEFT PROXIMAL CALF
LOCATION DETAILED: RIGHT DISTAL POSTERIOR THIGH
LOCATION DETAILED: UPPER STERNUM

## 2022-08-24 ASSESSMENT — LOCATION ZONE DERM
LOCATION ZONE: FACE
LOCATION ZONE: SCALP
LOCATION ZONE: LEG
LOCATION ZONE: TRUNK
LOCATION ZONE: FACE
LOCATION ZONE: LEG
LOCATION ZONE: NECK
LOCATION ZONE: EAR

## 2022-08-24 ASSESSMENT — ITCH NUMERIC RATING SCALE: HOW SEVERE IS YOUR ITCHING?: 2

## 2022-08-24 ASSESSMENT — BSA RASH: BSA RASH: 19

## 2022-08-24 NOTE — PROCEDURE: PRESCRIPTION MEDICATION MANAGEMENT
Initiate Treatment: Mometasone solution to scalp\\nAlclometasone ointment face\\nKetoconazole shampoo
Render In Strict Bullet Format?: No
Detail Level: Zone

## 2022-09-06 NOTE — PERIOP NOTE
Patient verified name,and   Order for consent NOT found in EHR at time of PAT visit. Unable to verify case posting against order; surgery verified by patient. Type: 1a; abbreviated assessment per anesthesia guidelines  Labs per surgeon: none ordered  Labs per anesthesia: not indicated  Patient has a Biotronik pacemaker, 3/31/22 last in person interrogation AP 58%,  2%, EKG 3/31/22, echo 22 EF 60-65%. Pacemaker rep is not needed. Pacemaker added to case posting. Instructed pt that they will be notified by the Gi Lab for time of arrival. If any questions please call the GI lab at [phone]. Follow diet and prep instructions per office. May have clear liquids until 4 hours prior to time of arrival.    [place] Roxo or shower the night before and the am of surgery with antibacterial soap. No lotions, oils, powders, cologne on skin. No make up, eye make up or jewelry. Wear loose fitting comfortable, clean clothing. Must have adult present in building the entire time . Medications for the day of procedure:  albuterol inhaler if needed and bring it to the hospital, loratadine if needed, sotalol, omeprazole. Hold eliquis as instructed by surgeon and cardiologist. Medication clearance in EHR. The following discharge instructions reviewed with patient: medication given during procedure may cause drowsiness for several hours, therefore, do not drive or operate machinery for remainder of the day, no alcohol on the day of your procedure, resume regular diet and activity unless otherwise directed, for mild sore throat you may use Cepacol throat lozenges or warm salt water gargles as needed, call your physician for any problems or questions. Patient verbalizes understanding.

## 2022-09-12 ENCOUNTER — ANESTHESIA (OUTPATIENT)
Dept: ENDOSCOPY | Age: 82
End: 2022-09-12
Payer: MEDICARE

## 2022-09-12 ENCOUNTER — ANESTHESIA EVENT (OUTPATIENT)
Dept: ENDOSCOPY | Age: 82
End: 2022-09-12
Payer: MEDICARE

## 2022-09-12 ENCOUNTER — HOSPITAL ENCOUNTER (OUTPATIENT)
Age: 82
Setting detail: OUTPATIENT SURGERY
Discharge: HOME | End: 2022-09-12
Attending: INTERNAL MEDICINE | Admitting: INTERNAL MEDICINE
Payer: MEDICARE

## 2022-09-12 VITALS
DIASTOLIC BLOOD PRESSURE: 65 MMHG | RESPIRATION RATE: 18 BRPM | HEART RATE: 69 BPM | WEIGHT: 165.8 LBS | HEIGHT: 67 IN | TEMPERATURE: 98 F | OXYGEN SATURATION: 99 % | SYSTOLIC BLOOD PRESSURE: 151 MMHG | BODY MASS INDEX: 26.02 KG/M2

## 2022-09-12 PROCEDURE — 3609012400 HC EGD TRANSORAL BIOPSY SINGLE/MULTIPLE: Performed by: INTERNAL MEDICINE

## 2022-09-12 PROCEDURE — 2500000003 HC RX 250 WO HCPCS: Performed by: NURSE ANESTHETIST, CERTIFIED REGISTERED

## 2022-09-12 PROCEDURE — 6360000002 HC RX W HCPCS: Performed by: NURSE ANESTHETIST, CERTIFIED REGISTERED

## 2022-09-12 PROCEDURE — 3700000000 HC ANESTHESIA ATTENDED CARE: Performed by: INTERNAL MEDICINE

## 2022-09-12 PROCEDURE — 88305 TISSUE EXAM BY PATHOLOGIST: CPT

## 2022-09-12 PROCEDURE — 7100000011 HC PHASE II RECOVERY - ADDTL 15 MIN: Performed by: INTERNAL MEDICINE

## 2022-09-12 PROCEDURE — 2580000003 HC RX 258: Performed by: ANESTHESIOLOGY

## 2022-09-12 PROCEDURE — 2580000003 HC RX 258: Performed by: NURSE ANESTHETIST, CERTIFIED REGISTERED

## 2022-09-12 PROCEDURE — 7100000010 HC PHASE II RECOVERY - FIRST 15 MIN: Performed by: INTERNAL MEDICINE

## 2022-09-12 PROCEDURE — 2709999900 HC NON-CHARGEABLE SUPPLY: Performed by: INTERNAL MEDICINE

## 2022-09-12 RX ORDER — PROPOFOL 10 MG/ML
INJECTION, EMULSION INTRAVENOUS PRN
Status: DISCONTINUED | OUTPATIENT
Start: 2022-09-12 | End: 2022-09-12 | Stop reason: SDUPTHER

## 2022-09-12 RX ORDER — LIDOCAINE HYDROCHLORIDE 10 MG/ML
1 INJECTION, SOLUTION INFILTRATION; PERINEURAL
Status: DISCONTINUED | OUTPATIENT
Start: 2022-09-12 | End: 2022-09-12 | Stop reason: HOSPADM

## 2022-09-12 RX ORDER — SODIUM CHLORIDE, SODIUM LACTATE, POTASSIUM CHLORIDE, CALCIUM CHLORIDE 600; 310; 30; 20 MG/100ML; MG/100ML; MG/100ML; MG/100ML
INJECTION, SOLUTION INTRAVENOUS CONTINUOUS PRN
Status: DISCONTINUED | OUTPATIENT
Start: 2022-09-12 | End: 2022-09-12 | Stop reason: SDUPTHER

## 2022-09-12 RX ORDER — GLYCOPYRROLATE 0.2 MG/ML
INJECTION INTRAMUSCULAR; INTRAVENOUS PRN
Status: DISCONTINUED | OUTPATIENT
Start: 2022-09-12 | End: 2022-09-12 | Stop reason: SDUPTHER

## 2022-09-12 RX ORDER — ONDANSETRON 2 MG/ML
4 INJECTION INTRAMUSCULAR; INTRAVENOUS
Status: CANCELLED | OUTPATIENT
Start: 2022-09-12 | End: 2022-09-12

## 2022-09-12 RX ORDER — PROPOFOL 10 MG/ML
INJECTION, EMULSION INTRAVENOUS CONTINUOUS PRN
Status: DISCONTINUED | OUTPATIENT
Start: 2022-09-12 | End: 2022-09-12 | Stop reason: SDUPTHER

## 2022-09-12 RX ORDER — LIDOCAINE HYDROCHLORIDE 20 MG/ML
INJECTION, SOLUTION EPIDURAL; INFILTRATION; INTRACAUDAL; PERINEURAL PRN
Status: DISCONTINUED | OUTPATIENT
Start: 2022-09-12 | End: 2022-09-12 | Stop reason: SDUPTHER

## 2022-09-12 RX ORDER — SODIUM CHLORIDE, SODIUM LACTATE, POTASSIUM CHLORIDE, CALCIUM CHLORIDE 600; 310; 30; 20 MG/100ML; MG/100ML; MG/100ML; MG/100ML
INJECTION, SOLUTION INTRAVENOUS CONTINUOUS
Status: DISCONTINUED | OUTPATIENT
Start: 2022-09-12 | End: 2022-09-12 | Stop reason: HOSPADM

## 2022-09-12 RX ORDER — SODIUM CHLORIDE 0.9 % (FLUSH) 0.9 %
5-40 SYRINGE (ML) INJECTION PRN
Status: CANCELLED | OUTPATIENT
Start: 2022-09-12

## 2022-09-12 RX ORDER — SODIUM CHLORIDE 9 MG/ML
INJECTION, SOLUTION INTRAVENOUS PRN
Status: CANCELLED | OUTPATIENT
Start: 2022-09-12

## 2022-09-12 RX ORDER — SODIUM CHLORIDE 0.9 % (FLUSH) 0.9 %
5-40 SYRINGE (ML) INJECTION EVERY 12 HOURS SCHEDULED
Status: CANCELLED | OUTPATIENT
Start: 2022-09-12

## 2022-09-12 RX ADMIN — SODIUM CHLORIDE, POTASSIUM CHLORIDE, SODIUM LACTATE AND CALCIUM CHLORIDE: 600; 310; 30; 20 INJECTION, SOLUTION INTRAVENOUS at 11:37

## 2022-09-12 RX ADMIN — SODIUM CHLORIDE, SODIUM LACTATE, POTASSIUM CHLORIDE, AND CALCIUM CHLORIDE: 600; 310; 30; 20 INJECTION, SOLUTION INTRAVENOUS at 11:49

## 2022-09-12 RX ADMIN — PROPOFOL 140 MCG/KG/MIN: 10 INJECTION, EMULSION INTRAVENOUS at 11:52

## 2022-09-12 RX ADMIN — GLYCOPYRROLATE 0.1 MG: 0.2 INJECTION, SOLUTION INTRAMUSCULAR; INTRAVENOUS at 11:52

## 2022-09-12 RX ADMIN — PROPOFOL 40 MG: 10 INJECTION, EMULSION INTRAVENOUS at 11:52

## 2022-09-12 RX ADMIN — LIDOCAINE HYDROCHLORIDE 100 MG: 20 INJECTION, SOLUTION EPIDURAL; INFILTRATION; INTRACAUDAL; PERINEURAL at 11:53

## 2022-09-12 ASSESSMENT — PAIN - FUNCTIONAL ASSESSMENT: PAIN_FUNCTIONAL_ASSESSMENT: NONE - DENIES PAIN

## 2022-09-12 NOTE — ANESTHESIA PRE PROCEDURE
Department of Anesthesiology  Preprocedure Note       Name:  Anuj Solis   Age:  78 y.o.  :  1940                                          MRN:  [id number]         Date:  2022      Surgeon: Reema Ash):  Robert Ramires MD    Procedure: Procedure(s):  EGD ESOPHAGOGASTRODUODENOSCOPY  patient has a pacemaker    Medications prior to admission:   Prior to Admission medications    Medication Sig Start Date End Date Taking? Authorizing Provider   polyethylene glycol (GLYCOLAX) 17 GM/SCOOP powder Take 17 g by mouth daily    Historical Provider, MD   famotidine (PEPCID) 40 MG tablet Take 40 mg by mouth at bedtime    Historical Provider, MD   sotalol (BETAPACE) 160 MG tablet Take 0.5 tablets by mouth 2 times daily 22   Chino Morales MD   acetaminophen (TYLENOL) 500 MG tablet Take 1,000 mg by mouth every 6 hours as needed    Ar Automatic Reconciliation   albuterol sulfate  (90 Base) MCG/ACT inhaler Inhale 2 puffs into the lungs every 4 hours as needed 21   Ar Automatic Reconciliation   apixaban (ELIQUIS) 5 MG TABS tablet TAKE 1 TABLET TWICE DAILY 10/1/21   Ar Automatic Reconciliation   docusate (COLACE, DULCOLAX) 100 MG CAPS Take 100 mg by mouth nightly as needed    Ar Automatic Reconciliation   furosemide (LASIX) 20 MG tablet Take 20 mg by mouth as needed 21   Ar Automatic Reconciliation   loratadine (CLARITIN) 10 MG tablet Take 10 mg by mouth daily as needed    Ar Automatic Reconciliation   losartan-hydroCHLOROthiazide (HYZAAR) 50-12.5 MG per tablet Take 1 tablet by mouth nightly 22   Ar Automatic Reconciliation   omeprazole (PRILOSEC) 20 MG delayed release capsule Take 20 mg by mouth daily    Ar Automatic Reconciliation   pravastatin (PRAVACHOL) 10 MG tablet Take 10 mg by mouth at bedtime 3/11/22   Ar Automatic Reconciliation       Current medications:    No current outpatient medications on file. No current facility-administered medications for this visit. Allergies: Allergies   Allergen Reactions    Sulfa Antibiotics Rash    Amoxicillin Other (See Comments)    Amoxicillin-Pot Clavulanate Nausea Only    Clavulanic Acid Other (See Comments)    Lisinopril Other (See Comments)    Lansoprazole Rash     flushed    Terbinafine Rash       Problem List:    Patient Active Problem List   Diagnosis Code    Hypertension I10    Paroxysmal atrial fibrillation (HCC) I48.0    Anemia, unspecified D64.9    Spinal stenosis M48.00    Osteoarthritis of right knee M17.11    Dyslipidemia E78.5    Chronic pain G89.29    Pacemaker Z95.0    Arthritis M19.90    CAD (coronary artery disease) I25.10    GERD (gastroesophageal reflux disease) K21.9    Obesity (BMI 30-39. 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       Past Medical History:        Diagnosis Date    Adverse effect of anesthesia     delayed awakening x1- with heart surgery    DARRELL (acute kidney injury) ([facility] [address] 06/08/2013    pt reports after TIA    Allergic rhinitis     has inhaler daily (pt denies asthma)    Anemia, unspecified 08/14/2015    no recent infusions    Aortic stenosis     sp AVR 2015    Blurry vision, bilateral 6/8/2013    CAD (coronary artery disease)     Cardiac pacemaker     Biotronik MRI compatible (dual chamber)  on Left chest; last in person interrogation 3/31/22  AP 58%,  2%    Cerebral artery occlusion with cerebral infarction ([address]     Constipation     Critical aortic valve stenosis 8/14/2015    8/13/15 (Dr Marely Perez) 1. Left and right sided maze. Please note that the left pulmonary veins were not done due to difficult anatomy. 2. Clipping, left atrial appendage. 3. Aortic valve replacement with a 23 mm Magna Ease Janet-Hudson pericardial valve.     Current use of long term anticoagulation     Eliquis    GERD (gastroesophageal reflux disease)     managed with medication    H/O maze procedure 09/30/2015    AVR with maze    History of Bell's palsy 2013    History of TIA (transient ischemic attack) 2013    pt reports bells palsy started at same time    Hx of blood clots     Hypertension     medication    Hyponatremia 9/7/2013    Menopause     Metabolic encephalopathy 0/2/5907    Neuropathy     Osteoarthritis     Pancreatic cyst     Paroxysmal atrial fibrillation ([facility] [address] 8/14/2015    Prolonged emergence from general anesthesia     Pulmonary nodule     benign per pulm note (1/2018)    Sick sinus syndrome (HCC)     SOB (shortness of breath) on exertion 2015    cannot climb flight of stairs or walk to mailbox- s/p AVR and pacemaker (pt reports no problems at this time 5/2018)    Spinal stenosis, lumbar     Status post total right knee replacement 6/6/2018    Syncope and collapse 2015       Past Surgical History:        Procedure Laterality Date    AORTIC VALVE REPLACEMENT  8/2015    magna Ease Janet -Hudson valve    CARDIAC CATHETERIZATION  2013, 2015    CHOLECYSTECTOMY  2000    COLONOSCOPY  April 2013    with EGD, Dr. Juanjo Hernandez COLONOSCOPY N/A 3/12/2021    COLONOSCOPY/ 30 performed by Kaye Brody MD at [address] [place] [address], COLON, DIAGNOSTIC      HYSTERECTOMY (CERVIX STATUS UNKNOWN)  1994    PACEMAKER  9/24/14    Biotronik MRI compatible (dual chamber)     TONSILLECTOMY  1951    TOTAL KNEE ARTHROPLASTY Right 06/06/2018    UPPER GASTROINTESTINAL ENDOSCOPY      UPPER GASTROINTESTINAL ENDOSCOPY N/A 7/22/2022    EGD DILATION franks. performed by Magdalena Tamayo MD at [facility] ENDOSCOPY       Social History:    Social History     Tobacco Use    Smoking status: Never    Smokeless tobacco: Never   Substance Use Topics    Alcohol use: No                                Counseling given: Not Answered      Vital Signs (Current): There were no vitals filed for this visit. BP Readings from Last 3 Encounters:   07/22/22 (!) 175/85   03/31/22 132/88   01/25/22 (!) 178/105       NPO Status:                                                                                 BMI:   Wt Readings from Last 3 Encounters:   09/06/22 169 lb 6.4 oz (76.8 kg)   07/22/22 179 lb (81.2 kg)   03/31/22 187 lb (84.8 kg)     There is no height or weight on file to calculate BMI.    CBC:   Lab Results   Component Value Date/Time    WBC 12.0 01/05/2022 09:04 PM    RBC 5.04 01/05/2022 09:04 PM    HGB 13.9 01/05/2022 09:04 PM    HCT 42.0 01/05/2022 09:04 PM    MCV 83.3 01/05/2022 09:04 PM    RDW 12.9 01/05/2022 09:04 PM     01/05/2022 09:04 PM       CMP:   Lab Results   Component Value Date/Time     01/05/2022 09:04 PM    K 4.3 01/05/2022 09:04 PM     01/05/2022 09:04 PM    CO2 24 01/05/2022 09:04 PM    BUN 17 01/05/2022 09:04 PM    CREATININE 1.06 01/05/2022 09:04 PM    GFRAA >60 01/05/2022 09:04 PM    AGRATIO 1.0 01/05/2022 09:04 PM    LABGLOM >60 03/10/2021 10:04 AM    GLUCOSE 134 01/05/2022 09:04 PM    PROT 7.7 01/05/2022 09:04 PM    CALCIUM 9.1 01/05/2022 09:04 PM    BILITOT 0.7 01/05/2022 09:04 PM    ALKPHOS 113 01/05/2022 09:04 PM    AST 26 01/05/2022 09:04 PM    ALT 29 01/05/2022 09:04 PM       POC Tests: No results for input(s): POCGLU, POCNA, POCK, POCCL, POCBUN, POCHEMO, POCHCT in the last 72 hours.     Coags: No results found for: PROTIME, INR, APTT    HCG (If Applicable): No results found for: PREGTESTUR, PREGSERUM, HCG, HCGQUANT     ABGs: No results found for: PHART, PO2ART, JNP6VYD, SFO5BDM, BEART, Z6GTYRZL     Type & Screen (If Applicable):  No results found for: LABABO, LABRH    Drug/Infectious Status (If Applicable):  No results found for: HIV, HEPCAB    COVID-19 Screening (If Applicable):   Lab Results   Component Value Date/Time    COVID19 Not Detected 03/05/2021 01:54 PM           Anesthesia Evaluation  Patient summary reviewed and Nursing notes reviewed  Airway: Mallampati: III  TM distance: >3 FB   Neck ROM: full  Mouth opening: > = 3 FB   Dental:    (+) upper dentures and lower dentures      Pulmonary: breath sounds clear to auscultation                             Cardiovascular:  Exercise tolerance: poor (<4 METS),   (+) hypertension:, pacemaker: pacemaker, dysrhythmias (on Eliquis): atrial fibrillation, hyperlipidemia    Valvular problems/murmurs: s/p AVR. Rhythm: regular  Rate: normal    Stress test reviewed             ROS comment: Normal stress 1/22     Neuro/Psych:   (+) TIA,             GI/Hepatic/Renal:   (+) GERD:,           Endo/Other: Negative Endo/Other ROS                    Abdominal:             Vascular: Other Findings:             Anesthesia Plan      TIVA     ASA 3       Induction: intravenous. Anesthetic plan and risks discussed with patient.                         Anais Ibrahim MD   9/12/2022

## 2022-09-12 NOTE — H&P
History and Physical for Procedure             Date: 9/12/2022     History of Present Illness:  Patient presents to undergo EGD. Past Medical History:   Diagnosis Date    Adverse effect of anesthesia     delayed awakening x1- with heart surgery    DARRELL (acute kidney injury) ([facility] [address] 06/08/2013    pt reports after TIA    Allergic rhinitis     has inhaler daily (pt denies asthma)    Anemia, unspecified 08/14/2015    no recent infusions    Aortic stenosis     sp AVR 2015    Blurry vision, bilateral 6/8/2013    CAD (coronary artery disease)     Cardiac pacemaker     Biotronik MRI compatible (dual chamber)  on Left chest; last in person interrogation 3/31/22  AP 58%,  2%    Cerebral artery occlusion with cerebral infarction [facility])     Constipation     Critical aortic valve stenosis 8/14/2015    8/13/15 (Dr Jany Grove) 1. Left and right sided maze. Please note that the left pulmonary veins were not done due to difficult anatomy. 2. Clipping, left atrial appendage. 3. Aortic valve replacement with a 23 mm Magna Ease Janet-Hudson pericardial valve.     Current use of long term anticoagulation     Eliquis    GERD (gastroesophageal reflux disease)     managed with medication    H/O maze procedure 09/30/2015    AVR with maze    History of Bell's palsy 2013    History of TIA (transient ischemic attack) 2013    pt reports bells palsy started at same time    Hx of blood clots     Hypertension     medication    Hyponatremia 9/7/2013    Menopause     Metabolic encephalopathy 5/8/3493    Neuropathy     Osteoarthritis     Pancreatic cyst     Paroxysmal atrial fibrillation ([facility] [address] 8/14/2015    Prolonged emergence from general anesthesia     Pulmonary nodule     benign per pulm note (1/2018)    Sick sinus syndrome (HCC)     SOB (shortness of breath) on exertion 2015    cannot climb flight of stairs or walk to mailbox- s/p AVR and pacemaker (pt reports no problems at this time 5/2018)    Spinal stenosis, lumbar     Status post total right knee replacement 6/6/2018    Syncope and collapse 2015     Past Surgical History:   Procedure Laterality Date    AORTIC VALVE REPLACEMENT  8/2015    magna Ease Janet -Hudson valve    CARDIAC CATHETERIZATION  2013, 2015    CHOLECYSTECTOMY  2000    COLONOSCOPY  April 2013    with EGD, Dr. Olivia Brunner N/A 3/12/2021    COLONOSCOPY/ 30 performed by Tiffanie Dennison MD at [address] [place] [address], COLON, DIAGNOSTIC      HYSTERECTOMY (CERVIX STATUS UNKNOWN)  1994    PACEMAKER  9/24/14    Biotronik MRI compatible (dual chamber)     TONSILLECTOMY  1951    TOTAL KNEE ARTHROPLASTY Right 06/06/2018    UPPER GASTROINTESTINAL ENDOSCOPY      UPPER GASTROINTESTINAL ENDOSCOPY N/A 7/22/2022    EGD DILATION franks. performed by Nelson Cardenas MD at [facility] ENDOSCOPY     In and  Family History   Problem Relation Age of Onset    Heart Disease Sister     Diabetes Brother     Cancer Brother         pancreatic cancer    Heart Disease Father     Diabetes Son     Hypertension Sister     Breast Cancer Neg Hx     Diabetes Mother     Heart Disease Mother     Hypertension Mother     Diabetes Sister     Diabetes Son      Social History     Tobacco Use    Smoking status: Never    Smokeless tobacco: Never   Substance Use Topics    Alcohol use: No        Allergies   Allergen Reactions    Sulfa Antibiotics Rash    Amoxicillin Other (See Comments)    Amoxicillin-Pot Clavulanate Nausea Only    Clavulanic Acid Other (See Comments)    Lisinopril Other (See Comments)    Lansoprazole Rash     flushed    Terbinafine Rash     No current facility-administered medications for this encounter.      Current Outpatient Medications   Medication Sig    polyethylene glycol (GLYCOLAX) 17 GM/SCOOP powder Take 17 g by mouth daily    famotidine (PEPCID) 40 MG tablet Take 40 mg by mouth at bedtime    sotalol (BETAPACE) 160 MG tablet Take 0.5 tablets by mouth 2 times daily    acetaminophen (TYLENOL) 500 MG tablet Take 1,000 mg by mouth every 6 hours as needed    albuterol sulfate  (90 Base) MCG/ACT inhaler Inhale 2 puffs into the lungs every 4 hours as needed    apixaban (ELIQUIS) 5 MG TABS tablet TAKE 1 TABLET TWICE DAILY    docusate (COLACE, DULCOLAX) 100 MG CAPS Take 100 mg by mouth nightly as needed    furosemide (LASIX) 20 MG tablet Take 20 mg by mouth as needed    loratadine (CLARITIN) 10 MG tablet Take 10 mg by mouth daily as needed    losartan-hydroCHLOROthiazide (HYZAAR) 50-12.5 MG per tablet Take 1 tablet by mouth nightly    omeprazole (PRILOSEC) 20 MG delayed release capsule Take 20 mg by mouth daily    pravastatin (PRAVACHOL) 10 MG tablet Take 10 mg by mouth at bedtime        Review of Systems:  A detailed 10 organ review of systems is obtained with pertinent positives as listed in the History of Present Illness. All others are negative. Objective:     Physical Exam:  Ht 5' 7\" (1.702 m)   Wt 169 lb 6.4 oz (76.8 kg)   BMI 26.53 kg/m²    General:  Alert and oriented. Heart: Regular rate and rhythm  Lungs:  Clear to auscultation bilaterally  Abdomen: Soft, nontender, nondistended    Impression/Plan:     Proceed with EGD as planned. I have discussed with the patient the technique, benefits, alternatives, and risks of these procedures, including medication reaction, immediate or delayed bleeding, or perforation of the gastrointestinal tract.      Signed By: Muna Collado MD     September 12, 2022

## 2022-09-12 NOTE — DISCHARGE INSTRUCTIONS
Gastrointestinal Esophagogastroduodenoscopy (EGD)/ Endoscopic Ultrasound(EUS)- Upper Exam Discharge Instructions    1. Call Dr. Curtis Alexander  for any problems or questions. 2. Contact the doctor's office for follow up appointment as directed. 3. Medication may cause drowsiness for several hours, therefore, do not drive or operate machinery for remainder of the day. 4. No alcohol today. 5. Do not make any important decisions such as signing legal paperwork. 6. Ordinarily, you may resume regular diet and activity after exam unless otherwise specified by your physician. 7. For mild soreness in your throat you may use Cepacol throat lozenges or warm  salt-water gargles as needed. Any additional instructions:   Start eliquis tomorrow        Instructions given to Sheldon Feliciano and other family members.

## 2022-09-12 NOTE — PROGRESS NOTES
Spiritual Care visit. Initial Visit, Pre Surgery Consult. Visit and prayer before patient goes to surgery.     Visit by Monster Coy M.Ed., Th.B. ,Staff

## 2022-09-12 NOTE — OP NOTE
Procedure:  Esophagogastroduodenoscopy    Date of Procedure:  9/12/2022    Patient:  Marceal Will     1940    Indication:  History of Liriano's esophagus     Sedation:  MAC    Pre-Procedure Physical Exam:    Mental status:  alert and oriented  Airway:  normal oropharyngeal airway and neck mobility  CV:  regular rate and rhythm  Respiratory:  clear to auscultation    Procedure:  A History and Physical has been performed, and patient medication allergies have been reviewed. Risks of perforation, hemorrhage, adverse drug reaction, and aspiration were discussed. Informed consent was obtained for the procedure, including sedation. The patient was placed in the left lateral decubitus position. The heart rate, oxygen saturations, blood pressure, and response to care were monitored throughout the procedure. The gastroscope was passed through the mouth and advanced under direct vision to the second portion of the duodenum. A careful inspection was made as the gastroscope was withdrawn, including a retroflexed view of the proximal stomach. The patient tolerated the procedure well. Findings:     OROPHARYNX: Cords and pyriform recesses appear normal.   ESOPHAGUS:  Short segment Liriano's esophagus is seen. This is classified as C0/M1 by Export criteria. Biopsies were obtained for histology. STOMACH: On retroflexion, the flap-valve is classified as Hill Grade I, with a normal, prominent tissue fold at the lesser curvature closely approximated to the endoscope. The fundus on antegrade and retroflexed views is normal. The body, antrum, and pylorus are normal.   DUODENUM: The bulb and second portions are normal.    Specimen:  Yes    Estimated Blood Loss:  Minimal    Implant:  None           Impression:    Short segment Liriano's esophagus. Biopsied. Plan:  1. Follow up pathology results. 2. Omeprazole 40 mg daily. 3. If pathology is negative for dysplasia, repeat EGD in 3 years for surveillance. 4. Return to office in 3 months.      Signed:  Nelson Cardenas MD  9/12/2022  11:57 AM

## 2022-09-22 DIAGNOSIS — E78.5 DYSLIPIDEMIA: ICD-10-CM

## 2022-09-22 LAB
ANION GAP SERPL CALC-SCNC: 7 MMOL/L (ref 4–13)
BASOPHILS # BLD: 0.1 K/UL (ref 0–0.2)
BASOPHILS NFR BLD: 1 % (ref 0–2)
BUN SERPL-MCNC: 16 MG/DL (ref 8–23)
CALCIUM SERPL-MCNC: 9.6 MG/DL (ref 8.3–10.4)
CHLORIDE SERPL-SCNC: 89 MMOL/L (ref 101–110)
CHOLEST SERPL-MCNC: 147 MG/DL
CO2 SERPL-SCNC: 27 MMOL/L (ref 21–32)
CREAT SERPL-MCNC: 0.9 MG/DL (ref 0.6–1)
DIFFERENTIAL METHOD BLD: ABNORMAL
EOSINOPHIL # BLD: 0.1 K/UL (ref 0–0.8)
EOSINOPHIL NFR BLD: 1 % (ref 0.5–7.8)
ERYTHROCYTE [DISTWIDTH] IN BLOOD BY AUTOMATED COUNT: 13.2 % (ref 11.9–14.6)
GLUCOSE SERPL-MCNC: 96 MG/DL (ref 65–100)
HCT VFR BLD AUTO: 38.1 % (ref 35.8–46.3)
HDLC SERPL-MCNC: 55 MG/DL (ref 40–60)
HDLC SERPL: 2.7 {RATIO}
HGB BLD-MCNC: 13 G/DL (ref 11.7–15.4)
IMM GRANULOCYTES # BLD AUTO: 0.1 K/UL (ref 0–0.5)
IMM GRANULOCYTES NFR BLD AUTO: 1 % (ref 0–5)
LDLC SERPL CALC-MCNC: 81.8 MG/DL
LYMPHOCYTES # BLD: 1.3 K/UL (ref 0.5–4.6)
LYMPHOCYTES NFR BLD: 14 % (ref 13–44)
MCH RBC QN AUTO: 30 PG (ref 26.1–32.9)
MCHC RBC AUTO-ENTMCNC: 34.1 G/DL (ref 31.4–35)
MCV RBC AUTO: 88 FL (ref 79.6–97.8)
MONOCYTES # BLD: 1.3 K/UL (ref 0.1–1.3)
MONOCYTES NFR BLD: 13 % (ref 4–12)
NEUTS SEG # BLD: 6.8 K/UL (ref 1.7–8.2)
NEUTS SEG NFR BLD: 70 % (ref 43–78)
NRBC # BLD: 0 K/UL (ref 0–0.2)
PLATELET # BLD AUTO: 345 K/UL (ref 150–450)
PMV BLD AUTO: 9.4 FL (ref 9.4–12.3)
POTASSIUM SERPL-SCNC: 4.5 MMOL/L (ref 3.5–5.1)
RBC # BLD AUTO: 4.33 M/UL (ref 4.05–5.2)
SODIUM SERPL-SCNC: 123 MMOL/L (ref 136–145)
TRIGL SERPL-MCNC: 51 MG/DL (ref 35–150)
VLDLC SERPL CALC-MCNC: 10.2 MG/DL (ref 6–23)
WBC # BLD AUTO: 9.6 K/UL (ref 4.3–11.1)

## 2022-09-24 ENCOUNTER — APPOINTMENT (OUTPATIENT)
Dept: GENERAL RADIOLOGY | Age: 82
DRG: 643 | End: 2022-09-24
Payer: MEDICARE

## 2022-09-24 ENCOUNTER — HOSPITAL ENCOUNTER (INPATIENT)
Age: 82
LOS: 5 days | Discharge: HOME HEALTH | DRG: 643 | End: 2022-09-29
Attending: STUDENT IN AN ORGANIZED HEALTH CARE EDUCATION/TRAINING PROGRAM | Admitting: FAMILY MEDICINE
Payer: MEDICARE

## 2022-09-24 DIAGNOSIS — R55 SYNCOPE AND COLLAPSE: Primary | ICD-10-CM

## 2022-09-24 DIAGNOSIS — E87.1 HYPONATREMIA: ICD-10-CM

## 2022-09-24 PROBLEM — D68.69 SECONDARY HYPERCOAGULABLE STATE (HCC): Chronic | Status: ACTIVE | Noted: 2022-09-24

## 2022-09-24 PROBLEM — Z87.19 S/P DILATATION OF ESOPHAGEAL STRICTURE: Chronic | Status: ACTIVE | Noted: 2022-09-24

## 2022-09-24 PROBLEM — J30.9 ALLERGIC RHINITIS: Chronic | Status: ACTIVE | Noted: 2022-09-24

## 2022-09-24 PROBLEM — I95.89 HYPOTENSION DUE TO HYPOVOLEMIA: Status: ACTIVE | Noted: 2022-09-24

## 2022-09-24 PROBLEM — E83.42 HYPOMAGNESEMIA: Chronic | Status: ACTIVE | Noted: 2022-09-24

## 2022-09-24 PROBLEM — Z98.890 S/P DILATATION OF ESOPHAGEAL STRICTURE: Chronic | Status: ACTIVE | Noted: 2022-09-24

## 2022-09-24 PROBLEM — E78.5 DYSLIPIDEMIA: Status: ACTIVE | Noted: 2020-07-09

## 2022-09-24 PROBLEM — E86.1 HYPOTENSION DUE TO HYPOVOLEMIA: Status: ACTIVE | Noted: 2022-09-24

## 2022-09-24 PROBLEM — K22.70 SHORT-SEGMENT BARRETT'S ESOPHAGUS: Chronic | Status: ACTIVE | Noted: 2022-09-24

## 2022-09-24 PROBLEM — G93.41 ACUTE METABOLIC ENCEPHALOPATHY: Status: ACTIVE | Noted: 2022-09-24

## 2022-09-24 LAB
ALBUMIN SERPL-MCNC: 3.6 G/DL (ref 3.2–4.6)
ALBUMIN/GLOB SERPL: 1.1 {RATIO} (ref 1.2–3.5)
ALP SERPL-CCNC: 85 U/L (ref 50–136)
ALT SERPL-CCNC: 22 U/L (ref 12–65)
ANION GAP SERPL CALC-SCNC: 8 MMOL/L (ref 4–13)
AST SERPL-CCNC: 22 U/L (ref 15–37)
BASOPHILS # BLD: 0.1 K/UL (ref 0–0.2)
BASOPHILS NFR BLD: 1 % (ref 0–2)
BILIRUB SERPL-MCNC: 0.9 MG/DL (ref 0.2–1.1)
BILIRUB UR QL: NEGATIVE
BUN SERPL-MCNC: 20 MG/DL (ref 8–23)
CALCIUM SERPL-MCNC: 9 MG/DL (ref 8.3–10.4)
CHLORIDE SERPL-SCNC: 89 MMOL/L (ref 101–110)
CO2 SERPL-SCNC: 26 MMOL/L (ref 21–32)
CREAT SERPL-MCNC: 1.1 MG/DL (ref 0.6–1)
CREAT UR-MCNC: 17 MG/DL
DIFFERENTIAL METHOD BLD: ABNORMAL
EOSINOPHIL # BLD: 0.1 K/UL (ref 0–0.8)
EOSINOPHIL NFR BLD: 1 % (ref 0.5–7.8)
ERYTHROCYTE [DISTWIDTH] IN BLOOD BY AUTOMATED COUNT: 13.2 % (ref 11.9–14.6)
GLOBULIN SER CALC-MCNC: 3.4 G/DL (ref 2.3–3.5)
GLUCOSE SERPL-MCNC: 135 MG/DL (ref 65–100)
GLUCOSE UR QL STRIP.AUTO: NEGATIVE MG/DL
HCT VFR BLD AUTO: 32.9 % (ref 35.8–46.3)
HGB BLD-MCNC: 11.4 G/DL (ref 11.7–15.4)
IMM GRANULOCYTES # BLD AUTO: 0.1 K/UL (ref 0–0.5)
IMM GRANULOCYTES NFR BLD AUTO: 1 % (ref 0–5)
KETONES UR-MCNC: NEGATIVE MG/DL
LEUKOCYTE ESTERASE UR QL STRIP: ABNORMAL
LYMPHOCYTES # BLD: 1.2 K/UL (ref 0.5–4.6)
LYMPHOCYTES NFR BLD: 13 % (ref 13–44)
MAGNESIUM SERPL-MCNC: 1.8 MG/DL (ref 1.8–2.4)
MCH RBC QN AUTO: 30.5 PG (ref 26.1–32.9)
MCHC RBC AUTO-ENTMCNC: 34.7 G/DL (ref 31.4–35)
MCV RBC AUTO: 88 FL (ref 79.6–97.8)
MONOCYTES # BLD: 1.3 K/UL (ref 0.1–1.3)
MONOCYTES NFR BLD: 13 % (ref 4–12)
NEUTS SEG # BLD: 7 K/UL (ref 1.7–8.2)
NEUTS SEG NFR BLD: 71 % (ref 43–78)
NITRITE UR QL: NEGATIVE
NRBC # BLD: 0 K/UL (ref 0–0.2)
NT PRO BNP: 247 PG/ML
OSMOLALITY SERPL: 260 MOSM/KG H2O (ref 280–301)
OSMOLALITY UR: 234 MOSM/KG H2O (ref 50–1400)
PH UR: 6.5 [PH] (ref 5–9)
PLATELET # BLD AUTO: 224 K/UL (ref 150–450)
PMV BLD AUTO: 9.2 FL (ref 9.4–12.3)
POTASSIUM SERPL-SCNC: 4.7 MMOL/L (ref 3.5–5.1)
PROT SERPL-MCNC: 7 G/DL (ref 6.3–8.2)
PROT UR QL: NEGATIVE MG/DL
RBC # BLD AUTO: 3.74 M/UL (ref 4.05–5.2)
RBC # UR STRIP: NEGATIVE /UL
SERVICE CMNT-IMP: ABNORMAL
SODIUM SERPL-SCNC: 123 MMOL/L (ref 136–145)
SODIUM SERPL-SCNC: 124 MMOL/L (ref 136–145)
SODIUM UR-SCNC: 60 MMOL/L
SP GR UR: 1.01 (ref 1–1.02)
TROPONIN I SERPL HS-MCNC: 4.3 PG/ML (ref 0–14)
TROPONIN I SERPL HS-MCNC: 4.5 PG/ML (ref 0–14)
TSH W FREE THYROID IF ABNORMAL: 1.17 UIU/ML (ref 0.36–3.74)
UROBILINOGEN UR QL: 0.2 EU/DL (ref 0.2–1)
WBC # BLD AUTO: 9.8 K/UL (ref 4.3–11.1)

## 2022-09-24 PROCEDURE — 80053 COMPREHEN METABOLIC PANEL: CPT

## 2022-09-24 PROCEDURE — 82570 ASSAY OF URINE CREATININE: CPT

## 2022-09-24 PROCEDURE — 1100000003 HC PRIVATE W/ TELEMETRY

## 2022-09-24 PROCEDURE — 71045 X-RAY EXAM CHEST 1 VIEW: CPT

## 2022-09-24 PROCEDURE — 83930 ASSAY OF BLOOD OSMOLALITY: CPT

## 2022-09-24 PROCEDURE — 83880 ASSAY OF NATRIURETIC PEPTIDE: CPT

## 2022-09-24 PROCEDURE — 2580000003 HC RX 258: Performed by: FAMILY MEDICINE

## 2022-09-24 PROCEDURE — 99285 EMERGENCY DEPT VISIT HI MDM: CPT

## 2022-09-24 PROCEDURE — 84443 ASSAY THYROID STIM HORMONE: CPT

## 2022-09-24 PROCEDURE — 6370000000 HC RX 637 (ALT 250 FOR IP): Performed by: FAMILY MEDICINE

## 2022-09-24 PROCEDURE — 84484 ASSAY OF TROPONIN QUANT: CPT

## 2022-09-24 PROCEDURE — 83935 ASSAY OF URINE OSMOLALITY: CPT

## 2022-09-24 PROCEDURE — 83735 ASSAY OF MAGNESIUM: CPT

## 2022-09-24 PROCEDURE — 84300 ASSAY OF URINE SODIUM: CPT

## 2022-09-24 PROCEDURE — 36415 COLL VENOUS BLD VENIPUNCTURE: CPT

## 2022-09-24 PROCEDURE — 84295 ASSAY OF SERUM SODIUM: CPT

## 2022-09-24 PROCEDURE — 94761 N-INVAS EAR/PLS OXIMETRY MLT: CPT

## 2022-09-24 PROCEDURE — 6360000002 HC RX W HCPCS: Performed by: STUDENT IN AN ORGANIZED HEALTH CARE EDUCATION/TRAINING PROGRAM

## 2022-09-24 PROCEDURE — 85025 COMPLETE CBC W/AUTO DIFF WBC: CPT

## 2022-09-24 PROCEDURE — 81003 URINALYSIS AUTO W/O SCOPE: CPT

## 2022-09-24 RX ORDER — ACETAMINOPHEN 325 MG/1
650 TABLET ORAL EVERY 6 HOURS PRN
Status: DISCONTINUED | OUTPATIENT
Start: 2022-09-24 | End: 2022-09-29 | Stop reason: HOSPADM

## 2022-09-24 RX ORDER — SODIUM CHLORIDE, SODIUM LACTATE, POTASSIUM CHLORIDE, AND CALCIUM CHLORIDE .6; .31; .03; .02 G/100ML; G/100ML; G/100ML; G/100ML
1000 INJECTION, SOLUTION INTRAVENOUS ONCE
Status: COMPLETED | OUTPATIENT
Start: 2022-09-24 | End: 2022-09-24

## 2022-09-24 RX ORDER — PRAVASTATIN SODIUM 20 MG
20 TABLET ORAL NIGHTLY
Status: DISCONTINUED | OUTPATIENT
Start: 2022-09-24 | End: 2022-09-29 | Stop reason: HOSPADM

## 2022-09-24 RX ORDER — ACETAMINOPHEN 650 MG/1
650 SUPPOSITORY RECTAL EVERY 6 HOURS PRN
Status: DISCONTINUED | OUTPATIENT
Start: 2022-09-24 | End: 2022-09-29 | Stop reason: HOSPADM

## 2022-09-24 RX ORDER — PANTOPRAZOLE SODIUM 40 MG/1
40 TABLET, DELAYED RELEASE ORAL
Status: DISCONTINUED | OUTPATIENT
Start: 2022-09-24 | End: 2022-09-29 | Stop reason: HOSPADM

## 2022-09-24 RX ORDER — POLYETHYLENE GLYCOL 3350 17 G/17G
17 POWDER, FOR SOLUTION ORAL DAILY
Status: DISCONTINUED | OUTPATIENT
Start: 2022-09-24 | End: 2022-09-29 | Stop reason: HOSPADM

## 2022-09-24 RX ORDER — SODIUM CHLORIDE 9 MG/ML
INJECTION, SOLUTION INTRAVENOUS PRN
Status: DISCONTINUED | OUTPATIENT
Start: 2022-09-24 | End: 2022-09-29 | Stop reason: HOSPADM

## 2022-09-24 RX ORDER — ONDANSETRON 4 MG/1
4 TABLET, ORALLY DISINTEGRATING ORAL EVERY 8 HOURS PRN
Status: DISCONTINUED | OUTPATIENT
Start: 2022-09-24 | End: 2022-09-29 | Stop reason: HOSPADM

## 2022-09-24 RX ORDER — FLUTICASONE PROPIONATE 50 MCG
1 SPRAY, SUSPENSION (ML) NASAL DAILY
Status: DISCONTINUED | OUTPATIENT
Start: 2022-09-24 | End: 2022-09-29 | Stop reason: HOSPADM

## 2022-09-24 RX ORDER — LOSARTAN POTASSIUM 50 MG/1
50 TABLET ORAL DAILY
Status: DISCONTINUED | OUTPATIENT
Start: 2022-09-24 | End: 2022-09-24

## 2022-09-24 RX ORDER — SODIUM CHLORIDE, SODIUM LACTATE, POTASSIUM CHLORIDE, CALCIUM CHLORIDE 600; 310; 30; 20 MG/100ML; MG/100ML; MG/100ML; MG/100ML
INJECTION, SOLUTION INTRAVENOUS CONTINUOUS
Status: DISCONTINUED | OUTPATIENT
Start: 2022-09-24 | End: 2022-09-25

## 2022-09-24 RX ORDER — POTASSIUM CHLORIDE 7.45 MG/ML
10 INJECTION INTRAVENOUS PRN
Status: DISCONTINUED | OUTPATIENT
Start: 2022-09-24 | End: 2022-09-29 | Stop reason: HOSPADM

## 2022-09-24 RX ORDER — CETIRIZINE HYDROCHLORIDE 10 MG/1
10 TABLET ORAL DAILY
Status: DISCONTINUED | OUTPATIENT
Start: 2022-09-25 | End: 2022-09-29 | Stop reason: HOSPADM

## 2022-09-24 RX ORDER — SENNA AND DOCUSATE SODIUM 50; 8.6 MG/1; MG/1
2 TABLET, FILM COATED ORAL NIGHTLY
Status: DISCONTINUED | OUTPATIENT
Start: 2022-09-24 | End: 2022-09-29 | Stop reason: HOSPADM

## 2022-09-24 RX ORDER — ONDANSETRON 2 MG/ML
4 INJECTION INTRAMUSCULAR; INTRAVENOUS EVERY 6 HOURS PRN
Status: DISCONTINUED | OUTPATIENT
Start: 2022-09-24 | End: 2022-09-29 | Stop reason: HOSPADM

## 2022-09-24 RX ORDER — MAGNESIUM SULFATE IN WATER 40 MG/ML
2000 INJECTION, SOLUTION INTRAVENOUS ONCE
Status: COMPLETED | OUTPATIENT
Start: 2022-09-24 | End: 2022-09-24

## 2022-09-24 RX ORDER — POTASSIUM CHLORIDE 20 MEQ/1
40 TABLET, EXTENDED RELEASE ORAL PRN
Status: DISCONTINUED | OUTPATIENT
Start: 2022-09-24 | End: 2022-09-29 | Stop reason: HOSPADM

## 2022-09-24 RX ORDER — SOTALOL HYDROCHLORIDE 80 MG/1
80 TABLET ORAL DAILY
Status: DISCONTINUED | OUTPATIENT
Start: 2022-09-25 | End: 2022-09-29 | Stop reason: HOSPADM

## 2022-09-24 RX ORDER — LOSARTAN POTASSIUM 50 MG/1
50 TABLET ORAL NIGHTLY
Status: DISCONTINUED | OUTPATIENT
Start: 2022-09-24 | End: 2022-09-25

## 2022-09-24 RX ORDER — SODIUM CHLORIDE 0.9 % (FLUSH) 0.9 %
5-40 SYRINGE (ML) INJECTION PRN
Status: DISCONTINUED | OUTPATIENT
Start: 2022-09-24 | End: 2022-09-29 | Stop reason: HOSPADM

## 2022-09-24 RX ORDER — SOTALOL HYDROCHLORIDE 80 MG/1
80 TABLET ORAL 2 TIMES DAILY
Status: DISCONTINUED | OUTPATIENT
Start: 2022-09-24 | End: 2022-09-24

## 2022-09-24 RX ORDER — POLYETHYLENE GLYCOL 3350 17 G/17G
17 POWDER, FOR SOLUTION ORAL DAILY PRN
Status: DISCONTINUED | OUTPATIENT
Start: 2022-09-24 | End: 2022-09-29 | Stop reason: HOSPADM

## 2022-09-24 RX ORDER — MAGNESIUM HYDROXIDE/ALUMINUM HYDROXICE/SIMETHICONE 120; 1200; 1200 MG/30ML; MG/30ML; MG/30ML
30 SUSPENSION ORAL EVERY 6 HOURS PRN
Status: DISCONTINUED | OUTPATIENT
Start: 2022-09-24 | End: 2022-09-29 | Stop reason: HOSPADM

## 2022-09-24 RX ORDER — MAGNESIUM SULFATE IN WATER 40 MG/ML
2000 INJECTION, SOLUTION INTRAVENOUS PRN
Status: DISCONTINUED | OUTPATIENT
Start: 2022-09-24 | End: 2022-09-29 | Stop reason: HOSPADM

## 2022-09-24 RX ORDER — SODIUM CHLORIDE 0.9 % (FLUSH) 0.9 %
5-40 SYRINGE (ML) INJECTION EVERY 12 HOURS SCHEDULED
Status: DISCONTINUED | OUTPATIENT
Start: 2022-09-24 | End: 2022-09-29 | Stop reason: HOSPADM

## 2022-09-24 RX ADMIN — FLUTICASONE PROPIONATE 1 SPRAY: 50 SPRAY, METERED NASAL at 16:59

## 2022-09-24 RX ADMIN — PRAVASTATIN SODIUM 20 MG: 20 TABLET ORAL at 20:41

## 2022-09-24 RX ADMIN — LOSARTAN POTASSIUM 50 MG: 50 TABLET, FILM COATED ORAL at 20:41

## 2022-09-24 RX ADMIN — PANTOPRAZOLE SODIUM 40 MG: 40 TABLET, DELAYED RELEASE ORAL at 15:34

## 2022-09-24 RX ADMIN — POLYETHYLENE GLYCOL 3350 17 G: 17 POWDER, FOR SOLUTION ORAL at 16:59

## 2022-09-24 RX ADMIN — MAGNESIUM SULFATE HEPTAHYDRATE 2000 MG: 40 INJECTION, SOLUTION INTRAVENOUS at 14:46

## 2022-09-24 RX ADMIN — SODIUM CHLORIDE, POTASSIUM CHLORIDE, SODIUM LACTATE AND CALCIUM CHLORIDE 1000 ML: 600; 310; 30; 20 INJECTION, SOLUTION INTRAVENOUS at 16:58

## 2022-09-24 RX ADMIN — SODIUM CHLORIDE, POTASSIUM CHLORIDE, SODIUM LACTATE AND CALCIUM CHLORIDE: 600; 310; 30; 20 INJECTION, SOLUTION INTRAVENOUS at 16:58

## 2022-09-24 RX ADMIN — APIXABAN 5 MG: 5 TABLET, FILM COATED ORAL at 20:41

## 2022-09-24 RX ADMIN — SENNOSIDES AND DOCUSATE SODIUM 2 TABLET: 8.6; 5 TABLET ORAL at 20:41

## 2022-09-24 ASSESSMENT — ENCOUNTER SYMPTOMS
DIARRHEA: 0
VOMITING: 0
CONSTIPATION: 1
COUGH: 0
SHORTNESS OF BREATH: 0
PHOTOPHOBIA: 0
NAUSEA: 0
COLOR CHANGE: 0
SORE THROAT: 0
ABDOMINAL PAIN: 0
ABDOMINAL DISTENTION: 0
RHINORRHEA: 1
COUGH: 1

## 2022-09-24 ASSESSMENT — PAIN - FUNCTIONAL ASSESSMENT: PAIN_FUNCTIONAL_ASSESSMENT: 0-10

## 2022-09-24 ASSESSMENT — PAIN SCALES - GENERAL
PAINLEVEL_OUTOF10: 0

## 2022-09-24 NOTE — H&P
Hospitalist History and Physical   Admit Date:  2022 11:53 AM   Name:  Cornelius Ruby   Age:  78 y.o. Sex:  female  :  1940   MRN:  [id number]   Room:  [place] [address]    Presenting Complaint: Loss of Consciousness     Reason(s) for Admission: Hyponatremia [E87.1]     History of Present Illness:   Cornelius Ruby is a 78 y.o. female with medical history of pAFIB on apixaban and sotalol ,aortic stensos s/p aVR and MAZE, SSS s/p MAZE, CAD, GERD w/ esophageal stricture, Lirinao's esophagus s/p EGD 22who presented to ed via ems from [facility] s/p syncopal episode while doing light physical therapy. Suffered lightheadedness and passed out. She was seated when she LOC and no head injury. C/o increasing confusing and forgetfulness over the lat few days which is abnormal for her. BP 70/40 initially with EMS, 400 cc NS given and noted to have asymptomatic non sustained v tach while en route. PPM was interogated without abnormal findings. In ED, her /96. Labs significant for Sna 123, same as on 22. Prevously, was mildly low at 132-134 in 2022 and . CXR scarring or atelectasis in the RUL otherwise no acute findings    She c/o increasing forgetfulness and confusion. Feels like she has no energy with fatigue. Making errors at home like when trying to send a text message. She thought she was getting dementia. She has not been taking much PO, less than 1 L per day. Having more exercise intolerance. She is no longer on lasix but is on losartan-HCTZ 50-12.5 mg daily. She's been really constipated and taking a lot of stool softners. Reports her urine was really dark lately with an odor and was planning to have PCP check her urine next week at her follow up visit. Also complains of increasing bruising. Has been on apixaban since 2014 for afib. She has 2 EGD's this year.  She had gerd with dysphagia found to have stricture s/p dilitation 2022 and repeat EGD on 22 with notable short segment barette's esophagus. She also has a long history of R eye twitching that her PCP suspects is related to stress/anxiety which has gotten much worsen over the last few weeks. Review of Systems:  Review of Systems   Constitutional:  Positive for activity change, appetite change and fatigue. HENT:  Positive for congestion, postnasal drip and rhinorrhea. Eyes:  Negative for photophobia and visual disturbance. Respiratory:  Positive for cough. Negative for shortness of breath. Cardiovascular:  Negative for chest pain, palpitations and leg swelling. Gastrointestinal:  Positive for constipation. Negative for abdominal distention, abdominal pain and vomiting. Endocrine: Negative for polydipsia, polyphagia and polyuria. Genitourinary:  Positive for decreased urine volume. Negative for difficulty urinating, frequency and hematuria. Musculoskeletal:  Positive for gait problem. Skin:  Negative for color change and rash. Allergic/Immunologic: Positive for environmental allergies. Neurological:  Positive for dizziness, tremors, syncope, weakness, light-headedness and headaches. Hematological:  Bruises/bleeds easily. Psychiatric/Behavioral:  Positive for confusion, decreased concentration and sleep disturbance. Assessment & Plan:     Syncope and collapse 2/2 hypovolemia with symptomatic hyponatremia   Admit remote tele   Bolus 1 L LR f/b 100 cc/hr   Check orthostatics vitals   PPM interrogated without any events recorded   Cardiac etiology not suspected at this time   PT/OT    Hyponatremia - 2/2 hypovolemia from poor PO intake and thiazide   - Sna q6h x 48 hours   - 1 L bolus f/b 100 cc/hr. Hold HCTZ. - Serum osm, Uosm, Fabiola, Ucr, UA, TSH, AM cortisol   -Treatment goal: increase Sna 5-9 in 24 hours and symptom relief. - Initiate relowering treatment if sNa level increase ? 10 mmol/L within the first 24 hours or ? 18 mmol/L within 48 hours   >> (D5W 10 mL/kg over 1 hour and/or IV desmopressin 2 µg )     Acute metabolic encephalopathy - 2/2 hyponatremia. No focal findings. Anticipate improvement with supportive care. Hypomagnesemia - possibly drug induced d/t PPI which is necessary for her will's treatment. 2g IV ordered in ED. Trend. pAFIB // SSS s/p PPM // CAD // HTN // HLD // AVS s/p AVR   Rate controlled. PPM interrogated. Hold losartan pending improvement in hypotension   Resume sotalol to prevent rebound tachycardia but decrease to 80 mg once daily d/t renal function   Apixaban 5 BID for stroke ppx   Statin. Anemia - check iron stores, b12, folate. At risk of b12 def with PPI. Constipation - polyethylene glycol and pericolase qhs     Generalized weakness - PT/OT     GERD - PPI     Allergic rhinitis - flonase, cetirizine. [id number] - renal dosing. Adjust sotalol dosing. Anticipated discharge needs:         Diet: ADULT DIET; Regular  VTE ppx: apixaban   Code status: Full Code    Hospital Problems:  Principal Problem:    Hyponatremia  Active Problems:    Syncope and collapse    Hypotension due to hypovolemia    S/P dilatation of esophageal stricture    Short-segment Liriano's esophagus    Allergic rhinitis    Acute metabolic encephalopathy    Secondary hypercoagulable state ([address]    Essential hypertension    Paroxysmal atrial fibrillation (HCC)    Anemia, unspecified    Dyslipidemia    CAD (coronary artery disease)    Gastroesophageal reflux disease with esophagitis without hemorrhage    Cardiac pacemaker    Sick sinus syndrome ([address]  Resolved Problems:    * No resolved hospital problems.  *       Past History:     Past Medical History:   Diagnosis Date    Adverse effect of anesthesia     delayed awakening x1- with heart surgery    DARRELL (acute kidney injury) ([facility] [address] 06/08/2013    pt reports after TIA    Allergic rhinitis     has inhaler daily (pt denies asthma)    Anemia, unspecified 08/14/2015    no recent infusions    Aortic stenosis     sp AVR 2015    Blurry vision, bilateral 6/8/2013    CAD (coronary artery disease)     Cardiac pacemaker     Biotronik MRI compatible (dual chamber)  on Left chest; last in person interrogation 3/31/22  AP 58%,  2%    Cerebral artery occlusion with cerebral infarction [facility])     Constipation     Critical aortic valve stenosis 8/14/2015    8/13/15 (Dr Lalo Wilson) 1. Left and right sided maze. Please note that the left pulmonary veins were not done due to difficult anatomy. 2. Clipping, left atrial appendage. 3. Aortic valve replacement with a 23 mm Magna Ease Janet-Hudson pericardial valve.     Current use of long term anticoagulation     Eliquis    GERD (gastroesophageal reflux disease)     managed with medication    H/O maze procedure 09/30/2015    AVR with maze    History of Bell's palsy 2013    History of TIA (transient ischemic attack) 2013    pt reports bells palsy started at same time    Hx of blood clots     Hypertension     medication    Hyponatremia 9/7/2013    Menopause     Metabolic encephalopathy 1/3/9895    Neuropathy     Osteoarthritis     Pancreatic cyst     Paroxysmal atrial fibrillation ([facility] [address] 8/14/2015    Prolonged emergence from general anesthesia     Pulmonary nodule     benign per pulm note (1/2018)    S/P dilatation of esophageal stricture 9/24/2022 7/2022     Short-segment Liriano's esophagus 9/24/2022    Sick sinus syndrome (HCC)     SOB (shortness of breath) on exertion 2015    cannot climb flight of stairs or walk to mailbox- s/p AVR and pacemaker (pt reports no problems at this time 5/2018)    Spinal stenosis, lumbar     Status post total right knee replacement 6/6/2018    Syncope and collapse 2015       Past Surgical History:   Procedure Laterality Date    AORTIC VALVE REPLACEMENT  8/2015    magna Ease Janet -Hudson valve    CARDIAC CATHETERIZATION  2013, 2015    CHOLECYSTECTOMY  2000    COLONOSCOPY  April 2013    with EGD, Dr. Roland Whyte    COLONOSCOPY N/A 3/12/2021    COLONOSCOPY/ 30 performed by Dixie Cottrell MD at [facility] ENDOSCOPY    ENDOSCOPY, COLON, DIAGNOSTIC      HYSTERECTOMY (CERVIX STATUS UNKNOWN)  1994    PACEMAKER  9/24/14    Biotronik MRI compatible (dual chamber)     TONSILLECTOMY  1951    TOTAL KNEE ARTHROPLASTY Right 06/06/2018    UPPER GASTROINTESTINAL ENDOSCOPY      UPPER GASTROINTESTINAL ENDOSCOPY N/A 7/22/2022    EGD DILATION franks.  performed by Eduardo Neff MD at [facility] ENDOSCOPY    UPPER GASTROINTESTINAL ENDOSCOPY N/A 9/12/2022    EGD BIOPSY performed by Eduardo Neff MD at [facility] ENDOSCOPY        Social History     Tobacco Use    Smoking status: Never    Smokeless tobacco: Never   Substance Use Topics    Alcohol use: No      Social History     Substance and Sexual Activity   Drug Use No       Family History   Problem Relation Age of Onset    Heart Disease Sister     Diabetes Brother     Cancer Brother         pancreatic cancer    Heart Disease Father     Diabetes Son     Hypertension Sister     Breast Cancer Neg Hx     Diabetes Mother     Heart Disease Mother     Hypertension Mother     Diabetes Sister     Diabetes Son         Immunization History   Administered Date(s) Administered    Influenza, High Dose (Fluzone 65 yrs and older) 10/01/2017    Influenza, Trivalent PF 09/09/2013    PPD Test 08/13/2015    Pneumococcal Conjugate 13-valent ([id number]) 01/05/2017    Pneumococcal Polysaccharide ([id number]) 03/25/2013     Allergies   Allergen Reactions    Sulfa Antibiotics Rash    Amoxicillin Other (See Comments)    Amoxicillin-Pot Clavulanate Nausea Only    Clavulanic Acid Other (See Comments)    Lisinopril Other (See Comments)    Lansoprazole Rash     flushed    Terbinafine Rash     Prior to Admit Medications:  Current Outpatient Medications   Medication Instructions    acetaminophen (TYLENOL) 1,000 mg, EVERY 6 HOURS PRN    albuterol sulfate  (90 Base) MCG/ACT inhaler 2 puffs, Inhalation, EVERY 4 HOURS PRN    apixaban (ELIQUIS) 5 MG TABS tablet TAKE 1 TABLET TWICE DAILY    docusate (COLACE, DULCOLAX) 100 mg, Oral, NIGHTLY PRN    famotidine (PEPCID) 40 mg, Oral, Nightly    furosemide (LASIX) 20 mg, AS NEEDED    loratadine (CLARITIN) 10 mg, Oral, DAILY PRN    losartan-hydroCHLOROthiazide (HYZAAR) 50-12.5 MG per tablet 1 tablet, Oral, NIGHTLY    omeprazole (PRILOSEC) 20 mg, Oral, DAILY    polyethylene glycol (GLYCOLAX) 17 g, Oral, DAILY    pravastatin (PRAVACHOL) 10 mg, Oral, Nightly    sotalol (BETAPACE) 80 mg, Oral, 2 TIMES DAILY         Objective:   Patient Vitals for the past 24 hrs:   Temp Pulse Resp BP SpO2   09/24/22 1157 98.6 °F (37 °C) 73 16 (!) 116/96 99 %       Oxygen Therapy  SpO2: 99 %    Estimated body mass index is 26.31 kg/m² as calculated from the following:    Height as of this encounter: 5' 7\" (1.702 m). Weight as of this encounter: 168 lb (76.2 kg). No intake or output data in the 24 hours ending 09/24/22 1530      Physical Exam:    Blood pressure (!) 116/96, pulse 73, temperature 98.6 °F (37 °C), temperature source Oral, resp. rate 16, height 5' 7\" (1.702 m), weight 168 lb (76.2 kg), SpO2 99 %. Physical Exam  Vitals and nursing note reviewed. Constitutional:       Appearance: She is normal weight. She is ill-appearing. HENT:      Head: Normocephalic and atraumatic. Nose: Nose normal.      Mouth/Throat:      Mouth: Mucous membranes are dry. Eyes:      Extraocular Movements: Extraocular movements intact. Conjunctiva/sclera: Conjunctivae normal.      Pupils: Pupils are equal, round, and reactive to light. Cardiovascular:      Rate and Rhythm: Normal rate and regular rhythm. Heart sounds: Murmur heard. Pulmonary:      Effort: Pulmonary effort is normal. No respiratory distress. Breath sounds: Normal breath sounds. No wheezing. Abdominal:      General: Abdomen is flat. Bowel sounds are normal. There is no distension. Palpations: Abdomen is soft. Tenderness: There is no abdominal tenderness. Musculoskeletal:      Cervical back: Neck supple. No rigidity or tenderness. Right lower leg: No edema. Left lower leg: No edema. Lymphadenopathy:      Cervical: No cervical adenopathy. Neurological:      Mental Status: She is alert and oriented to person, place, and time.       Comments: Right eye twitching    Psychiatric:         Mood and Affect: Mood and affect normal.         Speech: Speech normal.         Behavior: Behavior normal.       I have personally reviewed labs and tests showing:  Recent Labs:  Recent Results (from the past 24 hour(s))   CBC with Auto Differential    Collection Time: 09/24/22 12:02 PM   Result Value Ref Range    WBC 9.8 4.3 - 11.1 K/uL    RBC 3.74 (L) 4.05 - 5.2 M/uL    Hemoglobin 11.4 (L) 11.7 - 15.4 g/dL    Hematocrit 32.9 (L) 35.8 - 46.3 %    MCV 88.0 79.6 - 97.8 FL    MCH 30.5 26.1 - 32.9 PG    MCHC 34.7 31.4 - 35.0 g/dL    RDW 13.2 11.9 - 14.6 %    Platelets 431 476 - 954 K/uL    MPV 9.2 (L) 9.4 - 12.3 FL    nRBC 0.00 0.0 - 0.2 K/uL    Differential Type AUTOMATED      Seg Neutrophils 71 43 - 78 %    Lymphocytes 13 13 - 44 %    Monocytes 13 (H) 4.0 - 12.0 %    Eosinophils % 1 0.5 - 7.8 %    Basophils 1 0.0 - 2.0 %    Immature Granulocytes 1 0.0 - 5.0 %    Segs Absolute 7.0 1.7 - 8.2 K/UL    Absolute Lymph # 1.2 0.5 - 4.6 K/UL    Absolute Mono # 1.3 0.1 - 1.3 K/UL    Absolute Eos # 0.1 0.0 - 0.8 K/UL    Basophils Absolute 0.1 0.0 - 0.2 K/UL    Absolute Immature Granulocyte 0.1 0.0 - 0.5 K/UL   Comprehensive Metabolic Panel    Collection Time: 09/24/22 12:02 PM   Result Value Ref Range    Sodium 123 (L) 136 - 145 mmol/L    Potassium 4.7 3.5 - 5.1 mmol/L    Chloride 89 (L) 101 - 110 mmol/L    CO2 26 21 - 32 mmol/L    Anion Gap 8 4 - 13 mmol/L    Glucose 135 (H) 65 - 100 mg/dL    BUN 20 8 - 23 MG/DL    Creatinine 1.10 (H) 0.6 - 1.0 MG/DL    GFR African American >60 >60 ml/min/1.73m2    GFR Non- 50 (L) >60 ml/min/1.73m2    Calcium 9.0 8.3 - 10.4 MG/DL    Total Bilirubin 0.9 0.2 - 1.1 MG/DL    ALT 22 12 - 65 U/L    AST 22 15 - 37 U/L    Alk Phosphatase 85 50 - 136 U/L    Total Protein 7.0 6.3 - 8.2 g/dL    Albumin 3.6 3.2 - 4.6 g/dL    Globulin 3.4 2.3 - 3.5 g/dL    Albumin/Globulin Ratio 1.1 (L) 1.2 - 3.5     Magnesium    Collection Time: 09/24/22 12:02 PM   Result Value Ref Range    Magnesium 1.8 1.8 - 2.4 mg/dL   Brain Natriuretic Peptide    Collection Time: 09/24/22 12:02 PM   Result Value Ref Range    NT Pro- <450 PG/ML   Troponin    Collection Time: 09/24/22 12:02 PM   Result Value Ref Range    Troponin, High Sensitivity 4.3 0 - 14 pg/mL   Osmolality    Collection Time: 09/24/22  2:33 PM   Result Value Ref Range    Serum Osmolality 260 (L) 280 - 301 MOSM/kg H2O       I have personally reviewed imaging studies showing:  XR CHEST PORTABLE    Result Date: 9/24/2022  Portable chest x-ray CLINICAL INDICATION: Syncope FINDINGS: Single AP view of the chest compared to a similar exam dated 1/5/2022 shows scarring or atelectasis in the right upper lobe that remains stable. The remainder the lung parenchyma is clear. No pleural effusion or pneumothorax. Post CABG changes are present. A pacer device in place. Scarring or atelectasis in the right upper lobe, otherwise no acute abnormality. Echocardiogram:  02/22/22    TRANSTHORACIC ECHOCARDIOGRAM (TTE) COMPLETE (CONTRAST/BUBBLE/3D PRN) 02/23/2022 12:23 PM (Final)    Narrative  This is a summary report. The complete report is available in the patient's medical record. If you cannot access the medical record, please contact the sending organization for a detailed fax or copy. Left Ventricle: Left ventricle size is normal. Moderately increased wall thickness. Normal wall motion. Normal left ventricular systolic function with a visually estimated EF of 60 - 65%. Abnormal diastolic function. Left Atrium: Left atrium is mildly dilated. Technical qualifiers: Echo study was technically difficult.     Signed by: Harleen Hammer on 2/23/2022 12:23 PM        Orders Placed This Encounter   Medications    magnesium sulfate 2000 mg in 50 mL IVPB premix    sodium chloride flush 0.9 % injection 5-40 mL    sodium chloride flush 0.9 % injection 5-40 mL    0.9 % sodium chloride infusion    OR Linked Order Group     ondansetron (ZOFRAN-ODT) disintegrating tablet 4 mg     ondansetron (ZOFRAN) injection 4 mg    polyethylene glycol (GLYCOLAX) packet 17 g    OR Linked Order Group     acetaminophen (TYLENOL) tablet 650 mg     acetaminophen (TYLENOL) suppository 650 mg    apixaban (ELIQUIS) tablet 5 mg     Order Specific Question:   Indication of Use     Answer:   A Fib/A Flutter    pantoprazole (PROTONIX) tablet 40 mg    lactated ringers infusion    aluminum & magnesium hydroxide-simethicone (MAALOX) 200-200-20 MG/5ML suspension 30 mL    OR Linked Order Group     potassium chloride (KLOR-CON M) extended release tablet 40 mEq     potassium bicarb-citric acid (EFFER-K) effervescent tablet 40 mEq     potassium chloride 10 mEq/100 mL IVPB (Peripheral Line)    magnesium sulfate 2000 mg in 50 mL IVPB premix    lactated ringers bolus    cetirizine (ZYRTEC) tablet 10 mg    fluticasone (FLONASE) 50 MCG/ACT nasal spray 1 spray    polyethylene glycol (GLYCOLAX) packet 17 g    sennosides-docusate sodium (SENOKOT-S) 8.6-50 MG tablet 2 tablet    DISCONTD: sotalol (BETAPACE) tablet 80 mg    pravastatin (PRAVACHOL) tablet 20 mg    sotalol (BETAPACE) tablet 80 mg         Signed:  Racquel Hyde DO, DO    Part of this note may have been written by using a voice dictation software. The note has been proof read but may still contain some grammatical/other typographical errors.

## 2022-09-24 NOTE — ED NOTES
TRANSFER - OUT REPORT:    Verbal report given to [facility] JAH PONCE on Donivan Patches  being transferred to [address] [place], [place] [address] for routine progression of patient care       Report consisted of patient's Situation, Background, Assessment and   Recommendations(SBAR). Information from the following report(s) Nurse Handoff Report, ED Encounter Summary, ED SBAR, [facility], and Recent Results was reviewed with the receiving nurse. Lines:   Peripheral IV Distal;Left; Anterior Forearm (Active)        Opportunity for questions and clarification was provided.       Patient transported with:  Kwame Soares RN  09/24/22 [id number]

## 2022-09-24 NOTE — PROGRESS NOTES
TRANSFER - IN REPORT:    Verbal report received from [facility] on Joseph Klein  being received from E.R for routine progression of patient care      Report consisted of patient's Situation, Background, Assessment and   Recommendations(SBAR). Information from the following report(s) Nurse Handoff Report was reviewed with the receiving nurse. Opportunity for questions and clarification was provided. Assessment completed upon patient's arrival to unit and care assumed.

## 2022-09-24 NOTE — ED PROVIDER NOTES
Emergency Department Provider Note                   PCP:                Jeff Ch MD               Age: 78 y.o. Sex: female     No diagnosis found. DISPOSITION          MDM  Number of Diagnoses or Management Options  Hyponatremia  Syncope and collapse  Diagnosis management comments: 13-year-old female presents to the emerged part via EMS after syncopal episode. EMS does have EKG strip with them that does show what appears to be nonsustained V. tach. Patient denies any symptoms from this. We will obtain a broad-based work-up. Lab work showed normal white count, stable H&H, sodium 123, hemolyzed potassium of 4.7, creatinine is 1.1, BUN is 20, normal LFTs, magnesium 1.7 and normal BNP, troponin is 4.3. Will give 2 g IV magnesium. Patient's pacemaker was interrogated without any chronic events recorded. Given patient's hyponatremia and syncope, she would benefit from medical admission. Hospitalist consulted for admission. Patient and family voiced understanding and agreement with this plan. EKG interpretation shows sinus rhythm, rate 70, , QRS 91, QTc 453, normal axis, no significant ST elevation or depression.        Amount and/or Complexity of Data Reviewed  Clinical lab tests: ordered and reviewed  Tests in the radiology section of CPT®: reviewed  Discussion of test results with the performing providers: yes  Discuss the patient with other providers: yes  Independent visualization of images, tracings, or specimens: yes    Risk of Complications, Morbidity, and/or Mortality  Presenting problems: moderate  Diagnostic procedures: moderate  Management options: moderate               Orders Placed This Encounter   Procedures    XR CHEST PORTABLE    CBC with Auto Differential    Comprehensive Metabolic Panel    Magnesium    Troponin    Brain Natriuretic Peptide    Cardiac Monitor - ED Only    Continuous Pulse Oximetry    Orthostatic blood pressure and pulse    EKG 12 Lead    Saline lock IV        Medications - No data to display    New Prescriptions    No medications on file        Tesha Huddleston is a 78 y.o. female who presents to the Emergency Department with chief complaint of    Chief Complaint   Patient presents with    Loss of Consciousness      40-year-old female presents to the emergency department after having a syncopal episode while performing physical therapy. EMS was called. In route EMS reports patient having what appears to be nonsustained V. tach. Patient denies falling or hitting her head. She was seated when she had her loss of consciousness. Denies chest pain, back pain or shortness of breath. Does see cardiology. Denies any swelling in lower extremities. Patient also reports some confusion and forgetfulness over the past few days which is abnormal for her. Review of Systems   Constitutional:  Negative for chills and fever. HENT:  Negative for sore throat. Eyes:  Negative for photophobia. Respiratory:  Negative for cough and shortness of breath. Cardiovascular:  Negative for chest pain. Gastrointestinal:  Negative for diarrhea, nausea and vomiting. Genitourinary:  Negative for dysuria. Musculoskeletal:  Negative for neck pain and neck stiffness. Skin:  Negative for rash. Neurological:  Positive for syncope. Negative for headaches. Psychiatric/Behavioral:  Negative for confusion. All other systems reviewed and are negative.     Past Medical History:   Diagnosis Date    Adverse effect of anesthesia     delayed awakening x1- with heart surgery    DARRELL (acute kidney injury) ([facility] [address] 06/08/2013    pt reports after TIA    Allergic rhinitis     has inhaler daily (pt denies asthma)    Anemia, unspecified 08/14/2015    no recent infusions    Aortic stenosis     sp AVR 2015    Blurry vision, bilateral 6/8/2013    CAD (coronary artery disease)     Cardiac pacemaker     Biotronik MRI compatible (dual chamber)  on Left chest; last in person interrogation 3/31/22  AP 58%,  2%    Cerebral artery occlusion with cerebral infarction [facility])     Constipation     Critical aortic valve stenosis 8/14/2015    8/13/15 (Dr Thelma Shi) 1. Left and right sided maze. Please note that the left pulmonary veins were not done due to difficult anatomy. 2. Clipping, left atrial appendage. 3. Aortic valve replacement with a 23 mm Magna Ease Janet-Hudson pericardial valve.     Current use of long term anticoagulation     Eliquis    GERD (gastroesophageal reflux disease)     managed with medication    H/O maze procedure 09/30/2015    AVR with maze    History of Bell's palsy 2013    History of TIA (transient ischemic attack) 2013    pt reports bells palsy started at same time    Hx of blood clots     Hypertension     medication    Hyponatremia 9/7/2013    Menopause     Metabolic encephalopathy 4/7/9219    Neuropathy     Osteoarthritis     Pancreatic cyst     Paroxysmal atrial fibrillation ([facility] [address] 8/14/2015    Prolonged emergence from general anesthesia     Pulmonary nodule     benign per pulm note (1/2018)    Sick sinus syndrome (HCC)     SOB (shortness of breath) on exertion 2015    cannot climb flight of stairs or walk to mailbox- s/p AVR and pacemaker (pt reports no problems at this time 5/2018)    Spinal stenosis, lumbar     Status post total right knee replacement 6/6/2018    Syncope and collapse 2015        Past Surgical History:   Procedure Laterality Date    AORTIC VALVE REPLACEMENT  8/2015    magna Ease Janet -Hudson valve    CARDIAC CATHETERIZATION  2013, 2015    CHOLECYSTECTOMY  2000    COLONOSCOPY  April 2013    with EGD, Dr. Suma Lala    COLONOSCOPY N/A 3/12/2021    COLONOSCOPY/ 30 performed by Kenia Sherman MD at [facility] ENDOSCOPY    ENDOSCOPY, COLON, DIAGNOSTIC      HYSTERECTOMY (CERVIX STATUS UNKNOWN)  1994    PACEMAKER  9/24/14    Biotronik MRI compatible (dual chamber)     TONSILLECTOMY  1951    TOTAL KNEE ARTHROPLASTY Right 06/06/2018    UPPER GASTROINTESTINAL ENDOSCOPY UPPER GASTROINTESTINAL ENDOSCOPY N/A 7/22/2022    EGD DILATION franks. performed by Ellis Jordan MD at [facility] ENDOSCOPY    UPPER GASTROINTESTINAL ENDOSCOPY N/A 9/12/2022    EGD BIOPSY performed by Ellis Jordan MD at [facility] ENDOSCOPY        Family History   Problem Relation Age of Onset    Heart Disease Sister     Diabetes Brother     Cancer Brother         pancreatic cancer    Heart Disease Father     Diabetes Son     Hypertension Sister     Breast Cancer Neg Hx     Diabetes Mother     Heart Disease Mother     Hypertension Mother     Diabetes Sister     Diabetes Son         Social History     Socioeconomic History    Marital status:     Tobacco Use    Smoking status: Never    Smokeless tobacco: Never   Vaping Use    Vaping Use: Never used   Substance and Sexual Activity    Alcohol use: No    Drug use: No   Social History Narrative    Lives with her daughter         Sulfa antibiotics, Amoxicillin, Amoxicillin-pot clavulanate, Clavulanic acid, Lisinopril, Lansoprazole, and Terbinafine     Previous Medications    ACETAMINOPHEN (TYLENOL) 500 MG TABLET    Take 1,000 mg by mouth every 6 hours as needed    ALBUTEROL SULFATE  (90 BASE) MCG/ACT INHALER    Inhale 2 puffs into the lungs every 4 hours as needed    APIXABAN (ELIQUIS) 5 MG TABS TABLET    TAKE 1 TABLET TWICE DAILY    DOCUSATE (COLACE, DULCOLAX) 100 MG CAPS    Take 100 mg by mouth nightly as needed    FAMOTIDINE (PEPCID) 40 MG TABLET    Take 40 mg by mouth at bedtime    FUROSEMIDE (LASIX) 20 MG TABLET    Take 20 mg by mouth as needed    LORATADINE (CLARITIN) 10 MG TABLET    Take 10 mg by mouth daily as needed    LOSARTAN-HYDROCHLOROTHIAZIDE (HYZAAR) 50-12.5 MG PER TABLET    Take 1 tablet by mouth nightly    OMEPRAZOLE (PRILOSEC) 20 MG DELAYED RELEASE CAPSULE    Take 20 mg by mouth daily    POLYETHYLENE GLYCOL (GLYCOLAX) 17 GM/SCOOP POWDER    Take 17 g by mouth daily    PRAVASTATIN (PRAVACHOL) 10 MG TABLET    Take 10 mg by mouth at bedtime SOTALOL (BETAPACE) 160 MG TABLET    Take 0.5 tablets by mouth 2 times daily        Vitals signs and nursing note reviewed. Patient Vitals for the past 4 hrs:   Temp Pulse Resp BP SpO2   09/24/22 1157 98.6 °F (37 °C) 73 16 (!) 116/96 99 %          Physical Exam  Vitals and nursing note reviewed. Constitutional:       General: She is not in acute distress. Appearance: Normal appearance. HENT:      Head: Normocephalic. Nose: Nose normal.      Mouth/Throat:      Mouth: Mucous membranes are moist.   Eyes:      Extraocular Movements: Extraocular movements intact. Pupils: Pupils are equal, round, and reactive to light. Cardiovascular:      Rate and Rhythm: Normal rate and regular rhythm. Pulses: Normal pulses. Heart sounds: Normal heart sounds. Pulmonary:      Effort: Pulmonary effort is normal. No respiratory distress. Breath sounds: Normal breath sounds. Abdominal:      General: Abdomen is flat. Palpations: Abdomen is soft. Tenderness: There is no abdominal tenderness. Musculoskeletal:         General: No swelling or tenderness. Normal range of motion. Cervical back: Normal range of motion. No rigidity. Skin:     General: Skin is warm. Findings: No rash. Neurological:      General: No focal deficit present. Mental Status: She is alert and oriented to person, place, and time. Cranial Nerves: No cranial nerve deficit. Sensory: No sensory deficit. Motor: No weakness. Psychiatric:         Mood and Affect: Mood normal.        Procedures    No results found for any visits on 09/24/22. XR CHEST PORTABLE    (Results Pending)                       Voice dictation software was used during the making of this note. This software is not perfect and grammatical and other typographical errors may be present. This note has not been completely proofread for errors.         Reginald Gillis, DO  09/24/22 5316

## 2022-09-24 NOTE — ED TRIAGE NOTES
Patient arrives via [address] [facility] from [facility] where she was doing light PT and became lightheaded and had a syncopal episode. BP 70/40 initially with EMS. 400mL NSS given en route. En route pt has runs of VT where she was asymptomatic while laying still.  138 BGL

## 2022-09-25 LAB
ANION GAP SERPL CALC-SCNC: 10 MMOL/L (ref 4–13)
BUN SERPL-MCNC: 13 MG/DL (ref 8–23)
CALCIUM SERPL-MCNC: 9.4 MG/DL (ref 8.3–10.4)
CHLORIDE SERPL-SCNC: 91 MMOL/L (ref 101–110)
CO2 SERPL-SCNC: 26 MMOL/L (ref 21–32)
CORTIS AM PEAK SERPL-MCNC: 19.2 UG/DL (ref 7–25)
CREAT SERPL-MCNC: 0.7 MG/DL (ref 0.6–1)
CREAT UR-MCNC: 22 MG/DL
FERRITIN SERPL-MCNC: 149 NG/ML (ref 8–388)
FOLATE SERPL-MCNC: 14.6 NG/ML (ref 3.1–17.5)
GLUCOSE SERPL-MCNC: 86 MG/DL (ref 65–100)
IRON SATN MFR SERPL: 17 %
IRON SERPL-MCNC: 52 UG/DL (ref 35–150)
MAGNESIUM SERPL-MCNC: 2.2 MG/DL (ref 1.8–2.4)
OSMOLALITY SERPL: 263 MOSM/KG H2O (ref 280–301)
OSMOLALITY UR: 214 MOSM/KG H2O (ref 50–1400)
POTASSIUM SERPL-SCNC: 4.5 MMOL/L (ref 3.5–5.1)
SODIUM SERPL-SCNC: 124 MMOL/L (ref 136–145)
SODIUM SERPL-SCNC: 127 MMOL/L (ref 136–145)
SODIUM SERPL-SCNC: 127 MMOL/L (ref 136–145)
SODIUM UR-SCNC: 61 MMOL/L
TIBC SERPL-MCNC: 308 UG/DL (ref 250–450)
VIT B12 SERPL-MCNC: 671 PG/ML (ref 193–986)

## 2022-09-25 PROCEDURE — 84300 ASSAY OF URINE SODIUM: CPT

## 2022-09-25 PROCEDURE — 83935 ASSAY OF URINE OSMOLALITY: CPT

## 2022-09-25 PROCEDURE — 6370000000 HC RX 637 (ALT 250 FOR IP): Performed by: INTERNAL MEDICINE

## 2022-09-25 PROCEDURE — 83735 ASSAY OF MAGNESIUM: CPT

## 2022-09-25 PROCEDURE — 83930 ASSAY OF BLOOD OSMOLALITY: CPT

## 2022-09-25 PROCEDURE — 82746 ASSAY OF FOLIC ACID SERUM: CPT

## 2022-09-25 PROCEDURE — 82728 ASSAY OF FERRITIN: CPT

## 2022-09-25 PROCEDURE — 82607 VITAMIN B-12: CPT

## 2022-09-25 PROCEDURE — 83540 ASSAY OF IRON: CPT

## 2022-09-25 PROCEDURE — 36415 COLL VENOUS BLD VENIPUNCTURE: CPT

## 2022-09-25 PROCEDURE — 92610 EVALUATE SWALLOWING FUNCTION: CPT

## 2022-09-25 PROCEDURE — 82570 ASSAY OF URINE CREATININE: CPT

## 2022-09-25 PROCEDURE — 84295 ASSAY OF SERUM SODIUM: CPT

## 2022-09-25 PROCEDURE — 1100000000 HC RM PRIVATE

## 2022-09-25 PROCEDURE — 2500000003 HC RX 250 WO HCPCS: Performed by: INTERNAL MEDICINE

## 2022-09-25 PROCEDURE — 2580000003 HC RX 258: Performed by: FAMILY MEDICINE

## 2022-09-25 PROCEDURE — 80048 BASIC METABOLIC PNL TOTAL CA: CPT

## 2022-09-25 PROCEDURE — 2580000003 HC RX 258: Performed by: INTERNAL MEDICINE

## 2022-09-25 PROCEDURE — 6370000000 HC RX 637 (ALT 250 FOR IP): Performed by: FAMILY MEDICINE

## 2022-09-25 PROCEDURE — 82533 TOTAL CORTISOL: CPT

## 2022-09-25 RX ORDER — LOSARTAN POTASSIUM 50 MG/1
100 TABLET ORAL NIGHTLY
Status: DISCONTINUED | OUTPATIENT
Start: 2022-09-25 | End: 2022-09-29 | Stop reason: HOSPADM

## 2022-09-25 RX ORDER — LOSARTAN POTASSIUM 50 MG/1
50 TABLET ORAL ONCE
Status: COMPLETED | OUTPATIENT
Start: 2022-09-25 | End: 2022-09-25

## 2022-09-25 RX ORDER — SODIUM CHLORIDE, SODIUM LACTATE, POTASSIUM CHLORIDE, CALCIUM CHLORIDE 600; 310; 30; 20 MG/100ML; MG/100ML; MG/100ML; MG/100ML
INJECTION, SOLUTION INTRAVENOUS CONTINUOUS
Status: ACTIVE | OUTPATIENT
Start: 2022-09-25 | End: 2022-09-25

## 2022-09-25 RX ADMIN — SODIUM CHLORIDE, PRESERVATIVE FREE 10 ML: 5 INJECTION INTRAVENOUS at 08:32

## 2022-09-25 RX ADMIN — SENNOSIDES AND DOCUSATE SODIUM 2 TABLET: 8.6; 5 TABLET ORAL at 20:34

## 2022-09-25 RX ADMIN — APIXABAN 5 MG: 5 TABLET, FILM COATED ORAL at 20:35

## 2022-09-25 RX ADMIN — SOTALOL HYDROCHLORIDE 80 MG: 80 TABLET ORAL at 08:31

## 2022-09-25 RX ADMIN — LOSARTAN POTASSIUM 50 MG: 50 TABLET, FILM COATED ORAL at 10:29

## 2022-09-25 RX ADMIN — TUBERCULIN PURIFIED PROTEIN DERIVATIVE 5 UNITS: 5 INJECTION, SOLUTION INTRADERMAL at 11:24

## 2022-09-25 RX ADMIN — PANTOPRAZOLE SODIUM 40 MG: 40 TABLET, DELAYED RELEASE ORAL at 16:18

## 2022-09-25 RX ADMIN — APIXABAN 5 MG: 5 TABLET, FILM COATED ORAL at 08:31

## 2022-09-25 RX ADMIN — SODIUM CHLORIDE, PRESERVATIVE FREE 10 ML: 5 INJECTION INTRAVENOUS at 20:35

## 2022-09-25 RX ADMIN — PANTOPRAZOLE SODIUM 40 MG: 40 TABLET, DELAYED RELEASE ORAL at 05:58

## 2022-09-25 RX ADMIN — FLUTICASONE PROPIONATE 1 SPRAY: 50 SPRAY, METERED NASAL at 08:31

## 2022-09-25 RX ADMIN — CETIRIZINE HYDROCHLORIDE 10 MG: 10 TABLET ORAL at 08:31

## 2022-09-25 RX ADMIN — SODIUM CHLORIDE, SODIUM LACTATE, POTASSIUM CHLORIDE, AND CALCIUM CHLORIDE: 600; 310; 30; 20 INJECTION, SOLUTION INTRAVENOUS at 09:59

## 2022-09-25 RX ADMIN — LOSARTAN POTASSIUM 100 MG: 50 TABLET, FILM COATED ORAL at 20:35

## 2022-09-25 RX ADMIN — POLYETHYLENE GLYCOL 3350 17 G: 17 POWDER, FOR SOLUTION ORAL at 08:31

## 2022-09-25 RX ADMIN — PRAVASTATIN SODIUM 20 MG: 20 TABLET ORAL at 20:35

## 2022-09-25 RX ADMIN — SODIUM CHLORIDE, POTASSIUM CHLORIDE, SODIUM LACTATE AND CALCIUM CHLORIDE: 600; 310; 30; 20 INJECTION, SOLUTION INTRAVENOUS at 08:40

## 2022-09-25 ASSESSMENT — PAIN SCALES - GENERAL
PAINLEVEL_OUTOF10: 0
PAINLEVEL_OUTOF10: 0

## 2022-09-25 NOTE — CARE COORDINATION
CM met with patient to complete initial assessment. Patient alert and oriented x3, found sitting up in chair next to window. Patient states she lives in a one story home with 2STE with her DIL and son. Patient is independent with ADL's, still drives. DME's include a rw, cane, shower chair. Demographics, insurance and PCP confirmed. Patient had HH(RN) in 2015 - can't remember the agency. Patient also went to [facility] in 2015 after open heart surgery. She was there for 20 days. Patient states she has a [employer] member that is a PT that has volunteered some time to work with her at [employer]. She states they started therapy on Saturday and was only able to work together once. PT/OT consults placed - currently pending. PPD ordered. CM will continue to follow to assist with discharge planning. 09/25/22 1601   Service Assessment   Patient Orientation Alert and Oriented   Cognition Alert   History Provided By Patient   Primary [address]   PCP Verified by CM Yes   Prior Functional Level Independent in ADLs/IADLs   Can patient return to prior living arrangement Unknown at present   Ability to make needs known: Good   Social/Functional History   Lives With Son;Daughter   Type of [address] [facility] One level   Home Access Stairs to enter with rails   Entrance Stairs - Number of Steps 2   [address] [place] [address] chair   Home Equipment Cane;Walker, rolling   Receives Help From Family   Active  Yes   Occupation Retired   Discharge Planning   Living Arrangements Children   Current Services Prior To Admission None   Patient expects to be discharged to:  Unknown

## 2022-09-25 NOTE — PROGRESS NOTES
unspecified    Dyslipidemia    CAD (coronary artery disease)    Gastroesophageal reflux disease with esophagitis without hemorrhage    Cardiac pacemaker    Sick sinus syndrome (HCC)    S/P dilatation of esophageal stricture  Resolved Problems:    * No resolved hospital problems. *      Objective:   Patient Vitals for the past 24 hrs:   Temp Pulse Resp BP SpO2   09/25/22 0722 98.2 °F (36.8 °C) 70 18 (!) 171/69 99 %   09/25/22 0303 97.9 °F (36.6 °C) 60 18 (!) 150/60 97 %   09/24/22 2258 97.7 °F (36.5 °C) 59 16 (!) 150/61 96 %   09/24/22 1914 97.5 °F (36.4 °C) 70 16 (!) 164/61 98 %   09/24/22 1810 -- -- -- (!) 153/58 --   09/24/22 1627 98.2 °F (36.8 °C) 72 16 (!) 166/102 99 %   09/24/22 1533 -- 70 15 (!) 148/97 99 %   09/24/22 1513 -- 70 10 (!) 150/67 99 %   09/24/22 1453 -- 70 10 -- 98 %   09/24/22 1443 -- 70 14 (!) 159/70 99 %   09/24/22 1433 -- 70 12 (!) 167/71 99 %   09/24/22 1157 98.6 °F (37 °C) 73 16 (!) 116/96 99 %       Oxygen Therapy  SpO2: 99 %  O2 Device: None (Room air)    Estimated body mass index is 26.31 kg/m² as calculated from the following:    Height as of this encounter: 5' 7\" (1.702 m). Weight as of this encounter: 168 lb (76.2 kg). No intake or output data in the 24 hours ending 09/25/22 0904      Physical Exam:     Blood pressure (!) 171/69, pulse 70, temperature 98.2 °F (36.8 °C), temperature source Oral, resp. rate 18, height 5' 7\" (1.702 m), weight 168 lb (76.2 kg), SpO2 99 %. General:    Well nourished. Head:  Normocephalic, atraumatic  Eyes:  Sclerae appear normal.  Pupils equally round. ENT:  Nares appear normal, no drainage. Moist oral mucosa  Neck:  No restricted ROM. Trachea midline   CV:   RRR. No jugular venous distension. Lungs:   No distress. Symmetric expansion. Abdomen:    nondistended. Extremities: No cyanosis or clubbing. No edema  Skin:     No rashes and normal coloration. Warm and dry. Neuro:  CN II-XII grossly intact. Sensation intact. A&Ox3  Psych:  Normal mood and affect.       I have personally reviewed labs and tests showing:  Recent Labs:  Recent Results (from the past 48 hour(s))   CBC with Auto Differential    Collection Time: 09/24/22 12:02 PM   Result Value Ref Range    WBC 9.8 4.3 - 11.1 K/uL    RBC 3.74 (L) 4.05 - 5.2 M/uL    Hemoglobin 11.4 (L) 11.7 - 15.4 g/dL    Hematocrit 32.9 (L) 35.8 - 46.3 %    MCV 88.0 79.6 - 97.8 FL    MCH 30.5 26.1 - 32.9 PG    MCHC 34.7 31.4 - 35.0 g/dL    RDW 13.2 11.9 - 14.6 %    Platelets 864 011 - 814 K/uL    MPV 9.2 (L) 9.4 - 12.3 FL    nRBC 0.00 0.0 - 0.2 K/uL    Differential Type AUTOMATED      Seg Neutrophils 71 43 - 78 %    Lymphocytes 13 13 - 44 %    Monocytes 13 (H) 4.0 - 12.0 %    Eosinophils % 1 0.5 - 7.8 %    Basophils 1 0.0 - 2.0 %    Immature Granulocytes 1 0.0 - 5.0 %    Segs Absolute 7.0 1.7 - 8.2 K/UL    Absolute Lymph # 1.2 0.5 - 4.6 K/UL    Absolute Mono # 1.3 0.1 - 1.3 K/UL    Absolute Eos # 0.1 0.0 - 0.8 K/UL    Basophils Absolute 0.1 0.0 - 0.2 K/UL    Absolute Immature Granulocyte 0.1 0.0 - 0.5 K/UL   Comprehensive Metabolic Panel    Collection Time: 09/24/22 12:02 PM   Result Value Ref Range    Sodium 123 (L) 136 - 145 mmol/L    Potassium 4.7 3.5 - 5.1 mmol/L    Chloride 89 (L) 101 - 110 mmol/L    CO2 26 21 - 32 mmol/L    Anion Gap 8 4 - 13 mmol/L    Glucose 135 (H) 65 - 100 mg/dL    BUN 20 8 - 23 MG/DL    Creatinine 1.10 (H) 0.6 - 1.0 MG/DL    GFR African American >60 >60 ml/min/1.73m2    GFR Non- 50 (L) >60 ml/min/1.73m2    Calcium 9.0 8.3 - 10.4 MG/DL    Total Bilirubin 0.9 0.2 - 1.1 MG/DL    ALT 22 12 - 65 U/L    AST 22 15 - 37 U/L    Alk Phosphatase 85 50 - 136 U/L    Total Protein 7.0 6.3 - 8.2 g/dL    Albumin 3.6 3.2 - 4.6 g/dL    Globulin 3.4 2.3 - 3.5 g/dL    Albumin/Globulin Ratio 1.1 (L) 1.2 - 3.5     Magnesium    Collection Time: 09/24/22 12:02 PM   Result Value Ref Range    Magnesium 1.8 1.8 - 2.4 mg/dL   Brain Natriuretic Peptide    Collection Time: 09/24/22 12:02 PM   Result Value Ref Range    NT Pro- <450 PG/ML   Troponin    Collection Time: 09/24/22 12:02 PM   Result Value Ref Range    Troponin, High Sensitivity 4.3 0 - 14 pg/mL   Troponin    Collection Time: 09/24/22  2:33 PM   Result Value Ref Range    Troponin, High Sensitivity 4.5 0 - 14 pg/mL   Osmolality    Collection Time: 09/24/22  2:33 PM   Result Value Ref Range    Serum Osmolality 260 (L) 280 - 301 MOSM/kg H2O   TSH with Reflex    Collection Time: 09/24/22  2:33 PM   Result Value Ref Range    TSH w Free Thyroid if Abnormal 1.17 0.358 - 3.740 UIU/ML   Sodium, urine, random    Collection Time: 09/24/22  3:44 PM   Result Value Ref Range    SODIUM, RANDOM URINE 60 MMOL/L   Creatinine, Random Urine    Collection Time: 09/24/22  3:44 PM   Result Value Ref Range    Creatinine, Ur 17.00 mg/dL   Osmolality, Urine    Collection Time: 09/24/22  3:44 PM   Result Value Ref Range    Osmolality, Ur 234 50 - 1400 MOSM/kg H2O   POCT Urinalysis no Micro    Collection Time: 09/24/22  3:44 PM   Result Value Ref Range    Specific Gravity, Urine, POC 1.010 1.001 - 1.023      pH, Urine, POC 6.5 5.0 - 9.0      Protein, Urine, POC Negative NEG mg/dL    Glucose, UA POC Negative NEG mg/dL    Ketones, Urine, POC Negative NEG mg/dL    Bilirubin, Urine, POC Negative NEG      Blood, UA POC Negative NEG      URINE UROBILINOGEN POC 0.2 0.2 - 1.0 EU/dL    Nitrate, Urine, POC Negative NEG      Leukocyte Est, UA POC SMALL (A) NEG      Performed by: Denisse Etienne    Sodium    Collection Time: 09/24/22  5:56 PM   Result Value Ref Range    Sodium 124 (L) 136 - 145 mmol/L   Sodium    Collection Time: 09/25/22 12:11 AM   Result Value Ref Range    Sodium 124 (L) 136 - 145 mmol/L   Vitamin B12    Collection Time: 09/25/22  4:34 AM   Result Value Ref Range    Vitamin B-12 671 193 - 986 pg/mL   Basic Metabolic Panel    Collection Time: 09/25/22  4:34 AM   Result Value Ref Range    Sodium 127 (L) 136 - 145 mmol/L    Potassium 4.5 3.5 - 5.1 mmol/L    Chloride 91 (L) 101 - 110 mmol/L    CO2 26 21 - 32 mmol/L    Anion Gap 10 4 - 13 mmol/L    Glucose 86 65 - 100 mg/dL    BUN 13 8 - 23 MG/DL    Creatinine 0.70 0.6 - 1.0 MG/DL    GFR African American >60 >60 ml/min/1.73m2    GFR Non- >60 >60 ml/min/1.73m2    Calcium 9.4 8.3 - 10.4 MG/DL   Magnesium    Collection Time: 09/25/22  4:34 AM   Result Value Ref Range    Magnesium 2.2 1.8 - 2.4 mg/dL   Osmolality    Collection Time: 09/25/22  4:34 AM   Result Value Ref Range    Serum Osmolality 263 (L) 280 - 301 MOSM/kg H2O   Folate    Collection Time: 09/25/22  4:34 AM   Result Value Ref Range    Folate 14.6 3.1 - 17.5 ng/mL   Ferritin    Collection Time: 09/25/22  4:34 AM   Result Value Ref Range    Ferritin 149 8 - 388 NG/ML   Transferrin Saturation    Collection Time: 09/25/22  4:34 AM   Result Value Ref Range    Iron 52 35 - 150 ug/dL    TIBC 308 250 - 450 ug/dL    TRANSFERRIN SATURATION 17 (L) >20 %   Sodium, urine, random    Collection Time: 09/25/22  6:00 AM   Result Value Ref Range    SODIUM, RANDOM URINE 61 MMOL/L   Creatinine, Random Urine    Collection Time: 09/25/22  6:00 AM   Result Value Ref Range    Creatinine, Ur 22.00 mg/dL   Cortisol AM, Total    Collection Time: 09/25/22  6:33 AM   Result Value Ref Range    Cortisol - AM 19.2 7 - 25 ug/dL       I have personally reviewed imaging studies showing: Other Studies:  XR CHEST PORTABLE   Final Result   Scarring or atelectasis in the right upper lobe, otherwise no acute   abnormality.           Current Meds:  Current Facility-Administered Medications   Medication Dose Route Frequency    losartan (COZAAR) tablet 100 mg  100 mg Oral Nightly    losartan (COZAAR) tablet 50 mg  50 mg Oral Once    lactated ringers infusion   IntraVENous Continuous    sodium chloride flush 0.9 % injection 5-40 mL  5-40 mL IntraVENous 2 times per day    sodium chloride flush 0.9 % injection 5-40 mL  5-40 mL IntraVENous PRN    0.9 % sodium chloride infusion   IntraVENous PRN    ondansetron (ZOFRAN-ODT) disintegrating tablet 4 mg  4 mg Oral Q8H PRN    Or    ondansetron (ZOFRAN) injection 4 mg  4 mg IntraVENous Q6H PRN    polyethylene glycol (GLYCOLAX) packet 17 g  17 g Oral Daily PRN    acetaminophen (TYLENOL) tablet 650 mg  650 mg Oral Q6H PRN    Or    acetaminophen (TYLENOL) suppository 650 mg  650 mg Rectal Q6H PRN    apixaban (ELIQUIS) tablet 5 mg  5 mg Oral BID    pantoprazole (PROTONIX) tablet 40 mg  40 mg Oral BID AC    aluminum & magnesium hydroxide-simethicone (MAALOX) 200-200-20 MG/5ML suspension 30 mL  30 mL Oral Q6H PRN    potassium chloride (KLOR-CON M) extended release tablet 40 mEq  40 mEq Oral PRN    Or    potassium bicarb-citric acid (EFFER-K) effervescent tablet 40 mEq  40 mEq Oral PRN    Or    potassium chloride 10 mEq/100 mL IVPB (Peripheral Line)  10 mEq IntraVENous PRN    magnesium sulfate 2000 mg in 50 mL IVPB premix  2,000 mg IntraVENous PRN    cetirizine (ZYRTEC) tablet 10 mg  10 mg Oral Daily    fluticasone (FLONASE) 50 MCG/ACT nasal spray 1 spray  1 spray Each Nostril Daily    polyethylene glycol (GLYCOLAX) packet 17 g  17 g Oral Daily    sennosides-docusate sodium (SENOKOT-S) 8.6-50 MG tablet 2 tablet  2 tablet Oral Nightly    pravastatin (PRAVACHOL) tablet 20 mg  20 mg Oral Nightly    sotalol (BETAPACE) tablet 80 mg  80 mg Oral Daily       Signed:  Jian Granado MD    Part of this note may have been written by using a voice dictation software. The note has been proof read but may still contain some grammatical/other typographical errors.

## 2022-09-25 NOTE — PROGRESS NOTES
SPEECH LANGUAGE PATHOLOGY: DYSPHAGIA  Initial Assessment and Discharge    NAME: Alda Peck  : 1940  MRN: [id number]    ADMISSION DATE: 2022  PRIMARY DIAGNOSIS: Hyponatremia  Syncope and collapse [R55]  Hyponatremia [E87.1]    ICD-10: Treatment Diagnosis: R13.19 Other Dysphagia    RECOMMENDATIONS   Diet:  Diet Solids Recommendation: Regular  Liquid Consistency Recommendation: Thin    Medications: PO            Compensatory Swallowing Strategies: Small bites/sips   Patient continues to require skilled intervention: No  D/C Recommendations: No follow up therapy recommended post discharge     ASSESSMENT    Dysphagia Diagnosis: Swallow function appears WFL  Discussed status with patient/family - refer to Education section of this note. Speech therapy will sign off at this time. GENERAL    History of Present Injury/Illness: Ms. Yaz Kumar  has a past medical history of Adverse effect of anesthesia, DARRELL (acute kidney injury) ([facility] Allergic rhinitis, Anemia, unspecified, Aortic stenosis, Blurry vision, bilateral, CAD (coronary artery disease), Cardiac pacemaker, Cerebral artery occlusion with cerebral infarction [facility]), Constipation, Critical aortic valve stenosis, Current use of long term anticoagulation, GERD (gastroesophageal reflux disease), H/O maze procedure, History of Bell's palsy, History of TIA (transient ischemic attack), Hx of blood clots, Hypertension, Hyponatremia, Menopause, Metabolic encephalopathy, Neuropathy, Osteoarthritis, Pancreatic cyst, Paroxysmal atrial fibrillation (HCC), Prolonged emergence from general anesthesia, Pulmonary nodule, S/P dilatation of esophageal stricture, Short-segment Liriano's esophagus, Sick sinus syndrome (HCC), SOB (shortness of breath) on exertion, Spinal stenosis, lumbar, Status post total right knee replacement, and Syncope and collapse. . She also  has a past surgical history that includes Hysterectomy (); Cholecystectomy ();  Tonsillectomy (); Colonoscopy (April 2013); Colonoscopy (N/A, 3/12/2021); Upper gastrointestinal endoscopy; Cardiac catheterization (2013, 2015); Aortic valve replacement (8/2015); Total knee arthroplasty (Right, 06/06/2018); pacemaker (9/24/14); Endoscopy, colon, diagnostic; Upper gastrointestinal endoscopy (N/A, 7/22/2022); and Upper gastrointestinal endoscopy (N/A, 9/12/2022). Chart Reviewed: Yes  Subjective: Patient awake, alert, and agreeable to speech therapy services. Patient's son, Yanick Servin, present during evaluation. Behavior/Cognition: Alert; Cooperative;Pleasant mood  Communication Observation: Functional  Follows Directions: Complex    Prior Dysphagia History: Patient with x's 2 esophageal dilations in the past few months. Since dilations performed, patient denies difficulty with PO intake. Current Diet : Regular  Current Liquid Diet : Thin    Respiratory Status: Room air              Pain:                                            OBJECTIVE    Patient Positioning: Upright in chair   Oral Motor   Labial:  (Redued range on R side - patient with history of Bell's Palsy.)  Dentition: Lower dentures  Oral Hygiene: Clean  Lingual: No impairment  Dentition: Dentures top;Dentures bottom             Oropharyngeal Phase:  Thin;Regular              Pharyngeal Phase: Patient consumed soilds x's 2 and 2 oz of thin liquids via cup. Oral and pharyngeal phases of swallow were unremarkable. No overt signs of aspiration observed. Patient denied difficulty with PO intake or need for diet modification.   PLAN    Duration/Frequency: No treatment indicated at this time    Dysphagia Outcome and Severity Scale (SUBHA)  Dysphagia Outcome Severity Scale: Level 6: Within functional limits/Modified independence  Interpretation of Tool: The Dysphagia Outcome and Severity Scale (SUBHA) is a simple, easy-to-use, 7-point scale developed to systematically rate the functional severity of dysphagia based on objective assessment and make recommendations

## 2022-09-26 PROBLEM — I69.30 HISTORY OF CVA WITH RESIDUAL DEFICIT: Chronic | Status: ACTIVE | Noted: 2022-09-26

## 2022-09-26 PROBLEM — E78.5 DYSLIPIDEMIA: Chronic | Status: ACTIVE | Noted: 2020-07-09

## 2022-09-26 PROBLEM — G93.41 ACUTE METABOLIC ENCEPHALOPATHY: Status: RESOLVED | Noted: 2022-09-24 | Resolved: 2022-09-26

## 2022-09-26 PROBLEM — E86.1 HYPOTENSION DUE TO HYPOVOLEMIA: Status: RESOLVED | Noted: 2022-09-24 | Resolved: 2022-09-26

## 2022-09-26 PROBLEM — I95.89 HYPOTENSION DUE TO HYPOVOLEMIA: Status: RESOLVED | Noted: 2022-09-24 | Resolved: 2022-09-26

## 2022-09-26 PROBLEM — R55 SYNCOPE AND COLLAPSE: Status: RESOLVED | Noted: 2022-09-24 | Resolved: 2022-09-26

## 2022-09-26 LAB
ANION GAP SERPL CALC-SCNC: 6 MMOL/L (ref 4–13)
BUN SERPL-MCNC: 13 MG/DL (ref 8–23)
CALCIUM SERPL-MCNC: 9.4 MG/DL (ref 8.3–10.4)
CHLORIDE SERPL-SCNC: 93 MMOL/L (ref 101–110)
CO2 SERPL-SCNC: 28 MMOL/L (ref 21–32)
CREAT SERPL-MCNC: 0.8 MG/DL (ref 0.6–1)
ERYTHROCYTE [DISTWIDTH] IN BLOOD BY AUTOMATED COUNT: 13.3 % (ref 11.9–14.6)
GLUCOSE SERPL-MCNC: 104 MG/DL (ref 65–100)
HCT VFR BLD AUTO: 32 % (ref 35.8–46.3)
HGB BLD-MCNC: 10.8 G/DL (ref 11.7–15.4)
MCH RBC QN AUTO: 29.8 PG (ref 26.1–32.9)
MCHC RBC AUTO-ENTMCNC: 33.8 G/DL (ref 31.4–35)
MCV RBC AUTO: 88.2 FL (ref 79.6–97.8)
MM INDURATION, POC: 0 MM (ref 0–5)
NRBC # BLD: 0 K/UL (ref 0–0.2)
PLATELET # BLD AUTO: 248 K/UL (ref 150–450)
PMV BLD AUTO: 9.4 FL (ref 9.4–12.3)
POTASSIUM SERPL-SCNC: 4.6 MMOL/L (ref 3.5–5.1)
PPD, POC: NEGATIVE
RBC # BLD AUTO: 3.63 M/UL (ref 4.05–5.2)
SODIUM SERPL-SCNC: 127 MMOL/L (ref 136–145)
WBC # BLD AUTO: 8.4 K/UL (ref 4.3–11.1)

## 2022-09-26 PROCEDURE — 1100000000 HC RM PRIVATE

## 2022-09-26 PROCEDURE — 97535 SELF CARE MNGMENT TRAINING: CPT

## 2022-09-26 PROCEDURE — 36415 COLL VENOUS BLD VENIPUNCTURE: CPT

## 2022-09-26 PROCEDURE — 85027 COMPLETE CBC AUTOMATED: CPT

## 2022-09-26 PROCEDURE — 97166 OT EVAL MOD COMPLEX 45 MIN: CPT

## 2022-09-26 PROCEDURE — 6370000000 HC RX 637 (ALT 250 FOR IP): Performed by: FAMILY MEDICINE

## 2022-09-26 PROCEDURE — 80048 BASIC METABOLIC PNL TOTAL CA: CPT

## 2022-09-26 PROCEDURE — 2580000003 HC RX 258: Performed by: FAMILY MEDICINE

## 2022-09-26 PROCEDURE — 6370000000 HC RX 637 (ALT 250 FOR IP): Performed by: INTERNAL MEDICINE

## 2022-09-26 RX ORDER — OXYCODONE HYDROCHLORIDE 5 MG/1
5 TABLET ORAL EVERY 4 HOURS PRN
Status: DISCONTINUED | OUTPATIENT
Start: 2022-09-26 | End: 2022-09-29 | Stop reason: HOSPADM

## 2022-09-26 RX ORDER — AMLODIPINE BESYLATE 10 MG/1
10 TABLET ORAL DAILY
Status: DISCONTINUED | OUTPATIENT
Start: 2022-09-26 | End: 2022-09-29 | Stop reason: HOSPADM

## 2022-09-26 RX ORDER — HYDRALAZINE HYDROCHLORIDE 50 MG/1
50 TABLET, FILM COATED ORAL EVERY 8 HOURS SCHEDULED
Status: DISCONTINUED | OUTPATIENT
Start: 2022-09-26 | End: 2022-09-27

## 2022-09-26 RX ADMIN — PRAVASTATIN SODIUM 20 MG: 20 TABLET ORAL at 21:02

## 2022-09-26 RX ADMIN — SOTALOL HYDROCHLORIDE 80 MG: 80 TABLET ORAL at 08:57

## 2022-09-26 RX ADMIN — APIXABAN 5 MG: 5 TABLET, FILM COATED ORAL at 08:57

## 2022-09-26 RX ADMIN — SODIUM CHLORIDE, PRESERVATIVE FREE 10 ML: 5 INJECTION INTRAVENOUS at 21:07

## 2022-09-26 RX ADMIN — LOSARTAN POTASSIUM 100 MG: 50 TABLET, FILM COATED ORAL at 21:02

## 2022-09-26 RX ADMIN — HYDRALAZINE HYDROCHLORIDE 50 MG: 50 TABLET, FILM COATED ORAL at 14:50

## 2022-09-26 RX ADMIN — HYDRALAZINE HYDROCHLORIDE 50 MG: 50 TABLET, FILM COATED ORAL at 21:02

## 2022-09-26 RX ADMIN — CETIRIZINE HYDROCHLORIDE 10 MG: 10 TABLET ORAL at 08:57

## 2022-09-26 RX ADMIN — POLYETHYLENE GLYCOL 3350 17 G: 17 POWDER, FOR SOLUTION ORAL at 09:00

## 2022-09-26 RX ADMIN — APIXABAN 5 MG: 5 TABLET, FILM COATED ORAL at 21:02

## 2022-09-26 RX ADMIN — FLUTICASONE PROPIONATE 1 SPRAY: 50 SPRAY, METERED NASAL at 08:59

## 2022-09-26 RX ADMIN — ACETAMINOPHEN 650 MG: 325 TABLET, FILM COATED ORAL at 09:05

## 2022-09-26 RX ADMIN — AMLODIPINE BESYLATE 10 MG: 10 TABLET ORAL at 10:35

## 2022-09-26 RX ADMIN — PANTOPRAZOLE SODIUM 40 MG: 40 TABLET, DELAYED RELEASE ORAL at 06:09

## 2022-09-26 RX ADMIN — SENNOSIDES AND DOCUSATE SODIUM 2 TABLET: 8.6; 5 TABLET ORAL at 21:02

## 2022-09-26 ASSESSMENT — PAIN DESCRIPTION - LOCATION
LOCATION: HEAD
LOCATION: HEAD

## 2022-09-26 ASSESSMENT — PAIN SCALES - GENERAL
PAINLEVEL_OUTOF10: 3
PAINLEVEL_OUTOF10: 6
PAINLEVEL_OUTOF10: 0
PAINLEVEL_OUTOF10: 0

## 2022-09-26 ASSESSMENT — PAIN DESCRIPTION - DESCRIPTORS: DESCRIPTORS: TIGHTNESS;PRESSURE

## 2022-09-26 NOTE — PROGRESS NOTES
ACUTE OCCUPATIONAL THERAPY GOALS:   (Developed with and agreed upon by patient and/or caregiver.)  1. Patient will complete lower body bathing and dressing with setup and adaptive equipment as needed. 2. Patient will complete toileting with supervision. 3. Patient will tolerate 30 minutes of OT treatment with as needed rest breaks to increase activity tolerance for ADLs. 4. Patient will complete functional transfers with modified independence and adaptive equipment as needed. Timeframe: 7 visits       OCCUPATIONAL THERAPY Initial Assessment and Daily Note       OT Visit Days: 1  Acknowledge Orders  Time  OT Charge Capture  Rehab Caseload Tracker      Ki Castaneda is a 78 y.o. female   PRIMARY DIAGNOSIS: Hyponatremia  Syncope and collapse [R55]  Hyponatremia [E87.1]       Reason for Referral: Dizziness and Giddiness (R42)  Inpatient: Payor: Aria Levy / Plan: Keshia Carney PPO / Product Type: Medicare /     ASSESSMENT:     REHAB RECOMMENDATIONS:   Recommendation to date pending progress:  Setting:  Home Health Therapy    Equipment:    To Be Determined     ASSESSMENT:  Ms. Davide Scales is a pleasant 77 y/o F admitted after syncopal event @ [facility]. Baseline independent, lives with son & DIL. Today reports headache 6/10. Evaluation limited due to symptomatic fluctuations in BP (171/62 in sitting, 125/79 in standing with + dizziness). Demonstrates sit to stand & UB dressing with SBA. Once BP is stable I expect she will be functioning at her baseline. Will follow for acute OT to insure independence & safety.  [address] AM-PAC 6 Clicks Daily Activity Inpatient Short Form:    AM-PAC Daily Activity Inpatient   How much help for putting on and taking off regular lower body clothing?: A Little  How much help for Bathing?: A Little  How much help for Toileting?: A Little  How much help for putting on and taking off regular upper body clothing?: None  How much help for taking care of personal grooming?: None  How much help for eating meals?: None  AM-PAC Inpatient Daily Activity Raw Score: 21  AM-PAC Inpatient ADL T-Scale Score : 44.27  ADL Inpatient CMS 0-100% Score: 32.79  ADL Inpatient CMS G-Code Modifier : CJ           SUBJECTIVE:     Ms. Johnathon Lennon states, \"I hope they get this figured out so I can get back to my life. \"     Social/Functional Lives With: Son, Daughter  Type of Home: House  Home Layout: One level  Home Access: Stairs to enter with rails  Entrance Stairs - Number of Steps: 2  Bathroom Equipment: Shower chair  Home Equipment: Rilla Beards, [address] Help From: Family  Active : Yes  Occupation: Retired  Lives with son & daughter in law who assist with dinner prep. Otherwise patient does her own ADLs/IADLs. Uses a cane and drives.    OBJECTIVE:     Bambi Yates / Kimmy Roles / AIRWAY: IV    RESTRICTIONS/PRECAUTIONS:  Restrictions/Precautions: Fall Risk    PAIN: VITALS / O2:   Pre Treatment:   Pain Assessment: 0-10  Pain Level: 6  Pain Location: Head  Non-Pharmaceutical Pain Intervention(s): Provider notified (comment)      Post Treatment: same       Vitals   Vitals  Blood Pressure Sittin/62  Blood Pressure Standin/79      Oxygen            GROSS EVALUATION: INTACT IMPAIRED   (See Comments)   UE AROM [x] []   UE PROM [x] []   Strength [x]       Posture / Balance [] Posture: Good  Sitting - Static: Good  Sitting - Dynamic: Good  Standing - Static: Good  Standing - Dynamic:  (NT)   Sensation []     Coordination [x]       Tone []       Edema []    Activity Tolerance [] Treatment limited secondary to medical complications (free text) (BP, headache, dizziness)     Hand Dominance R [] L []      COGNITION/  PERCEPTION: INTACT IMPAIRED   (See Comments)   Orientation [x]     Vision []     Hearing []     Cognition  [x]     Perception [x]       MOBILITY: I Mod I S SBA CGA Min Mod Max Total  NT x2 Comments:   Bed Mobility    Rolling [] [] [] [] [] [] [] [] [] [x] []    Supine to Sit [] [] [] [] [] [] [] [] [] [x] []    Scooting [] [] [] [] [] [] [] [] [] [x] []    Sit to Supine [] [] [] [] [] [] [] [] [] [x] []    Transfers    Sit to Stand [] [] [] [x] [] [] [] [] [] [] []    Bed to Chair [] [] [] [] [] [] [] [] [] [x] []    Stand to Sit [] [] [] [x] [] [] [] [] [] [] []    Tub/Shower [] [] [] [] [] [] [] [] [] [x] []     Toilet [] [] [] [] [] [] [] [] [] [x] []      [] [] [] [] [] [] [] [] [] [] []    I=Independent, Mod I=Modified Independent, S=Supervision/Setup, SBA=Standby Assistance, CGA=Contact Guard Assistance, Min=Minimal Assistance, Mod=Moderate Assistance, Max=Maximal Assistance, Total=Total Assistance, NT=Not Tested    ACTIVITIES OF DAILY LIVING: I Mod I S SBA CGA Min Mod Max Total NT Comments   BASIC ADLs:              Upper Body Bathing  [] [] [] [] [] [] [] [] [] []    Lower Body Bathing [] [] [] [] [] [] [] [] [] []    Toileting [] [] [] [] [] [] [] [] [] []    Upper Body Dressing [] [] [x] [] [] [] [] [] [] []    Lower Body Dressing [] [] [] [] [] [] [] [] [] []    Feeding [] [] [x] [] [] [] [] [] [] []    Grooming [] [] [] [] [] [] [] [] [] []    Personal Device Care [] [] [] [] [] [] [] [] [] []    Functional Mobility [] [] [] [x] [] [] [] [] [] []    I=Independent, Mod I=Modified Independent, S=Supervision/Setup, SBA=Standby Assistance, CGA=Contact Guard Assistance, Min=Minimal Assistance, Mod=Moderate Assistance, Max=Maximal Assistance, Total=Total Assistance, NT=Not Tested    PLAN:   FREQUENCY/DURATION   OT Plan of Care: 3 times/week for duration of hospital stay or until stated goals are met, whichever comes first.    PROBLEM LIST:   (Skilled intervention is medically necessary to address:)  Decreased ADL/Functional Activities  Decreased Activity Tolerance  Decreased Transfer Abilities  Increased Pain   INTERVENTIONS PLANNED:  (Benefits and precautions of occupational therapy have been discussed with the patient.)  Self Care Training  Therapeutic Activity  Therapeutic Exercise/HEP  Neuromuscular Re-education  Manual Therapy  Education         TREATMENT:     EVALUATION: MODERATE COMPLEXITY: (Untimed Charge)    TREATMENT:   Self Care (8 minutes): Patient participated in lower body dressing and self feeding ADLs in supported sitting with no verbal cueing to increase independence. Patient also participated in sit to stand training to increase independence and increase safety awareness.      TREATMENT GRID:  N/A    AFTER TREATMENT PRECAUTIONS: Call light within reach, Chair, Needs within reach, and RN notified    INTERDISCIPLINARY COLLABORATION:  MD/ PA/ NP , RN/ PCT, and OT/ VEGA    EDUCATION:  Education Given To: Patient  Education Provided: Role of Therapy;Plan of Care  Education Outcome: Continued education needed    TOTAL TREATMENT DURATION AND TIME:  Time In: 1128  Time Out: [address]  Minutes: [address], OT

## 2022-09-26 NOTE — PROGRESS NOTES
Physical Therapy Note:    Physical therapy evaluation orders received and chart reviewed. Attempted to see patient this PM to initiate assessment. Patient just finished mobilizing with OT with +orthostatics and c/o headache. Will check back.  Thank you,    Shane Celestin, PT, DPT  9/26/22 14:20PM

## 2022-09-26 NOTE — PROGRESS NOTES
Hospitalist Progress Note   Admit Date:  2022 11:53 AM   Name:  Cornelius Ruby   Age:  78 y.o. Sex:  female  :  1940   MRN:  [id number]   Room:  [facility]/    Presenting Complaint: Loss of Consciousness     Reason(s) for Admission: Syncope and collapse [R55]  Hyponatremia [E87.1]     Hospital Course:   Cornelius Ruby is a 78 y.o. female with medical history of pAFIB on apixaban and sotalol ,aortic stensos s/p aVR and MAZE, SSS s/p MAZE, CAD, GERD w/ esophageal stricture, Liriano's esophagus s/p EGD 22who presented to ed via ems from [facility] s/p syncopal episode while doing light physical therapy. She was found to be hyponatremic. Admitted with hypovolemic hyponatremia. HCTZ has been held. Started on IVF with improvement in symptoms. IVF stopped. BP was uncontrolled which delayed discharge      Subjective & 24hr Events (22): Pt got a headache and felt dizzy standing up earlier. Feels better sitting down. Her BP is very uncontrolled. Added norvasc. No CP, SOB, other complaints      Assessment & Plan:       Hyponatremia  22 - -stable now. daily BMP  - Do not resume HCTZ at discharge. Syncope and collapse  -hypovolemic. Do not resume HCTZ at discharge. Short-segment Lriiano's esophagus  -cont PPI      Secondary hypercoagulable state ([address]  -on eliquis      Essential hypertension  22 - uncontrolled. Add norvasc      Paroxysmal atrial fibrillation (HCC)  -cont eliquis,       Anemia, unspecified  -daily CBC. Anticipated discharge needs:    -home maybe tomorrow    Diet:  ADULT DIET;  Regular  DVT PPx: on AC  Code status: Full Code    Hospital Problems:  Principal Problem:    Hyponatremia  Active Problems:    Short-segment Liriano's esophagus    Allergic rhinitis    Secondary hypercoagulable state ([address]    Hypomagnesemia    Essential hypertension    Paroxysmal atrial fibrillation (HCC)    Anemia, unspecified    Dyslipidemia    CAD (coronary artery disease)    Gastroesophageal reflux disease with esophagitis without hemorrhage    Cardiac pacemaker    Sick sinus syndrome (HCC)    S/P dilatation of esophageal stricture  Resolved Problems:    Syncope and collapse    Hypotension due to hypovolemia    Acute metabolic encephalopathy      Objective:   Patient Vitals for the past 24 hrs:   Temp Pulse Resp BP SpO2   09/26/22 0730 98.2 °F (36.8 °C) 70 18 (!) 178/65 99 %   09/26/22 0525 98.2 °F (36.8 °C) 59 18 (!) 153/65 97 %   09/26/22 0013 98.4 °F (36.9 °C) 59 18 (!) 136/48 96 %   09/25/22 2026 98.4 °F (36.9 °C) 70 18 (!) 180/63 97 %   09/25/22 1539 98.1 °F (36.7 °C) 70 16 (!) 181/73 99 %   09/25/22 1442 97.7 °F (36.5 °C) 70 14 (!) 169/66 99 %   09/25/22 1116 97.9 °F (36.6 °C) 70 16 (!) 167/69 99 %         Oxygen Therapy  SpO2: 99 %  O2 Device: None (Room air)    Estimated body mass index is 26.31 kg/m² as calculated from the following:    Height as of this encounter: 5' 7\" (1.702 m). Weight as of this encounter: 168 lb (76.2 kg). Intake/Output Summary (Last 24 hours) at 9/26/2022 0935  Last data filed at 9/26/2022 0915  Gross per 24 hour   Intake 220 ml   Output --   Net 220 ml           Physical Exam:     Blood pressure (!) 178/65, pulse 70, temperature 98.2 °F (36.8 °C), temperature source Oral, resp. rate 18, height 5' 7\" (1.702 m), weight 168 lb (76.2 kg), SpO2 99 %. General:    Well nourished. Head:  Normocephalic, atraumatic  Eyes:  Sclerae appear normal.  Pupils equally round. ENT:  Nares appear normal, no drainage. Moist oral mucosa  Neck:  No restricted ROM. Trachea midline   CV:   RRR. No jugular venous distension. Lungs:   No distress. Symmetric expansion. Abdomen:    nondistended. Extremities: No cyanosis or clubbing. No edema  Skin:     No rashes and normal coloration. Warm and dry. Neuro:  CN II-XII grossly intact. Sensation intact. A&Ox3  Psych:  Normal mood and affect.       I have personally reviewed labs and tests showing:  Recent Labs:  Recent Results (from the past 48 hour(s))   CBC with Auto Differential    Collection Time: 09/24/22 12:02 PM   Result Value Ref Range    WBC 9.8 4.3 - 11.1 K/uL    RBC 3.74 (L) 4.05 - 5.2 M/uL    Hemoglobin 11.4 (L) 11.7 - 15.4 g/dL    Hematocrit 32.9 (L) 35.8 - 46.3 %    MCV 88.0 79.6 - 97.8 FL    MCH 30.5 26.1 - 32.9 PG    MCHC 34.7 31.4 - 35.0 g/dL    RDW 13.2 11.9 - 14.6 %    Platelets 174 334 - 973 K/uL    MPV 9.2 (L) 9.4 - 12.3 FL    nRBC 0.00 0.0 - 0.2 K/uL    Differential Type AUTOMATED      Seg Neutrophils 71 43 - 78 %    Lymphocytes 13 13 - 44 %    Monocytes 13 (H) 4.0 - 12.0 %    Eosinophils % 1 0.5 - 7.8 %    Basophils 1 0.0 - 2.0 %    Immature Granulocytes 1 0.0 - 5.0 %    Segs Absolute 7.0 1.7 - 8.2 K/UL    Absolute Lymph # 1.2 0.5 - 4.6 K/UL    Absolute Mono # 1.3 0.1 - 1.3 K/UL    Absolute Eos # 0.1 0.0 - 0.8 K/UL    Basophils Absolute 0.1 0.0 - 0.2 K/UL    Absolute Immature Granulocyte 0.1 0.0 - 0.5 K/UL   Comprehensive Metabolic Panel    Collection Time: 09/24/22 12:02 PM   Result Value Ref Range    Sodium 123 (L) 136 - 145 mmol/L    Potassium 4.7 3.5 - 5.1 mmol/L    Chloride 89 (L) 101 - 110 mmol/L    CO2 26 21 - 32 mmol/L    Anion Gap 8 4 - 13 mmol/L    Glucose 135 (H) 65 - 100 mg/dL    BUN 20 8 - 23 MG/DL    Creatinine 1.10 (H) 0.6 - 1.0 MG/DL    GFR African American >60 >60 ml/min/1.73m2    GFR Non- 50 (L) >60 ml/min/1.73m2    Calcium 9.0 8.3 - 10.4 MG/DL    Total Bilirubin 0.9 0.2 - 1.1 MG/DL    ALT 22 12 - 65 U/L    AST 22 15 - 37 U/L    Alk Phosphatase 85 50 - 136 U/L    Total Protein 7.0 6.3 - 8.2 g/dL    Albumin 3.6 3.2 - 4.6 g/dL    Globulin 3.4 2.3 - 3.5 g/dL    Albumin/Globulin Ratio 1.1 (L) 1.2 - 3.5     Magnesium    Collection Time: 09/24/22 12:02 PM   Result Value Ref Range    Magnesium 1.8 1.8 - 2.4 mg/dL   Brain Natriuretic Peptide    Collection Time: 09/24/22 12:02 PM   Result Value Ref Range    NT Pro- <450 PG/ML   Troponin Collection Time: 09/24/22 12:02 PM   Result Value Ref Range    Troponin, High Sensitivity 4.3 0 - 14 pg/mL   Troponin    Collection Time: 09/24/22  2:33 PM   Result Value Ref Range    Troponin, High Sensitivity 4.5 0 - 14 pg/mL   Osmolality    Collection Time: 09/24/22  2:33 PM   Result Value Ref Range    Serum Osmolality 260 (L) 280 - 301 MOSM/kg H2O   TSH with Reflex    Collection Time: 09/24/22  2:33 PM   Result Value Ref Range    TSH w Free Thyroid if Abnormal 1.17 0.358 - 3.740 UIU/ML   Sodium, urine, random    Collection Time: 09/24/22  3:44 PM   Result Value Ref Range    SODIUM, RANDOM URINE 60 MMOL/L   Creatinine, Random Urine    Collection Time: 09/24/22  3:44 PM   Result Value Ref Range    Creatinine, Ur 17.00 mg/dL   Osmolality, Urine    Collection Time: 09/24/22  3:44 PM   Result Value Ref Range    Osmolality, Ur 234 50 - 1400 MOSM/kg H2O   POCT Urinalysis no Micro    Collection Time: 09/24/22  3:44 PM   Result Value Ref Range    Specific Gravity, Urine, POC 1.010 1.001 - 1.023      pH, Urine, POC 6.5 5.0 - 9.0      Protein, Urine, POC Negative NEG mg/dL    Glucose, UA POC Negative NEG mg/dL    Ketones, Urine, POC Negative NEG mg/dL    Bilirubin, Urine, POC Negative NEG      Blood, UA POC Negative NEG      URINE UROBILINOGEN POC 0.2 0.2 - 1.0 EU/dL    Nitrate, Urine, POC Negative NEG      Leukocyte Est, UA POC SMALL (A) NEG      Performed by: Robert Lincoln    Sodium    Collection Time: 09/24/22  5:56 PM   Result Value Ref Range    Sodium 124 (L) 136 - 145 mmol/L   Sodium    Collection Time: 09/25/22 12:11 AM   Result Value Ref Range    Sodium 124 (L) 136 - 145 mmol/L   Vitamin B12    Collection Time: 09/25/22  4:34 AM   Result Value Ref Range    Vitamin B-12 671 193 - 986 pg/mL   Basic Metabolic Panel    Collection Time: 09/25/22  4:34 AM   Result Value Ref Range    Sodium 127 (L) 136 - 145 mmol/L    Potassium 4.5 3.5 - 5.1 mmol/L    Chloride 91 (L) 101 - 110 mmol/L    CO2 26 21 - 32 mmol/L    Anion Gap 10 4 - 13 mmol/L    Glucose 86 65 - 100 mg/dL    BUN 13 8 - 23 MG/DL    Creatinine 0.70 0.6 - 1.0 MG/DL    GFR African American >60 >60 ml/min/1.73m2    GFR Non- >60 >60 ml/min/1.73m2    Calcium 9.4 8.3 - 10.4 MG/DL   Magnesium    Collection Time: 09/25/22  4:34 AM   Result Value Ref Range    Magnesium 2.2 1.8 - 2.4 mg/dL   Osmolality    Collection Time: 09/25/22  4:34 AM   Result Value Ref Range    Serum Osmolality 263 (L) 280 - 301 MOSM/kg H2O   Folate    Collection Time: 09/25/22  4:34 AM   Result Value Ref Range    Folate 14.6 3.1 - 17.5 ng/mL   Ferritin    Collection Time: 09/25/22  4:34 AM   Result Value Ref Range    Ferritin 149 8 - 388 NG/ML   Transferrin Saturation    Collection Time: 09/25/22  4:34 AM   Result Value Ref Range    Iron 52 35 - 150 ug/dL    TIBC 308 250 - 450 ug/dL    TRANSFERRIN SATURATION 17 (L) >20 %   Sodium, urine, random    Collection Time: 09/25/22  6:00 AM   Result Value Ref Range    SODIUM, RANDOM URINE 61 MMOL/L   Creatinine, Random Urine    Collection Time: 09/25/22  6:00 AM   Result Value Ref Range    Creatinine, Ur 22.00 mg/dL   Osmolality, Urine    Collection Time: 09/25/22  6:00 AM   Result Value Ref Range    Osmolality, Ur 214 50 - 1400 MOSM/kg H2O   Cortisol AM, Total    Collection Time: 09/25/22  6:33 AM   Result Value Ref Range    Cortisol - AM 19.2 7 - 25 ug/dL   Sodium    Collection Time: 09/25/22  9:48 AM   Result Value Ref Range    Sodium 127 (L) 136 - 145 mmol/L   Basic Metabolic Panel w/ Reflex to MG    Collection Time: 09/26/22  4:25 AM   Result Value Ref Range    Sodium 127 (L) 136 - 145 mmol/L    Potassium 4.6 3.5 - 5.1 mmol/L    Chloride 93 (L) 101 - 110 mmol/L    CO2 28 21 - 32 mmol/L    Anion Gap 6 4 - 13 mmol/L    Glucose 104 (H) 65 - 100 mg/dL    BUN 13 8 - 23 MG/DL    Creatinine 0.80 0.6 - 1.0 MG/DL    GFR African American >60 >60 ml/min/1.73m2    GFR Non- >60 >60 ml/min/1.73m2    Calcium 9.4 8.3 - 10.4 MG/DL   CBC Collection Time: 09/26/22  4:25 AM   Result Value Ref Range    WBC 8.4 4.3 - 11.1 K/uL    RBC 3.63 (L) 4.05 - 5.2 M/uL    Hemoglobin 10.8 (L) 11.7 - 15.4 g/dL    Hematocrit 32.0 (L) 35.8 - 46.3 %    MCV 88.2 79.6 - 97.8 FL    MCH 29.8 26.1 - 32.9 PG    MCHC 33.8 31.4 - 35.0 g/dL    RDW 13.3 11.9 - 14.6 %    Platelets 807 169 - 632 K/uL    MPV 9.4 9.4 - 12.3 FL    nRBC 0.00 0.0 - 0.2 K/uL       I have personally reviewed imaging studies showing: Other Studies:  XR CHEST PORTABLE   Final Result   Scarring or atelectasis in the right upper lobe, otherwise no acute   abnormality.           Current Meds:  Current Facility-Administered Medications   Medication Dose Route Frequency    amLODIPine (NORVASC) tablet 10 mg  10 mg Oral Daily    losartan (COZAAR) tablet 100 mg  100 mg Oral Nightly    tuberculin injection 5 Units  5 Units IntraDERmal Once    sodium chloride flush 0.9 % injection 5-40 mL  5-40 mL IntraVENous 2 times per day    sodium chloride flush 0.9 % injection 5-40 mL  5-40 mL IntraVENous PRN    0.9 % sodium chloride infusion   IntraVENous PRN    ondansetron (ZOFRAN-ODT) disintegrating tablet 4 mg  4 mg Oral Q8H PRN    Or    ondansetron (ZOFRAN) injection 4 mg  4 mg IntraVENous Q6H PRN    polyethylene glycol (GLYCOLAX) packet 17 g  17 g Oral Daily PRN    acetaminophen (TYLENOL) tablet 650 mg  650 mg Oral Q6H PRN    Or    acetaminophen (TYLENOL) suppository 650 mg  650 mg Rectal Q6H PRN    apixaban (ELIQUIS) tablet 5 mg  5 mg Oral BID    pantoprazole (PROTONIX) tablet 40 mg  40 mg Oral BID AC    aluminum & magnesium hydroxide-simethicone (MAALOX) 200-200-20 MG/5ML suspension 30 mL  30 mL Oral Q6H PRN    potassium chloride (KLOR-CON M) extended release tablet 40 mEq  40 mEq Oral PRN    Or    potassium bicarb-citric acid (EFFER-K) effervescent tablet 40 mEq  40 mEq Oral PRN    Or    potassium chloride 10 mEq/100 mL IVPB (Peripheral Line)  10 mEq IntraVENous PRN    magnesium sulfate 2000 mg in 50 mL IVPB premix 2,000 mg IntraVENous PRN    cetirizine (ZYRTEC) tablet 10 mg  10 mg Oral Daily    fluticasone (FLONASE) 50 MCG/ACT nasal spray 1 spray  1 spray Each Nostril Daily    polyethylene glycol (GLYCOLAX) packet 17 g  17 g Oral Daily    sennosides-docusate sodium (SENOKOT-S) 8.6-50 MG tablet 2 tablet  2 tablet Oral Nightly    pravastatin (PRAVACHOL) tablet 20 mg  20 mg Oral Nightly    sotalol (BETAPACE) tablet 80 mg  80 mg Oral Daily       Signed:  Maulik Martin MD    Part of this note may have been written by using a voice dictation software. The note has been proof read but may still contain some grammatical/other typographical errors.

## 2022-09-27 ENCOUNTER — APPOINTMENT (OUTPATIENT)
Dept: CT IMAGING | Age: 82
DRG: 643 | End: 2022-09-27
Payer: MEDICARE

## 2022-09-27 PROBLEM — I50.32 CHRONIC DIASTOLIC CONGESTIVE HEART FAILURE (HCC): Chronic | Status: ACTIVE | Noted: 2022-09-27

## 2022-09-27 LAB
ANION GAP SERPL CALC-SCNC: 4 MMOL/L (ref 4–13)
APPEARANCE UR: CLEAR
BACTERIA URNS QL MICRO: ABNORMAL /HPF
BILIRUB UR QL: NEGATIVE
BUN SERPL-MCNC: 14 MG/DL (ref 8–23)
CALCIUM SERPL-MCNC: 9.2 MG/DL (ref 8.3–10.4)
CASTS URNS QL MICRO: ABNORMAL /LPF
CHLORIDE SERPL-SCNC: 94 MMOL/L (ref 101–110)
CO2 SERPL-SCNC: 24 MMOL/L (ref 21–32)
COLOR UR: ABNORMAL
CREAT SERPL-MCNC: 0.8 MG/DL (ref 0.6–1)
EKG ATRIAL RATE: 69 BPM
EKG DIAGNOSIS: NORMAL
EKG P-R INTERVAL: 236 MS
EKG Q-T INTERVAL: 428 MS
EKG QRS DURATION: 90 MS
EKG QTC CALCULATION (BAZETT): 462 MS
EKG R AXIS: 51 DEGREES
EKG T AXIS: 70 DEGREES
EKG VENTRICULAR RATE: 70 BPM
EPI CELLS #/AREA URNS HPF: ABNORMAL /HPF
ERYTHROCYTE [DISTWIDTH] IN BLOOD BY AUTOMATED COUNT: 13.2 % (ref 11.9–14.6)
GLUCOSE SERPL-MCNC: 104 MG/DL (ref 65–100)
GLUCOSE UR STRIP.AUTO-MCNC: NEGATIVE MG/DL
HCT VFR BLD AUTO: 32.7 % (ref 35.8–46.3)
HGB BLD-MCNC: 11.3 G/DL (ref 11.7–15.4)
HGB UR QL STRIP: NEGATIVE
KETONES UR QL STRIP.AUTO: NEGATIVE MG/DL
LEUKOCYTE ESTERASE UR QL STRIP.AUTO: ABNORMAL
MCH RBC QN AUTO: 30.2 PG (ref 26.1–32.9)
MCHC RBC AUTO-ENTMCNC: 34.6 G/DL (ref 31.4–35)
MCV RBC AUTO: 87.4 FL (ref 79.6–97.8)
MUCOUS THREADS URNS QL MICRO: 0 /LPF
NITRITE UR QL STRIP.AUTO: NEGATIVE
NRBC # BLD: 0 K/UL (ref 0–0.2)
OSMOLALITY SERPL: 259 MOSM/KG H2O (ref 280–301)
PH UR STRIP: 7 [PH] (ref 5–9)
PLATELET # BLD AUTO: 264 K/UL (ref 150–450)
PMV BLD AUTO: 9 FL (ref 9.4–12.3)
POTASSIUM SERPL-SCNC: 4.4 MMOL/L (ref 3.5–5.1)
PROT UR STRIP-MCNC: NEGATIVE MG/DL
RBC # BLD AUTO: 3.74 M/UL (ref 4.05–5.2)
RBC #/AREA URNS HPF: ABNORMAL /HPF
SODIUM SERPL-SCNC: 122 MMOL/L (ref 136–145)
SODIUM SERPL-SCNC: 123 MMOL/L (ref 136–145)
SODIUM UR-SCNC: 43 MMOL/L
SP GR UR REFRACTOMETRY: 1.01 (ref 1–1.02)
TROPONIN I SERPL HS-MCNC: 6.8 PG/ML (ref 0–14)
URINE CULTURE IF INDICATED: ABNORMAL
UROBILINOGEN UR QL STRIP.AUTO: 1 EU/DL (ref 0.2–1)
WBC # BLD AUTO: 15.8 K/UL (ref 4.3–11.1)
WBC URNS QL MICRO: ABNORMAL /HPF

## 2022-09-27 PROCEDURE — 84484 ASSAY OF TROPONIN QUANT: CPT

## 2022-09-27 PROCEDURE — 70450 CT HEAD/BRAIN W/O DYE: CPT

## 2022-09-27 PROCEDURE — 2580000003 HC RX 258: Performed by: FAMILY MEDICINE

## 2022-09-27 PROCEDURE — 80048 BASIC METABOLIC PNL TOTAL CA: CPT

## 2022-09-27 PROCEDURE — 2580000003 HC RX 258: Performed by: INTERNAL MEDICINE

## 2022-09-27 PROCEDURE — 97161 PT EVAL LOW COMPLEX 20 MIN: CPT

## 2022-09-27 PROCEDURE — 84300 ASSAY OF URINE SODIUM: CPT

## 2022-09-27 PROCEDURE — 93005 ELECTROCARDIOGRAM TRACING: CPT | Performed by: FAMILY MEDICINE

## 2022-09-27 PROCEDURE — 87088 URINE BACTERIA CULTURE: CPT

## 2022-09-27 PROCEDURE — 6360000002 HC RX W HCPCS: Performed by: INTERNAL MEDICINE

## 2022-09-27 PROCEDURE — 1100000000 HC RM PRIVATE

## 2022-09-27 PROCEDURE — 6370000000 HC RX 637 (ALT 250 FOR IP): Performed by: INTERNAL MEDICINE

## 2022-09-27 PROCEDURE — 97530 THERAPEUTIC ACTIVITIES: CPT

## 2022-09-27 PROCEDURE — 83930 ASSAY OF BLOOD OSMOLALITY: CPT

## 2022-09-27 PROCEDURE — 87086 URINE CULTURE/COLONY COUNT: CPT

## 2022-09-27 PROCEDURE — 36415 COLL VENOUS BLD VENIPUNCTURE: CPT

## 2022-09-27 PROCEDURE — 6370000000 HC RX 637 (ALT 250 FOR IP): Performed by: FAMILY MEDICINE

## 2022-09-27 PROCEDURE — 81001 URINALYSIS AUTO W/SCOPE: CPT

## 2022-09-27 PROCEDURE — 83935 ASSAY OF URINE OSMOLALITY: CPT

## 2022-09-27 PROCEDURE — 87186 SC STD MICRODIL/AGAR DIL: CPT

## 2022-09-27 PROCEDURE — 84295 ASSAY OF SERUM SODIUM: CPT

## 2022-09-27 PROCEDURE — 85027 COMPLETE CBC AUTOMATED: CPT

## 2022-09-27 RX ORDER — HYDRALAZINE HYDROCHLORIDE 50 MG/1
100 TABLET, FILM COATED ORAL EVERY 8 HOURS SCHEDULED
Status: DISCONTINUED | OUTPATIENT
Start: 2022-09-27 | End: 2022-09-29 | Stop reason: HOSPADM

## 2022-09-27 RX ADMIN — PRAVASTATIN SODIUM 20 MG: 20 TABLET ORAL at 21:35

## 2022-09-27 RX ADMIN — CEFTRIAXONE 1000 MG: 1 INJECTION, POWDER, FOR SOLUTION INTRAMUSCULAR; INTRAVENOUS at 14:58

## 2022-09-27 RX ADMIN — PANTOPRAZOLE SODIUM 40 MG: 40 TABLET, DELAYED RELEASE ORAL at 14:59

## 2022-09-27 RX ADMIN — LOSARTAN POTASSIUM 100 MG: 50 TABLET, FILM COATED ORAL at 21:34

## 2022-09-27 RX ADMIN — APIXABAN 5 MG: 5 TABLET, FILM COATED ORAL at 21:35

## 2022-09-27 RX ADMIN — AMLODIPINE BESYLATE 10 MG: 10 TABLET ORAL at 08:47

## 2022-09-27 RX ADMIN — SOTALOL HYDROCHLORIDE 80 MG: 80 TABLET ORAL at 08:47

## 2022-09-27 RX ADMIN — APIXABAN 5 MG: 5 TABLET, FILM COATED ORAL at 08:47

## 2022-09-27 RX ADMIN — ALUMINUM HYDROXIDE, MAGNESIUM HYDROXIDE, AND SIMETHICONE 30 ML: 200; 200; 20 SUSPENSION ORAL at 05:39

## 2022-09-27 RX ADMIN — SENNOSIDES AND DOCUSATE SODIUM 2 TABLET: 8.6; 5 TABLET ORAL at 21:35

## 2022-09-27 RX ADMIN — SODIUM CHLORIDE, PRESERVATIVE FREE 5 ML: 5 INJECTION INTRAVENOUS at 21:44

## 2022-09-27 RX ADMIN — SODIUM CHLORIDE, PRESERVATIVE FREE 10 ML: 5 INJECTION INTRAVENOUS at 08:52

## 2022-09-27 RX ADMIN — PANTOPRAZOLE SODIUM 40 MG: 40 TABLET, DELAYED RELEASE ORAL at 05:39

## 2022-09-27 RX ADMIN — FLUTICASONE PROPIONATE 1 SPRAY: 50 SPRAY, METERED NASAL at 08:49

## 2022-09-27 RX ADMIN — CETIRIZINE HYDROCHLORIDE 10 MG: 10 TABLET ORAL at 08:48

## 2022-09-27 RX ADMIN — SODIUM CHLORIDE, PRESERVATIVE FREE 10 ML: 5 INJECTION INTRAVENOUS at 08:48

## 2022-09-27 RX ADMIN — HYDRALAZINE HYDROCHLORIDE 50 MG: 50 TABLET, FILM COATED ORAL at 05:39

## 2022-09-27 RX ADMIN — OXYCODONE 5 MG: 5 TABLET ORAL at 08:47

## 2022-09-27 RX ADMIN — POLYETHYLENE GLYCOL 3350 17 G: 17 POWDER, FOR SOLUTION ORAL at 08:48

## 2022-09-27 ASSESSMENT — PAIN SCALES - GENERAL
PAINLEVEL_OUTOF10: 0
PAINLEVEL_OUTOF10: 5
PAINLEVEL_OUTOF10: 0
PAINLEVEL_OUTOF10: 5

## 2022-09-27 ASSESSMENT — PAIN DESCRIPTION - ORIENTATION: ORIENTATION: MID

## 2022-09-27 ASSESSMENT — PAIN DESCRIPTION - LOCATION: LOCATION: NECK;HEAD

## 2022-09-27 ASSESSMENT — PAIN DESCRIPTION - DESCRIPTORS: DESCRIPTORS: ACHING

## 2022-09-27 NOTE — PLAN OF CARE
Problem: Discharge Planning  Goal: Discharge to home or other facility with appropriate resources  Outcome: Progressing  Flowsheets (Taken 9/26/2022 0904 by Adelaida Delaney RN)  Discharge to home or other facility with appropriate resources: Refer to discharge planning if patient needs post-hospital services based on physician order or complex needs related to functional status, cognitive ability or social support system     Problem: Pain  Goal: Verbalizes/displays adequate comfort level or baseline comfort level  Outcome: Progressing     Problem: Safety - Adult  Goal: Free from fall injury  Outcome: Progressing     Problem: ABCDS Injury Assessment  Goal: Absence of physical injury  Outcome: Progressing

## 2022-09-27 NOTE — PROGRESS NOTES
ACUTE PHYSICAL THERAPY GOALS:   (Developed with and agreed upon by patient and/or caregiver.)    (1.) Katherine Baig  will move from supine to sit and sit to supine  with INDEPENDENCE within 7 treatment day(s). (2.) Katherine Baig will transfer from bed to chair and chair to bed with MODIFIED INDEPENDENCE using the least restrictive device within 7 treatment day(s). (3.) Katherine Baig will ambulate with MODIFIED INDEPENDENCE for 500 feet with the least restrictive device within 7 treatment day(s). (4.) Katherine Baig will perform standing static and dynamic balance activities x 20 minutes with SUPERVISION to improve safety within 7 treatment day(s). (5.) Katherine Baig will ascend and descend 2 stairs using 1 hand rail(s) with SUPERVISION to improve functional mobility and safety within 7 treatment day(s). (6.) Katherine Baig will perform bilateral lower extremity exercises x 20 min for HEP with INDEPENDENCE to improve strength, endurance, and functional mobility within 7 treatment day(s). PHYSICAL THERAPY Initial Assessment, Daily Note, and AM  (Link to Caseload Tracking: PT Visit Days : 1  Acknowledge Orders  Time In/Out  PT Charge Capture  Rehab Caseload Tracker    Katherine Baig is a 78 y.o. female   PRIMARY DIAGNOSIS: Hyponatremia  Syncope and collapse [R55]  Hyponatremia [E87.1]       Reason for Referral: Difficulty in walking, Not elsewhere classified (R26.2)  History of falling (Z91.81)  Inpatient: Payor: Kelby Patel / Plan: [facility] PPO / Product Type: Medicare /     ASSESSMENT:     REHAB RECOMMENDATIONS:   Recommendation to date pending progress:  Setting:  Home Health Therapy    Equipment:    To Be Determined     ASSESSMENT:  Ms. Merline Mouse  is a 78year old F who presents after syncope episode. At baseline, pt reports she is independent. She uses a cane sometimes and has had 2 other episodes of falling due to dizziness.  She reports when this happens she typically feels lightheaded, fatigued and gets blurred vision. Pt received sitting in chair, reports feeling better today with no symptoms at rest. BP in sitting 137/59, BP in standing 128/61 with no symptoms with position change. She was able to ambulate 250 ft with SPC and CGA. She also participated in ~10 mins of standing balance activities and transferred to toilet and chair with CGA. Pt with decreased gait speed and some unsteadiness noted but overall pt tolerated treatment well. Pt with no symptoms throughout session, BP at end of session 143/60. Pt presents as functioning below her baseline, with deficits in mobility including transfers, gait, balance, and activity tolerance. Pt will benefit from skilled therapy services to address stated deficits to promote return to highest level of function, independence, and safety. Will continue to follow.      [facility] [address] [address] AM-PAC 6 Clicks Basic Mobility Inpatient Short Form  AM-PAC Mobility Inpatient   How much difficulty turning over in bed?: A Little  How much difficulty sitting down on / standing up from a chair with arms?: A Little  How much difficulty moving from lying on back to sitting on side of bed?: A Little  How much help from another person moving to and from a bed to a chair?: A Little  How much help from another person needed to walk in hospital room?: A Little  How much help from another person for climbing 3-5 steps with a railing?: A Little  [facility] Inpatient Mobility Raw Score : 18  AM-PAC Inpatient T-Scale Score : 43.63  Mobility Inpatient CMS 0-100% Score: 46.58  Mobility Inpatient CMS G-Code Modifier : CK    SUBJECTIVE:   Ms. Marvin Torres states, \"I had a stroke back in 2013\"     Social/Functional Lives With: Son, Daughter  Type of Home: House  Home Layout: One level  Home Access: Stairs to enter with rails  Entrance Stairs - Number of Steps: 2  Bathroom Equipment: Shower chair  Home Equipment: anthony Carnes  Receives Help From: Family  Active : Yes  Occupation: Retired    OBJECTIVE:     PAIN: VITALS / O2: PRECAUTION / Delilah Gum / Efra Rein:   Pre Treatment:   Pain Assessment: None - Denies Pain      Post Treatment: 0 Vitals   Vitals  BP: (!) 143/60  Orthostatic B/P and Pulse?: Yes  Blood Pressure Sittin/59  Blood Pressure Standin/61    Oxygen      Telemetry     RESTRICTIONS/PRECAUTIONS:  Restrictions/Precautions: Fall Risk                 GROSS EVALUATION: B LE Intact Impaired (Comments):   AROM [x]     PROM []    Strength []  Generalized weakness   Balance [] Posture: Good  Sitting - Static: Good  Sitting - Dynamic: Good  Standing - Static: Fair, +  Standing - Dynamic: Fair, +   Posture [] N/A   Sensation []     Coordination []      Tone []     Edema []    Activity Tolerance [] Patient limited by fatigue, Patient Tolerated treatment well    []      COGNITION/  PERCEPTION: Intact Impaired (Comments):   Orientation [x]     Vision [x]     Hearing [x]     Cognition  [x]       MOBILITY: I Mod I S SBA CGA Min Mod Max Total  NT x2 Comments:   Bed Mobility    Rolling [] [] [] [] [] [] [] [] [] [x] [] Pt received sitting in chair, requested to continue sitting up   Supine to Sit [] [] [] [] [] [] [] [] [] [x] []    Scooting [] [] [] [] [] [] [] [] [] [x] []    Sit to Supine [] [] [] [] [] [] [] [] [] [x] []    Transfers    Sit to Stand [] [] [] [] [x] [] [] [] [] [] []    Bed to Chair [] [] [] [x] [x] [] [] [] [] [] []    Stand to Sit [] [] [] [] [x] [] [] [] [] [] []     [] [] [] [] [] [] [] [] [] [] []    I=Independent, Mod I=Modified Independent, S=Supervision, SBA=Standby Assistance, CGA=Contact Guard Assistance,   Min=Minimal Assistance, Mod=Moderate Assistance, Max=Maximal Assistance, Total=Total Assistance, NT=Not Tested    GAIT: I Mod I S SBA CGA Min Mod Max Total  NT x2 Comments:   Level of Assistance [] [] [] [] [x] [] [] [] [] [] []    Distance 250 feet    DME SPC    Gait Quality Decreased estee  and Decreased step length    Weightbearing Status Stairs      I=Independent, Mod I=Modified Independent, S=Supervision, SBA=Standby Assistance, CGA=Contact Guard Assistance,   Min=Minimal Assistance, Mod=Moderate Assistance, Max=Maximal Assistance, Total=Total Assistance, NT=Not Tested    PLAN:   [address]: 3 times/week for duration of hospital stay or until stated goals are met, whichever comes first.    THERAPY PROGNOSIS: Good    PROBLEM LIST:   (Skilled intervention is medically necessary to address:)  Decreased ADL/Functional Activities  Decreased Activity Tolerance  Decreased Balance  Decreased Gait Ability  Decreased Strength  Decreased Transfer Abilities INTERVENTIONS PLANNED:   (Benefits and precautions of physical therapy have been discussed with the patient.)  Self Care Training  Therapeutic Activity  Therapeutic Exercise/HEP  Neuromuscular Re-education  Gait Training  Education       TREATMENT:   EVALUATION: LOW COMPLEXITY: (Untimed Charge)    TREATMENT:   Therapeutic Activity (29 Minutes): Therapeutic activity included Scooting, Transfer Training, Ambulation on level ground, Sitting balance , and Standing balance to improve functional Activity tolerance, Balance, Mobility, and Strength. TREATMENT GRID:  N/A    AFTER TREATMENT PRECAUTIONS: Bed/Chair Locked, Call light within reach, Chair, Needs within reach, and RN notified    INTERDISCIPLINARY COLLABORATION:  RN/ PCT    EDUCATION: Education Given To: Patient  Education Provided: Role of Therapy;Plan of Care;Transfer Training; Fall Prevention Strategies; Equipment  Education Method: Verbal  Barriers to Learning: None  Education Outcome: Verbalized understanding    TIME IN/OUT:  Time In: 0942  Time Out: 1019  Minutes: Jude Jara 39. TERENCE, PT

## 2022-09-27 NOTE — PROGRESS NOTES
Pt resting in chair comfortably at this time, alert and oriented times 4. No distress noted, respirations even and unlabored on room air. Pt denies pain at this time. Pt instructed to call for assistance if needed, call light in place, will continue to monitor. Will prepare for bedside shift report.

## 2022-09-27 NOTE — PROGRESS NOTES
Hospitalist Progress Note   Admit Date:  2022 11:53 AM   Name:  Jasmine Bell   Age:  78 y.o. Sex:  female  :  1940   MRN:  [id number]   Room:  [facility]/    Presenting Complaint: Loss of Consciousness     Reason(s) for Admission: Syncope and collapse [R55]  Hyponatremia [E87.1]     Hospital Course:   Jasmine Bell is a 78 y.o. female with medical history of pAFIB on apixaban and sotalol, aortic stensos s/p aVR and MAZE, SSS s/p MAZE, CAD, GERD w/ esophageal stricture, Liriano's esophagus s/p EGD 22 who presented to ed via ems from [facility] s/p syncopal episode while doing light physical therapy. She was found to be hyponatremic. Admitted with hypovolemic hyponatremia. HCTZ has been held. Started on IVF with improvement in symptoms and slight improvement in Na. IVF stopped. Na worsened on . Urine and serum osm reordered      Subjective & 24hr Events (22): Pt says she still has headache in front and back of head, seems to fluctuate somewhat in severity, but is constant. Had some reflux last night; after eating dinner and lying down. better now. No fevers, SOB      Assessment & Plan:       Hyponatremia  22 - worse today. Recheck urine osm, serum osm, urine Na. Serial serum Na checks  -TSH and AM cortisol levels ok. - Do not resume HCTZ at discharge. Leukocytosis  22 - new issue. recheck UA      Headache  22 - new issue. thought to be BP related initially but still persists. Check CT head      Syncope and collapse  -resolved. Doing better now ambulating unassisted after IVF      Essential hypertension  22 - better controlled today. Cont current meds for now. Anemia, unspecified  22 - daily CBC.   Hb stable today      Paroxysmal atrial fibrillation (HCC)    Secondary hypercoagulable state ([facility] [address]  -cont eliquis, betapace      Short-segment Liriano's esophagus  -cont PPI      Anticipated discharge needs:    -home maybe equally round. ENT:  Nares appear normal, no drainage. Moist oral mucosa  Neck:  No restricted ROM. Trachea midline   CV:   RRR. No jugular venous distension. Lungs:   No distress. Symmetric expansion. Abdomen:    nondistended. Extremities: No cyanosis or clubbing. No edema  Skin:     No rashes and normal coloration. Warm and dry. Neuro:  CN II-XII grossly intact. Sensation intact. A&Ox3  Psych:  Normal mood and affect.       I have personally reviewed labs and tests showing:  Recent Labs:  Recent Results (from the past 48 hour(s))   Sodium    Collection Time: 09/25/22  9:48 AM   Result Value Ref Range    Sodium 127 (L) 136 - 145 mmol/L   Basic Metabolic Panel w/ Reflex to MG    Collection Time: 09/26/22  4:25 AM   Result Value Ref Range    Sodium 127 (L) 136 - 145 mmol/L    Potassium 4.6 3.5 - 5.1 mmol/L    Chloride 93 (L) 101 - 110 mmol/L    CO2 28 21 - 32 mmol/L    Anion Gap 6 4 - 13 mmol/L    Glucose 104 (H) 65 - 100 mg/dL    BUN 13 8 - 23 MG/DL    Creatinine 0.80 0.6 - 1.0 MG/DL    GFR African American >60 >60 ml/min/1.73m2    GFR Non- >60 >60 ml/min/1.73m2    Calcium 9.4 8.3 - 10.4 MG/DL   CBC    Collection Time: 09/26/22  4:25 AM   Result Value Ref Range    WBC 8.4 4.3 - 11.1 K/uL    RBC 3.63 (L) 4.05 - 5.2 M/uL    Hemoglobin 10.8 (L) 11.7 - 15.4 g/dL    Hematocrit 32.0 (L) 35.8 - 46.3 %    MCV 88.2 79.6 - 97.8 FL    MCH 29.8 26.1 - 32.9 PG    MCHC 33.8 31.4 - 35.0 g/dL    RDW 13.3 11.9 - 14.6 %    Platelets 183 817 - 453 K/uL    MPV 9.4 9.4 - 12.3 FL    nRBC 0.00 0.0 - 0.2 K/uL   Basic Metabolic Panel w/ Reflex to MG    Collection Time: 09/27/22  2:59 AM   Result Value Ref Range    Sodium 122 (L) 136 - 145 mmol/L    Potassium 4.4 3.5 - 5.1 mmol/L    Chloride 94 (L) 101 - 110 mmol/L    CO2 24 21 - 32 mmol/L    Anion Gap 4 4 - 13 mmol/L    Glucose 104 (H) 65 - 100 mg/dL    BUN 14 8 - 23 MG/DL    Creatinine 0.80 0.6 - 1.0 MG/DL    GFR African American >60 >60 ml/min/1.73m2    GFR Non-African American >60 >60 ml/min/1.73m2    Calcium 9.2 8.3 - 10.4 MG/DL   CBC    Collection Time: 09/27/22  2:59 AM   Result Value Ref Range    WBC 15.8 (H) 4.3 - 11.1 K/uL    RBC 3.74 (L) 4.05 - 5.2 M/uL    Hemoglobin 11.3 (L) 11.7 - 15.4 g/dL    Hematocrit 32.7 (L) 35.8 - 46.3 %    MCV 87.4 79.6 - 97.8 FL    MCH 30.2 26.1 - 32.9 PG    MCHC 34.6 31.4 - 35.0 g/dL    RDW 13.2 11.9 - 14.6 %    Platelets 773 868 - 786 K/uL    MPV 9.0 (L) 9.4 - 12.3 FL    nRBC 0.00 0.0 - 0.2 K/uL   EKG 12 Lead    Collection Time: 09/27/22  6:01 AM   Result Value Ref Range    Ventricular Rate 70 BPM    Atrial Rate 69 BPM    P-R Interval 236 ms    QRS Duration 90 ms    Q-T Interval 428 ms    QTc Calculation (Bazett) 462 ms    R Axis 51 degrees    T Axis 70 degrees    Diagnosis Atrial-paced rhythm with prolonged AV conduction    Troponin    Collection Time: 09/27/22  6:11 AM   Result Value Ref Range    Troponin, High Sensitivity 6.8 0 - 14 pg/mL       I have personally reviewed imaging studies showing: Other Studies:  XR CHEST PORTABLE   Final Result   Scarring or atelectasis in the right upper lobe, otherwise no acute   abnormality.           Current Meds:  Current Facility-Administered Medications   Medication Dose Route Frequency    amLODIPine (NORVASC) tablet 10 mg  10 mg Oral Daily    oxyCODONE (ROXICODONE) immediate release tablet 5 mg  5 mg Oral Q4H PRN    hydrALAZINE (APRESOLINE) tablet 50 mg  50 mg Oral 3 times per day    losartan (COZAAR) tablet 100 mg  100 mg Oral Nightly    sodium chloride flush 0.9 % injection 5-40 mL  5-40 mL IntraVENous 2 times per day    sodium chloride flush 0.9 % injection 5-40 mL  5-40 mL IntraVENous PRN    0.9 % sodium chloride infusion   IntraVENous PRN    ondansetron (ZOFRAN-ODT) disintegrating tablet 4 mg  4 mg Oral Q8H PRN    Or    ondansetron (ZOFRAN) injection 4 mg  4 mg IntraVENous Q6H PRN    polyethylene glycol (GLYCOLAX) packet 17 g  17 g Oral Daily PRN    acetaminophen (TYLENOL) tablet 650 mg  650 mg Oral Q6H PRN    Or    acetaminophen (TYLENOL) suppository 650 mg  650 mg Rectal Q6H PRN    apixaban (ELIQUIS) tablet 5 mg  5 mg Oral BID    pantoprazole (PROTONIX) tablet 40 mg  40 mg Oral BID AC    aluminum & magnesium hydroxide-simethicone (MAALOX) 200-200-20 MG/5ML suspension 30 mL  30 mL Oral Q6H PRN    potassium chloride (KLOR-CON M) extended release tablet 40 mEq  40 mEq Oral PRN    Or    potassium bicarb-citric acid (EFFER-K) effervescent tablet 40 mEq  40 mEq Oral PRN    Or    potassium chloride 10 mEq/100 mL IVPB (Peripheral Line)  10 mEq IntraVENous PRN    magnesium sulfate 2000 mg in 50 mL IVPB premix  2,000 mg IntraVENous PRN    cetirizine (ZYRTEC) tablet 10 mg  10 mg Oral Daily    fluticasone (FLONASE) 50 MCG/ACT nasal spray 1 spray  1 spray Each Nostril Daily    polyethylene glycol (GLYCOLAX) packet 17 g  17 g Oral Daily    sennosides-docusate sodium (SENOKOT-S) 8.6-50 MG tablet 2 tablet  2 tablet Oral Nightly    pravastatin (PRAVACHOL) tablet 20 mg  20 mg Oral Nightly    sotalol (BETAPACE) tablet 80 mg  80 mg Oral Daily       Signed:  Dirk Almanza MD    Part of this note may have been written by using a voice dictation software. The note has been proof read but may still contain some grammatical/other typographical errors.

## 2022-09-27 NOTE — PROGRESS NOTES
Pt resting in bed comfortably at this time, alert and oriented times 4. No distress noted, respirations even and unlabored on room air. Pt ambulates in room. Pt instructed to call for assistance if needed, call light in place, will continue to monitor.

## 2022-09-27 NOTE — PROGRESS NOTES
Patient just told staff that around 2am her chest was hurting but didn't want to mention it to staff until now. She is unsure if its ingestion or heart related and had a hard time describing her symptoms but she mentioned burping when it was hurting around 2am. She did state that its not hurting as bad as it was. We just gave her some maalox and has felt some relief after giving that along with her protonix that was given. She is also having a lot of gas. BP was high but she has now received her hydralazine that's was schedule. She also is telling us she has been having some mild numbness/tingling in her bilateral hands/feet. Patient also mentioned that she does not want to discharge today if all this is going on.

## 2022-09-27 NOTE — PROGRESS NOTES
Pt resting in bed comfortably at this time, alert and oriented times 4. No distress noted, respirations even and unlabored on room air. Pt instructed to call for assistance if needed, call light in place, will continue to monitor.

## 2022-09-27 NOTE — PROGRESS NOTES
UA indicative of UTI. Start rocephin. Reflex culture sent. Still awaiting urine Osm; serum osm low. 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 Cortisol and TSH were normal and only had mild transient improvement with NS IVF. Try fluid restriction starting now. Hold hydralazine as her BP may drop with this.

## 2022-09-28 LAB
ANION GAP SERPL CALC-SCNC: 6 MMOL/L (ref 4–13)
BUN SERPL-MCNC: 15 MG/DL (ref 8–23)
CALCIUM SERPL-MCNC: 8.7 MG/DL (ref 8.3–10.4)
CHLORIDE SERPL-SCNC: 93 MMOL/L (ref 101–110)
CO2 SERPL-SCNC: 26 MMOL/L (ref 21–32)
CREAT SERPL-MCNC: 0.9 MG/DL (ref 0.6–1)
ERYTHROCYTE [DISTWIDTH] IN BLOOD BY AUTOMATED COUNT: 13.5 % (ref 11.9–14.6)
GLUCOSE BLD STRIP.AUTO-MCNC: 133 MG/DL (ref 65–100)
GLUCOSE SERPL-MCNC: 149 MG/DL (ref 65–100)
HCT VFR BLD AUTO: 32 % (ref 35.8–46.3)
HGB BLD-MCNC: 11.1 G/DL (ref 11.7–15.4)
MAGNESIUM SERPL-MCNC: 1.8 MG/DL (ref 1.8–2.4)
MCH RBC QN AUTO: 30.5 PG (ref 26.1–32.9)
MCHC RBC AUTO-ENTMCNC: 34.7 G/DL (ref 31.4–35)
MCV RBC AUTO: 87.9 FL (ref 79.6–97.8)
NRBC # BLD: 0 K/UL (ref 0–0.2)
OSMOLALITY UR: 295 MOSM/KG H2O (ref 50–1400)
PLATELET # BLD AUTO: 247 K/UL (ref 150–450)
PMV BLD AUTO: 9.2 FL (ref 9.4–12.3)
POTASSIUM SERPL-SCNC: 4.2 MMOL/L (ref 3.5–5.1)
RBC # BLD AUTO: 3.64 M/UL (ref 4.05–5.2)
SERVICE CMNT-IMP: ABNORMAL
SODIUM SERPL-SCNC: 122 MMOL/L (ref 136–145)
SODIUM SERPL-SCNC: 125 MMOL/L (ref 136–145)
SODIUM SERPL-SCNC: 126 MMOL/L (ref 136–145)
SODIUM SERPL-SCNC: 126 MMOL/L (ref 136–145)
WBC # BLD AUTO: 10.4 K/UL (ref 4.3–11.1)

## 2022-09-28 PROCEDURE — 2580000003 HC RX 258: Performed by: FAMILY MEDICINE

## 2022-09-28 PROCEDURE — 85027 COMPLETE CBC AUTOMATED: CPT

## 2022-09-28 PROCEDURE — 6360000002 HC RX W HCPCS: Performed by: INTERNAL MEDICINE

## 2022-09-28 PROCEDURE — 36415 COLL VENOUS BLD VENIPUNCTURE: CPT

## 2022-09-28 PROCEDURE — 2580000003 HC RX 258: Performed by: PHYSICIAN ASSISTANT

## 2022-09-28 PROCEDURE — 97535 SELF CARE MNGMENT TRAINING: CPT

## 2022-09-28 PROCEDURE — 97530 THERAPEUTIC ACTIVITIES: CPT

## 2022-09-28 PROCEDURE — 6370000000 HC RX 637 (ALT 250 FOR IP): Performed by: PHYSICIAN ASSISTANT

## 2022-09-28 PROCEDURE — 83735 ASSAY OF MAGNESIUM: CPT

## 2022-09-28 PROCEDURE — 6370000000 HC RX 637 (ALT 250 FOR IP): Performed by: FAMILY MEDICINE

## 2022-09-28 PROCEDURE — 1100000000 HC RM PRIVATE

## 2022-09-28 PROCEDURE — 2580000003 HC RX 258: Performed by: INTERNAL MEDICINE

## 2022-09-28 PROCEDURE — 82962 GLUCOSE BLOOD TEST: CPT

## 2022-09-28 PROCEDURE — 80048 BASIC METABOLIC PNL TOTAL CA: CPT

## 2022-09-28 PROCEDURE — 84295 ASSAY OF SERUM SODIUM: CPT

## 2022-09-28 RX ORDER — SODIUM CHLORIDE 1000 MG
1 TABLET, SOLUBLE MISCELLANEOUS 2 TIMES DAILY WITH MEALS
Status: DISCONTINUED | OUTPATIENT
Start: 2022-09-28 | End: 2022-09-29 | Stop reason: HOSPADM

## 2022-09-28 RX ORDER — SOTALOL HYDROCHLORIDE 80 MG/1
80 TABLET ORAL ONCE
Status: COMPLETED | OUTPATIENT
Start: 2022-09-28 | End: 2022-09-28

## 2022-09-28 RX ORDER — 0.9 % SODIUM CHLORIDE 0.9 %
1000 INTRAVENOUS SOLUTION INTRAVENOUS ONCE
Status: COMPLETED | OUTPATIENT
Start: 2022-09-28 | End: 2022-09-28

## 2022-09-28 RX ADMIN — CETIRIZINE HYDROCHLORIDE 10 MG: 10 TABLET ORAL at 09:45

## 2022-09-28 RX ADMIN — Medication 1 G: at 11:32

## 2022-09-28 RX ADMIN — CEFTRIAXONE 1000 MG: 1 INJECTION, POWDER, FOR SOLUTION INTRAMUSCULAR; INTRAVENOUS at 16:27

## 2022-09-28 RX ADMIN — APIXABAN 5 MG: 5 TABLET, FILM COATED ORAL at 09:45

## 2022-09-28 RX ADMIN — FLUTICASONE PROPIONATE 1 SPRAY: 50 SPRAY, METERED NASAL at 09:50

## 2022-09-28 RX ADMIN — PRAVASTATIN SODIUM 20 MG: 20 TABLET ORAL at 20:43

## 2022-09-28 RX ADMIN — SENNOSIDES AND DOCUSATE SODIUM 2 TABLET: 8.6; 5 TABLET ORAL at 20:43

## 2022-09-28 RX ADMIN — PANTOPRAZOLE SODIUM 40 MG: 40 TABLET, DELAYED RELEASE ORAL at 06:20

## 2022-09-28 RX ADMIN — Medication 1 G: at 17:01

## 2022-09-28 RX ADMIN — SODIUM CHLORIDE, PRESERVATIVE FREE 10 ML: 5 INJECTION INTRAVENOUS at 09:46

## 2022-09-28 RX ADMIN — SODIUM CHLORIDE 1000 ML: 9 INJECTION, SOLUTION INTRAVENOUS at 09:51

## 2022-09-28 RX ADMIN — SODIUM CHLORIDE, PRESERVATIVE FREE 10 ML: 5 INJECTION INTRAVENOUS at 20:43

## 2022-09-28 RX ADMIN — SOTALOL HYDROCHLORIDE 80 MG: 80 TABLET ORAL at 11:32

## 2022-09-28 RX ADMIN — PANTOPRAZOLE SODIUM 40 MG: 40 TABLET, DELAYED RELEASE ORAL at 17:01

## 2022-09-28 RX ADMIN — APIXABAN 5 MG: 5 TABLET, FILM COATED ORAL at 20:43

## 2022-09-28 ASSESSMENT — PAIN SCALES - GENERAL
PAINLEVEL_OUTOF10: 0

## 2022-09-28 NOTE — FLOWSHEET NOTE
09/28/22 1135 09/28/22 1136 09/28/22 1139   Vital Signs   Heart Rate 70  --  69   BP (!) 136/48 (!) 144/52 (!) 146/47   MAP (Calculated) 77.33 82.67 80   Patient Position Supine Sitting Standing

## 2022-09-28 NOTE — PROGRESS NOTES
Hospitalist Progress Note   Admit Date:  2022 11:53 AM   Name:  Cornelius Ruby   Age:  78 y.o. Sex:  female  :  1940   MRN:  [id number]   Room:      Presenting Complaint: Loss of Consciousness     Reason(s) for Admission: Syncope and collapse [R55]  Hyponatremia [E87.1]     Hospital Course:   Cornelius Ruby is a 78 y.o. female with medical history of pAFIB on apixaban and sotalol, aortic stensos s/p aVR and MAZE, SSS s/p MAZE, CAD, GERD w/ esophageal stricture, Liriano's esophagus s/p EGD 22, hx of CVA, Bell's palsy with chronic left facial droop who presented to ED via EMS from [facility] s/p syncopal episode while doing light physical therapy. She was found to be hyponatremic. Admitted with hypovolemic hyponatremia. HCTZ has been held. Started on IVF with improvement in symptoms and slight improvement in Na. IVF stopped. Na worsened on . Repeated urine osmolality wnl with low serum osmolality noted 2022. Earlier this AM, CODE S called by staff as pt was minimally responsive. Upon arrival to bedside, pt was found to be hypotensive, BP 66/42. RN reports patient has just gotten out of bed and attempted to ambulate to bathroom. Pt was returned to bed, placed in Trendelenburg with interval neurologic improvement and SBP improved to 75mmhg. IVF opened wide and BP improved; CODE S canceled. Subjective & 24hr Events (22): \"I just felt wobbly and then everyone came into the room. \"  Pleasant lucid 82y. o. WF laying in bed admitting to feeling light headed earlier. Denies HA, visual deficits, N/V, chest pain, sensation loss, dyspnea, abd pains.       Assessment & Plan:     Principal Problem:    Hyponatremia  - start salt tabs bid  - diuretics held  - f/u AM BMP    Active Problems:    Near syncope / hypotension  - attributed to hypotension  - d/w clinical pharmacist, BP meds incrementally increased past 48hrs, suspect over medication  - BP meds held, check orthostatic VS; consider florinef if positive or symptomatic   - placed on telemetry       Leukocytosis / UTI  - WBC normalized; on empiric rocephin  - pt afebrile  - f/u Ucx final results (GNR)      Headache  - denies this AM      Hypertension  - BP meds held this AM with hypotension  - monitor      PAF  - cont betapace / apixaban as dosed      Short-segment Liriano's esophagus  - cont PPI      Allergic rhinitis  - cont zyrtec      Secondary hypercoagulable state ([facility] [address]  - cont apixaban  - h/h ~stable      History of CVA with residual deficit  - aware      Chronic diastolic congestive heart failure ([facility] [address]  - appears euvolemic      SSS s/p cardiac pacemaker  - aware      Anticipated discharge needs:    - pending clinical course    Diet:  ADULT DIET;  Regular; 1500 ml  DVT PPx: apixaban  Code status: Full Code    Hospital Problems:  Principal Problem:    Hyponatremia  Active Problems:    Short-segment Liriano's esophagus    Allergic rhinitis    Secondary hypercoagulable state ([facility] [address]    Hypomagnesemia    History of CVA with residual deficit    Chronic diastolic congestive heart failure (HCC)    Essential hypertension    Paroxysmal atrial fibrillation (HCC)    Anemia, unspecified    Dyslipidemia    CAD (coronary artery disease)    Gastroesophageal reflux disease with esophagitis without hemorrhage    Cardiac pacemaker    Sick sinus syndrome (HCC)    S/P dilatation of esophageal stricture  Resolved Problems:    Syncope and collapse    Hypotension due to hypovolemia    Acute metabolic encephalopathy      Objective:   Patient Vitals for the past 24 hrs:   Temp Pulse Resp BP SpO2   09/28/22 1050 97.5 °F (36.4 °C) 70 16 (!) 147/50 98 %   09/28/22 0834 -- -- -- (!) 95/42 --   09/28/22 0830 -- -- -- (!) 90/42 --   09/28/22 0829 -- 70 16 (!) 66/42 --   09/28/22 0717 97.6 °F (36.4 °C) 70 18 (!) 142/52 97 %   09/28/22 0501 97.9 °F (36.6 °C) 59 20 (!) 106/49 97 %   09/27/22 2345 97.5 °F (36.4 °C) 59 20 (!) 106/49 99 %   09/27/22 1926 97.7 °F (36.5 °C) 70 20 134/72 98 %   09/27/22 1511 -- 70 -- (!) 134/58 --   09/27/22 1509 -- 70 -- (!) 155/80 --   09/27/22 1507 97.5 °F (36.4 °C) 70 17 133/64 98 %   09/27/22 1327 -- 70 -- (!) 117/53 --       Oxygen Therapy  SpO2: 98 %  O2 Device: None (Room air)    Estimated body mass index is 26.31 kg/m² as calculated from the following:    Height as of this encounter: 5' 7\" (1.702 m). Weight as of this encounter: 168 lb (76.2 kg). Intake/Output Summary (Last 24 hours) at 9/28/2022 1103  Last data filed at 9/28/2022 1049  Gross per 24 hour   Intake 472 ml   Output 1100 ml   Net -628 ml         Physical Exam:     Blood pressure (!) 147/50, pulse 70, temperature 97.5 °F (36.4 °C), resp. rate 16, height 5' 7\" (1.702 m), weight 168 lb (76.2 kg), SpO2 98 %. General:    Well nourished. Head:  Normocephalic, atraumatic  Eyes:  Sclerae appear normal.  Pupils equally round. ENT:  Mild left facial droop (chronic); nares appear normal, no drainage. Moist oral mucosa  Neck:  No restricted ROM. Trachea midline   CV:   Irreg rhythm, rate ~controlled  Lungs:   CTAB. No wheezing, rhonchi, or rales. Symmetric expansion. Abdomen: Bowel sounds present. Soft, nontender, nondistended. Extremities: No cyanosis or clubbing. No edema.  + mvmt x 4  Skin:     No rashes and normal coloration. Warm and dry. Neuro:  Sensation intact. A&Ox3; no acute gross focal deficits  Psych:  Normal mood and affect.       I have personally reviewed labs and tests showing:  Recent Labs:  Recent Results (from the past 48 hour(s))   PLEASE READ & DOCUMENT PPD TEST IN 24 HRS    Collection Time: 09/26/22 12:00 PM   Result Value Ref Range    PPD, (POC) Negative Negative    mm Induration 0 0 - 5 mm   Basic Metabolic Panel w/ Reflex to MG    Collection Time: 09/27/22  2:59 AM   Result Value Ref Range    Sodium 122 (L) 136 - 145 mmol/L    Potassium 4.4 3.5 - 5.1 mmol/L    Chloride 94 (L) 101 - 110 mmol/L    CO2 24 21 - 32 mmol/L    Anion Gap 4 4 - 13 mmol/L    Glucose 104 (H) 65 - 100 mg/dL    BUN 14 8 - 23 MG/DL    Creatinine 0.80 0.6 - 1.0 MG/DL    GFR African American >60 >60 ml/min/1.73m2    GFR Non- >60 >60 ml/min/1.73m2    Calcium 9.2 8.3 - 10.4 MG/DL   CBC    Collection Time: 09/27/22  2:59 AM   Result Value Ref Range    WBC 15.8 (H) 4.3 - 11.1 K/uL    RBC 3.74 (L) 4.05 - 5.2 M/uL    Hemoglobin 11.3 (L) 11.7 - 15.4 g/dL    Hematocrit 32.7 (L) 35.8 - 46.3 %    MCV 87.4 79.6 - 97.8 FL    MCH 30.2 26.1 - 32.9 PG    MCHC 34.6 31.4 - 35.0 g/dL    RDW 13.2 11.9 - 14.6 %    Platelets 275 771 - 403 K/uL    MPV 9.0 (L) 9.4 - 12.3 FL    nRBC 0.00 0.0 - 0.2 K/uL   EKG 12 Lead    Collection Time: 09/27/22  6:01 AM   Result Value Ref Range    Ventricular Rate 70 BPM    Atrial Rate 69 BPM    P-R Interval 236 ms    QRS Duration 90 ms    Q-T Interval 428 ms    QTc Calculation (Bazett) 462 ms    R Axis 51 degrees    T Axis 70 degrees    Diagnosis Atrial-paced rhythm with prolonged AV conduction    Troponin    Collection Time: 09/27/22  6:11 AM   Result Value Ref Range    Troponin, High Sensitivity 6.8 0 - 14 pg/mL   Osmolality    Collection Time: 09/27/22  6:11 AM   Result Value Ref Range    Serum Osmolality 259 (L) 280 - 301 MOSM/kg H2O   Osmolality, Urine    Collection Time: 09/27/22  1:22 PM   Result Value Ref Range    Osmolality, Ur 295 50 - 1400 MOSM/kg H2O   Sodium, urine, random    Collection Time: 09/27/22  1:22 PM   Result Value Ref Range    SODIUM, RANDOM URINE 43 MMOL/L   Urinalysis with Reflex to Culture    Collection Time: 09/27/22  1:22 PM    Specimen: Urine   Result Value Ref Range    Color, UA YELLOW/STRAW      Appearance CLEAR      Specific Gravity, UA 1.009 1.001 - 1.023      pH, Urine 7.0 5.0 - 9.0      Protein, UA Negative NEG mg/dL    Glucose, UA Negative mg/dL    Ketones, Urine Negative NEG mg/dL    Bilirubin Urine Negative NEG      Blood, Urine Negative NEG      Urobilinogen, Urine 1.0 0.2 - 1.0 EU/dL    Nitrite, Urine Negative NEG      Leukocyte Esterase, Urine MODERATE (A) NEG      Urine Culture if Indicated URINE CULTURE ORDERED      WBC, UA  (A) NORM /hpf    RBC, UA 0-5 (A) NORM /hpf    BACTERIA, URINE 2+ (A) NORM /hpf    Epithelial Cells UA 0-5 (A) NORM /hpf    Casts 0-2 (A) NORM /lpf    Mucus, UA 0 0 /lpf   Culture, Urine    Collection Time: 09/27/22  1:22 PM    Specimen: Urine   Result Value Ref Range    Special Requests NO SPECIAL REQUESTS  Reflexed from [id number]        Culture (A)       >100,000 COLONIES/mL GRAM NEGATIVE RODS IDENTIFICATION AND SUSCEPTIBILITY TO FOLLOW   Sodium    Collection Time: 09/27/22  2:13 PM   Result Value Ref Range    Sodium 123 (L) 136 - 145 mmol/L   Sodium    Collection Time: 09/27/22 11:20 PM   Result Value Ref Range    Sodium 122 (L) 136 - 145 mmol/L   Sodium    Collection Time: 09/28/22  4:27 AM   Result Value Ref Range    Sodium 126 (L) 136 - 145 mmol/L   CBC    Collection Time: 09/28/22  4:27 AM   Result Value Ref Range    WBC 10.4 4.3 - 11.1 K/uL    RBC 3.64 (L) 4.05 - 5.2 M/uL    Hemoglobin 11.1 (L) 11.7 - 15.4 g/dL    Hematocrit 32.0 (L) 35.8 - 46.3 %    MCV 87.9 79.6 - 97.8 FL    MCH 30.5 26.1 - 32.9 PG    MCHC 34.7 31.4 - 35.0 g/dL    RDW 13.5 11.9 - 14.6 %    Platelets 082 034 - 592 K/uL    MPV 9.2 (L) 9.4 - 12.3 FL    nRBC 0.00 0.0 - 0.2 K/uL   POCT Glucose    Collection Time: 09/28/22  8:27 AM   Result Value Ref Range    POC Glucose 133 (H) 65 - 100 mg/dL    Performed by:  Estela    Basic Metabolic Panel    Collection Time: 09/28/22  9:29 AM   Result Value Ref Range    Sodium 125 (L) 136 - 145 mmol/L    Potassium 4.2 3.5 - 5.1 mmol/L    Chloride 93 (L) 101 - 110 mmol/L    CO2 26 21 - 32 mmol/L    Anion Gap 6 4 - 13 mmol/L    Glucose 149 (H) 65 - 100 mg/dL    BUN 15 8 - 23 MG/DL    Creatinine 0.90 0.6 - 1.0 MG/DL    GFR African American >60 >60 ml/min/1.73m2    GFR Non- >60 >60 ml/min/1.73m2    Calcium 8.7 8.3 - 10.4 MG/DL   Magnesium    Collection Time: 09/28/22  9:29 AM   Result Value Ref Range    Magnesium 1.8 1.8 - 2.4 mg/dL       I have personally reviewed imaging studies showing: Other Studies:  CT HEAD WO CONTRAST   Final Result   Negative for acute intracranial abnormality. Chronic changes. XR CHEST PORTABLE   Final Result   Scarring or atelectasis in the right upper lobe, otherwise no acute   abnormality.           Current Meds:  Current Facility-Administered Medications   Medication Dose Route Frequency    sotalol (BETAPACE) tablet 80 mg  80 mg Oral Once    [Held by provider] hydrALAZINE (APRESOLINE) tablet 100 mg  100 mg Oral 3 times per day    cefTRIAXone (ROCEPHIN) 1,000 mg in sodium chloride 0.9 % 50 mL IVPB mini-bag  1,000 mg IntraVENous Q24H    [Held by provider] amLODIPine (NORVASC) tablet 10 mg  10 mg Oral Daily    oxyCODONE (ROXICODONE) immediate release tablet 5 mg  5 mg Oral Q4H PRN    [Held by provider] losartan (COZAAR) tablet 100 mg  100 mg Oral Nightly    sodium chloride flush 0.9 % injection 5-40 mL  5-40 mL IntraVENous 2 times per day    sodium chloride flush 0.9 % injection 5-40 mL  5-40 mL IntraVENous PRN    0.9 % sodium chloride infusion   IntraVENous PRN    ondansetron (ZOFRAN-ODT) disintegrating tablet 4 mg  4 mg Oral Q8H PRN    Or    ondansetron (ZOFRAN) injection 4 mg  4 mg IntraVENous Q6H PRN    polyethylene glycol (GLYCOLAX) packet 17 g  17 g Oral Daily PRN    acetaminophen (TYLENOL) tablet 650 mg  650 mg Oral Q6H PRN    Or    acetaminophen (TYLENOL) suppository 650 mg  650 mg Rectal Q6H PRN    apixaban (ELIQUIS) tablet 5 mg  5 mg Oral BID    pantoprazole (PROTONIX) tablet 40 mg  40 mg Oral BID AC    aluminum & magnesium hydroxide-simethicone (MAALOX) 200-200-20 MG/5ML suspension 30 mL  30 mL Oral Q6H PRN    potassium chloride (KLOR-CON M) extended release tablet 40 mEq  40 mEq Oral PRN    Or    potassium bicarb-citric acid (EFFER-K) effervescent tablet 40 mEq  40 mEq Oral PRN    Or    potassium chloride 10 mEq/100 mL IVPB (Peripheral Line)  10 mEq IntraVENous PRN    magnesium sulfate 2000 mg in 50 mL IVPB premix  2,000 mg IntraVENous PRN    cetirizine (ZYRTEC) tablet 10 mg  10 mg Oral Daily    fluticasone (FLONASE) 50 MCG/ACT nasal spray 1 spray  1 spray Each Nostril Daily    polyethylene glycol (GLYCOLAX) packet 17 g  17 g Oral Daily    sennosides-docusate sodium (SENOKOT-S) 8.6-50 MG tablet 2 tablet  2 tablet Oral Nightly    pravastatin (PRAVACHOL) tablet 20 mg  20 mg Oral Nightly    sotalol (BETAPACE) tablet 80 mg  80 mg Oral Daily       Signed:  Bo Shen    Part of this note may have been written by using a voice dictation software. The note has been proof read but may still contain some grammatical/other typographical errors.

## 2022-09-28 NOTE — PLAN OF CARE
Problem: Discharge Planning  Goal: Discharge to home or other facility with appropriate resources  Outcome: Progressing  Flowsheets (Taken 9/28/2022 0701 by Enedina Anderson, RN)  Discharge to home or other facility with appropriate resources: Identify barriers to discharge with patient and caregiver     Problem: Pain  Goal: Verbalizes/displays adequate comfort level or baseline comfort level  Outcome: Progressing  Flowsheets (Taken 9/28/2022 0717 by Enedina Anderson, RN)  Verbalizes/displays adequate comfort level or baseline comfort level:   Encourage patient to monitor pain and request assistance   Assess pain using appropriate pain scale     Problem: Safety - Adult  Goal: Free from fall injury  Outcome: Progressing  Flowsheets (Taken 9/28/2022 0701 by Enedina Anderson, RN)  Free From Fall Injury: Instruct family/caregiver on patient safety     Problem: ABCDS Injury Assessment  Goal: Absence of physical injury  Outcome: Progressing  Flowsheets (Taken 9/28/2022 0701 by Enedina Anderson, RN)  Absence of Physical Injury: Implement safety measures based on patient assessment     Problem: Chronic Conditions and Co-morbidities  Goal: Patient's chronic conditions and co-morbidity symptoms are monitored and maintained or improved  Outcome: Progressing  Flowsheets (Taken 9/28/2022 0701 by Enedina Anderson, RN)  Care Plan - Patient's Chronic Conditions and Co-Morbidity Symptoms are Monitored and Maintained or Improved: Monitor and assess patient's chronic conditions and comorbid symptoms for stability, deterioration, or improvement

## 2022-09-28 NOTE — PROGRESS NOTES
ACUTE PHYSICAL THERAPY GOALS:   (Developed with and agreed upon by patient and/or caregiver.)  (1.) Katherine Baig  will move from supine to sit and sit to supine  with INDEPENDENCE within 7 treatment day(s). (2.) Katherine Baig will transfer from bed to chair and chair to bed with MODIFIED INDEPENDENCE using the least restrictive device within 7 treatment day(s). (3.) Katherine Baig will ambulate with MODIFIED INDEPENDENCE for 500 feet with the least restrictive device within 7 treatment day(s). (4.) Katherine Baig will perform standing static and dynamic balance activities x 20 minutes with SUPERVISION to improve safety within 7 treatment day(s). (5.) Katherine Baig will ascend and descend 2 stairs using 1 hand rail(s) with SUPERVISION to improve functional mobility and safety within 7 treatment day(s). (6.) Katherine Baig will perform bilateral lower extremity exercises x 20 min for HEP with INDEPENDENCE to improve strength, endurance, and functional mobility within 7 treatment day(s). PHYSICAL THERAPY: Daily Note PM   (Link to Caseload Tracking: PT Visit Days : 2  Time In/Out PT Charge Capture  Rehab Caseload Tracker  Orders    Katherine Baig is a 78 y.o. female   PRIMARY DIAGNOSIS: Hyponatremia  Syncope and collapse [R55]  Hyponatremia [E87.1]       Inpatient: Payor: Kelby Patel / Plan: Yazmin Meraz PPCASSY / Product Type: Medicare /     ASSESSMENT:     REHAB RECOMMENDATIONS:   Recommendation to date pending progress:  Setting:  Home Health Therapy    Equipment:    None     ASSESSMENT:  Ms. Merline Mouse is making slow progress toward goals. Today treatment was limited by RN due to concern following syncopal episode earlier. SUP for all mobility today good mobility throughout. Bed mobility, sitting/standing balance and ambulation in the room only. No LOB, dizziness, or unsteadiness. Great mobility throughout.       BP:   Supine: 136/55  Sittin/63  Standin/56     SUBJECTIVE:   Ms. Merline Mouse states, \"I get blurry vision and then I feel like I just HAVE to go to sleep and rest my eyes for a minute\"     Social/Functional Lives With: Son, Daughter  Type of Home: House  Home Layout: One level  Home Access: Stairs to enter with rails  Entrance Stairs - Number of Steps: 2  Bathroom Equipment: Shower chair  Home Equipment: Atlee Thicket, [address] Help From: Family  Active : Yes  Occupation: Retired  OBJECTIVE:     PAIN: VITALS / O2: PRECAUTION / Montrell Phelps / Abram Britton:   Pre Treatment: 0         Post Treatment: 0 Vitals        Oxygen    None    RESTRICTIONS/PRECAUTIONS:        MOBILITY: I Mod I S SBA CGA Min Mod Max Total  NT x2 Comments:   Bed Mobility    Rolling [] [] [] [] [] [] [] [] [] [] []    Supine to Sit [] [] [x] [] [] [] [] [] [] [] []    Scooting [] [] [x] [] [] [] [] [] [] [] []    Sit to Supine [] [] [x] [] [] [] [] [] [] [] []    Transfers    Sit to Stand [] [] [x] [] [] [] [] [] [] [] []    Bed to Chair [] [] [x] [] [] [] [] [] [] [] []    Stand to Sit [] [] [x] [] [] [] [] [] [] [] []     [] [] [] [] [] [] [] [] [] [] []    I=Independent, Mod I=Modified Independent, S=Supervision, SBA=Standby Assistance, CGA=Contact Guard Assistance,   Min=Minimal Assistance, Mod=Moderate Assistance, Max=Maximal Assistance, Total=Total Assistance, NT=Not Tested    BALANCE: Good Fair+ Fair Fair- Poor NT Comments   Sitting Static [x] [] [] [] [] []    Sitting Dynamic [x] [] [] [] [] []              Standing Static [x] [] [] [] [] []    Standing Dynamic [x] [] [] [] [] []      GAIT: I Mod I S SBA CGA Min Mod Max Total  NT x2 Comments:   Level of Assistance [] [] [x] [] [] [] [] [] [] [] []    Distance 15 feet    DME SPC    Gait Quality N/A    Weightbearing Status      Stairs      I=Independent, Mod I=Modified Independent, S=Supervision, SBA=Standby Assistance, CGA=Contact Guard Assistance,   Min=Minimal Assistance, Mod=Moderate Assistance, Max=Maximal Assistance, Total=Total Assistance, NT=Not Tested    PLAN:   FREQUENCY AND DURATION: 3 times/week for duration of hospital stay or until stated goals are met, whichever comes first.    TREATMENT:   TREATMENT:   Co-Treatment PT/OT necessary due to patient's decreased overall endurance/tolerance levels, as well as need for high level skilled assistance to complete functional transfers/mobility and functional tasks  Therapeutic Activity (30 Minutes): Therapeutic activity included Supine to Sit, Sit to Supine, Scooting, Transfer Training, Ambulation on level ground, Sitting balance , and Standing balance to improve functional Activity tolerance, Balance, Coordination, Mobility, and Strength.     TREATMENT GRID:  N/A    AFTER TREATMENT PRECAUTIONS: Bed, Bed/Chair Locked, Call light within reach, Needs within reach, and RN notified    INTERDISCIPLINARY COLLABORATION:  RN/ PCT, PT/ PTA, and OT/ VEGA    EDUCATION:      TIME IN/OUT:  Time In: 1500  Time Out: 0  Minutes: [id number] [facility], [facility]

## 2022-09-28 NOTE — PLAN OF CARE
Problem: Discharge Planning  Goal: Discharge to home or other facility with appropriate resources  Outcome: Progressing     Problem: Pain  Goal: Verbalizes/displays adequate comfort level or baseline comfort level  Outcome: Progressing     Problem: Safety - Adult  Goal: Free from fall injury  Outcome: Progressing     Problem: ABCDS Injury Assessment  Goal: Absence of physical injury  Outcome: Progressing  Flowsheets (Taken 9/27/2022 1915)  Absence of Physical Injury: Implement safety measures based on patient assessment

## 2022-09-28 NOTE — CARE COORDINATION
Chart review complete. PT/OT recommending [facility] services.   CM will follow up with pt/family tomorrow to obtain agency choice and make referral.

## 2022-09-28 NOTE — PROGRESS NOTES
ACUTE OCCUPATIONAL THERAPY GOALS:   (Developed with and agreed upon by patient and/or caregiver.)  1. Patient will complete lower body bathing and dressing with setup and adaptive equipment as needed. 2. Patient will complete toileting with supervision. 3. Patient will tolerate 30 minutes of OT treatment with as needed rest breaks to increase activity tolerance for ADLs. 4. Patient will complete functional transfers with modified independence and adaptive equipment as needed. OCCUPATIONAL THERAPY: Daily Note PM   OT Visit Days: 2   Time  OT Charge Capture  Rehab Caseload Tracker  OT Orders    Thedore Mcburney is a 78 y.o. female   PRIMARY DIAGNOSIS: Hyponatremia  Syncope and collapse [R55]  Hyponatremia [E87.1]       Inpatient: Payor: Katie Harris / Plan: Fidel Hay PPO / Product Type: Medicare /     ASSESSMENT:     REHAB RECOMMENDATIONS: CURRENT LEVEL OF FUNCTION:  (Most Recently Demonstrated)   Recommendation to date pending progress:  Setting:  No further skilled therapy after discharge from hospital    Equipment:    None Bathing:  Not Tested  Dressing:  Not Tested  Feeding/Grooming:  Stand by Assist  Toileting:  Not Tested  Functional Mobility:  Stand by Assist     ASSESSMENT:  Ms. Jacob Salinas presents with off and on visual deficits, dizziness, and syncope episodes. Pt had code S called early this morning but she did not have a stroke, just similar symptoms. Pt's vitals monitored throughout session. She participated well with mobility and sink side grooming and hygiene. Limited session d/t earlier episode. Pt was asymptomatic throughout session. Good effort. Continue POC. SUBJECTIVE:     Ms. Jacob Salinas states, Evelin Dines you girls so much. \"     Social/Functional Lives With: Son, Daughter  Type of Home: House  Home Layout: One level  Home Access: Stairs to enter with rails  Entrance Stairs - Number of Steps: 2  Bathroom Equipment: Shower chair  Home Equipment: Dilan Johnston, [address] Help Static [x] [] [] [] [] []    Sitting Dynamic [x] [] [] [] [] []              Standing Static [x] [] [] [] [] []    Standing Dynamic [x] [] [] [] [] []        PLAN:     FREQUENCY/DURATION   OT Plan of Care: 3 times/week for duration of hospital stay or until stated goals are met, whichever comes first.    TREATMENT:     TREATMENT:   Co-Treatment PT/OT necessary due to patient's decreased overall endurance/tolerance levels, as well as need for high level skilled assistance to complete functional transfers/mobility and functional tasks  Self Care (30 minutes): Patient participated in grooming ADLs in standing with minimal verbal cueing to increase activity tolerance. Patient also participated in functional mobility and functional transfer training to increase independence and increase activity tolerance.      TREATMENT GRID:  N/A    AFTER TREATMENT PRECAUTIONS: Bed, Bed/Chair Locked, Needs within reach, RN notified, and Side rails x2    INTERDISCIPLINARY COLLABORATION:  RN/ PCT, PT/ PTA, and OT/ VEGA    EDUCATION:       TOTAL TREATMENT DURATION AND TIME:  Time In: 1500  Time Out: [phone]  Minutes: [address] [facility]

## 2022-09-28 NOTE — PROGRESS NOTES
Responded to Code S called overhead this morning. According to staff Ms. Marvin Torres was having an issue with her BP. After staff provided patient care, I visited with Ms. Marvin Torres. Patient responded well after the incident. She talked at length about her health, jacquelyn, and family. I offered spiritual/emotional support including prayer as requested. Ms. Marvin Torres was very appreciative of the visit.        Blayne Case 66  Board Certified

## 2022-09-29 ENCOUNTER — HOME HEALTH ADMISSION (OUTPATIENT)
Dept: HOME HEALTH SERVICES | Facility: HOME HEALTH | Age: 82
End: 2022-09-29
Payer: MEDICARE

## 2022-09-29 VITALS
HEART RATE: 70 BPM | DIASTOLIC BLOOD PRESSURE: 53 MMHG | OXYGEN SATURATION: 97 % | TEMPERATURE: 98.1 F | WEIGHT: 168 LBS | HEIGHT: 67 IN | SYSTOLIC BLOOD PRESSURE: 118 MMHG | BODY MASS INDEX: 26.37 KG/M2 | RESPIRATION RATE: 17 BRPM

## 2022-09-29 LAB
ANION GAP SERPL CALC-SCNC: 5 MMOL/L (ref 4–13)
BACTERIA SPEC CULT: ABNORMAL
BUN SERPL-MCNC: 14 MG/DL (ref 8–23)
CALCIUM SERPL-MCNC: 9.1 MG/DL (ref 8.3–10.4)
CHLORIDE SERPL-SCNC: 99 MMOL/L (ref 101–110)
CO2 SERPL-SCNC: 26 MMOL/L (ref 21–32)
CREAT SERPL-MCNC: 0.8 MG/DL (ref 0.6–1)
ERYTHROCYTE [DISTWIDTH] IN BLOOD BY AUTOMATED COUNT: 13.6 % (ref 11.9–14.6)
GLUCOSE SERPL-MCNC: 113 MG/DL (ref 65–100)
HCT VFR BLD AUTO: 31.6 % (ref 35.8–46.3)
HGB BLD-MCNC: 11 G/DL (ref 11.7–15.4)
MCH RBC QN AUTO: 30.6 PG (ref 26.1–32.9)
MCHC RBC AUTO-ENTMCNC: 34.8 G/DL (ref 31.4–35)
MCV RBC AUTO: 88 FL (ref 79.6–97.8)
NRBC # BLD: 0 K/UL (ref 0–0.2)
PLATELET # BLD AUTO: 239 K/UL (ref 150–450)
PMV BLD AUTO: 8.9 FL (ref 9.4–12.3)
POTASSIUM SERPL-SCNC: 4.5 MMOL/L (ref 3.5–5.1)
RBC # BLD AUTO: 3.59 M/UL (ref 4.05–5.2)
SERVICE CMNT-IMP: ABNORMAL
SODIUM SERPL-SCNC: 129 MMOL/L (ref 136–145)
SODIUM SERPL-SCNC: 130 MMOL/L (ref 136–145)
WBC # BLD AUTO: 9.2 K/UL (ref 4.3–11.1)

## 2022-09-29 PROCEDURE — 6370000000 HC RX 637 (ALT 250 FOR IP): Performed by: FAMILY MEDICINE

## 2022-09-29 PROCEDURE — 6370000000 HC RX 637 (ALT 250 FOR IP): Performed by: PHYSICIAN ASSISTANT

## 2022-09-29 PROCEDURE — 2580000003 HC RX 258: Performed by: FAMILY MEDICINE

## 2022-09-29 PROCEDURE — 80048 BASIC METABOLIC PNL TOTAL CA: CPT

## 2022-09-29 RX ORDER — CEFDINIR 300 MG/1
300 CAPSULE ORAL 2 TIMES DAILY
Qty: 10 CAPSULE | Refills: 0 | Status: SHIPPED | OUTPATIENT
Start: 2022-09-29 | End: 2022-10-04

## 2022-09-29 RX ORDER — SOTALOL HYDROCHLORIDE 160 MG/1
80 TABLET ORAL DAILY
Qty: 90 TABLET | Refills: 0 | Status: SHIPPED | OUTPATIENT
Start: 2022-09-29

## 2022-09-29 RX ORDER — SODIUM CHLORIDE 1000 MG
1 TABLET, SOLUBLE MISCELLANEOUS 2 TIMES DAILY WITH MEALS
Qty: 60 TABLET | Refills: 0 | Status: SHIPPED | OUTPATIENT
Start: 2022-09-29 | End: 2022-10-29

## 2022-09-29 RX ADMIN — SOTALOL HYDROCHLORIDE 80 MG: 80 TABLET ORAL at 09:30

## 2022-09-29 RX ADMIN — PANTOPRAZOLE SODIUM 40 MG: 40 TABLET, DELAYED RELEASE ORAL at 05:07

## 2022-09-29 RX ADMIN — POLYETHYLENE GLYCOL 3350 17 G: 17 POWDER, FOR SOLUTION ORAL at 09:30

## 2022-09-29 RX ADMIN — APIXABAN 5 MG: 5 TABLET, FILM COATED ORAL at 09:30

## 2022-09-29 RX ADMIN — SODIUM CHLORIDE, PRESERVATIVE FREE 5 ML: 5 INJECTION INTRAVENOUS at 09:31

## 2022-09-29 RX ADMIN — FLUTICASONE PROPIONATE 1 SPRAY: 50 SPRAY, METERED NASAL at 09:30

## 2022-09-29 RX ADMIN — Medication 1 G: at 09:30

## 2022-09-29 RX ADMIN — CETIRIZINE HYDROCHLORIDE 10 MG: 10 TABLET ORAL at 09:30

## 2022-09-29 ASSESSMENT — PAIN SCALES - GENERAL: PAINLEVEL_OUTOF10: 0

## 2022-09-29 NOTE — CARE COORDINATION
Pt is medically cleared for dc to home today. CM met with pt/DIL to discuss [facility] services. Demographics, insurance and PCP confirmed. Pt is in agreement and expressed no preference of [facility] agency. Referral submitted to [facility] for RN/PT/OT services. No other dc needs or concerns identified or reported. CM remains available to assist as needed. 09/29/22 1140   Service Assessment   Cognition Alert   History Provided By Patient;Medical Record   Primary Caregiver Family   Support Systems Children;Family Members   PCP Verified by CM Yes   Prior Functional Level Independent in ADLs/IADLs   Current Functional Level Independent in ADLs/IADLs   Can patient return to prior living arrangement Yes   Ability to make needs known: Good   Family able to assist with home care needs: Yes   Financial Resources Medicare   Social/Functional History   Lives With Son;Daughter   Type of [address] One level   Home Access Stairs to enter with rails   Entrance Stairs - Number of Steps 2   [address] [place] chair   Home Equipment Cane;Walker, [address] [place] From Family   ADL Assistance Independent   Homemaking Assistance Independent   Ambulation Assistance Independent   Transfer Assistance Independent   Active  Yes   Occupation Retired   Discharge Planning   Type of [address] Prior To Admission None   [address]   DME Ordered? No   Potential Assistance Purchasing Medications No   Type of Home Care Services PT;OT;Nursing Services   Patient expects to be discharged to: [address] Discharge   Transition of Care Consult (CM Consult) [facility] RN/PT/OT)   Internal [address] Discharge PT;OT;Home Health;Nursing services   [employer] Information Provided?  No   Mode of Transport at Discharge Other (see comment)  (DIL)   Confirm Follow Up Transport Family   Condition of Participation: Discharge Planning   The Plan for Transition of Care is related to the following treatment goals: Home health nursing and therapy services to improve pt's disease management, strength and functional abilities. The Patient and/or Patient Representative was provided with a Choice of Provider? Patient   The Patient and/Or Patient Representative agree with the Discharge Plan? Yes   Freedom of Choice list was provided with basic dialogue that supports the patient's individualized plan of care/goals, treatment preferences, and shares the quality data associated with the providers?   Yes

## 2022-09-29 NOTE — PROGRESS NOTES
IV taken out. Discharge instructions and prescriptions provided and explained to the pt and family member. Opportunity for questions provided. Instructed to call once ready to leave.      Cipriano Samuel RN

## 2022-09-29 NOTE — DISCHARGE SUMMARY
Hospitalist Discharge Summary   Admit Date:  2022 11:53 AM   DC Note date: 2022  Name:  Caleb Rawls   Age:  78 y.o. Sex:  female  :  1940   MRN:  [id number]   Room:  [facility]  PCP:  Gena Anton MD    Presenting Complaint: Loss of Consciousness     Initial Admission Diagnosis: Syncope and collapse [R55]  Hyponatremia [E87.1]     Problem List for this Hospitalization (present on admission):    Principal Problem:    Hyponatremia  Active Problems:    Short-segment Liriano's esophagus    Allergic rhinitis    Secondary hypercoagulable state ([facility] [address]    Hypomagnesemia    History of CVA with residual deficit    Chronic diastolic congestive heart failure ([facility] [address]    Essential hypertension    Paroxysmal atrial fibrillation (HCC)    Anemia, unspecified    Dyslipidemia    CAD (coronary artery disease)    Gastroesophageal reflux disease with esophagitis without hemorrhage    Cardiac pacemaker    Sick sinus syndrome (HCC)    S/P dilatation of esophageal stricture  Resolved Problems:    Syncope and collapse    Hypotension due to hypovolemia    Acute metabolic encephalopathy      Hospital Course:  Caleb Rawls is a 78 y.o. female with medical history of pAFIB on apixaban and sotalol, aortic stensos s/p aVR and MAZE, SSS s/p MAZE, CAD, GERD w/ esophageal stricture, Liriano's esophagus s/p EGD 22, hx of CVA, Bell's palsy with chronic left facial droop who presented to ED via EMS from [facility] s/p syncopal episode while doing light physical therapy. She was found to be hyponatremic. Admitted with hypovolemic hyponatremia. HCTZ has been held. Started on IVF with improvement in symptoms and slight improvement in Na. IVF stopped. Na worsened on . Repeated urine osmolality wnl with low serum osmolality noted 2022. On morning of 2022, CODE S called by staff as pt was minimally responsive. Upon arrival to bedside, pt was found to be hypotensive, BP 66/42.   RN reports patient has just gotten out of bed and attempted to ambulate to bathroom. Pt was returned to bed, placed in Trendelenburg with interval neurologic improvement and SBP improved to 75mmhg. IVF opened wide and BP improved; CODE S canceled. Upon review of BP medications, it was noted that patient was newly started on hydralazine and titrated up to 100mg tid the previous day along with increased cozaar to 100mg in conjunction with norvasc 10mg daily. Suspect etiology for hypotension secondary to overmedication with BP medicines; as pt's cozaar, norvasc, hydralazine stopped, she had repeated orthostatic VS later that day and this AM without further episodes of severe orthostatic hypotension / near syncope. Pt and I, with daughter at bedside, discussed at length today that the plan is to continue betapace but continue holding home norvasc dose on discharge. She will check orthostatic VS at home and f/u with PCP one week with these results; will defer to her PCP Dr Sean Arauz for ongoing BP mgmt. As well, as pt's sodium levels improved with salt tabs from 126 ~> 129 ~> 130 this AM, pt instructed to continue salt tabs until f/u one week BMP and PCP office follow up. Pt did have an increase in WBC on 9/27 to 15.8; UA was positive and pt started on empiric rocephin. WBC count normalized thereafter and urine culture finalized with E. Coli, centeno-susceptible. On discharge today pt was prescribed cefdinir 300mg bid x 5 more days. She is medically stable for discharge home today; pt will f/u with PCP one week post dc. Disposition: home  Diet: ADULT DIET; Regular; 1500 ml  Code Status: Full Code    Follow Ups:   Contact information for follow-up providers     Cesar Arellano MD Follow up in 1 week(s).     Specialty: Family Medicine  Contact information:  [address]  [facility] [address]  [phone]             Cesar Arellano MD .    Specialty: Family Medicine  Contact information:  [address]  [facility] loratadine (CLARITIN) 10 MG tablet Take 10 mg by mouth daily as neededHistorical Med      pravastatin (PRAVACHOL) 10 MG tablet Take 10 mg by mouth at bedtimeHistorical Med           STOP taking these medications       furosemide (LASIX) 20 MG tablet Comments:   Reason for Stopping:         losartan-hydroCHLOROthiazide (HYZAAR) 50-12.5 MG per tablet Comments:   Reason for Stopping:         omeprazole (PRILOSEC) 20 MG delayed release capsule Comments:   Reason for Stopping:               Procedures done this admission:  * No surgery found *    Consults this admission:  [address]    Echocardiogram results:  02/22/22    TRANSTHORACIC ECHOCARDIOGRAM (TTE) COMPLETE (CONTRAST/BUBBLE/3D PRN) 02/23/2022 12:23 PM (Final)    Narrative  This is a summary report. The complete report is available in the patient's medical record. If you cannot access the medical record, please contact the sending organization for a detailed fax or copy. Left Ventricle: Left ventricle size is normal. Moderately increased wall thickness. Normal wall motion. Normal left ventricular systolic function with a visually estimated EF of 60 - 65%. Abnormal diastolic function. Left Atrium: Left atrium is mildly dilated. Technical qualifiers: Echo study was technically difficult. Signed by: Shilpi Leonardo on 2/23/2022 12:23 PM      Diagnostic Imaging/Tests:   CT HEAD WO CONTRAST    Result Date: 9/27/2022  Negative for acute intracranial abnormality. Chronic changes. XR CHEST PORTABLE    Result Date: 9/24/2022  Scarring or atelectasis in the right upper lobe, otherwise no acute abnormality.        Labs: Results:       BMP, Mg, Phos Recent Labs     09/27/22  0259 09/27/22  1413 09/28/22  0929 09/28/22  1345 09/28/22  2351 09/29/22  1027   *   < > 125* 126* 129* 130*   K 4.4  --  4.2  --   --  4.5   CL 94*  --  93*  --   --  99*   CO2 24  --  26  --   --  26   ANIONGAP 4  --  6  --   --  5   BUN 14  --  15  --   --  14 CREATININE 0.80  --  0.90  --   --  0.80   LABGLOM >60  --  >60  --   --  >60   GFRAA >60  --  >60  --   --  >60   CALCIUM 9.2  --  8.7  --   --  9.1   GLUCOSE 104*  --  149*  --   --  113*   MG  --   --  1.8  --   --   --     < > = values in this interval not displayed. CBC Recent Labs     09/27/22  0259 09/28/22  0427 09/28/22  2351   WBC 15.8* 10.4 9.2   RBC 3.74* 3.64* 3.59*   HGB 11.3* 11.1* 11.0*   HCT 32.7* 32.0* 31.6*   MCV 87.4 87.9 88.0   MCH 30.2 30.5 30.6   MCHC 34.6 34.7 34.8   RDW 13.2 13.5 13.6    247 239   MPV 9.0* 9.2* 8.9*   NRBC 0.00 0.00 0.00      LFT No results for input(s): BILITOT, BILIDIR, ALKPHOS, AST, ALT, PROT, LABALBU, GLOB in the last 72 hours.    Cardiac  Lab Results   Component Value Date/Time    NTPROBNP 247 09/24/2022 12:02 PM    TROPHS 6.8 09/27/2022 06:11 AM    TROPHS 4.5 09/24/2022 02:33 PM    TROPHS 4.3 09/24/2022 12:02 PM      Coags No results found for: PROTIME, INR, APTT   A1c No results found for: LABA1C, EAG   Lipids Lab Results   Component Value Date/Time    CHOL 147 09/22/2022 10:34 AM    LDLCALC 81.8 09/22/2022 10:34 AM    LABVLDL 10.2 09/22/2022 10:34 AM    HDL 55 09/22/2022 10:34 AM    CHOLHDLRATIO 2.7 09/22/2022 10:34 AM    TRIG 51 09/22/2022 10:34 AM      Thyroid  Lab Results   Component Value Date/Time    TSHELE 1.17 09/24/2022 02:33 PM        Most Recent UA Lab Results   Component Value Date/Time    COLORU YELLOW/STRAW 09/27/2022 01:22 PM    APPEARANCE CLEAR 09/27/2022 01:22 PM    SPECGRAV 1.009 09/27/2022 01:22 PM    LABPH 7.0 09/27/2022 01:22 PM    PROTEINU Negative 09/27/2022 01:22 PM    GLUCOSEU Negative 09/27/2022 01:22 PM    KETUA Negative 09/27/2022 01:22 PM    BILIRUBINUR Negative 09/27/2022 01:22 PM    BLOODU Negative 09/27/2022 01:22 PM    UROBILINOGEN 1.0 09/27/2022 01:22 PM    NITRU Negative 09/27/2022 01:22 PM    LEUKOCYTESUR MODERATE 09/27/2022 01:22 PM    WBCUA  09/27/2022 01:22 PM    RBCUA 0-5 09/27/2022 01:22 PM    EPITHUA 0-5 09/27/2022 01:22 PM    BACTERIA 2+ 09/27/2022 01:22 PM    LABCAST 0-2 09/27/2022 01:22 PM    MUCUS 0 09/27/2022 01:22 PM        Recent Labs     09/27/22  1322   CULTURE >100,000 COLONIES/mL ESCHERICHIA COLI*       All Labs from Last 24 Hrs:  Recent Results (from the past 24 hour(s))   Sodium    Collection Time: 09/28/22 11:51 PM   Result Value Ref Range    Sodium 129 (L) 136 - 145 mmol/L   CBC    Collection Time: 09/28/22 11:51 PM   Result Value Ref Range    WBC 9.2 4.3 - 11.1 K/uL    RBC 3.59 (L) 4.05 - 5.2 M/uL    Hemoglobin 11.0 (L) 11.7 - 15.4 g/dL    Hematocrit 31.6 (L) 35.8 - 46.3 %    MCV 88.0 79.6 - 97.8 FL    MCH 30.6 26.1 - 32.9 PG    MCHC 34.8 31.4 - 35.0 g/dL    RDW 13.6 11.9 - 14.6 %    Platelets 044 239 - 948 K/uL    MPV 8.9 (L) 9.4 - 12.3 FL    nRBC 0.00 0.0 - 0.2 K/uL   Basic Metabolic Panel    Collection Time: 09/29/22 10:27 AM   Result Value Ref Range    Sodium 130 (L) 136 - 145 mmol/L    Potassium 4.5 3.5 - 5.1 mmol/L    Chloride 99 (L) 101 - 110 mmol/L    CO2 26 21 - 32 mmol/L    Anion Gap 5 4 - 13 mmol/L    Glucose 113 (H) 65 - 100 mg/dL    BUN 14 8 - 23 MG/DL    Creatinine 0.80 0.6 - 1.0 MG/DL    GFR African American >60 >60 ml/min/1.73m2    GFR Non- >60 >60 ml/min/1.73m2    Calcium 9.1 8.3 - 10.4 MG/DL       Allergies   Allergen Reactions    Sulfa Antibiotics Rash    Amoxicillin Other (See Comments)    Amoxicillin-Pot Clavulanate Nausea Only    Clavulanic Acid Other (See Comments)    Lisinopril Other (See Comments)    Lansoprazole Rash     flushed    Terbinafine Rash     Immunization History   Administered Date(s) Administered    Influenza, High Dose (Fluzone 65 yrs and older) 10/01/2017    Influenza, Trivalent PF 09/09/2013    PPD Test 08/13/2015, 09/25/2022    Pneumococcal Conjugate 13-valent (Unmpcek04) 01/05/2017    Pneumococcal Polysaccharide (Mcsulecut74) 03/25/2013       Recent Vital Data:  Patient Vitals for the past 24 hrs:   Temp Pulse Resp BP SpO2   09/29/22 1109 -- -- -- (!) 118/53 --   09/29/22 1100 98.1 °F (36.7 °C) 70 17 (!) 115/46 97 %   09/29/22 0705 98.1 °F (36.7 °C) 68 17 (!) 149/60 96 %   09/29/22 0404 98.1 °F (36.7 °C) 60 18 (!) 128/51 97 %   09/28/22 2315 98.1 °F (36.7 °C) 59 18 (!) 124/54 98 %   09/28/22 1902 97.9 °F (36.6 °C) 70 17 (!) 114/46 97 %   09/28/22 1540 98.2 °F (36.8 °C) 70 18 (!) 120/51 99 %       Oxygen Therapy  SpO2: 97 %  O2 Device: None (Room air)    Estimated body mass index is 26.31 kg/m² as calculated from the following:    Height as of this encounter: 5' 7\" (1.702 m). Weight as of this encounter: 168 lb (76.2 kg). Intake/Output Summary (Last 24 hours) at 9/29/2022 1435  Last data filed at 9/28/2022 1702  Gross per 24 hour   Intake --   Output 300 ml   Net -300 ml         Physical Exam:    General:           Obese, in NAD. Head:               Normocephalic, atraumatic  Eyes:               Sclerae appear normal.  Pupils equally round. ENT:                Mild left facial droop (chronic); nares appear normal, no drainage. Moist oral mucosa  Neck:               No restricted ROM. Trachea midline   CV:                  Irreg rhythm, rate ~controlled  Lungs:             CTAB. No wheezing, rhonchi, or rales. Symmetric expansion. Abdomen: Bowel sounds present. Soft, nontender, nondistended. Extremities:     No cyanosis or clubbing. No edema.  + mvmt x 4  Skin:                No rashes and normal coloration. Warm and dry. Neuro:             Sensation intact. A&Ox3; no acute gross focal deficits  Psych:             Normal mood and affect. Signed:  Bo Shen    Part of this note may have been written by using a voice dictation software. The note has been proof read but may still contain some grammatical/other typographical errors.

## 2022-09-30 ENCOUNTER — CARE COORDINATION (OUTPATIENT)
Dept: OTHER | Facility: CLINIC | Age: 82
End: 2022-09-30

## 2022-09-30 NOTE — CARE COORDINATION
provided:  Scheduled appointment with PCP-on 10/7/22 at 2 pm with NP  Scheduled appointment with Specialist-Cardiology on 10/13/2022 at 3:30 pm   Obtained and reviewed discharge summary and/or continuity of care documents  Education of patient/family/caregiver/guardian to support self-management-of medical conditions, daily weights, Orthostatic BP monitoring, and follow up appointments. Assessment and support for treatment adherence and medication management-per discharge instructions. Patient is able to monitor and record orthostatic BP daily for MD review. Patient is able to monitor and record weights daily. Reports increase if 2 lbs. overnight or 5 lbs. in a week to MD. Yuko Liriano patient enrollment in the Remote Patient Monitoring (RPM) program for in-home monitoring: NA.    Care Transitions 24 Hour Call    Schedule Follow Up Appointment with PCP: Completed  Do you have a copy of your discharge instructions?: Yes  Do you have all of your prescriptions and are they filled?: Yes  Have you been contacted by a [id number] Pharmacist?: No  Have you scheduled your follow up appointment?: Yes  How are you going to get to your appointment?: Car - family or friend to transport  Do you have support at home?: Child  Do you feel like you have everything you need to keep you well at home?: Yes  Are you an active caregiver in your home?: No  Care Transitions Interventions        Goals Addressed                   This Visit's Progress     Conditions and Symptoms        I will schedule office visits, as directed by my provider. I will keep my appointment or reschedule if I have to cancel. I will follow my Zone Management tool to seek urgent or emergent care. I will notify my provider of any symptoms that indicate a worsening of my condition. Patient is able to monitor and record orthostatic BP daily for MD review. Patient is able to monitor and record weights daily. Reports increase if 2 lbs. overnight or 5 lbs.  in a

## 2022-10-01 ENCOUNTER — HOME CARE VISIT (OUTPATIENT)
Dept: SCHEDULING | Facility: HOME HEALTH | Age: 82
End: 2022-10-01
Payer: MEDICARE

## 2022-10-01 PROCEDURE — G0299 HHS/HOSPICE OF RN EA 15 MIN: HCPCS

## 2022-10-01 ASSESSMENT — ENCOUNTER SYMPTOMS: STOOL DESCRIPTION: FORMED

## 2022-10-04 ENCOUNTER — HOME CARE VISIT (OUTPATIENT)
Dept: SCHEDULING | Facility: HOME HEALTH | Age: 82
End: 2022-10-04
Payer: MEDICARE

## 2022-10-04 VITALS
TEMPERATURE: 98.2 F | RESPIRATION RATE: 16 BRPM | HEART RATE: 72 BPM | OXYGEN SATURATION: 98 % | DIASTOLIC BLOOD PRESSURE: 60 MMHG | SYSTOLIC BLOOD PRESSURE: 115 MMHG

## 2022-10-04 PROCEDURE — G0299 HHS/HOSPICE OF RN EA 15 MIN: HCPCS

## 2022-10-05 ENCOUNTER — HOME CARE VISIT (OUTPATIENT)
Dept: SCHEDULING | Facility: HOME HEALTH | Age: 82
End: 2022-10-05
Payer: MEDICARE

## 2022-10-05 ENCOUNTER — TELEPHONE (OUTPATIENT)
Dept: FAMILY MEDICINE CLINIC | Facility: CLINIC | Age: 82
End: 2022-10-05

## 2022-10-05 VITALS
OXYGEN SATURATION: 98 % | HEART RATE: 88 BPM | TEMPERATURE: 97.4 F | RESPIRATION RATE: 18 BRPM | SYSTOLIC BLOOD PRESSURE: 138 MMHG | DIASTOLIC BLOOD PRESSURE: 88 MMHG

## 2022-10-05 VITALS
RESPIRATION RATE: 19 BRPM | SYSTOLIC BLOOD PRESSURE: 110 MMHG | DIASTOLIC BLOOD PRESSURE: 72 MMHG | TEMPERATURE: 98.4 F | HEART RATE: 70 BPM | OXYGEN SATURATION: 98 % | HEIGHT: 67 IN | BODY MASS INDEX: 26.31 KG/M2

## 2022-10-05 PROCEDURE — G0151 HHCP-SERV OF PT,EA 15 MIN: HCPCS

## 2022-10-05 PROCEDURE — 93296 REM INTERROG EVL PM/IDS: CPT | Performed by: INTERNAL MEDICINE

## 2022-10-05 PROCEDURE — 93294 REM INTERROG EVL PM/LDLS PM: CPT | Performed by: INTERNAL MEDICINE

## 2022-10-05 ASSESSMENT — ENCOUNTER SYMPTOMS
PAIN LOCATION - PAIN QUALITY: ACHY
HEMOPTYSIS: 0

## 2022-10-05 NOTE — Clinical Note
S: Patient is a 79 y/o female referred to home care after presenting to ED   from [facility]  on 9/2 4/2022 r/t  syncopal episode while doing light physical therapy. Patient became lightheaded and passed out. She was seated when she LOC and no head injury. Patient with c/o  increasing confusing and forgetfulness over the last  few days which is abnormal for her. BP 70/40 initially with EMS, 400 cc NS given and noted to have asymptomatic non sustained v tach while en route. PPM was interogated without abnormal findings. In ED , her /96. CXR scarring or atelectasis in the RUL otherwise no acute findings. She c/o increasing forgetfulness and confusion. Feels like she has no energy with fatigue. She was found to be hyponatremic. Admitted with hypovolemic hyponatremia. HCTZ has been held. She was started on IVF with improvement in symptoms and slight improvement in Na. IVF stopped. Na worsened on 9-27. Repeated urine osmolality wnl with low serum osmolality noted 9/27/2022. Earlier this AM, CODE S called by staff as pt was minimally responsive. Upon arrival to bedside, pt was found to be hypotensive, BP 66/42. RN reports patient has just gotten out of bed and attempted to ambulate to bathroom. Pt was returned to bed, placed in Trendelenburg with interval neurologic improvement and SBP improved to 75mmhg. IVF opened wide and BP improved; CODE S canceled. B: PMH: Anemia,Afib, CAD, Pacemaker, GERD, Hypertension, Arthritis, Bell's Palsy. A: Patient presents with generalized LE weakness, delayed balance reactions, gait abnormalities, and is a high fall risk (Tinetti: 9/28). Patient trained in a written HEP and instructed on fall prevention strategies. Patient had a posterior LOB and controlled fall during sit <> stand test during assessment; no injuries and with cuing patient was able to stand by herself. Patient ambulates short household distances with [facility] and requires Trav with transfers.   Patient requires skilled PT services to address decreased BLE and core strength, decreased activity tolerance, decreased functional mobility including transfers, increased pain, impaired balance, increased fall risk, and educational needs.      R: PT POC 2w4

## 2022-10-05 NOTE — TELEPHONE ENCOUNTER
Pt was seen for pt eval and needs verbal for 7 visits over next 4 wks. Will from [place] [id number]  [phone]    Pt had loss of balance and fall during eval but was able stand back up on own.

## 2022-10-06 ENCOUNTER — HOME CARE VISIT (OUTPATIENT)
Dept: SCHEDULING | Facility: HOME HEALTH | Age: 82
End: 2022-10-06
Payer: MEDICARE

## 2022-10-06 ENCOUNTER — CARE COORDINATION (OUTPATIENT)
Dept: OTHER | Facility: CLINIC | Age: 82
End: 2022-10-06

## 2022-10-06 VITALS
RESPIRATION RATE: 15 BRPM | DIASTOLIC BLOOD PRESSURE: 78 MMHG | TEMPERATURE: 97.7 F | OXYGEN SATURATION: 97 % | HEART RATE: 70 BPM | SYSTOLIC BLOOD PRESSURE: 146 MMHG

## 2022-10-06 VITALS
DIASTOLIC BLOOD PRESSURE: 78 MMHG | HEART RATE: 70 BPM | SYSTOLIC BLOOD PRESSURE: 146 MMHG | TEMPERATURE: 97.7 F | OXYGEN SATURATION: 97 % | RESPIRATION RATE: 15 BRPM

## 2022-10-06 PROCEDURE — G0299 HHS/HOSPICE OF RN EA 15 MIN: HCPCS

## 2022-10-06 PROCEDURE — G0157 HHC PT ASSISTANT EA 15: HCPCS

## 2022-10-06 ASSESSMENT — ENCOUNTER SYMPTOMS: DYSPNEA ACTIVITY LEVEL: AFTER AMBULATING MORE THAN 20 FT

## 2022-10-06 NOTE — CARE COORDINATION
[facility] Care Transitions Follow Up Call    Care Transition Nurse contacted the patient by telephone to follow up after admission on 2022. Verified name and  with patient as identifiers. Patient: Ramin Bro  Patient : 1940   MRN: [id number]  Reason for Admission: Syncope and collapse  Discharge Date: 22 RARS: Readmission Risk Score: 9.7      Needs to be reviewed by the provider   Additional needs identified to be addressed with provider: No  none             Method of communication with provider: none. Addressed changes since last contact:   Patient reports she is doing okay at this time. Patient endorses compliance with her medications. Patient continues to monitor orthostatic BP and daily weights. Patient reports she fell to her buttocks while working with PT this week but denies any pain or injuries. PCP was notified per chart review. Patient continues to work with [address]. Discussed follow-up appointments. If no appointment was previously scheduled, appointment scheduling offered: Yes. Is follow up appointment scheduled within 7 days of discharge? Appointment is scheduled for [birth year].     Follow Up  Future Appointments   Date Time Provider Wilfrido Aleman   10/6/2022  4:30 PM KRIS [address]   10/7/2022 To Be Determined Sharonda Gonsales 48   10/7/2022  2:00 PM CHRISTO Barreto Arm - CNP [facility] AMB   10/11/2022 To Be Determined Iris Paez, PT [address]   10/11/2022 To Be Determined KRIS [address]   10/13/2022  3:30 PM Shawn Ibrahim MD [facility] AMB   10/14/2022 To Be Determined Mary Ellen Tracey PTA [address]   10/14/2022 To Be Determined KRIS [address]   10/18/2022 To Be Determined Mary Ellen Tracey PTA [address]   10/18/2022 To Be Determined KRIS [address]   10/21/2022 To Be Determined Mary Ellen Tracey PTA [address]   10/25/2022 To Be Determined Odalis Pollock, LILY [address]   10/25/2022 To Be Determined KRIS [address]   10/28/2022 To Be Determined Shaina Faustin, CHRISTIANO [facility]   11/1/2022 To Be Determined Mariola Carvalho RN [facility] HOME [facility]     Non-[facility] follow up appointment(s): no    Care Transition Nurse reviewed medical action plan and red flags with patient and discussed any barriers to care and/or understanding of plan of care after discharge. Discussed appropriate site of care based on symptoms and resources available to patient including: PCP  Specialist  [facility]  When to call [address]  Condition related references. The patient agrees to contact the PCP office for questions related to their healthcare. Patients top risk factors for readmission: medical condition-UTI, Hyponatremia, Syncope and collapse, fall, Liriano's esophagus , allergic rhinitis, CHF, HTN, Afib, Anemia, Dyslipidemia, CAD, SSS has pacemaker, Hx. Of CVA  Interventions to address risk factors: Education of patient/family/caregiver/guardian to support self-management-of medical conditions, safety/falls precautions, use of assistive devices, daily weights and BP monitoring, participation with home therapy and Assessment and support for treatment adherence and medication management-per discharge. Patient is aware of upcoming follow up and has good support from her family. Patient given an opportunity to ask questions and does not have any further questions or concerns at this time. The patient is able to contact the PCP/Specialist office for questions related to their healthcare.     Offered patient enrollment in the Remote Patient Monitoring (RPM) program for in-home monitoring: NA.     Care Transitions Subsequent and Final Call    Schedule Follow Up Appointment with PCP: Completed  Subsequent and Final Calls  Do you have any ongoing symptoms?: No  Have your medications changed?: No  Do you have any questions related to your medications?: No  Do you currently have any active services?: Yes  Are you currently active with any services?: Home Health  Do you have any needs or concerns that I can assist you with?: No  Identified Barriers: None  Care Transitions Interventions  Other Interventions:           Care Transition Nurse provided contact information for future needs. Plan for follow-up call in 10-14 days based on severity of symptoms and risk factors. Plan for next call: self management-of medical conditions  and follow up appointments.      Joel Ta RN

## 2022-10-07 ENCOUNTER — OFFICE VISIT (OUTPATIENT)
Dept: FAMILY MEDICINE CLINIC | Facility: CLINIC | Age: 82
End: 2022-10-07

## 2022-10-07 VITALS
WEIGHT: 169 LBS | DIASTOLIC BLOOD PRESSURE: 72 MMHG | OXYGEN SATURATION: 97 % | BODY MASS INDEX: 26.47 KG/M2 | TEMPERATURE: 97.8 F | HEART RATE: 70 BPM | SYSTOLIC BLOOD PRESSURE: 130 MMHG

## 2022-10-07 DIAGNOSIS — I95.9 HYPOTENSION, UNSPECIFIED HYPOTENSION TYPE: ICD-10-CM

## 2022-10-07 DIAGNOSIS — Z87.440 HISTORY OF UTI: ICD-10-CM

## 2022-10-07 DIAGNOSIS — Z09 HOSPITAL DISCHARGE FOLLOW-UP: Primary | ICD-10-CM

## 2022-10-07 DIAGNOSIS — E87.1 HYPONATREMIA: ICD-10-CM

## 2022-10-07 LAB
ANION GAP SERPL CALC-SCNC: 10 MMOL/L (ref 4–13)
APPEARANCE UR: CLEAR
BACTERIA URNS QL MICRO: NEGATIVE /HPF
BILIRUB UR QL: NEGATIVE
BUN SERPL-MCNC: 20 MG/DL (ref 8–23)
CALCIUM SERPL-MCNC: 9.4 MG/DL (ref 8.3–10.4)
CASTS URNS QL MICRO: ABNORMAL /LPF
CHLORIDE SERPL-SCNC: 102 MMOL/L (ref 101–110)
CO2 SERPL-SCNC: 22 MMOL/L (ref 21–32)
COLOR UR: ABNORMAL
CREAT SERPL-MCNC: 1 MG/DL (ref 0.6–1)
EPI CELLS #/AREA URNS HPF: ABNORMAL /HPF
GLUCOSE SERPL-MCNC: 93 MG/DL (ref 65–100)
GLUCOSE UR STRIP.AUTO-MCNC: NEGATIVE MG/DL
HGB UR QL STRIP: NEGATIVE
KETONES UR QL STRIP.AUTO: NEGATIVE MG/DL
LEUKOCYTE ESTERASE UR QL STRIP.AUTO: ABNORMAL
NITRITE UR QL STRIP.AUTO: NEGATIVE
PH UR STRIP: 6.5 [PH] (ref 5–9)
POTASSIUM SERPL-SCNC: 4.7 MMOL/L (ref 3.5–5.1)
PROT UR STRIP-MCNC: ABNORMAL MG/DL
RBC #/AREA URNS HPF: ABNORMAL /HPF
SODIUM SERPL-SCNC: 134 MMOL/L (ref 136–145)
SP GR UR REFRACTOMETRY: 1.02 (ref 1–1.02)
UROBILINOGEN UR QL STRIP.AUTO: 1 EU/DL (ref 0.2–1)
WBC URNS QL MICRO: ABNORMAL /HPF

## 2022-10-07 ASSESSMENT — ENCOUNTER SYMPTOMS
CONSTIPATION: 0
SINUS PAIN: 0
EYE PAIN: 0
BACK PAIN: 0
VOMITING: 0
SORE THROAT: 0
EYE REDNESS: 0
NAUSEA: 0
DIARRHEA: 0
BLOOD IN STOOL: 0
COUGH: 0
SHORTNESS OF BREATH: 0

## 2022-10-07 ASSESSMENT — PATIENT HEALTH QUESTIONNAIRE - PHQ9
SUM OF ALL RESPONSES TO PHQ QUESTIONS 1-9: 0
1. LITTLE INTEREST OR PLEASURE IN DOING THINGS: 0
2. FEELING DOWN, DEPRESSED OR HOPELESS: 0
SUM OF ALL RESPONSES TO PHQ9 QUESTIONS 1 & 2: 0

## 2022-10-07 NOTE — PROGRESS NOTES
Soledad Alexandra (:  1940) is a 78 y.o. female,Established patient, here for evaluation of the following chief complaint(s):  Follow-Up from Hospital (Low sodium levels)         ASSESSMENT/PLAN:  1. Hospital discharge follow-up  2. Hypotension, unspecified hypotension type  3. Hyponatremia  -     Basic Metabolic Panel; Future  4. History of UTI  -     Urinalysis; Future    Patient feeling well and is not orthostatic on exam today. Await labs. Cont f/up with specialists. No follow-ups on file. Subjective   SUBJECTIVE/OBJECTIVE:  Hospitals in [place]  Hospital follow-up 22 - 22 hyponatremia. S/p syncopal episode while doing light physical therapy. Patient was newly started on hydralazine 100 mg tid with increased cozaar to 100 mg daily in conjunction with norvasc 10 mg daily. Cozaar, norvasc and amlodipine stopped and she remained without further episodes of orthostatic hypotension. Also UA was positive and pt was started on rocephin and discharged on cefdinir. Denies urinary symptoms at present. Review of Systems   Constitutional:  Negative for chills and fever. HENT:  Negative for congestion, ear pain, sinus pain and sore throat. Eyes:  Negative for pain and redness. Respiratory:  Negative for cough and shortness of breath. Cardiovascular:  Negative for chest pain and palpitations. Gastrointestinal:  Negative for blood in stool, constipation, diarrhea, nausea and vomiting. Endocrine: Negative for polydipsia. Genitourinary:  Negative for dysuria, frequency and urgency. Musculoskeletal:  Negative for arthralgias, back pain and myalgias. Skin:  Negative for rash. Allergic/Immunologic: Negative for environmental allergies. Neurological:  Negative for dizziness and headaches. Psychiatric/Behavioral:  Negative for hallucinations and suicidal ideas. Objective   Physical Exam  Vitals and nursing note reviewed.    Constitutional:       General: She is not in acute distress. Appearance: Normal appearance. HENT:      Head: Normocephalic. Right Ear: External ear normal.      Left Ear: External ear normal.      Nose: Nose normal.      Mouth/Throat:      Lips: Pink. Mouth: Mucous membranes are moist.   Eyes:      Conjunctiva/sclera: Conjunctivae normal.      Pupils: Pupils are equal, round, and reactive to light. Cardiovascular:      Rate and Rhythm: Normal rate and regular rhythm. Pulmonary:      Effort: Pulmonary effort is normal.      Breath sounds: Normal breath sounds. No wheezing, rhonchi or rales. Musculoskeletal:         General: Normal range of motion. Cervical back: Normal range of motion. Right lower leg: No edema. Left lower leg: No edema. Lymphadenopathy:      Cervical: No cervical adenopathy. Skin:     General: Skin is warm and dry. Capillary Refill: Capillary refill takes less than 2 seconds. Findings: No rash. Neurological:      General: No focal deficit present. Mental Status: She is alert and oriented to person, place, and time. Mental status is at baseline. An electronic signature was used to authenticate this note.     --Quintella Meckel, APRN - CNP

## 2022-10-10 ENCOUNTER — HOME CARE VISIT (OUTPATIENT)
Dept: SCHEDULING | Facility: HOME HEALTH | Age: 82
End: 2022-10-10
Payer: MEDICARE

## 2022-10-10 VITALS
OXYGEN SATURATION: 98 % | DIASTOLIC BLOOD PRESSURE: 80 MMHG | HEART RATE: 70 BPM | RESPIRATION RATE: 16 BRPM | TEMPERATURE: 98.7 F | SYSTOLIC BLOOD PRESSURE: 136 MMHG

## 2022-10-10 PROCEDURE — G0299 HHS/HOSPICE OF RN EA 15 MIN: HCPCS

## 2022-10-11 ENCOUNTER — HOME CARE VISIT (OUTPATIENT)
Dept: SCHEDULING | Facility: HOME HEALTH | Age: 82
End: 2022-10-11
Payer: MEDICARE

## 2022-10-11 ENCOUNTER — HOME CARE VISIT (OUTPATIENT)
Dept: HOME HEALTH SERVICES | Facility: HOME HEALTH | Age: 82
End: 2022-10-11
Payer: MEDICARE

## 2022-10-11 VITALS
SYSTOLIC BLOOD PRESSURE: 118 MMHG | TEMPERATURE: 98 F | RESPIRATION RATE: 16 BRPM | OXYGEN SATURATION: 98 % | DIASTOLIC BLOOD PRESSURE: 68 MMHG | HEART RATE: 68 BPM

## 2022-10-11 PROCEDURE — G0157 HHC PT ASSISTANT EA 15: HCPCS

## 2022-10-13 ENCOUNTER — OFFICE VISIT (OUTPATIENT)
Dept: CARDIOLOGY CLINIC | Age: 82
End: 2022-10-13
Payer: MEDICARE

## 2022-10-13 VITALS
OXYGEN SATURATION: 97 % | DIASTOLIC BLOOD PRESSURE: 80 MMHG | BODY MASS INDEX: 26.53 KG/M2 | HEART RATE: 70 BPM | SYSTOLIC BLOOD PRESSURE: 120 MMHG | WEIGHT: 169 LBS | HEIGHT: 67 IN

## 2022-10-13 DIAGNOSIS — I10 ESSENTIAL HYPERTENSION: Chronic | ICD-10-CM

## 2022-10-13 DIAGNOSIS — E78.5 DYSLIPIDEMIA: Chronic | ICD-10-CM

## 2022-10-13 DIAGNOSIS — I25.118 CORONARY ARTERY DISEASE OF NATIVE ARTERY OF NATIVE HEART WITH STABLE ANGINA PECTORIS (HCC): Chronic | ICD-10-CM

## 2022-10-13 DIAGNOSIS — Z95.0 CARDIAC PACEMAKER: Chronic | ICD-10-CM

## 2022-10-13 DIAGNOSIS — I48.0 PAROXYSMAL ATRIAL FIBRILLATION (HCC): Primary | Chronic | ICD-10-CM

## 2022-10-13 DIAGNOSIS — N18.31 STAGE 3A CHRONIC KIDNEY DISEASE (HCC): ICD-10-CM

## 2022-10-13 PROBLEM — N18.30 CHRONIC RENAL DISEASE, STAGE III (HCC): Status: ACTIVE | Noted: 2022-10-13

## 2022-10-13 PROCEDURE — 99214 OFFICE O/P EST MOD 30 MIN: CPT | Performed by: INTERNAL MEDICINE

## 2022-10-13 PROCEDURE — 1123F ACP DISCUSS/DSCN MKR DOCD: CPT | Performed by: INTERNAL MEDICINE

## 2022-10-13 ASSESSMENT — ENCOUNTER SYMPTOMS
ABDOMINAL PAIN: 0
COUGH: 0
SHORTNESS OF BREATH: 1
BACK PAIN: 0

## 2022-10-13 NOTE — PROGRESS NOTES
[facility] CARDIOLOGY  [address], [address]      10/13/22      NAME:  Soledad Alexandra  : 1940  MRN: [id number]      SUBJECTIVE:   Soledad Alexandra is a 78 y.o. female seen for a follow up visit regarding the following:     Chief Complaint   Patient presents with    Congestive Heart Failure    Hypertension    Irregular Heart Beat    Coronary Artery Disease    Follow-Up from Hospital       HPI:   Patient s/p AVR and MAZE for aortic stenosis and atrial fibrillation 2015. Minimal atrial fibrillation on PM. Normal LVEF and AVR by echo 2019 and normal PM function. No AF on PM.    Patient was admitted 10/2022 with UTI, pain with hypotension/hyponatremia likely due to volume depletion. Patient was noted to be significantly orthostatic upon standing. She appears to have developed some component of dysautonomia. Past Medical History, Past Surgical History, Family history, Social History, and Medications were all reviewed with the patient today and updated as necessary.      Current Outpatient Medications   Medication Sig Dispense Refill    sotalol (BETAPACE) 160 MG tablet Take 0.5 tablets by mouth daily 90 tablet 0    sodium chloride 1 g tablet Take 1 tablet by mouth 2 times daily (with meals) 60 tablet 0    polyethylene glycol (GLYCOLAX) 17 GM/SCOOP powder Take 17 g by mouth daily      famotidine (PEPCID) 40 MG tablet Take 40 mg by mouth at bedtime      acetaminophen (TYLENOL) 500 MG tablet Take 1,000 mg by mouth every 6 hours as needed for Pain      albuterol sulfate  (90 Base) MCG/ACT inhaler Inhale 2 puffs into the lungs every 4 hours as needed for Wheezing      apixaban (ELIQUIS) 5 MG TABS tablet Take 5 mg by mouth 2 times daily TAKE 1 TABLET TWICE DAILY      docusate (COLACE, DULCOLAX) 100 MG CAPS Take 100 mg by mouth nightly as needed      loratadine (CLARITIN) 10 MG tablet Take 10 mg by mouth daily as needed      pravastatin (PRAVACHOL) 10 MG tablet Take 10 Heart Disease Sister     Diabetes Brother     Cancer Brother         pancreatic cancer    Heart Disease Father     Diabetes Son     Hypertension Sister     Breast Cancer Neg Hx     Diabetes Mother     Heart Disease Mother     Hypertension Mother     Diabetes Sister     Diabetes Son      Social History     Tobacco Use    Smoking status: Never    Smokeless tobacco: Never   Substance Use Topics    Alcohol use: No       Review Of Symptoms    Review of Systems   Constitutional: Positive for malaise/fatigue. Negative for fever. HENT:  Negative for nosebleeds. Cardiovascular:  Positive for dyspnea on exertion. Negative for chest pain and palpitations. Respiratory:  Positive for shortness of breath. Negative for cough. Musculoskeletal:  Positive for muscle weakness. Negative for back pain, muscle cramps and myalgias. Gastrointestinal:  Negative for abdominal pain. Neurological:  Positive for dizziness. Physical Exam  Blood pressure 120/80, pulse 70, height 5' 7\" (1.702 m), weight 169 lb (76.7 kg), SpO2 97 %. Physical Exam  HENT:      Head: Normocephalic and atraumatic. Eyes:      Extraocular Movements: Extraocular movements intact. Cardiovascular:      Rate and Rhythm: Normal rate and regular rhythm. Heart sounds: No murmur heard. Pulmonary:      Effort: Pulmonary effort is normal.      Breath sounds: Normal breath sounds. Abdominal:      Palpations: Abdomen is soft. Musculoskeletal:         General: Normal range of motion. Skin:     General: Skin is warm and dry. Neurological:      General: No focal deficit present. Mental Status: She is oriented to person, place, and time. Medical problems, medical history and test results were reviewed with the patient today.       Lab Results   Component Value Date    CHOL 147 09/22/2022    CHOL 123 02/18/2021    CHOL 151 05/14/2020     Lab Results   Component Value Date    TRIG 51 09/22/2022    TRIG 174 (H) 02/18/2021    TRIG 133 05/14/2020     Lab Results   Component Value Date    HDL 55 09/22/2022    HDL 35 (L) 02/18/2021    HDL 49 05/14/2020     Lab Results   Component Value Date    LDLCALC 81.8 09/22/2022    LDLCALC 59 02/18/2021    LDLCALC 75 05/14/2020     Lab Results   Component Value Date    LABVLDL 10.2 09/22/2022    LABVLDL 27 05/14/2020    VLDL 29 02/18/2021     Lab Results   Component Value Date    CHOLHDLRATIO 2.7 09/22/2022        No results found for: LABA1C  No results found for: EAG     Lab Results   Component Value Date     (L) 10/07/2022    K 4.7 10/07/2022     10/07/2022    CO2 22 10/07/2022    BUN 20 10/07/2022    CREATININE 1.00 10/07/2022    GLUCOSE 93 10/07/2022    CALCIUM 9.4 10/07/2022    PROT 7.0 09/24/2022    LABALBU 3.6 09/24/2022    BILITOT 0.9 09/24/2022    ALKPHOS 85 09/24/2022    AST 22 09/24/2022    ALT 22 09/24/2022    LABGLOM 56 (L) 10/07/2022    GFRAA >60 09/29/2022    AGRATIO 1.0 (L) 01/05/2022    GLOB 3.4 09/24/2022       Lab Results   Component Value Date/Time     10/07/2022 02:41 PM    K 4.7 10/07/2022 02:41 PM     10/07/2022 02:41 PM    CO2 22 10/07/2022 02:41 PM    BUN 20 10/07/2022 02:41 PM    CREATININE 1.00 10/07/2022 02:41 PM    GLUCOSE 93 10/07/2022 02:41 PM    CALCIUM 9.4 10/07/2022 02:41 PM        Lab Results   Component Value Date    WBC 9.2 09/28/2022    HGB 11.0 (L) 09/28/2022    HCT 31.6 (L) 09/28/2022    MCV 88.0 09/28/2022     09/28/2022             ASSESSMENT:    Dk Bang was seen today for follow-up. Diagnoses and all orders for this visit:      S/P AVR - Normal function by echo 01/2022    Coronary artery disease with stable angina - Mild to moderate mLAD in 2015. Normal stress MPI 01/2022    Paroxysmal atrial fibrillation (HCC) - S/P Maze. Stable on Eliquis and Sotalol 80mg BID and monitor for recurrence. Minimal on PM.     Essential hypertension with goal blood pressure less than 130/85 - Patient with orthostatic hypotension. Now on sodium pill. Needs monthly sodium checks and then hopefully can come off of medication. Allow permissive hypertension. Pure hypercholesterolemia - Stable on pravastatin 10mg daily. 09/2022 - LDL 82. Pacemaker - Normal function    Problem List Items Addressed This Visit          Circulatory    Essential hypertension (Chronic)    Relevant Orders    Transthoracic echocardiogram (TTE) complete with contrast, bubble, strain, and 3D PRN    Paroxysmal atrial fibrillation (HCC) - Primary (Chronic)    Relevant Orders    Transthoracic echocardiogram (TTE) complete with contrast, bubble, strain, and 3D PRN    CAD (coronary artery disease) (Chronic)    Relevant Orders    Transthoracic echocardiogram (TTE) complete with contrast, bubble, strain, and 3D PRN    Cardiac pacemaker (Chronic)       Genitourinary    Chronic renal disease, stage III ([facility] [address] [[id number]]       Other    Dyslipidemia (Chronic)       There are no discontinued medications. Patient has been instructed and agrees to call our office with any issues or other concerns related to their cardiac condition(s) and/or complaint(s). Return in about 3 months (around 1/13/2023).        Michaelle Courtney MD  10/13/2022

## 2022-10-14 ENCOUNTER — HOME CARE VISIT (OUTPATIENT)
Dept: SCHEDULING | Facility: HOME HEALTH | Age: 82
End: 2022-10-14
Payer: MEDICARE

## 2022-10-14 VITALS
RESPIRATION RATE: 15 BRPM | OXYGEN SATURATION: 96 % | HEART RATE: 68 BPM | TEMPERATURE: 98.1 F | SYSTOLIC BLOOD PRESSURE: 120 MMHG | DIASTOLIC BLOOD PRESSURE: 68 MMHG

## 2022-10-14 PROCEDURE — G0157 HHC PT ASSISTANT EA 15: HCPCS

## 2022-10-18 ENCOUNTER — HOME CARE VISIT (OUTPATIENT)
Dept: SCHEDULING | Facility: HOME HEALTH | Age: 82
End: 2022-10-18
Payer: MEDICARE

## 2022-10-18 ENCOUNTER — TELEPHONE (OUTPATIENT)
Dept: PRIMARY CARE CLINIC | Facility: CLINIC | Age: 82
End: 2022-10-18

## 2022-10-18 VITALS
HEART RATE: 72 BPM | RESPIRATION RATE: 15 BRPM | OXYGEN SATURATION: 96 % | DIASTOLIC BLOOD PRESSURE: 70 MMHG | TEMPERATURE: 98.2 F | SYSTOLIC BLOOD PRESSURE: 138 MMHG

## 2022-10-18 DIAGNOSIS — E87.1 HYPONATREMIA: Primary | ICD-10-CM

## 2022-10-18 PROCEDURE — G0157 HHC PT ASSISTANT EA 15: HCPCS

## 2022-10-18 NOTE — TELEPHONE ENCOUNTER
----- Message from Leopold Buddy sent at 10/17/2022 11:30 AM EDT -----  Subject: Message to Provider    QUESTIONS  Information for Provider? Patient needs basic blood work done requested by   the cardiologist. Please give a call back once orders are active.  ---------------------------------------------------------------------------  --------------  [address]  [phone]; OK to leave message on [employer], Wilfrido Alicea to respond with   electronic message via [employer] portal (only for patients who have   registered [employer] account)  ---------------------------------------------------------------------------  --------------  SCRIPT ANSWERS  Relationship to Patient? Other  Representative Name? Fran Vázquez  Is the Representative on the appropriate HIPAA document in Epic?  Yes

## 2022-10-19 ENCOUNTER — CARE COORDINATION (OUTPATIENT)
Dept: OTHER | Facility: CLINIC | Age: 82
End: 2022-10-19

## 2022-10-19 NOTE — CARE COORDINATION
[facility] Care Transitions Follow Up Call    Care Transition Nurse contacted the patient by telephone to follow up after admission on 2022. Verified name and  with patient as identifiers. Patient: Nirmala Moss  Patient : 1940   MRN: [id number]  Reason for Admission: Syncope and collapse  Discharge Date: 22 RARS: Readmission Risk Score: 9.7      Needs to be reviewed by the provider   Additional needs identified to be addressed with provider: No  none             Method of communication with provider: none. Addressed changes since last contact:   none  Discussed follow-up appointments. If no appointment was previously scheduled, appointment scheduling offered: Yes. Is follow up appointment scheduled within 7 days of discharge? Yes. Follow Up  Future Appointments   Date Time Provider Department Center   10/21/2022 To Be Determined Rola Marie   10/25/2022 To Be Determined Rola Gonsales 48   10/28/2022 To Be Determined CHRISTIANO Edmond 48   2022 10:30 AM [facility] LAB RESOURCE [facility] AMB   1/3/2023  8:00 AM Renaldo Short MD [facility] AMB   2023  9:30 AM [facility] ECHO [facility] AMB   3/31/2023 10:30 AM Stcaey Evangelista MD [facility] AMB     Non-[facility] follow up appointment(s): no    Care Transition Nurse reviewed medical action plan and red flags with patient and discussed any barriers to care and/or understanding of plan of care after discharge. Discussed appropriate site of care based on symptoms and resources available to patient including: PCP  Specialist  Home health  When to call [phone]  Condition related references. The patient agrees to contact the PCP office for questions related to their healthcare.      Patients top risk factors for readmission: medical condition-UTI, Hyponatremia, Syncope and collapse, fall, Liriano's esophagus , allergic rhinitis, CHF, HTN, Afib, Anemia, Dyslipidemia, CAD, SSS has pacemaker, Hx. Of CVA  Interventions to address risk factors: Education of patient/family/caregiver/guardian to support self-management-of medical conditions, safety precautions, monitor BP and daily weights and Assessment and support for treatment adherence and medication management-per directions. Patient reports she is doing well and feels like she is improving with home health therapy. Patient given an opportunity to ask questions and does not have any further questions or concerns at this time. The patient is able to contact the PCP/Specialist office for questions related to their healthcare. Offered patient enrollment in the Remote Patient Monitoring (RPM) program for in-home monitoring: NA.     Care Transitions Subsequent and Final Call    Schedule Follow Up Appointment with PCP: Completed  Subsequent and Final Calls  Do you have any ongoing symptoms?: No  Have your medications changed?: No  Do you have any questions related to your medications?: No  Do you currently have any active services?: No  Are you currently active with any services?: Home Health  Do you have any needs or concerns that I can assist you with?: No  Identified Barriers: None  Care Transitions Interventions  Other Interventions:           Care Transition Nurse provided contact information for future needs. Plan for follow up  based on severity of symptoms and risk factors.   Plan for next call: self management-of medical conditions and follow up    Ruby Ortega RN

## 2022-10-21 ENCOUNTER — HOME CARE VISIT (OUTPATIENT)
Dept: SCHEDULING | Facility: HOME HEALTH | Age: 82
End: 2022-10-21
Payer: MEDICARE

## 2022-10-21 VITALS
TEMPERATURE: 97.5 F | HEART RATE: 72 BPM | SYSTOLIC BLOOD PRESSURE: 110 MMHG | DIASTOLIC BLOOD PRESSURE: 66 MMHG | OXYGEN SATURATION: 98 % | RESPIRATION RATE: 15 BRPM

## 2022-10-21 PROCEDURE — G0157 HHC PT ASSISTANT EA 15: HCPCS

## 2022-10-25 ENCOUNTER — HOME CARE VISIT (OUTPATIENT)
Dept: SCHEDULING | Facility: HOME HEALTH | Age: 82
End: 2022-10-25
Payer: MEDICARE

## 2022-10-25 VITALS
DIASTOLIC BLOOD PRESSURE: 70 MMHG | SYSTOLIC BLOOD PRESSURE: 116 MMHG | HEART RATE: 60 BPM | RESPIRATION RATE: 15 BRPM | TEMPERATURE: 97.8 F | OXYGEN SATURATION: 98 %

## 2022-10-25 PROCEDURE — G0157 HHC PT ASSISTANT EA 15: HCPCS

## 2022-10-28 ENCOUNTER — HOME CARE VISIT (OUTPATIENT)
Dept: SCHEDULING | Facility: HOME HEALTH | Age: 82
End: 2022-10-28
Payer: MEDICARE

## 2022-10-28 PROCEDURE — G0151 HHCP-SERV OF PT,EA 15 MIN: HCPCS

## 2022-10-29 VITALS
SYSTOLIC BLOOD PRESSURE: 122 MMHG | TEMPERATURE: 97.8 F | DIASTOLIC BLOOD PRESSURE: 84 MMHG | RESPIRATION RATE: 18 BRPM | HEART RATE: 78 BPM | OXYGEN SATURATION: 98 %

## 2022-10-29 DIAGNOSIS — Z95.0 PACEMAKER: Primary | ICD-10-CM

## 2022-10-29 DIAGNOSIS — Z95.0 PACEMAKER: ICD-10-CM

## 2022-10-29 DIAGNOSIS — I49.5 SICK SINUS SYNDROME (HCC): ICD-10-CM

## 2022-11-01 ENCOUNTER — CARE COORDINATION (OUTPATIENT)
Dept: OTHER | Facility: CLINIC | Age: 82
End: 2022-11-01

## 2022-11-01 NOTE — CARE COORDINATION
Patient has graduated from the Care Transitions program on 11/1/2022. Patient/family has the ability to self-manage at this time. Patient has no further care management needs, no referral to the [facility]note 2[facility] team for further management. Patient has good support from her family and friends. Goals Addressed                   This Visit's Progress     Conditions and Symptoms   On track     I will schedule office visits, as directed by my provider. I will keep my appointment or reschedule if I have to cancel. I will follow my Zone Management tool to seek urgent or emergent care. I will notify my provider of any symptoms that indicate a worsening of my condition. Patient is able to monitor and record orthostatic BP daily for MD review. Patient is able to monitor and record weights daily. Reports increase if 2 lbs. overnight or 5 lbs. in a week to MD.    Barriers: none  Plan for overcoming my barriers: N/A  Confidence: 9/10  Anticipated Goal Completion Date: 10/29/2022               Patients upcoming visits:    Future Appointments   Date Time Provider Wilfrido Aleman   11/16/2022 10:30 AM [facility] LAB RESOURCE [facility] AMB   1/3/2023  8:00 AM Joseph Benitez MD [facility] AMB   1/4/2023  9:30 AM [facility] ECHO [facility] AMB   3/31/2023 10:30 AM Juan Carlos Schwarz MD [facility] AMB      Patient has Care Transition Nurse's contact information for any further questions, concerns, or needs.

## 2022-11-10 ASSESSMENT — ENCOUNTER SYMPTOMS: DYSPNEA ACTIVITY LEVEL: AFTER AMBULATING 10 - 20 FT

## 2022-11-15 DIAGNOSIS — E87.0 SERUM SODIUM ELEVATED: Primary | ICD-10-CM

## 2022-11-16 ENCOUNTER — NURSE ONLY (OUTPATIENT)
Dept: FAMILY MEDICINE CLINIC | Facility: CLINIC | Age: 82
End: 2022-11-16

## 2022-11-16 DIAGNOSIS — E87.0 SERUM SODIUM ELEVATED: ICD-10-CM

## 2022-11-16 LAB — SODIUM SERPL-SCNC: 131 MMOL/L (ref 133–143)

## 2022-11-17 NOTE — TELEPHONE ENCOUNTER
MEDICATION REFILL REQUEST      Name of Medication:  Eliquis  Dose:  5 mg  Frequency:  BID  Quantity:  60  Days' supply:  30      Pharmacy Name/Location: [phone]

## 2023-01-03 ENCOUNTER — OFFICE VISIT (OUTPATIENT)
Dept: FAMILY MEDICINE CLINIC | Facility: CLINIC | Age: 83
End: 2023-01-03
Payer: MEDICARE

## 2023-01-03 VITALS
SYSTOLIC BLOOD PRESSURE: 153 MMHG | OXYGEN SATURATION: 99 % | WEIGHT: 174 LBS | DIASTOLIC BLOOD PRESSURE: 89 MMHG | BODY MASS INDEX: 27.25 KG/M2 | HEART RATE: 70 BPM | TEMPERATURE: 97.9 F

## 2023-01-03 DIAGNOSIS — E87.1 HYPONATREMIA: ICD-10-CM

## 2023-01-03 DIAGNOSIS — I10 PRIMARY HYPERTENSION: Primary | ICD-10-CM

## 2023-01-03 DIAGNOSIS — I48.0 PAROXYSMAL ATRIAL FIBRILLATION (HCC): ICD-10-CM

## 2023-01-03 DIAGNOSIS — I25.118 CORONARY ARTERY DISEASE OF NATIVE ARTERY OF NATIVE HEART WITH STABLE ANGINA PECTORIS (HCC): ICD-10-CM

## 2023-01-03 DIAGNOSIS — I50.32 CHRONIC DIASTOLIC CONGESTIVE HEART FAILURE (HCC): ICD-10-CM

## 2023-01-03 PROBLEM — N18.30 CHRONIC RENAL DISEASE, STAGE III (HCC): Status: RESOLVED | Noted: 2022-10-13 | Resolved: 2023-01-03

## 2023-01-03 PROBLEM — D68.69 SECONDARY HYPERCOAGULABLE STATE (HCC): Chronic | Status: RESOLVED | Noted: 2022-09-24 | Resolved: 2023-01-03

## 2023-01-03 LAB
ALBUMIN SERPL-MCNC: 3.8 G/DL (ref 3.2–4.6)
ALBUMIN/GLOB SERPL: 1.2 {RATIO} (ref 0.4–1.6)
ALP SERPL-CCNC: 98 U/L (ref 50–136)
ALT SERPL-CCNC: 23 U/L (ref 12–65)
ANION GAP SERPL CALC-SCNC: 5 MMOL/L (ref 2–11)
AST SERPL-CCNC: 13 U/L (ref 15–37)
BILIRUB SERPL-MCNC: 0.5 MG/DL (ref 0.2–1.1)
BUN SERPL-MCNC: 18 MG/DL (ref 8–23)
CALCIUM SERPL-MCNC: 9.4 MG/DL (ref 8.3–10.4)
CHLORIDE SERPL-SCNC: 102 MMOL/L (ref 101–110)
CO2 SERPL-SCNC: 28 MMOL/L (ref 21–32)
CREAT SERPL-MCNC: 0.8 MG/DL (ref 0.6–1)
GLOBULIN SER CALC-MCNC: 3.3 G/DL (ref 2.8–4.5)
GLUCOSE SERPL-MCNC: 103 MG/DL (ref 65–100)
POTASSIUM SERPL-SCNC: 4.4 MMOL/L (ref 3.5–5.1)
PROT SERPL-MCNC: 7.1 G/DL (ref 6.3–8.2)
SODIUM SERPL-SCNC: 135 MMOL/L (ref 133–143)

## 2023-01-03 PROCEDURE — 99213 OFFICE O/P EST LOW 20 MIN: CPT | Performed by: FAMILY MEDICINE

## 2023-01-03 PROCEDURE — 3079F DIAST BP 80-89 MM HG: CPT | Performed by: FAMILY MEDICINE

## 2023-01-03 PROCEDURE — 3077F SYST BP >= 140 MM HG: CPT | Performed by: FAMILY MEDICINE

## 2023-01-03 PROCEDURE — 1123F ACP DISCUSS/DSCN MKR DOCD: CPT | Performed by: FAMILY MEDICINE

## 2023-01-03 RX ORDER — LOSARTAN POTASSIUM 100 MG/1
50 TABLET ORAL DAILY
Qty: 45 TABLET | Refills: 3 | Status: SHIPPED | OUTPATIENT
Start: 2023-01-03

## 2023-01-03 ASSESSMENT — PATIENT HEALTH QUESTIONNAIRE - PHQ9
SUM OF ALL RESPONSES TO PHQ QUESTIONS 1-9: 1
SUM OF ALL RESPONSES TO PHQ QUESTIONS 1-9: 1
SUM OF ALL RESPONSES TO PHQ9 QUESTIONS 1 & 2: 1
1. LITTLE INTEREST OR PLEASURE IN DOING THINGS: 0
2. FEELING DOWN, DEPRESSED OR HOPELESS: 1
SUM OF ALL RESPONSES TO PHQ QUESTIONS 1-9: 1
SUM OF ALL RESPONSES TO PHQ QUESTIONS 1-9: 1

## 2023-01-03 ASSESSMENT — ENCOUNTER SYMPTOMS
EYES NEGATIVE: 1
RESPIRATORY NEGATIVE: 1
GASTROINTESTINAL NEGATIVE: 1

## 2023-01-03 NOTE — PROGRESS NOTES
HISTORY OF PRESENT ILLNESS    Milla Bejarano is a 78 y.o. female. HPI  Chief Complaint   Patient presents with    Hypertension    Follow-Up from Hospital     Elevated sodium     See above. Overall stable on current regimen. Followed by cardiology for CAD and history of atrial fib. Has been stable with no angina. Spent 5 days in hospital beginning Sept 24 after syncopal episode. Found to be dehydrated. Responded well to IV hydration. Sodium was low. Medications containing diuretic were stopped and Miralax was stopped. Has been stable since that time. Does feel like BP is elevated at times because of dull headache. No vision change. No exertional symptoms. Breathing has been good. Appetite is normal. Sleeping well most of the time. Current Outpatient Medications on File Prior to Visit   Medication Sig Dispense Refill    apixaban (ELIQUIS) 5 MG TABS tablet Take 1 tablet by mouth in the morning and 1 tablet in the evening. TAKE 1 TABLET TWICE DAILY. 180 tablet 3    sotalol (BETAPACE) 160 MG tablet Take 0.5 tablets by mouth daily 90 tablet 0    polyethylene glycol (GLYCOLAX) 17 GM/SCOOP powder Take 17 g by mouth daily      famotidine (PEPCID) 40 MG tablet Take 40 mg by mouth at bedtime      acetaminophen (TYLENOL) 500 MG tablet Take 1,000 mg by mouth every 6 hours as needed for Pain      albuterol sulfate  (90 Base) MCG/ACT inhaler Inhale 2 puffs into the lungs every 4 hours as needed for Wheezing      docusate (COLACE, DULCOLAX) 100 MG CAPS Take 100 mg by mouth nightly as needed (constipation)      loratadine (CLARITIN) 10 MG tablet Take 10 mg by mouth daily as needed (allergies)      pravastatin (PRAVACHOL) 10 MG tablet Take 10 mg by mouth at bedtime       No current facility-administered medications on file prior to visit.      Past Medical History:   Diagnosis Date    Adverse effect of anesthesia     delayed awakening x1- with heart surgery    DARRELL (acute kidney injury) ([facility] [address] 06/08/2013    pt reports after TIA    Allergic rhinitis     has inhaler daily (pt denies asthma)    Anemia, unspecified 08/14/2015    no recent infusions    Aortic stenosis     sp AVR 2015    Blurry vision, bilateral 6/8/2013    CAD (coronary artery disease)     Cardiac pacemaker     Biotronik MRI compatible (dual chamber)  on Left chest; last in person interrogation 3/31/22  AP 58%,  2%    Cerebral artery occlusion with cerebral infarction [facility])     Constipation     Critical aortic valve stenosis 8/14/2015    8/13/15 (Dr Suzanne Lloyd) 1. Left and right sided maze. Please note that the left pulmonary veins were not done due to difficult anatomy. 2. Clipping, left atrial appendage. 3. Aortic valve replacement with a 23 mm Magna Ease Janet-Hudson pericardial valve. Current use of long term anticoagulation     Eliquis    GERD (gastroesophageal reflux disease)     managed with medication    H/O maze procedure 09/30/2015    AVR with maze    History of Bell's palsy 2013    History of TIA (transient ischemic attack) 2013    pt reports bells palsy started at same time    Hx of blood clots     Hypertension     medication    Hyponatremia 9/7/2013    Menopause     Metabolic encephalopathy 6/8/5778    Neuropathy     Osteoarthritis     Pancreatic cyst     Paroxysmal atrial fibrillation ([facility] [address] 8/14/2015    Prolonged emergence from general anesthesia     Pulmonary nodule     benign per pulm note (1/2018)    S/P dilatation of esophageal stricture 9/24/2022 7/2022     Short-segment Liriano's esophagus 9/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 HENT: Negative. Eyes: Negative. Respiratory: Negative. Cardiovascular: Negative. Gastrointestinal: Negative. Genitourinary: Negative. Musculoskeletal: Negative. Allergic/Immunologic: Positive for environmental allergies. Negative for food allergies and immunocompromised state. Neurological:         Residual left facial deficit related to remote CVA is stable. Psychiatric/Behavioral: Negative. Blood pressure (!) 153/89, pulse 70, temperature 97.9 °F (36.6 °C), weight 174 lb (78.9 kg), SpO2 99 %. Physical Exam  Vitals and nursing note reviewed. Constitutional:       Appearance: Normal appearance. HENT:      Mouth/Throat:      Mouth: Mucous membranes are moist.      Pharynx: Oropharynx is clear. Eyes:      Conjunctiva/sclera: Conjunctivae normal.   Neck:      Vascular: No carotid bruit. Cardiovascular:      Rate and Rhythm: Normal rate and regular rhythm. Heart sounds: Normal heart sounds. Pulmonary:      Breath sounds: Normal breath sounds. Musculoskeletal:         General: No swelling. Cervical back: Neck supple. Lymphadenopathy:      Cervical: No cervical adenopathy. Neurological:      Mental Status: Mental status is at baseline. Deep Tendon Reflexes: Reflexes normal.   Psychiatric:         Mood and Affect: Mood normal.        JERRY and Evaristo Payal was seen today for hypertension and follow-up from hospital.    Diagnoses and all orders for this visit:    Primary hypertension  -     Comprehensive Metabolic Panel; Future  -     Comprehensive Metabolic Panel    Hyponatremia  -     Comprehensive Metabolic Panel; Future  -     Comprehensive Metabolic Panel    Chronic diastolic congestive heart failure (HCC)    Paroxysmal atrial fibrillation (HCC)    Coronary artery disease of native artery of native heart with stable angina pectoris ([address]    Other orders  -     losartan (COZAAR) 100 MG tablet; Take 0.5 tablets by mouth daily   Discussed need to resume BP medications. Follow up according to response.   Continue other maintenance measures. Notify lab results      Return in about 4 weeks (around 1/31/2023), or if symptoms worsen or fail to improve.     Zulema Denney MD

## 2023-02-02 ENCOUNTER — OFFICE VISIT (OUTPATIENT)
Dept: FAMILY MEDICINE CLINIC | Facility: CLINIC | Age: 83
End: 2023-02-02
Payer: MEDICARE

## 2023-02-02 VITALS
SYSTOLIC BLOOD PRESSURE: 138 MMHG | WEIGHT: 171 LBS | OXYGEN SATURATION: 98 % | DIASTOLIC BLOOD PRESSURE: 82 MMHG | BODY MASS INDEX: 26.78 KG/M2 | TEMPERATURE: 97.5 F

## 2023-02-02 DIAGNOSIS — I10 PRIMARY HYPERTENSION: Primary | ICD-10-CM

## 2023-02-02 PROCEDURE — 1123F ACP DISCUSS/DSCN MKR DOCD: CPT | Performed by: FAMILY MEDICINE

## 2023-02-02 PROCEDURE — 3079F DIAST BP 80-89 MM HG: CPT | Performed by: FAMILY MEDICINE

## 2023-02-02 PROCEDURE — 3075F SYST BP GE 130 - 139MM HG: CPT | Performed by: FAMILY MEDICINE

## 2023-02-02 PROCEDURE — 99213 OFFICE O/P EST LOW 20 MIN: CPT | Performed by: FAMILY MEDICINE

## 2023-02-02 SDOH — ECONOMIC STABILITY: FOOD INSECURITY: WITHIN THE PAST 12 MONTHS, THE FOOD YOU BOUGHT JUST DIDN'T LAST AND YOU DIDN'T HAVE MONEY TO GET MORE.: NEVER TRUE

## 2023-02-02 SDOH — ECONOMIC STABILITY: HOUSING INSECURITY
IN THE LAST 12 MONTHS, WAS THERE A TIME WHEN YOU DID NOT HAVE A STEADY PLACE TO SLEEP OR SLEPT IN A SHELTER (INCLUDING NOW)?: NO

## 2023-02-02 SDOH — ECONOMIC STABILITY: INCOME INSECURITY: HOW HARD IS IT FOR YOU TO PAY FOR THE VERY BASICS LIKE FOOD, HOUSING, MEDICAL CARE, AND HEATING?: NOT HARD AT ALL

## 2023-02-02 SDOH — ECONOMIC STABILITY: FOOD INSECURITY: WITHIN THE PAST 12 MONTHS, YOU WORRIED THAT YOUR FOOD WOULD RUN OUT BEFORE YOU GOT MONEY TO BUY MORE.: NEVER TRUE

## 2023-02-02 ASSESSMENT — PATIENT HEALTH QUESTIONNAIRE - PHQ9
SUM OF ALL RESPONSES TO PHQ QUESTIONS 1-9: 0
1. LITTLE INTEREST OR PLEASURE IN DOING THINGS: 0
2. FEELING DOWN, DEPRESSED OR HOPELESS: 0
SUM OF ALL RESPONSES TO PHQ QUESTIONS 1-9: 0
SUM OF ALL RESPONSES TO PHQ9 QUESTIONS 1 & 2: 0

## 2023-02-02 ASSESSMENT — ENCOUNTER SYMPTOMS
RESPIRATORY NEGATIVE: 1
GASTROINTESTINAL NEGATIVE: 1

## 2023-02-02 NOTE — PROGRESS NOTES
HISTORY OF PRESENT ILLNESS    Kelly Meza is a 78 y.o. female. HPI  Chief Complaint   Patient presents with    Hypertension    1 Month Follow-Up     See above. Feeling well. No side effects from BP medication. No headache or vision change. Denies chest pain or SOB. No swelling. Home readings have been stable. No hypotensive symptoms. Current Outpatient Medications on File Prior to Visit   Medication Sig Dispense Refill    losartan (COZAAR) 100 MG tablet Take 0.5 tablets by mouth daily 45 tablet 3    apixaban (ELIQUIS) 5 MG TABS tablet Take 1 tablet by mouth in the morning and 1 tablet in the evening. TAKE 1 TABLET TWICE DAILY. 180 tablet 3    sotalol (BETAPACE) 160 MG tablet Take 0.5 tablets by mouth daily 90 tablet 0    polyethylene glycol (GLYCOLAX) 17 GM/SCOOP powder Take 17 g by mouth daily      famotidine (PEPCID) 40 MG tablet Take 40 mg by mouth at bedtime      acetaminophen (TYLENOL) 500 MG tablet Take 1,000 mg by mouth every 6 hours as needed for Pain      albuterol sulfate  (90 Base) MCG/ACT inhaler Inhale 2 puffs into the lungs every 4 hours as needed for Wheezing      docusate (COLACE, DULCOLAX) 100 MG CAPS Take 100 mg by mouth nightly as needed (constipation)      loratadine (CLARITIN) 10 MG tablet Take 10 mg by mouth daily as needed (allergies)      pravastatin (PRAVACHOL) 10 MG tablet Take 10 mg by mouth at bedtime       No current facility-administered medications on file prior to visit.      Past Medical History:   Diagnosis Date    Adverse effect of anesthesia     delayed awakening x1- with heart surgery    DARRELL (acute kidney injury) ([facility] [address] 06/08/2013    pt reports after TIA    Allergic rhinitis     has inhaler daily (pt denies asthma)    Anemia, unspecified 08/14/2015    no recent infusions    Aortic stenosis     sp AVR 2015    Blurry vision, bilateral 6/8/2013    CAD (coronary artery disease)     Cardiac pacemaker     Biotronik MRI compatible (dual chamber)  on Left chest; last in person interrogation 3/31/22  AP 58%,  2%    Cerebral artery occlusion with cerebral infarction [facility])     Constipation     Critical aortic valve stenosis 8/14/2015    8/13/15 (Dr Ewelina Montoya) 1. Left and right sided maze. Please note that the left pulmonary veins were not done due to difficult anatomy. 2. Clipping, left atrial appendage. 3. Aortic valve replacement with a 23 mm Magna Ease Janet-Hudson pericardial valve. Current use of long term anticoagulation     Eliquis    GERD (gastroesophageal reflux disease)     managed with medication    H/O maze procedure 09/30/2015    AVR with maze    History of Bell's palsy 2013    History of TIA (transient ischemic attack) 2013    pt reports bells palsy started at same time    Hx of blood clots     Hypertension     medication    Hyponatremia 9/7/2013    Menopause     Metabolic encephalopathy 5/3/7917    Neuropathy     Osteoarthritis     Pancreatic cyst     Paroxysmal atrial fibrillation ([facility] [address] 8/14/2015    Prolonged emergence from general anesthesia     Pulmonary nodule     benign per pulm note (1/2018)    S/P dilatation of esophageal stricture 9/24/2022 7/2022     Short-segment Liriano's esophagus 9/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 Respiratory: Negative. Cardiovascular: Negative. Gastrointestinal: Negative. Genitourinary: Negative. Musculoskeletal: Negative. Neurological: Negative. Blood pressure 138/82, temperature 97.5 °F (36.4 °C), temperature source Temporal, weight 171 lb (77.6 kg), SpO2 98 %. Physical Exam  Vitals and nursing note reviewed. Constitutional:       Appearance: Normal appearance.    HENT: Mouth/Throat:      Mouth: Mucous membranes are moist.      Pharynx: Oropharynx is clear. Eyes:      Conjunctiva/sclera: Conjunctivae normal.   Neck:      Vascular: No carotid bruit. Cardiovascular:      Rate and Rhythm: Normal rate and regular rhythm. Heart sounds: Normal heart sounds. Pulmonary:      Breath sounds: Normal breath sounds. Musculoskeletal:         General: No swelling. Cervical back: Neck supple. Lymphadenopathy:      Cervical: No cervical adenopathy. Psychiatric:         Mood and Affect: Mood normal.        ASSESSMENT and Rachel Hale was seen today for hypertension and 1 month follow-up. Diagnoses and all orders for this visit:    Primary hypertension   Discussed history and medications with patient. Continue current measures. Follow up if side effects occur or if new symptoms develop. Schedule AWV. Return in 3 months (on 5/2/2023), or if symptoms worsen or fail to improve.     Esdras Abraham MD

## 2023-03-07 DIAGNOSIS — Z95.0 PACEMAKER: ICD-10-CM

## 2023-03-07 DIAGNOSIS — I49.5 SICK SINUS SYNDROME (HCC): ICD-10-CM

## 2023-03-31 ENCOUNTER — OFFICE VISIT (OUTPATIENT)
Dept: CARDIOLOGY CLINIC | Age: 83
End: 2023-03-31

## 2023-03-31 VITALS
HEART RATE: 60 BPM | SYSTOLIC BLOOD PRESSURE: 118 MMHG | HEIGHT: 67 IN | DIASTOLIC BLOOD PRESSURE: 74 MMHG | WEIGHT: 171.2 LBS | BODY MASS INDEX: 26.87 KG/M2

## 2023-03-31 DIAGNOSIS — I25.118 CORONARY ARTERY DISEASE OF NATIVE ARTERY OF NATIVE HEART WITH STABLE ANGINA PECTORIS (HCC): Primary | ICD-10-CM

## 2023-03-31 DIAGNOSIS — D68.69 SECONDARY HYPERCOAGULABLE STATE (HCC): ICD-10-CM

## 2023-03-31 DIAGNOSIS — Z95.0 CARDIAC PACEMAKER: Chronic | ICD-10-CM

## 2023-03-31 DIAGNOSIS — Z95.2 S/P AVR: ICD-10-CM

## 2023-03-31 DIAGNOSIS — I10 ESSENTIAL HYPERTENSION: Chronic | ICD-10-CM

## 2023-03-31 DIAGNOSIS — I48.0 PAROXYSMAL ATRIAL FIBRILLATION (HCC): Chronic | ICD-10-CM

## 2023-03-31 RX ORDER — PRAVASTATIN SODIUM 10 MG
10 TABLET ORAL NIGHTLY
Qty: 90 TABLET | Refills: 3 | Status: SHIPPED | OUTPATIENT
Start: 2023-03-31

## 2023-03-31 RX ORDER — SOTALOL HYDROCHLORIDE 160 MG/1
80 TABLET ORAL DAILY
Qty: 90 TABLET | Refills: 3 | Status: SHIPPED | OUTPATIENT
Start: 2023-03-31

## 2023-03-31 ASSESSMENT — ENCOUNTER SYMPTOMS
ABDOMINAL PAIN: 0
BACK PAIN: 0
COUGH: 0
SHORTNESS OF BREATH: 1

## 2023-03-31 NOTE — PROGRESS NOTES
[facility] CARDIOLOGY  [address] [facility], [address], [address]  [address]      [address]      NAME:  Toan Maier  : 1940  MRN: [id number]      SUBJECTIVE:   Toan Maier is a 78 y.o. female seen for a follow up visit regarding the following:     Chief Complaint   Patient presents with    Congestive Heart Failure    Hypertension    Coronary Artery Disease       HPI:   Patient s/p AVR and MAZE for aortic stenosis and atrial fibrillation 2015. Minimal atrial fibrillation on PM. Normal LVEF and AVR by echo 2019 and normal PM function. No AF on PM.    Patient was admitted 10/2022 with UTI, pain with hypotension/hyponatremia likely due to volume depletion. Patient was noted to be significantly orthostatic upon standing. She appears to have developed some component of dysautonomia. This has improved over the last 6 months. Echo was normal 2023. Past Medical History, Past Surgical History, Family history, Social History, and Medications were all reviewed with the patient today and updated as necessary. Current Outpatient Medications   Medication Sig Dispense Refill    pravastatin (PRAVACHOL) 10 MG tablet Take 1 tablet by mouth at bedtime 90 tablet 3    sotalol (BETAPACE) 160 MG tablet Take 0.5 tablets by mouth daily 90 tablet 3    losartan (COZAAR) 100 MG tablet Take 0.5 tablets by mouth daily 45 tablet 3    apixaban (ELIQUIS) 5 MG TABS tablet Take 1 tablet by mouth in the morning and 1 tablet in the evening. TAKE 1 TABLET TWICE DAILY.  180 tablet 3    polyethylene glycol (GLYCOLAX) 17 GM/SCOOP powder Take 17 g by mouth daily      famotidine (PEPCID) 40 MG tablet Take 40 mg by mouth at bedtime      acetaminophen (TYLENOL) 500 MG tablet Take 1,000 mg by mouth every 6 hours as needed for Pain      albuterol sulfate  (90 Base) MCG/ACT inhaler Inhale 2 puffs into the lungs every 4 hours as needed for Wheezing      docusate (COLACE, DULCOLAX) 100 MG CAPS Take 100 mg by mouth

## 2023-04-03 DIAGNOSIS — Z12.5 ENCOUNTER FOR SCREENING FOR MALIGNANT NEOPLASM OF PROSTATE: ICD-10-CM

## 2023-04-03 DIAGNOSIS — Z00.00 ENCOUNTER FOR ANNUAL PHYSICAL EXAM: Primary | ICD-10-CM

## 2023-04-03 DIAGNOSIS — I10 PRIMARY HYPERTENSION: ICD-10-CM

## 2023-04-03 SDOH — ECONOMIC STABILITY: FOOD INSECURITY: WITHIN THE PAST 12 MONTHS, YOU WORRIED THAT YOUR FOOD WOULD RUN OUT BEFORE YOU GOT MONEY TO BUY MORE.: NEVER TRUE

## 2023-04-03 SDOH — HEALTH STABILITY: PHYSICAL HEALTH: ON AVERAGE, HOW MANY MINUTES DO YOU ENGAGE IN EXERCISE AT THIS LEVEL?: PATIENT DECLINED

## 2023-04-03 SDOH — ECONOMIC STABILITY: INCOME INSECURITY: HOW HARD IS IT FOR YOU TO PAY FOR THE VERY BASICS LIKE FOOD, HOUSING, MEDICAL CARE, AND HEATING?: NOT HARD AT ALL

## 2023-04-03 SDOH — ECONOMIC STABILITY: TRANSPORTATION INSECURITY
IN THE PAST 12 MONTHS, HAS LACK OF TRANSPORTATION KEPT YOU FROM MEETINGS, WORK, OR FROM GETTING THINGS NEEDED FOR DAILY LIVING?: NO

## 2023-04-03 SDOH — ECONOMIC STABILITY: FOOD INSECURITY: WITHIN THE PAST 12 MONTHS, THE FOOD YOU BOUGHT JUST DIDN'T LAST AND YOU DIDN'T HAVE MONEY TO GET MORE.: NEVER TRUE

## 2023-04-03 SDOH — HEALTH STABILITY: PHYSICAL HEALTH
ON AVERAGE, HOW MANY DAYS PER WEEK DO YOU ENGAGE IN MODERATE TO STRENUOUS EXERCISE (LIKE A BRISK WALK)?: PATIENT DECLINED

## 2023-04-03 ASSESSMENT — LIFESTYLE VARIABLES
HOW MANY STANDARD DRINKS CONTAINING ALCOHOL DO YOU HAVE ON A TYPICAL DAY: PATIENT DOES NOT DRINK
HOW OFTEN DO YOU HAVE SIX OR MORE DRINKS ON ONE OCCASION: 1
HOW OFTEN DO YOU HAVE A DRINK CONTAINING ALCOHOL: 1
HOW MANY STANDARD DRINKS CONTAINING ALCOHOL DO YOU HAVE ON A TYPICAL DAY: 0
HOW OFTEN DO YOU HAVE A DRINK CONTAINING ALCOHOL: NEVER

## 2023-04-03 ASSESSMENT — PATIENT HEALTH QUESTIONNAIRE - PHQ9
SUM OF ALL RESPONSES TO PHQ QUESTIONS 1-9: 0
1. LITTLE INTEREST OR PLEASURE IN DOING THINGS: 0
SUM OF ALL RESPONSES TO PHQ QUESTIONS 1-9: 0
SUM OF ALL RESPONSES TO PHQ QUESTIONS 1-9: 0
2. FEELING DOWN, DEPRESSED OR HOPELESS: 0
SUM OF ALL RESPONSES TO PHQ QUESTIONS 1-9: 0
SUM OF ALL RESPONSES TO PHQ9 QUESTIONS 1 & 2: 0

## 2023-04-04 ENCOUNTER — NURSE ONLY (OUTPATIENT)
Dept: FAMILY MEDICINE CLINIC | Facility: CLINIC | Age: 83
End: 2023-04-04

## 2023-04-04 ENCOUNTER — OFFICE VISIT (OUTPATIENT)
Dept: FAMILY MEDICINE CLINIC | Facility: CLINIC | Age: 83
End: 2023-04-04

## 2023-04-04 VITALS
BODY MASS INDEX: 28.66 KG/M2 | HEART RATE: 70 BPM | HEIGHT: 65 IN | OXYGEN SATURATION: 90 % | WEIGHT: 172 LBS | SYSTOLIC BLOOD PRESSURE: 136 MMHG | DIASTOLIC BLOOD PRESSURE: 82 MMHG

## 2023-04-04 DIAGNOSIS — I10 ESSENTIAL HYPERTENSION: Primary | Chronic | ICD-10-CM

## 2023-04-04 DIAGNOSIS — I10 PRIMARY HYPERTENSION: ICD-10-CM

## 2023-04-04 DIAGNOSIS — E78.5 HYPERLIPIDEMIA, UNSPECIFIED HYPERLIPIDEMIA TYPE: ICD-10-CM

## 2023-04-04 DIAGNOSIS — Z00.00 ENCOUNTER FOR ANNUAL WELLNESS VISIT (AWV) IN MEDICARE PATIENT: ICD-10-CM

## 2023-04-04 DIAGNOSIS — M25.50 CHRONIC JOINT PAIN: ICD-10-CM

## 2023-04-04 DIAGNOSIS — K21.9 GASTROESOPHAGEAL REFLUX DISEASE WITHOUT ESOPHAGITIS: ICD-10-CM

## 2023-04-04 DIAGNOSIS — I10 ESSENTIAL HYPERTENSION: Chronic | ICD-10-CM

## 2023-04-04 DIAGNOSIS — Z00.00 ENCOUNTER FOR SUBSEQUENT ANNUAL WELLNESS VISIT (AWV) IN MEDICARE PATIENT: Primary | ICD-10-CM

## 2023-04-04 DIAGNOSIS — G89.29 CHRONIC JOINT PAIN: ICD-10-CM

## 2023-04-04 LAB
ALBUMIN SERPL-MCNC: 3.8 G/DL (ref 3.2–4.6)
ALBUMIN/GLOB SERPL: 1 (ref 0.4–1.6)
ALP SERPL-CCNC: 99 U/L (ref 50–136)
ALT SERPL-CCNC: 16 U/L (ref 12–65)
ANION GAP SERPL CALC-SCNC: 4 MMOL/L (ref 2–11)
APPEARANCE UR: CLEAR
AST SERPL-CCNC: 18 U/L (ref 15–37)
BACTERIA URNS QL MICRO: NEGATIVE /HPF
BASOPHILS # BLD: 0.1 K/UL (ref 0–0.2)
BASOPHILS NFR BLD: 1 % (ref 0–2)
BILIRUB SERPL-MCNC: 0.6 MG/DL (ref 0.2–1.1)
BILIRUB UR QL: NEGATIVE
BUN SERPL-MCNC: 18 MG/DL (ref 8–23)
CALCIUM SERPL-MCNC: 9.2 MG/DL (ref 8.3–10.4)
CASTS URNS QL MICRO: ABNORMAL /LPF (ref 0–2)
CHLORIDE SERPL-SCNC: 106 MMOL/L (ref 101–110)
CHOLEST SERPL-MCNC: 152 MG/DL
CO2 SERPL-SCNC: 26 MMOL/L (ref 21–32)
COLOR UR: ABNORMAL
CREAT SERPL-MCNC: 0.8 MG/DL (ref 0.6–1)
DIFFERENTIAL METHOD BLD: NORMAL
EOSINOPHIL # BLD: 0.2 K/UL (ref 0–0.8)
EOSINOPHIL NFR BLD: 2 % (ref 0.5–7.8)
EPI CELLS #/AREA URNS HPF: ABNORMAL /HPF (ref 0–5)
ERYTHROCYTE [DISTWIDTH] IN BLOOD BY AUTOMATED COUNT: 14 % (ref 11.9–14.6)
GLOBULIN SER CALC-MCNC: 3.8 G/DL (ref 2.8–4.5)
GLUCOSE SERPL-MCNC: 93 MG/DL (ref 65–100)
GLUCOSE UR STRIP.AUTO-MCNC: NEGATIVE MG/DL
HCT VFR BLD AUTO: 40 % (ref 35.8–46.3)
HDLC SERPL-MCNC: 65 MG/DL (ref 40–60)
HDLC SERPL: 2.3
HGB BLD-MCNC: 13 G/DL (ref 11.7–15.4)
HGB UR QL STRIP: NEGATIVE
IMM GRANULOCYTES # BLD AUTO: 0 K/UL (ref 0–0.5)
IMM GRANULOCYTES NFR BLD AUTO: 0 % (ref 0–5)
KETONES UR QL STRIP.AUTO: NEGATIVE MG/DL
LDLC SERPL CALC-MCNC: 65 MG/DL
LEUKOCYTE ESTERASE UR QL STRIP.AUTO: NEGATIVE
LYMPHOCYTES # BLD: 1.5 K/UL (ref 0.5–4.6)
LYMPHOCYTES NFR BLD: 21 % (ref 13–44)
MCH RBC QN AUTO: 28.6 PG (ref 26.1–32.9)
MCHC RBC AUTO-ENTMCNC: 32.5 G/DL (ref 31.4–35)
MCV RBC AUTO: 88.1 FL (ref 82–102)
MONOCYTES # BLD: 0.7 K/UL (ref 0.1–1.3)
MONOCYTES NFR BLD: 9 % (ref 4–12)
MUCOUS THREADS URNS QL MICRO: 0 /LPF
NEUTS SEG # BLD: 4.8 K/UL (ref 1.7–8.2)
NEUTS SEG NFR BLD: 67 % (ref 43–78)
NITRITE UR QL STRIP.AUTO: NEGATIVE
NRBC # BLD: 0 K/UL (ref 0–0.2)
PH UR STRIP: 6.5 (ref 5–9)
PLATELET # BLD AUTO: 273 K/UL (ref 150–450)
PMV BLD AUTO: 9.5 FL (ref 9.4–12.3)
POTASSIUM SERPL-SCNC: 4.6 MMOL/L (ref 3.5–5.1)
PROT SERPL-MCNC: 7.6 G/DL (ref 6.3–8.2)
PROT UR STRIP-MCNC: 30 MG/DL
RBC # BLD AUTO: 4.54 M/UL (ref 4.05–5.2)
RBC #/AREA URNS HPF: ABNORMAL /HPF (ref 0–5)
SODIUM SERPL-SCNC: 136 MMOL/L (ref 133–143)
SP GR UR REFRACTOMETRY: 1.02 (ref 1–1.02)
TRIGL SERPL-MCNC: 110 MG/DL (ref 35–150)
TSH, 3RD GENERATION: 1.66 UIU/ML (ref 0.36–3.74)
URINE CULTURE IF INDICATED: ABNORMAL
UROBILINOGEN UR QL STRIP.AUTO: 1 EU/DL (ref 0.2–1)
VLDLC SERPL CALC-MCNC: 22 MG/DL (ref 6–23)
WBC # BLD AUTO: 7.2 K/UL (ref 4.3–11.1)
WBC URNS QL MICRO: ABNORMAL /HPF (ref 0–4)

## 2023-04-04 RX ORDER — MOMETASONE FUROATE 1 MG/ML
SOLUTION TOPICAL
COMMUNITY
Start: 2023-01-27

## 2023-04-04 ASSESSMENT — ENCOUNTER SYMPTOMS
CONSTIPATION: 1
EYES NEGATIVE: 1
RECTAL PAIN: 0
NAUSEA: 0
RESPIRATORY NEGATIVE: 1
DIARRHEA: 0
ABDOMINAL PAIN: 0
ABDOMINAL DISTENTION: 0
ANAL BLEEDING: 0
BLOOD IN STOOL: 0

## 2023-04-04 NOTE — PATIENT INSTRUCTIONS
you to chew 1 adult-strength or 2 to 4 low-dose aspirin. Wait for an ambulance. Do not try to drive yourself. Watch closely for changes in your health, and be sure to contact your doctor if you have any problems. Where can you learn more? Go to http://www.chavira.com/ and enter F075 to learn more about \"A Healthy Heart: Care Instructions. \"  Current as of: September 7, 2022               Content Version: 13.6  © 5548-2675 Healthwise, [employer]. Care instructions adapted under license by [facility], [facility] ([facility]). If you have questions about a medical condition or this instruction, always ask your healthcare professional. [address] any warranty or liability for your use of this information. Personalized Preventive Plan for Luz Fraga - 4/4/2023  Medicare offers a range of preventive health benefits. Some of the tests and screenings are paid in full while other may be subject to a deductible, co-insurance, and/or copay. Some of these benefits include a comprehensive review of your medical history including lifestyle, illnesses that may run in your family, and various assessments and screenings as appropriate. After reviewing your medical record and screening and assessments performed today your provider may have ordered immunizations, labs, imaging, and/or referrals for you. A list of these orders (if applicable) as well as your Preventive Care list are included within your After Visit Summary for your review. Other Preventive Recommendations:    A preventive eye exam performed by an eye specialist is recommended every 1-2 years to screen for glaucoma; cataracts, macular degeneration, and other eye disorders. A preventive dental visit is recommended every 6 months. Try to get at least 150 minutes of exercise per week or 10,000 steps per day on a pedometer .   Order or download the FREE \"Exercise & Physical Activity: Your Everyday Guide\" from [employer] on

## 2023-04-04 NOTE — PROGRESS NOTES
HISTORY OF PRESENT ILLNESS    Luz Fraga is a 78 y.o. female. HPI    Review of Systems     Physical Exam     ASSESSMENT and Talib Caden was seen today for medicare awv. Diagnoses and all orders for this visit:    Encounter for subsequent annual wellness visit (AWV) in Medicare patient    Primary hypertension  -     Cancel: Urinalysis; Future    Gastroesophageal reflux disease without esophagitis    Hyperlipidemia, unspecified hyperlipidemia type    Chronic joint pain         Return in about 1 year (around 4/4/2024), or if symptoms worsen or fail to improve. Catherene Ferrier, MDMedicare Annual Wellness Visit    Luz Fraga is here for Medicare AWV (Due for eye exam. )    Assessment & Plan   Encounter for subsequent annual wellness visit (AWV) in Medicare patient  Primary hypertension  Gastroesophageal reflux disease without esophagitis  Hyperlipidemia, unspecified hyperlipidemia type  Chronic joint pain    Recommendations for Preventive Services Due: see orders and patient instructions/AVS.  Recommended screening schedule for the next 5-10 years is provided to the patient in written form: see Patient Instructions/AVS.     Return in about 6 months (around 10/4/2023), or if symptoms worsen or fail to improve. Subjective       Patient's complete Health Risk Assessment and screening values have been reviewed and are found in Flowsheets. The following problems were reviewed today and where indicated follow up appointments were made and/or referrals ordered.     Positive Risk Factor Screenings with Interventions:    Fall Risk:  Do you feel unsteady or are you worried about falling? : no  2 or more falls in past year?: (!) yes  Fall with injury in past year?: (!) yes     Interventions:    See AVS for additional education material                                    Objective   Vitals:    04/04/23 1345   BP: 136/82   Site: Left Upper Arm   Position: Sitting   Cuff Size: Medium Adult   Pulse: 70   SpO2:
abdominal distention, abdominal pain, anal bleeding, blood in stool, diarrhea, nausea and rectal pain. Genitourinary: Negative. Musculoskeletal:  Positive for arthralgias (DJD;  stable). Allergic/Immunologic: Positive for environmental allergies (controlled) and immunocompromised state. Negative for food allergies. Neurological: Negative. Psychiatric/Behavioral: Negative. Blood pressure 136/82, pulse 70, height 5' 5.16\" (1.655 m), weight 172 lb (78 kg), SpO2 90 %. Physical Exam  Vitals and nursing note reviewed. Constitutional:       Appearance: Normal appearance. HENT:      Right Ear: Tympanic membrane normal.      Left Ear: Tympanic membrane normal.      Nose: Nose normal.      Mouth/Throat:      Mouth: Mucous membranes are moist.      Pharynx: Oropharynx is clear. Eyes:      Extraocular Movements: Extraocular movements intact. Conjunctiva/sclera: Conjunctivae normal.      Pupils: Pupils are equal, round, and reactive to light. Neck:      Vascular: No carotid bruit. Cardiovascular:      Rate and Rhythm: Normal rate and regular rhythm. Heart sounds: Normal heart sounds. Pulmonary:      Breath sounds: Normal breath sounds. Musculoskeletal:         General: No swelling or tenderness. Normal range of motion. Cervical back: Neck supple. Lymphadenopathy:      Cervical: No cervical adenopathy. Neurological:      Cranial Nerves: No cranial nerve deficit. Coordination: Coordination normal.      Gait: Gait normal.      Deep Tendon Reflexes: Reflexes normal.   Psychiatric:         Mood and Affect: Mood normal.        ASSESSMENT and Reino Sessions was seen today for medicare awv. Diagnoses and all orders for this visit:    Encounter for subsequent annual wellness visit (AWV) in Medicare patient    Primary hypertension  -     Cancel: Urinalysis;  Future    Gastroesophageal reflux disease without esophagitis    Hyperlipidemia, unspecified hyperlipidemia

## 2023-05-01 ENCOUNTER — TELEPHONE (OUTPATIENT)
Dept: FAMILY MEDICINE CLINIC | Facility: CLINIC | Age: 83
End: 2023-05-01

## 2023-05-01 ENCOUNTER — HOSPITAL ENCOUNTER (OUTPATIENT)
Dept: GENERAL RADIOLOGY | Age: 83
Discharge: HOME | End: 2023-05-04
Payer: MEDICARE

## 2023-05-01 ENCOUNTER — OFFICE VISIT (OUTPATIENT)
Dept: FAMILY MEDICINE CLINIC | Facility: CLINIC | Age: 83
End: 2023-05-01
Payer: MEDICARE

## 2023-05-01 VITALS
OXYGEN SATURATION: 97 % | HEIGHT: 67 IN | HEART RATE: 63 BPM | WEIGHT: 164 LBS | SYSTOLIC BLOOD PRESSURE: 138 MMHG | DIASTOLIC BLOOD PRESSURE: 80 MMHG | BODY MASS INDEX: 25.74 KG/M2

## 2023-05-01 DIAGNOSIS — J40 BRONCHITIS: ICD-10-CM

## 2023-05-01 DIAGNOSIS — J40 BRONCHITIS: Primary | ICD-10-CM

## 2023-05-01 PROCEDURE — 99213 OFFICE O/P EST LOW 20 MIN: CPT | Performed by: FAMILY MEDICINE

## 2023-05-01 PROCEDURE — 1123F ACP DISCUSS/DSCN MKR DOCD: CPT | Performed by: FAMILY MEDICINE

## 2023-05-01 PROCEDURE — 3079F DIAST BP 80-89 MM HG: CPT | Performed by: FAMILY MEDICINE

## 2023-05-01 PROCEDURE — 71046 X-RAY EXAM CHEST 2 VIEWS: CPT

## 2023-05-01 PROCEDURE — 96372 THER/PROPH/DIAG INJ SC/IM: CPT | Performed by: FAMILY MEDICINE

## 2023-05-01 PROCEDURE — 3075F SYST BP GE 130 - 139MM HG: CPT | Performed by: FAMILY MEDICINE

## 2023-05-01 RX ORDER — AZITHROMYCIN 250 MG/1
250 TABLET, FILM COATED ORAL SEE ADMIN INSTRUCTIONS
Qty: 6 TABLET | Refills: 0 | Status: SHIPPED | OUTPATIENT
Start: 2023-05-01 | End: 2023-05-06

## 2023-05-01 RX ORDER — METHYLPREDNISOLONE SODIUM SUCCINATE 40 MG/ML
40 INJECTION, POWDER, LYOPHILIZED, FOR SOLUTION INTRAMUSCULAR; INTRAVENOUS ONCE
Status: COMPLETED | OUTPATIENT
Start: 2023-05-01 | End: 2023-05-01

## 2023-05-01 RX ORDER — PREDNISONE 10 MG/1
1 TABLET ORAL SEE ADMIN INSTRUCTIONS
Qty: 1 EACH | Refills: 0 | Status: SHIPPED | OUTPATIENT
Start: 2023-05-01

## 2023-05-01 RX ORDER — ALBUTEROL SULFATE 90 UG/1
2 AEROSOL, METERED RESPIRATORY (INHALATION) 4 TIMES DAILY PRN
Qty: 54 G | Refills: 1 | Status: SHIPPED | OUTPATIENT
Start: 2023-05-01

## 2023-05-01 RX ADMIN — METHYLPREDNISOLONE SODIUM SUCCINATE 40 MG: 40 INJECTION, POWDER, LYOPHILIZED, FOR SOLUTION INTRAMUSCULAR; INTRAVENOUS at 12:23

## 2023-05-01 SDOH — ECONOMIC STABILITY: INCOME INSECURITY: HOW HARD IS IT FOR YOU TO PAY FOR THE VERY BASICS LIKE FOOD, HOUSING, MEDICAL CARE, AND HEATING?: NOT HARD AT ALL

## 2023-05-01 SDOH — ECONOMIC STABILITY: FOOD INSECURITY: WITHIN THE PAST 12 MONTHS, THE FOOD YOU BOUGHT JUST DIDN'T LAST AND YOU DIDN'T HAVE MONEY TO GET MORE.: NEVER TRUE

## 2023-05-01 SDOH — ECONOMIC STABILITY: FOOD INSECURITY: WITHIN THE PAST 12 MONTHS, YOU WORRIED THAT YOUR FOOD WOULD RUN OUT BEFORE YOU GOT MONEY TO BUY MORE.: NEVER TRUE

## 2023-05-01 ASSESSMENT — ENCOUNTER SYMPTOMS
CHEST TIGHTNESS: 0
APNEA: 0
EYES NEGATIVE: 1
GASTROINTESTINAL NEGATIVE: 1
SHORTNESS OF BREATH: 1
WHEEZING: 1
CHOKING: 0
COUGH: 1

## 2023-05-01 ASSESSMENT — PATIENT HEALTH QUESTIONNAIRE - PHQ9
SUM OF ALL RESPONSES TO PHQ QUESTIONS 1-9: 0
SUM OF ALL RESPONSES TO PHQ QUESTIONS 1-9: 0
2. FEELING DOWN, DEPRESSED OR HOPELESS: 0
SUM OF ALL RESPONSES TO PHQ QUESTIONS 1-9: 0
SUM OF ALL RESPONSES TO PHQ QUESTIONS 1-9: 0
1. LITTLE INTEREST OR PLEASURE IN DOING THINGS: 0
SUM OF ALL RESPONSES TO PHQ9 QUESTIONS 1 & 2: 0

## 2023-05-01 NOTE — TELEPHONE ENCOUNTER
Pt called stating still experiencing cough, SOB, and wheezing. Pt requested medicine but was able to be scheduled for same day appt.

## 2023-05-01 NOTE — PROGRESS NOTES
HISTORY OF PRESENT ILLNESS    Lynnetta Simmonds is a 78 y.o. female. HPI  Chief Complaint   Patient presents with    Cough    Shortness of Breath     See above. Cough has persisted for at least a month. No improvement with doxycycline. Denies fever or sinus congestion. No chest pain. Some SOB and wheezing with exertion. Wheezing improves with inhaler but needs refill. Cough med gives temporary relief. Cough is productive of yellow or green thick mucus. Current Outpatient Medications on File Prior to Visit   Medication Sig Dispense Refill    brompheniramine-pseudoephedrine-DM 2-30-10 MG/5ML syrup Take 5 mLs by mouth 4 times daily as needed for Congestion or Cough 180 mL 0    mometasone (ELOCON) 0.1 % lotion APPLY TWICE DAILY TO SCALP      pravastatin (PRAVACHOL) 10 MG tablet Take 1 tablet by mouth at bedtime 90 tablet 3    sotalol (BETAPACE) 160 MG tablet Take 0.5 tablets by mouth daily (Patient taking differently: Take 1 tablet by mouth daily 80 mg BID) 90 tablet 3    losartan (COZAAR) 100 MG tablet Take 0.5 tablets by mouth daily 45 tablet 3    apixaban (ELIQUIS) 5 MG TABS tablet Take 1 tablet by mouth in the morning and 1 tablet in the evening. TAKE 1 TABLET TWICE DAILY. 180 tablet 3    famotidine (PEPCID) 40 MG tablet Take 1 tablet by mouth at bedtime      albuterol sulfate  (90 Base) MCG/ACT inhaler Inhale 2 puffs into the lungs every 4 hours as needed for Wheezing      docusate (COLACE, DULCOLAX) 100 MG CAPS Take 100 mg by mouth nightly as needed (constipation)      loratadine (CLARITIN) 10 MG tablet Take 1 tablet by mouth daily as needed (allergies)       No current facility-administered medications on file prior to visit.      Past Medical History:   Diagnosis Date    Adverse effect of anesthesia     delayed awakening x1- with heart surgery    DARRELL (acute kidney injury) ([facility] [address] 06/08/2013    pt reports after TIA    Allergic rhinitis     has inhaler daily (pt denies asthma)    Anemia, unspecified 08/14/2015

## 2023-05-01 NOTE — TELEPHONE ENCOUNTER
----- Message from Evelia Reeves MD sent at 5/1/2023  4:51 PM EDT -----  Shows some old scarring but no pneumonia.  ----- Message -----  From: Masood Mirza Incoming Orders Results To Radiant  Sent: 5/1/2023   4:50 PM EDT  To: Evelia Reeves MD

## 2023-05-11 ENCOUNTER — OFFICE VISIT (OUTPATIENT)
Dept: FAMILY MEDICINE CLINIC | Facility: CLINIC | Age: 83
End: 2023-05-11
Payer: MEDICARE

## 2023-05-11 VITALS
TEMPERATURE: 97.8 F | OXYGEN SATURATION: 98 % | DIASTOLIC BLOOD PRESSURE: 70 MMHG | SYSTOLIC BLOOD PRESSURE: 110 MMHG | WEIGHT: 166 LBS | HEART RATE: 78 BPM | BODY MASS INDEX: 26 KG/M2

## 2023-05-11 DIAGNOSIS — G47.00 INSOMNIA, UNSPECIFIED TYPE: ICD-10-CM

## 2023-05-11 DIAGNOSIS — Z87.09 HISTORY OF BRONCHITIS: Primary | ICD-10-CM

## 2023-05-11 DIAGNOSIS — R53.83 FATIGUE, UNSPECIFIED TYPE: ICD-10-CM

## 2023-05-11 DIAGNOSIS — I10 PRIMARY HYPERTENSION: ICD-10-CM

## 2023-05-11 PROCEDURE — 1123F ACP DISCUSS/DSCN MKR DOCD: CPT | Performed by: FAMILY MEDICINE

## 2023-05-11 PROCEDURE — 99213 OFFICE O/P EST LOW 20 MIN: CPT | Performed by: FAMILY MEDICINE

## 2023-05-11 PROCEDURE — 3078F DIAST BP <80 MM HG: CPT | Performed by: FAMILY MEDICINE

## 2023-05-11 PROCEDURE — 3074F SYST BP LT 130 MM HG: CPT | Performed by: FAMILY MEDICINE

## 2023-05-11 RX ORDER — LOSARTAN POTASSIUM 25 MG/1
25 TABLET ORAL DAILY
Qty: 30 TABLET | Refills: 5 | Status: SHIPPED | OUTPATIENT
Start: 2023-05-11

## 2023-05-11 RX ORDER — TRAZODONE HYDROCHLORIDE 50 MG/1
TABLET ORAL
Qty: 30 TABLET | Refills: 5 | Status: SHIPPED | OUTPATIENT
Start: 2023-05-11

## 2023-05-11 ASSESSMENT — ENCOUNTER SYMPTOMS
EYES NEGATIVE: 1
SINUS PRESSURE: 1
SORE THROAT: 0
GASTROINTESTINAL NEGATIVE: 1
RESPIRATORY NEGATIVE: 1

## 2023-05-11 ASSESSMENT — PATIENT HEALTH QUESTIONNAIRE - PHQ9
2. FEELING DOWN, DEPRESSED OR HOPELESS: 0
SUM OF ALL RESPONSES TO PHQ QUESTIONS 1-9: 0
SUM OF ALL RESPONSES TO PHQ QUESTIONS 1-9: 0
SUM OF ALL RESPONSES TO PHQ9 QUESTIONS 1 & 2: 0
1. LITTLE INTEREST OR PLEASURE IN DOING THINGS: 0
SUM OF ALL RESPONSES TO PHQ QUESTIONS 1-9: 0
SUM OF ALL RESPONSES TO PHQ QUESTIONS 1-9: 0

## 2023-05-11 NOTE — PROGRESS NOTES
HISTORY OF PRESENT ILLNESS    Falguni Ordoñez is a 78 y.o. female. HPI  Chief Complaint   Patient presents with    Follow-up     10 days      See above. History of bronchitis. States she is at least 70% better. Cough and wheezing is largely resolved. Mainly has residual fatigue. Has difficulty staying asleep. Awakes prematurely and feels tired the next day. No exertional symptoms. No side effects from BP medication. No headache or vision change. Denies chest pain or SOB. No swelling. Home BP readings have been normal. Taking half of losartan 100mg tab. Current Outpatient Medications on File Prior to Visit   Medication Sig Dispense Refill    mometasone (ELOCON) 0.1 % lotion APPLY TWICE DAILY TO SCALP      pravastatin (PRAVACHOL) 10 MG tablet Take 1 tablet by mouth at bedtime 90 tablet 3    sotalol (BETAPACE) 160 MG tablet Take 0.5 tablets by mouth daily (Patient taking differently: Take 1 tablet by mouth daily 80 mg BID) 90 tablet 3    apixaban (ELIQUIS) 5 MG TABS tablet Take 1 tablet by mouth in the morning and 1 tablet in the evening. TAKE 1 TABLET TWICE DAILY.  180 tablet 3    famotidine (PEPCID) 40 MG tablet Take 1 tablet by mouth at bedtime      docusate (COLACE, DULCOLAX) 100 MG CAPS Take 100 mg by mouth nightly as needed (constipation)      loratadine (CLARITIN) 10 MG tablet Take 1 tablet by mouth daily as needed (allergies)      albuterol sulfate HFA (VENTOLIN HFA) 108 (90 Base) MCG/ACT inhaler Inhale 2 puffs into the lungs 4 times daily as needed for Wheezing (Patient not taking: Reported on 5/11/2023) 54 g 1    brompheniramine-pseudoephedrine-DM 2-30-10 MG/5ML syrup Take 5 mLs by mouth 4 times daily as needed for Congestion or Cough (Patient not taking: Reported on 5/11/2023) 180 mL 0    albuterol sulfate  (90 Base) MCG/ACT inhaler Inhale 2 puffs into the lungs every 4 hours as needed for Wheezing (Patient not taking: Reported on 5/11/2023)       No current facility-administered medications on

## 2023-05-16 DIAGNOSIS — I48.0 PAROXYSMAL ATRIAL FIBRILLATION (HCC): Chronic | ICD-10-CM

## 2023-05-16 DIAGNOSIS — Z95.0 CARDIAC PACEMAKER: Primary | Chronic | ICD-10-CM

## 2023-07-20 DIAGNOSIS — Z95.0 PACEMAKER: ICD-10-CM

## 2023-07-20 DIAGNOSIS — I49.5 SICK SINUS SYNDROME (HCC): ICD-10-CM

## 2023-08-09 DIAGNOSIS — J40 BRONCHITIS: ICD-10-CM

## 2023-08-09 RX ORDER — ALBUTEROL SULFATE 90 UG/1
2 AEROSOL, METERED RESPIRATORY (INHALATION) 4 TIMES DAILY PRN
Qty: 54 EACH | Refills: 1 | OUTPATIENT
Start: 2023-08-09

## 2023-08-17 PROCEDURE — 93294 REM INTERROG EVL PM/LDLS PM: CPT | Performed by: INTERNAL MEDICINE

## 2023-08-17 PROCEDURE — 93296 REM INTERROG EVL PM/IDS: CPT | Performed by: INTERNAL MEDICINE

## 2023-09-01 ENCOUNTER — OFFICE VISIT (OUTPATIENT)
Dept: FAMILY MEDICINE CLINIC | Facility: CLINIC | Age: 83
End: 2023-09-01
Payer: MEDICARE

## 2023-09-01 VITALS
WEIGHT: 163 LBS | HEART RATE: 69 BPM | TEMPERATURE: 97.5 F | DIASTOLIC BLOOD PRESSURE: 70 MMHG | SYSTOLIC BLOOD PRESSURE: 110 MMHG | BODY MASS INDEX: 25.53 KG/M2 | OXYGEN SATURATION: 97 %

## 2023-09-01 DIAGNOSIS — R05.2 SUBACUTE COUGH: ICD-10-CM

## 2023-09-01 DIAGNOSIS — J30.9 ALLERGIC RHINITIS, UNSPECIFIED SEASONALITY, UNSPECIFIED TRIGGER: Primary | ICD-10-CM

## 2023-09-01 PROCEDURE — 3074F SYST BP LT 130 MM HG: CPT | Performed by: FAMILY MEDICINE

## 2023-09-01 PROCEDURE — 3078F DIAST BP <80 MM HG: CPT | Performed by: FAMILY MEDICINE

## 2023-09-01 PROCEDURE — 96372 THER/PROPH/DIAG INJ SC/IM: CPT | Performed by: FAMILY MEDICINE

## 2023-09-01 PROCEDURE — 1123F ACP DISCUSS/DSCN MKR DOCD: CPT | Performed by: FAMILY MEDICINE

## 2023-09-01 PROCEDURE — 99213 OFFICE O/P EST LOW 20 MIN: CPT | Performed by: FAMILY MEDICINE

## 2023-09-01 RX ORDER — FLUTICASONE PROPIONATE AND SALMETEROL XINAFOATE 45; 21 UG/1; UG/1
2 AEROSOL, METERED RESPIRATORY (INHALATION) 2 TIMES DAILY
Qty: 1 EACH | Refills: 3 | Status: SHIPPED | OUTPATIENT
Start: 2023-09-01

## 2023-09-01 RX ORDER — METHYLPREDNISOLONE ACETATE 80 MG/ML
40 INJECTION, SUSPENSION INTRA-ARTICULAR; INTRALESIONAL; INTRAMUSCULAR; SOFT TISSUE ONCE
Status: COMPLETED | OUTPATIENT
Start: 2023-09-01 | End: 2023-09-01

## 2023-09-01 RX ORDER — BROMPHENIRAMINE MALEATE, PSEUDOEPHEDRINE HYDROCHLORIDE, AND DEXTROMETHORPHAN HYDROBROMIDE 2; 30; 10 MG/5ML; MG/5ML; MG/5ML
5 SYRUP ORAL 4 TIMES DAILY PRN
Qty: 180 ML | Refills: 0 | Status: SHIPPED | OUTPATIENT
Start: 2023-09-01

## 2023-09-01 RX ADMIN — METHYLPREDNISOLONE ACETATE 40 MG: 80 INJECTION, SUSPENSION INTRA-ARTICULAR; INTRALESIONAL; INTRAMUSCULAR; SOFT TISSUE at 08:57

## 2023-09-01 ASSESSMENT — ENCOUNTER SYMPTOMS
CHEST TIGHTNESS: 0
STRIDOR: 0
SINUS PAIN: 0
COUGH: 1
APNEA: 0
SORE THROAT: 0
CHOKING: 0
WHEEZING: 0
GASTROINTESTINAL NEGATIVE: 1
EYES NEGATIVE: 1
SHORTNESS OF BREATH: 0
TROUBLE SWALLOWING: 0

## 2023-09-05 ENCOUNTER — PATIENT MESSAGE (OUTPATIENT)
Age: 83
End: 2023-09-05

## 2023-09-06 NOTE — TELEPHONE ENCOUNTER
I am sorry if she is in the hospital.  I did review her results so far her echocardiogram demonstrates normal heart pumping functions and no concerns. She does have a focal area of moderate to severe plaque within the left internal carotid artery. This is not an emergency and can be dealt with as an outpatient. Given her age some patients will opt to remain fairly conservative versus consider stenting options to avoid open surgery.

## 2023-09-15 ENCOUNTER — OFFICE VISIT (OUTPATIENT)
Dept: FAMILY MEDICINE CLINIC | Facility: CLINIC | Age: 83
End: 2023-09-15
Payer: MEDICARE

## 2023-09-15 VITALS
DIASTOLIC BLOOD PRESSURE: 78 MMHG | WEIGHT: 159 LBS | SYSTOLIC BLOOD PRESSURE: 138 MMHG | BODY MASS INDEX: 24.9 KG/M2 | TEMPERATURE: 97.3 F | OXYGEN SATURATION: 100 % | HEART RATE: 70 BPM

## 2023-09-15 DIAGNOSIS — B35.1 ONYCHOMYCOSIS: ICD-10-CM

## 2023-09-15 DIAGNOSIS — E87.1 HYPONATREMIA: ICD-10-CM

## 2023-09-15 DIAGNOSIS — R41.3 MEMORY LOSS: ICD-10-CM

## 2023-09-15 DIAGNOSIS — Z09 HOSPITAL DISCHARGE FOLLOW-UP: Primary | ICD-10-CM

## 2023-09-15 DIAGNOSIS — R41.82 ALTERED MENTAL STATUS, UNSPECIFIED ALTERED MENTAL STATUS TYPE: ICD-10-CM

## 2023-09-15 DIAGNOSIS — I10 PRIMARY HYPERTENSION: ICD-10-CM

## 2023-09-15 LAB
ALBUMIN SERPL-MCNC: 3.6 G/DL (ref 3.2–4.6)
ALBUMIN/GLOB SERPL: 1.1 (ref 0.4–1.6)
ALP SERPL-CCNC: 80 U/L (ref 50–136)
ALT SERPL-CCNC: 25 U/L (ref 12–65)
ANION GAP SERPL CALC-SCNC: 8 MMOL/L (ref 2–11)
AST SERPL-CCNC: 26 U/L (ref 15–37)
BASOPHILS # BLD: 0.1 K/UL (ref 0–0.2)
BASOPHILS NFR BLD: 1 % (ref 0–2)
BILIRUB SERPL-MCNC: 0.7 MG/DL (ref 0.2–1.1)
BUN SERPL-MCNC: 20 MG/DL (ref 8–23)
CALCIUM SERPL-MCNC: 9.2 MG/DL (ref 8.3–10.4)
CHLORIDE SERPL-SCNC: 107 MMOL/L (ref 101–110)
CO2 SERPL-SCNC: 22 MMOL/L (ref 21–32)
CREAT SERPL-MCNC: 0.6 MG/DL (ref 0.6–1)
DIFFERENTIAL METHOD BLD: ABNORMAL
EOSINOPHIL # BLD: 0.2 K/UL (ref 0–0.8)
EOSINOPHIL NFR BLD: 2 % (ref 0.5–7.8)
ERYTHROCYTE [DISTWIDTH] IN BLOOD BY AUTOMATED COUNT: 14.9 % (ref 11.9–14.6)
GLOBULIN SER CALC-MCNC: 3.2 G/DL (ref 2.8–4.5)
GLUCOSE SERPL-MCNC: 87 MG/DL (ref 65–100)
HCT VFR BLD AUTO: 36.5 % (ref 35.8–46.3)
HGB BLD-MCNC: 11.8 G/DL (ref 11.7–15.4)
IMM GRANULOCYTES # BLD AUTO: 0 K/UL (ref 0–0.5)
IMM GRANULOCYTES NFR BLD AUTO: 0 % (ref 0–5)
LYMPHOCYTES # BLD: 1.7 K/UL (ref 0.5–4.6)
LYMPHOCYTES NFR BLD: 17 % (ref 13–44)
MCH RBC QN AUTO: 28.8 PG (ref 26.1–32.9)
MCHC RBC AUTO-ENTMCNC: 32.3 G/DL (ref 31.4–35)
MCV RBC AUTO: 89 FL (ref 82–102)
MONOCYTES # BLD: 0.9 K/UL (ref 0.1–1.3)
MONOCYTES NFR BLD: 9 % (ref 4–12)
NEUTS SEG # BLD: 7 K/UL (ref 1.7–8.2)
NEUTS SEG NFR BLD: 71 % (ref 43–78)
NRBC # BLD: 0 K/UL (ref 0–0.2)
PLATELET # BLD AUTO: 287 K/UL (ref 150–450)
PMV BLD AUTO: 9.5 FL (ref 9.4–12.3)
POTASSIUM SERPL-SCNC: 4.7 MMOL/L (ref 3.5–5.1)
PROT SERPL-MCNC: 6.8 G/DL (ref 6.3–8.2)
RBC # BLD AUTO: 4.1 M/UL (ref 4.05–5.2)
SODIUM SERPL-SCNC: 137 MMOL/L (ref 133–143)
WBC # BLD AUTO: 9.8 K/UL (ref 4.3–11.1)

## 2023-09-15 PROCEDURE — 99214 OFFICE O/P EST MOD 30 MIN: CPT | Performed by: FAMILY MEDICINE

## 2023-09-15 PROCEDURE — 3078F DIAST BP <80 MM HG: CPT | Performed by: FAMILY MEDICINE

## 2023-09-15 PROCEDURE — 1123F ACP DISCUSS/DSCN MKR DOCD: CPT | Performed by: FAMILY MEDICINE

## 2023-09-15 PROCEDURE — 3075F SYST BP GE 130 - 139MM HG: CPT | Performed by: FAMILY MEDICINE

## 2023-09-15 ASSESSMENT — ENCOUNTER SYMPTOMS
WHEEZING: 0
EYES NEGATIVE: 1
COUGH: 0
CHEST TIGHTNESS: 0
SHORTNESS OF BREATH: 1
CHOKING: 0
GASTROINTESTINAL NEGATIVE: 1
APNEA: 0

## 2023-09-15 NOTE — PROGRESS NOTES
Take 1 tablet by mouth at bedtime 90 tablet 3    sotalol (BETAPACE) 160 MG tablet Take 0.5 tablets by mouth daily (Patient taking differently: Take 1 tablet by mouth daily 80 mg daily) 90 tablet 3    apixaban (ELIQUIS) 5 MG TABS tablet Take 1 tablet by mouth in the morning and 1 tablet in the evening. TAKE 1 TABLET TWICE DAILY. 180 tablet 3    famotidine (PEPCID) 40 MG tablet Take 1 tablet by mouth at bedtime      albuterol sulfate  (90 Base) MCG/ACT inhaler Inhale 2 puffs into the lungs every 4 hours as needed for Wheezing      docusate (COLACE, DULCOLAX) 100 MG CAPS Take 100 mg by mouth nightly as needed (constipation)      loratadine (CLARITIN) 10 MG tablet Take 1 tablet by mouth daily as needed (allergies)       No current facility-administered medications on file prior to visit. Past Medical History:   Diagnosis Date    Adverse effect of anesthesia     delayed awakening x1- with heart surgery    DARRELL (acute kidney injury) ([address]) 06/08/2013    pt reports after TIA    Allergic rhinitis     has inhaler daily (pt denies asthma)    Anemia, unspecified 08/14/2015    no recent infusions    Aortic stenosis     sp AVR 2015    Blurry vision, bilateral 6/8/2013    CAD (coronary artery disease)     Cardiac pacemaker     Biotronik MRI compatible (dual chamber)  on Left chest; last in person interrogation 3/31/22  AP 58%,  2%    Cerebral artery occlusion with cerebral infarction [facility])     Constipation     Critical aortic valve stenosis 8/14/2015    8/13/15 (Dr Rola Gomez) 1. Left and right sided maze. Please note that the left pulmonary veins were not done due to difficult anatomy. 2. Clipping, left atrial appendage. 3. Aortic valve replacement with a 23 mm Magna Ease Janet-Hudson pericardial valve.     Current use of long term anticoagulation     Eliquis    GERD (gastroesophageal reflux disease)     managed with medication    H/O maze procedure 09/30/2015    AVR with maze    History of Bell's palsy 2013

## 2023-09-26 ENCOUNTER — HOSPITAL ENCOUNTER (EMERGENCY)
Age: 83
Discharge: HOME | End: 2023-09-26
Attending: EMERGENCY MEDICINE
Payer: MEDICARE

## 2023-09-26 ENCOUNTER — APPOINTMENT (OUTPATIENT)
Dept: GENERAL RADIOLOGY | Age: 83
End: 2023-09-26
Payer: MEDICARE

## 2023-09-26 ENCOUNTER — TELEPHONE (OUTPATIENT)
Dept: FAMILY MEDICINE CLINIC | Facility: CLINIC | Age: 83
End: 2023-09-26

## 2023-09-26 VITALS
TEMPERATURE: 98.4 F | OXYGEN SATURATION: 96 % | BODY MASS INDEX: 24.9 KG/M2 | DIASTOLIC BLOOD PRESSURE: 58 MMHG | RESPIRATION RATE: 15 BRPM | WEIGHT: 159 LBS | HEART RATE: 69 BPM | SYSTOLIC BLOOD PRESSURE: 139 MMHG

## 2023-09-26 DIAGNOSIS — I10 PRIMARY HYPERTENSION: Primary | ICD-10-CM

## 2023-09-26 DIAGNOSIS — R07.9 CHEST PAIN, UNSPECIFIED TYPE: ICD-10-CM

## 2023-09-26 DIAGNOSIS — R20.2 PARESTHESIA: ICD-10-CM

## 2023-09-26 LAB
ANION GAP SERPL CALC-SCNC: 8 MMOL/L (ref 2–11)
BASOPHILS # BLD: 0.1 K/UL (ref 0–0.2)
BASOPHILS NFR BLD: 1 % (ref 0–2)
BUN SERPL-MCNC: 21 MG/DL (ref 8–23)
CALCIUM SERPL-MCNC: 9 MG/DL (ref 8.3–10.4)
CHLORIDE SERPL-SCNC: 104 MMOL/L (ref 101–110)
CO2 SERPL-SCNC: 24 MMOL/L (ref 21–32)
CREAT SERPL-MCNC: 0.68 MG/DL (ref 0.6–1)
DIFFERENTIAL METHOD BLD: ABNORMAL
EOSINOPHIL # BLD: 0.3 K/UL (ref 0–0.8)
EOSINOPHIL NFR BLD: 3 % (ref 0.5–7.8)
ERYTHROCYTE [DISTWIDTH] IN BLOOD BY AUTOMATED COUNT: 15.1 % (ref 11.9–14.6)
GLUCOSE SERPL-MCNC: 96 MG/DL (ref 65–100)
HCT VFR BLD AUTO: 39.8 % (ref 35.8–46.3)
HGB BLD-MCNC: 13.2 G/DL (ref 11.7–15.4)
IMM GRANULOCYTES # BLD AUTO: 0 K/UL (ref 0–0.5)
IMM GRANULOCYTES NFR BLD AUTO: 0 % (ref 0–5)
LYMPHOCYTES # BLD: 2 K/UL (ref 0.5–4.6)
LYMPHOCYTES NFR BLD: 23 % (ref 13–44)
MCH RBC QN AUTO: 28.8 PG (ref 26.1–32.9)
MCHC RBC AUTO-ENTMCNC: 33.2 G/DL (ref 31.4–35)
MCV RBC AUTO: 86.9 FL (ref 82–102)
MONOCYTES # BLD: 1.2 K/UL (ref 0.1–1.3)
MONOCYTES NFR BLD: 13 % (ref 4–12)
NEUTS SEG # BLD: 5.4 K/UL (ref 1.7–8.2)
NEUTS SEG NFR BLD: 60 % (ref 43–78)
NRBC # BLD: 0 K/UL (ref 0–0.2)
NT PRO BNP: 223 PG/ML
PLATELET # BLD AUTO: 279 K/UL (ref 150–450)
PMV BLD AUTO: 9.1 FL (ref 9.4–12.3)
POTASSIUM SERPL-SCNC: 4.2 MMOL/L (ref 3.5–5.1)
RBC # BLD AUTO: 4.58 M/UL (ref 4.05–5.2)
SODIUM SERPL-SCNC: 136 MMOL/L (ref 133–143)
TROPONIN I SERPL HS-MCNC: 52 PG/ML (ref 0–14)
TROPONIN I SERPL HS-MCNC: 53.7 PG/ML (ref 0–14)
WBC # BLD AUTO: 8.9 K/UL (ref 4.3–11.1)

## 2023-09-26 PROCEDURE — 99285 EMERGENCY DEPT VISIT HI MDM: CPT

## 2023-09-26 PROCEDURE — 6360000002 HC RX W HCPCS: Performed by: EMERGENCY MEDICINE

## 2023-09-26 PROCEDURE — 85025 COMPLETE CBC W/AUTO DIFF WBC: CPT

## 2023-09-26 PROCEDURE — 93005 ELECTROCARDIOGRAM TRACING: CPT | Performed by: EMERGENCY MEDICINE

## 2023-09-26 PROCEDURE — 96374 THER/PROPH/DIAG INJ IV PUSH: CPT

## 2023-09-26 PROCEDURE — 71046 X-RAY EXAM CHEST 2 VIEWS: CPT

## 2023-09-26 PROCEDURE — 84484 ASSAY OF TROPONIN QUANT: CPT

## 2023-09-26 PROCEDURE — 80048 BASIC METABOLIC PNL TOTAL CA: CPT

## 2023-09-26 PROCEDURE — 94762 N-INVAS EAR/PLS OXIMTRY CONT: CPT

## 2023-09-26 PROCEDURE — 83880 ASSAY OF NATRIURETIC PEPTIDE: CPT

## 2023-09-26 RX ORDER — HYDRALAZINE HYDROCHLORIDE 20 MG/ML
10 INJECTION INTRAMUSCULAR; INTRAVENOUS
Status: COMPLETED | OUTPATIENT
Start: 2023-09-26 | End: 2023-09-26

## 2023-09-26 RX ADMIN — HYDRALAZINE HYDROCHLORIDE 10 MG: 20 INJECTION, SOLUTION INTRAMUSCULAR; INTRAVENOUS at 20:13

## 2023-09-26 ASSESSMENT — ENCOUNTER SYMPTOMS
NAUSEA: 0
GASTROINTESTINAL NEGATIVE: 1
DIARRHEA: 0
BACK PAIN: 0
RESPIRATORY NEGATIVE: 1
SHORTNESS OF BREATH: 0
VOMITING: 0
ABDOMINAL PAIN: 0

## 2023-09-26 ASSESSMENT — LIFESTYLE VARIABLES
HOW MANY STANDARD DRINKS CONTAINING ALCOHOL DO YOU HAVE ON A TYPICAL DAY: PATIENT DOES NOT DRINK
HOW OFTEN DO YOU HAVE A DRINK CONTAINING ALCOHOL: NEVER

## 2023-09-26 ASSESSMENT — PAIN SCALES - GENERAL: PAINLEVEL_OUTOF10: 4

## 2023-09-26 NOTE — ED TRIAGE NOTES
Pt presents to Ed with c/o hypertension, swelling in her legs and feeling fatigued. Pt states she has also been having some shortness of breath.

## 2023-09-26 NOTE — TELEPHONE ENCOUNTER
Patient's bp has been elevated this week and is feeling pressure in her legs. Does she need to take additional medication or what would Dr. Eda Alvarado suggest. 204/87 went down to 193/88. Please call patient's son, Ahmad Holter, at [phone].

## 2023-09-27 ENCOUNTER — HOSPITAL ENCOUNTER (EMERGENCY)
Age: 83
Discharge: HOME | End: 2023-09-27
Attending: STUDENT IN AN ORGANIZED HEALTH CARE EDUCATION/TRAINING PROGRAM
Payer: MEDICARE

## 2023-09-27 ENCOUNTER — CARE COORDINATION (OUTPATIENT)
Dept: CARE COORDINATION | Facility: CLINIC | Age: 83
End: 2023-09-27

## 2023-09-27 ENCOUNTER — TELEPHONE (OUTPATIENT)
Age: 83
End: 2023-09-27

## 2023-09-27 VITALS
DIASTOLIC BLOOD PRESSURE: 60 MMHG | TEMPERATURE: 98.1 F | WEIGHT: 159 LBS | OXYGEN SATURATION: 95 % | SYSTOLIC BLOOD PRESSURE: 147 MMHG | HEIGHT: 67 IN | BODY MASS INDEX: 24.96 KG/M2 | RESPIRATION RATE: 16 BRPM | HEART RATE: 66 BPM

## 2023-09-27 DIAGNOSIS — I95.9 SYMPTOMATIC HYPOTENSION: Primary | ICD-10-CM

## 2023-09-27 LAB
ANION GAP SERPL CALC-SCNC: 8 MMOL/L (ref 2–11)
BASOPHILS # BLD: 0.1 K/UL (ref 0–0.2)
BASOPHILS NFR BLD: 1 % (ref 0–2)
BUN SERPL-MCNC: 20 MG/DL (ref 8–23)
CALCIUM SERPL-MCNC: 8.8 MG/DL (ref 8.3–10.4)
CHLORIDE SERPL-SCNC: 106 MMOL/L (ref 101–110)
CO2 SERPL-SCNC: 23 MMOL/L (ref 21–32)
CREAT SERPL-MCNC: 0.84 MG/DL (ref 0.6–1)
DIFFERENTIAL METHOD BLD: ABNORMAL
EKG ATRIAL RATE: 70 BPM
EKG ATRIAL RATE: 85 BPM
EKG DIAGNOSIS: NORMAL
EKG DIAGNOSIS: NORMAL
EKG P AXIS: 132 DEGREES
EKG P-R INTERVAL: 198 MS
EKG Q-T INTERVAL: 385 MS
EKG Q-T INTERVAL: 401 MS
EKG QRS DURATION: 84 MS
EKG QRS DURATION: 89 MS
EKG QTC CALCULATION (BAZETT): 433 MS
EKG QTC CALCULATION (BAZETT): 461 MS
EKG R AXIS: 60 DEGREES
EKG R AXIS: 68 DEGREES
EKG T AXIS: 57 DEGREES
EKG T AXIS: 62 DEGREES
EKG VENTRICULAR RATE: 70 BPM
EKG VENTRICULAR RATE: 86 BPM
EOSINOPHIL # BLD: 0.2 K/UL (ref 0–0.8)
EOSINOPHIL NFR BLD: 2 % (ref 0.5–7.8)
ERYTHROCYTE [DISTWIDTH] IN BLOOD BY AUTOMATED COUNT: 15.1 % (ref 11.9–14.6)
GLUCOSE SERPL-MCNC: 185 MG/DL (ref 65–100)
HCT VFR BLD AUTO: 37.8 % (ref 35.8–46.3)
HGB BLD-MCNC: 12.5 G/DL (ref 11.7–15.4)
IMM GRANULOCYTES # BLD AUTO: 0 K/UL (ref 0–0.5)
IMM GRANULOCYTES NFR BLD AUTO: 0 % (ref 0–5)
LYMPHOCYTES # BLD: 1.7 K/UL (ref 0.5–4.6)
LYMPHOCYTES NFR BLD: 14 % (ref 13–44)
MCH RBC QN AUTO: 28.6 PG (ref 26.1–32.9)
MCHC RBC AUTO-ENTMCNC: 33.1 G/DL (ref 31.4–35)
MCV RBC AUTO: 86.5 FL (ref 82–102)
MONOCYTES # BLD: 1.4 K/UL (ref 0.1–1.3)
MONOCYTES NFR BLD: 11 % (ref 4–12)
NEUTS SEG # BLD: 9.1 K/UL (ref 1.7–8.2)
NEUTS SEG NFR BLD: 73 % (ref 43–78)
NRBC # BLD: 0 K/UL (ref 0–0.2)
PLATELET # BLD AUTO: 226 K/UL (ref 150–450)
PMV BLD AUTO: 9.3 FL (ref 9.4–12.3)
POTASSIUM SERPL-SCNC: 4 MMOL/L (ref 3.5–5.1)
RBC # BLD AUTO: 4.37 M/UL (ref 4.05–5.2)
SODIUM SERPL-SCNC: 137 MMOL/L (ref 133–143)
WBC # BLD AUTO: 12.5 K/UL (ref 4.3–11.1)

## 2023-09-27 PROCEDURE — 96361 HYDRATE IV INFUSION ADD-ON: CPT

## 2023-09-27 PROCEDURE — 93010 ELECTROCARDIOGRAM REPORT: CPT | Performed by: INTERNAL MEDICINE

## 2023-09-27 PROCEDURE — 96360 HYDRATION IV INFUSION INIT: CPT

## 2023-09-27 PROCEDURE — 85025 COMPLETE CBC W/AUTO DIFF WBC: CPT

## 2023-09-27 PROCEDURE — 99284 EMERGENCY DEPT VISIT MOD MDM: CPT

## 2023-09-27 PROCEDURE — 80048 BASIC METABOLIC PNL TOTAL CA: CPT

## 2023-09-27 PROCEDURE — 93005 ELECTROCARDIOGRAM TRACING: CPT | Performed by: STUDENT IN AN ORGANIZED HEALTH CARE EDUCATION/TRAINING PROGRAM

## 2023-09-27 PROCEDURE — 2580000003 HC RX 258: Performed by: STUDENT IN AN ORGANIZED HEALTH CARE EDUCATION/TRAINING PROGRAM

## 2023-09-27 RX ORDER — 0.9 % SODIUM CHLORIDE 0.9 %
1000 INTRAVENOUS SOLUTION INTRAVENOUS ONCE
Status: COMPLETED | OUTPATIENT
Start: 2023-09-27 | End: 2023-09-27

## 2023-09-27 RX ADMIN — SODIUM CHLORIDE 1000 ML: 9 INJECTION, SOLUTION INTRAVENOUS at 01:51

## 2023-09-27 ASSESSMENT — PAIN - FUNCTIONAL ASSESSMENT: PAIN_FUNCTIONAL_ASSESSMENT: NONE - DENIES PAIN

## 2023-09-27 NOTE — ED TRIAGE NOTES
PT to triage/room via EMS stretcher with c/o hypotension     72/30 BP when EMS arrived  700 mL NS en route   20G L hand    EMS states when they arrived on scene PT was very weak and out of it.

## 2023-09-27 NOTE — DISCHARGE INSTRUCTIONS
Your labs and EKG tonight were reassuring. I suspect this was from getting the IV blood pressure medication earlier in the day. Hold your blood pressure medications at home for the next 24 hours. Please follow-up with your primary care doctor to discuss your medications.   Return to the ER or call EMS if any new or worsening symptoms

## 2023-09-27 NOTE — TELEPHONE ENCOUNTER
Seen in ER yesterday first for HTN given meds to bring down  then went back later the same day  for hypotension. As of this am has not taken any BP meds and her am BP was 165/83 HR=69 . Only BP med is Losartan 25mg qday which she was told to hold until she could talk w/. She has not been having any CP but has SOB. Appt.made 10/2 however son wants to know what to do in meantime about her Losartan.

## 2023-09-27 NOTE — ED PROVIDER NOTES
[address]  Emergency Department    DISPOSITION Decision To Discharge 09/27/2023 03:58:06 AM       ICD-10-CM    1. Symptomatic hypotension  I95.9         ED Course     ED Course as of 09/27/23 0407   Wed Sep 27, 2023   369 69-year-old female with complex past medical history presents with symptomatic hypotension now resolved. Seen in this ER few hours ago for hypertension and given IV hydralazine with improvement. It appears that when she got home her blood pressure significantly dropped and she felt lightheaded and weak. She feels improved since coming to the ER and blood pressure now 132/110. Neuro intact; NIH 0. Will repeat basic labs and give fluids and monitor in the ER [ER]   0208 EKG: Normal sinus rhythm, rate 86. LVH. Nonspecific ST and T wave changes. Baseline artifact. No STEMI. Time: 0539 [ER]   2459 Labs with mild cytosis otherwise normal.  Suspect likely stress demargination in the setting of transient hypotension. Blood pressure remaining stable in the ER. She remains asymptomatic. Low suspicion for infection. We will continue to monitor in the ER to ensure she remains clinically stable [ER]   0358 Observed in the ER for 2 hours. BP has remained around 159/59. She remains asymptomatic on reassessment. Feels comfortable with dc home. She is planning to see her cardiologist in 6 days. Recommended to hold her blood pressure medication for the next 24 to 48 hours. She is to return to the ER sooner if any new or worsening symptoms [ER]      ED Course User Index  [ER] Ibrahima Rodriguez MD     Complexity of Problems Addressed:  1 or more acute illnesses that pose a threat to life or bodily function    Data Reviewed and Analyzed:  Category 1:   I independently ordered and reviewed each unique test.    Category 2:   I independently ordered and interpreted the ED EKG in the absence of a Cardiologist.    I interpreted the labs.     Category 3: Discussion of management or

## 2023-09-27 NOTE — DISCHARGE INSTRUCTIONS
Please call your cardiologist for follow-up. Return of any emergency. They may need to consider increasing your sotalol back to 160 mg daily or make some changes to your blood pressure medications.

## 2023-09-27 NOTE — ED PROVIDER NOTES
Hypertension, swelling, fatigue    Comparison:  May 1, 2023    Findings:    Left-sided cardiac device again demonstrated. Scarring at the lateral right base   again demonstrated. No focal consolidation, pneumothorax, or pleural fluid   collection seen. The cardiomediastinal silhouette is unchanged. There are   median sternotomy wires. There is diffuse osteopenia. There are upper abdominal   surgical clips. Impression    Impression:    No acute cardiopulmonary process seen. Nonacute findings as above.      Onofre Tsang M.D.   9/26/2023 8:36:00 PM   Basic Metabolic Panel   Result Value Ref Range    Sodium 136 133 - 143 mmol/L    Potassium 4.2 3.5 - 5.1 mmol/L    Chloride 104 101 - 110 mmol/L    CO2 24 21 - 32 mmol/L    Anion Gap 8 2 - 11 mmol/L    Glucose 96 65 - 100 mg/dL    BUN 21 8 - 23 MG/DL    Creatinine 0.68 0.6 - 1.0 MG/DL    Est, Glom Filt Rate >60 >60 ml/min/1.73m2    Calcium 9.0 8.3 - 10.4 MG/DL   CBC with Auto Differential   Result Value Ref Range    WBC 8.9 4.3 - 11.1 K/uL    RBC 4.58 4.05 - 5.2 M/uL    Hemoglobin 13.2 11.7 - 15.4 g/dL    Hematocrit 39.8 35.8 - 46.3 %    MCV 86.9 82.0 - 102.0 FL    MCH 28.8 26.1 - 32.9 PG    MCHC 33.2 31.4 - 35.0 g/dL    RDW 15.1 (H) 11.9 - 14.6 %    Platelets 108 806 - 551 K/uL    MPV 9.1 (L) 9.4 - 12.3 FL    nRBC 0.00 0.0 - 0.2 K/uL    Differential Type AUTOMATED      Neutrophils % 60 43 - 78 %    Lymphocytes % 23 13 - 44 %    Monocytes % 13 (H) 4.0 - 12.0 %    Eosinophils % 3 0.5 - 7.8 %    Basophils % 1 0.0 - 2.0 %    Immature Granulocytes 0 0.0 - 5.0 %    Neutrophils Absolute 5.4 1.7 - 8.2 K/UL    Lymphocytes Absolute 2.0 0.5 - 4.6 K/UL    Monocytes Absolute 1.2 0.1 - 1.3 K/UL    Eosinophils Absolute 0.3 0.0 - 0.8 K/UL    Basophils Absolute 0.1 0.0 - 0.2 K/UL    Absolute Immature Granulocyte 0.0 0.0 - 0.5 K/UL   Troponin   Result Value Ref Range    Troponin, High Sensitivity 52.0 (H) 0 - 14 pg/mL   Troponin   Result Value Ref Range    Troponin, High

## 2023-09-27 NOTE — TELEPHONE ENCOUNTER
Patients son called stating his mom is experiencing the following issues :    QZ=057/60 yesterday DP=445/83 today  SOB  Headaches in back of head  Son mentioned possibility of medication interaction  ER visit last night

## 2023-09-27 NOTE — CARE COORDINATION
Ambulatory Care Coordination Note  2023    Patient Current Location:  [facility]    ACM contacted the patient by telephone. Verified name and  with patient as identifiers. Provided introduction to self, and explanation of the ACM role. ACM: Marlene Queen RN    Challenges to be reviewed by the provider   Additional needs identified to be addressed with provider: Yes  Notified pcp of er admission               Method of communication with provider: staff message. See assessment below. Ccm outreached to patient. Patient agreeable. Ccm reviewed with patient discharge summary. Patient verbalized understanding. Patient states blood pressure this morning was 165/82. Patient states no symptoms at this time. Patient states she has upcoming cardiology appointment. Ccm notified pcp of er admission and need for follow up. Patient lives with son and he is able to assist as needed. Patient states overall she is feeling better. Patient states no questions or concerns. Ccm discussed that I would outreach next week. Patient agreeable. Offered patient enrollment in the Remote Patient Monitoring (RPM) program for in-home monitoring: Yes, but did not enroll at this time. Ambulatory Care Coordination Assessment    Care Coordination Protocol  Referral from Primary Care Provider: No  Week 1 - Initial Assessment     Do you have all of your prescriptions and are they filled?: Yes  Barriers to medication adherence: None  Are you able to afford your medications?: Yes  How often do you have trouble taking your medications the way you have been told to take them?: I always take them as prescribed. Do you have Home O2 Therapy?: No      Ability to seek help/take action for Emergent Urgent situations i.e. fire, crime, inclement weather or health crisis. : Independent  Ability to ambulate to restroom: Independent  Ability handle personal hygeine needs (bathing/dressing/grooming):  Independent  Ability to manage

## 2023-09-28 NOTE — TELEPHONE ENCOUNTER
Son took pt to ER and they gave her a medication and sent home. After a few hours her BP went too low 74/62 and EMS was called. Pt went and was observed for a few hours and sent home. Was told to not take her losartan and let the other meds run it's course. Pt feeling well today. Has appt to see Dr. Roderick Torres tomorrow.

## 2023-09-29 ENCOUNTER — OFFICE VISIT (OUTPATIENT)
Dept: FAMILY MEDICINE CLINIC | Facility: CLINIC | Age: 83
End: 2023-09-29
Payer: MEDICARE

## 2023-09-29 VITALS
HEART RATE: 63 BPM | DIASTOLIC BLOOD PRESSURE: 80 MMHG | WEIGHT: 163 LBS | OXYGEN SATURATION: 97 % | TEMPERATURE: 97.3 F | SYSTOLIC BLOOD PRESSURE: 138 MMHG | BODY MASS INDEX: 25.53 KG/M2

## 2023-09-29 DIAGNOSIS — I10 PRIMARY HYPERTENSION: Primary | ICD-10-CM

## 2023-09-29 DIAGNOSIS — I65.29 STENOSIS OF CAROTID ARTERY, UNSPECIFIED LATERALITY: ICD-10-CM

## 2023-09-29 PROCEDURE — 3079F DIAST BP 80-89 MM HG: CPT | Performed by: FAMILY MEDICINE

## 2023-09-29 PROCEDURE — 1123F ACP DISCUSS/DSCN MKR DOCD: CPT | Performed by: FAMILY MEDICINE

## 2023-09-29 PROCEDURE — 3075F SYST BP GE 130 - 139MM HG: CPT | Performed by: FAMILY MEDICINE

## 2023-09-29 PROCEDURE — 99213 OFFICE O/P EST LOW 20 MIN: CPT | Performed by: FAMILY MEDICINE

## 2023-09-29 ASSESSMENT — ENCOUNTER SYMPTOMS
GASTROINTESTINAL NEGATIVE: 1
EYES NEGATIVE: 1
RESPIRATORY NEGATIVE: 1

## 2023-09-29 NOTE — PROGRESS NOTES
HISTORY OF PRESENT ILLNESS    Ciaran Koch is a 78 y.o. female. HPI  Chief Complaint   Patient presents with    Follow-up     Went to hospital for hypertension, given medication then sent home where it went too low and had to call EMS      See above. Went to ER on 26 Sept with elevated BP. Medications were adjusted. Following day had symptomatic hypotension. Work up indicated carotid stenosis. Has follow up with vascular specialist.  Has been stable in the last few days. No further hypotensive symptoms. Home BP readings have been stable. Has follow up with cardiologist next week. Current Outpatient Medications on File Prior to Visit   Medication Sig Dispense Refill    fluticasone-salmeterol (ADVAIR HFA) 45-21 MCG/ACT inhaler Inhale 2 puffs into the lungs 2 times daily 1 each 3    fluticasone (FLONASE) 50 MCG/ACT nasal spray 1 spray by Each Nostril route daily 32 g 3    losartan (COZAAR) 25 MG tablet Take 1 tablet by mouth daily 30 tablet 5    albuterol sulfate HFA (VENTOLIN HFA) 108 (90 Base) MCG/ACT inhaler Inhale 2 puffs into the lungs 4 times daily as needed for Wheezing 54 g 1    mometasone (ELOCON) 0.1 % lotion APPLY TWICE DAILY TO SCALP      pravastatin (PRAVACHOL) 10 MG tablet Take 1 tablet by mouth at bedtime 90 tablet 3    sotalol (BETAPACE) 160 MG tablet Take 0.5 tablets by mouth daily (Patient taking differently: Take 1 tablet by mouth daily 80 mg daily) 90 tablet 3    apixaban (ELIQUIS) 5 MG TABS tablet Take 1 tablet by mouth in the morning and 1 tablet in the evening. TAKE 1 TABLET TWICE DAILY.  180 tablet 3    famotidine (PEPCID) 40 MG tablet Take 1 tablet by mouth at bedtime      albuterol sulfate  (90 Base) MCG/ACT inhaler Inhale 2 puffs into the lungs every 4 hours as needed for Wheezing      docusate (COLACE, DULCOLAX) 100 MG CAPS Take 100 mg by mouth nightly as needed (constipation)      loratadine (CLARITIN) 10 MG tablet Take 1 tablet by mouth daily as needed (allergies) (Patient

## 2023-10-02 ENCOUNTER — OFFICE VISIT (OUTPATIENT)
Age: 83
End: 2023-10-02
Payer: MEDICARE

## 2023-10-02 VITALS
WEIGHT: 159.4 LBS | HEIGHT: 67 IN | SYSTOLIC BLOOD PRESSURE: 154 MMHG | BODY MASS INDEX: 25.02 KG/M2 | DIASTOLIC BLOOD PRESSURE: 92 MMHG | HEART RATE: 72 BPM

## 2023-10-02 DIAGNOSIS — I65.23 BILATERAL CAROTID ARTERY STENOSIS: Chronic | ICD-10-CM

## 2023-10-02 DIAGNOSIS — I69.30 HISTORY OF CVA WITH RESIDUAL DEFICIT: Chronic | ICD-10-CM

## 2023-10-02 DIAGNOSIS — Z95.2 S/P AVR: Primary | Chronic | ICD-10-CM

## 2023-10-02 DIAGNOSIS — I50.32 CHRONIC DIASTOLIC CONGESTIVE HEART FAILURE (HCC): Chronic | ICD-10-CM

## 2023-10-02 DIAGNOSIS — Z95.0 CARDIAC PACEMAKER: Chronic | ICD-10-CM

## 2023-10-02 DIAGNOSIS — J41.0 SIMPLE CHRONIC BRONCHITIS (HCC): ICD-10-CM

## 2023-10-02 DIAGNOSIS — I10 ESSENTIAL HYPERTENSION: Chronic | ICD-10-CM

## 2023-10-02 DIAGNOSIS — I25.118 CORONARY ARTERY DISEASE OF NATIVE ARTERY OF NATIVE HEART WITH STABLE ANGINA PECTORIS (HCC): ICD-10-CM

## 2023-10-02 DIAGNOSIS — I48.0 PAROXYSMAL ATRIAL FIBRILLATION (HCC): Chronic | ICD-10-CM

## 2023-10-02 PROBLEM — J44.9 CHRONIC OBSTRUCTIVE PULMONARY DISEASE, UNSPECIFIED (HCC): Status: ACTIVE | Noted: 2023-10-02

## 2023-10-02 PROCEDURE — 3077F SYST BP >= 140 MM HG: CPT | Performed by: INTERNAL MEDICINE

## 2023-10-02 PROCEDURE — 1123F ACP DISCUSS/DSCN MKR DOCD: CPT | Performed by: INTERNAL MEDICINE

## 2023-10-02 PROCEDURE — 99214 OFFICE O/P EST MOD 30 MIN: CPT | Performed by: INTERNAL MEDICINE

## 2023-10-02 PROCEDURE — 3080F DIAST BP >= 90 MM HG: CPT | Performed by: INTERNAL MEDICINE

## 2023-10-02 RX ORDER — ATORVASTATIN CALCIUM 40 MG/1
40 TABLET, FILM COATED ORAL DAILY
Qty: 90 TABLET | Refills: 1
Start: 2023-10-02

## 2023-10-02 RX ORDER — ASPIRIN 81 MG/1
81 TABLET ORAL DAILY
COMMUNITY

## 2023-10-02 ASSESSMENT — ENCOUNTER SYMPTOMS
ABDOMINAL PAIN: 0
BACK PAIN: 0
SHORTNESS OF BREATH: 1
COUGH: 0

## 2023-10-02 NOTE — PROGRESS NOTES
morning and 1 tablet in the evening. TAKE 1 TABLET TWICE DAILY. 180 tablet 3    albuterol sulfate  (90 Base) MCG/ACT inhaler Inhale 2 puffs into the lungs every 4 hours as needed for Wheezing      docusate (COLACE, DULCOLAX) 100 MG CAPS Take 100 mg by mouth nightly as needed (constipation)      loratadine (CLARITIN) 10 MG tablet Take 1 tablet by mouth daily as needed (allergies)       No current facility-administered medications for this visit. Patient Active Problem List    Diagnosis Date Noted    Chronic obstructive pulmonary disease, unspecified 10/02/2023     Priority: High    Bilateral carotid artery stenosis 10/02/2023     Priority: High    Secondary hypercoagulable state ([address]) 03/31/2023     Priority: High    Chronic diastolic congestive heart failure ([address]) 09/27/2022     Priority: Medium    History of CVA with residual deficit 09/26/2022     Priority: Medium    Hyponatremia 09/24/2022     Priority: Medium    Short-segment Liriano's esophagus 09/24/2022     Priority: Medium    Allergic rhinitis 09/24/2022     Priority: Medium    Hypomagnesemia 09/24/2022     Priority: Medium    S/P dilatation of esophageal stricture 09/24/2022     Priority: Low     7/2022       Dyslipidemia 07/09/2020     Priority: Low    Osteoarthritis of right knee 06/06/2018     Priority: Low    Status post total right knee replacement 06/06/2018     Priority: Low    Essential hypertension      Priority: Low    Peripheral neuropathy      Priority: Low    Abnormal CT of the chest 11/02/2017     Priority: Low    Coronary artery disease of native artery of native heart with stable angina pectoris ([address])      Priority: Low     05/2015:   Moderate mLAD        Cardiac pacemaker 09/30/2015     Priority: Low     Biotronik MRI compatible (dual chamber)        Paroxysmal atrial fibrillation ([address]) 08/14/2015     Priority: Low     S/p MAZE        Anemia, unspecified 08/14/2015     Priority: Low    S/P AVR 08/14/2015     Priority: Low

## 2023-10-03 ENCOUNTER — PATIENT MESSAGE (OUTPATIENT)
Age: 83
End: 2023-10-03

## 2023-10-03 ENCOUNTER — CARE COORDINATION (OUTPATIENT)
Dept: CARE COORDINATION | Facility: CLINIC | Age: 83
End: 2023-10-03

## 2023-10-03 RX ORDER — SOTALOL HYDROCHLORIDE 80 MG/1
80 TABLET ORAL DAILY
COMMUNITY
Start: 2023-09-11 | End: 2023-10-03 | Stop reason: SDUPTHER

## 2023-10-03 RX ORDER — SOTALOL HYDROCHLORIDE 80 MG/1
80 TABLET ORAL DAILY
Qty: 90 TABLET | Refills: 3 | Status: CANCELLED | OUTPATIENT
Start: 2023-10-03

## 2023-10-03 RX ORDER — SOTALOL HYDROCHLORIDE 80 MG/1
80 TABLET ORAL DAILY
Qty: 60 TABLET | Refills: 3 | Status: SHIPPED | OUTPATIENT
Start: 2023-10-03

## 2023-10-03 NOTE — CARE COORDINATION
Ambulatory Care Coordination Note  10/3/2023    Patient Current Location:  [facility]     ACM contacted the patient by telephone. Verified name and  with patient as identifiers. Provided introduction to self, and explanation of the ACM role. Challenges to be reviewed by the provider   Additional needs identified to be addressed with provider: No  none               Method of communication with provider: none. ACM: Chelo Abdalla RN    Ccm outreached to patient. Patient states she is doing well at this time. Patient states no questions or concerns. Ccm discussed that I would outreach next week. Patient agreeable. Offered patient enrollment in the Remote Patient Monitoring (RPM) program for in-home monitoring: NA. Lab Results       None                 Goals Addressed                   This Visit's Progress     Conditions and Symptoms   On track     I will schedule office visits, as directed by my provider. I will keep my appointment or reschedule if I have to cancel. I will follow my Zone Management tool to seek urgent or emergent care. I will notify my provider of any symptoms that indicate a worsening of my condition. Patient is able to monitor and record orthostatic BP daily for MD review. Patient is able to monitor and record weights daily. Reports increase if 2 lbs. overnight or 5 lbs.  in a week to MD.    Barriers: none  Plan for overcoming my barriers: N/A  Confidence: 9/10  Anticipated Goal Completion Date: 23                Future Appointments   Date Time Provider [address] [place] [address]   10/17/2023 11:00 AM [facility] AMB   2023 10:40 AM [facility] RESOURCE [facility] AMB   2023 10:40 AM [facility] LAB RESOURCE [facility] AMB   2024 10:40 AM [facility] LAB RESOURCE [facility] AMB   2024  9:00 AM Lenin Caputo MD [facility] AMB   2024  1:00 PM Mamadou Evangelista MD [facility] AMB   2024  8:00 AM Lenin Caputo MD [facility] AMB

## 2023-10-03 NOTE — TELEPHONE ENCOUNTER
From: Benjamín Trivedi  To: Dr. Althea Figueroa: 10/3/2023 11:30 AM EDT  Subject: Refill Request For 80MG Sotalol    Hi Dr. Eliezer John,  I'm writing on behalf of my mom Sheryl Young. When she was at [facility] a few weeks ago, Dr. Cholo Mcclellan. Jose cut back her Sotalol to 80mg and gave her one prescription. She seems to be doing well on that dosage but will be out by this weekend and needs a refill of the 80mg Sotalol. If you can take care of this, please send the prescription to [facility] at [address] [facility] . Thank you.     Weston Brown

## 2023-10-03 NOTE — TELEPHONE ENCOUNTER
----- Message from Ahmet Carr sent at 10/3/2023 11:30 AM EDT -----  Regarding: Refill Request For 80MG Sotalol  Contact: [phone]  Hi Dr. Augusto Simms,  I'm writing on behalf of my mom Sylvania Leventhal. When she was at [facility] a few weeks ago, Dr. Viviane Cowart. Jose cut back her Sotalol to 80mg and gave her one prescription. She seems to be doing well on that dosage but will be out by this weekend and needs a refill of the 80mg Sotalol. If you can take care of this, please send the prescription to [facility] at [address], [place] . Thank you.     Glee Sicard

## 2023-10-05 ENCOUNTER — OFFICE VISIT (OUTPATIENT)
Dept: VASCULAR SURGERY | Age: 83
End: 2023-10-05
Payer: MEDICARE

## 2023-10-05 VITALS
SYSTOLIC BLOOD PRESSURE: 195 MMHG | OXYGEN SATURATION: 97 % | HEIGHT: 67 IN | WEIGHT: 160 LBS | BODY MASS INDEX: 25.11 KG/M2 | DIASTOLIC BLOOD PRESSURE: 86 MMHG | HEART RATE: 71 BPM

## 2023-10-05 DIAGNOSIS — R41.0 DISORIENTATION: Primary | ICD-10-CM

## 2023-10-05 DIAGNOSIS — I65.22 STENOSIS OF LEFT CAROTID ARTERY: ICD-10-CM

## 2023-10-05 PROCEDURE — 99204 OFFICE O/P NEW MOD 45 MIN: CPT | Performed by: STUDENT IN AN ORGANIZED HEALTH CARE EDUCATION/TRAINING PROGRAM

## 2023-10-05 PROCEDURE — 1123F ACP DISCUSS/DSCN MKR DOCD: CPT | Performed by: STUDENT IN AN ORGANIZED HEALTH CARE EDUCATION/TRAINING PROGRAM

## 2023-10-05 PROCEDURE — 3078F DIAST BP <80 MM HG: CPT | Performed by: STUDENT IN AN ORGANIZED HEALTH CARE EDUCATION/TRAINING PROGRAM

## 2023-10-05 PROCEDURE — 3074F SYST BP LT 130 MM HG: CPT | Performed by: STUDENT IN AN ORGANIZED HEALTH CARE EDUCATION/TRAINING PROGRAM

## 2023-10-05 NOTE — PROGRESS NOTES
VASCULAR SURGERY    [address] [facility], [address] [place] [address] FAX: [address]  : 1940    Reason for visit: 2nd opinion for carotid stenosis     Chief Complaint: 78 y.o. female presented to [facility] with AMS with electrolyte abnormality. Work-up withoput evidence of CVA on perfusion scan and with left carotid stenosis with near occlusion. Would classify as asymptomatic left ICA stenosis. Unable to get MRI due to pacemaker. On Eliquis for a-fib. Plan:   Asymptomatic left carotid stenosis: Given age and co-morbidities would recommend optimal medical management at this time with ASA and statin. Referral to neurology to discuss AMS and need for MRI. Level 4 and Progression of chronic illness    Imaging interrupted:   CTA Head and Neck   1. Major cervical arterial vasculature: Atherosclerosis with high-grade focal stenosis/near occlusion of the origin of the left internal carotid artery, but patent beyond this point. 2.  Major intracranial arterial vasculature: No large vessel occlusion. 3. Evidence of small airways disease with distal airway mucous plugging and peribronchial clustered nodular opacities in the right upper lobe. Constitutional:   Negative for fevers and unexplained weight loss. Eyes:   Negative for visual disturbance. ENT:   Negative for significant hearing loss and tinnitus. Respiratory:   Negative for hemoptysis. Cardiovascular:   Negative except as noted in HPI. Gastrointestinal:   Negative for melena and abdominal pain. Genitourinary:   Negative for hematuria, renal stones. Integumentary:   Negative for rash or non-healing wounds  Hematologic/Lymphatic:   Negative for excessive bleeding hx or clotting disorder. Musculoskeletal:  Negative for active, unexplained/severe joint pain. Neurological:   Negative for stroke. Behavioral/Psych:   Negative for suicidal ideations.     Endocrine:   Negative for uncontrolled

## 2023-10-10 ENCOUNTER — CARE COORDINATION (OUTPATIENT)
Dept: CARE COORDINATION | Facility: CLINIC | Age: 83
End: 2023-10-10

## 2023-10-10 NOTE — CARE COORDINATION
Ambulatory Care Coordination Note  10/10/2023    Patient Current Location:  [facility]     ACM contacted the patient by telephone. Verified name and  with patient as identifiers. Provided introduction to self, and explanation of the ACM role. Challenges to be reviewed by the provider   Additional needs identified to be addressed with provider: No  none               Method of communication with provider: none. ACM: Remi Mccann RN    Ccm outreached to patient. Patient states overall she is doing well. Patient states blood pressure has remained stable. Patient states she had appointment with vascular on 10/5/23. Patient states no questions or concerns. Ccm discussed that I would outreach next week. Patient agreeable. Offered patient enrollment in the Remote Patient Monitoring (RPM) program for in-home monitoring: NA. Lab Results       None                 Goals Addressed                   This Visit's Progress     Conditions and Symptoms   On track     I will schedule office visits, as directed by my provider. I will keep my appointment or reschedule if I have to cancel. I will follow my Zone Management tool to seek urgent or emergent care. I will notify my provider of any symptoms that indicate a worsening of my condition. Patient is able to monitor and record orthostatic BP daily for MD review. Patient is able to monitor and record weights daily. Reports increase if 2 lbs. overnight or 5 lbs.  in a week to MD.    Barriers: none  Plan for overcoming my barriers: N/A  Confidence: 9/10  Anticipated Goal Completion Date: 23                Future Appointments   Date Time Provider [facility] [address]   10/17/2023 11:00 AM [facility] LAB RESOURCE [facility] AMB   2023 10:40 AM [facility] LAB RESOURCE [facility] AMB   2023 10:40 AM HT[facility] LAB RESOURCE [facility] AMB   2024 10:40 AM HT[facility] LAB RESOURCE [facility] AMB   2024  9:00 AM Nick Tay MD [facility] AMB   2024  1:00 PM

## 2023-10-16 DIAGNOSIS — Z09 HOSPITAL DISCHARGE FOLLOW-UP: Primary | ICD-10-CM

## 2023-10-17 ENCOUNTER — CARE COORDINATION (OUTPATIENT)
Dept: CARE COORDINATION | Facility: CLINIC | Age: 83
End: 2023-10-17

## 2023-10-17 ENCOUNTER — NURSE ONLY (OUTPATIENT)
Dept: FAMILY MEDICINE CLINIC | Facility: CLINIC | Age: 83
End: 2023-10-17

## 2023-10-17 DIAGNOSIS — I10 PRIMARY HYPERTENSION: ICD-10-CM

## 2023-10-17 DIAGNOSIS — Z00.00 ENCOUNTER FOR ANNUAL PHYSICAL EXAM: ICD-10-CM

## 2023-10-17 DIAGNOSIS — Z09 HOSPITAL DISCHARGE FOLLOW-UP: ICD-10-CM

## 2023-10-17 LAB
ANION GAP SERPL CALC-SCNC: 9 MMOL/L (ref 2–11)
APPEARANCE UR: CLEAR
BACTERIA URNS QL MICRO: NEGATIVE /HPF
BILIRUB UR QL: NEGATIVE
BUN SERPL-MCNC: 12 MG/DL (ref 8–23)
CALCIUM SERPL-MCNC: 9.1 MG/DL (ref 8.3–10.4)
CASTS URNS QL MICRO: ABNORMAL /LPF (ref 0–2)
CHLORIDE SERPL-SCNC: 102 MMOL/L (ref 101–110)
CHOLEST SERPL-MCNC: 104 MG/DL
CO2 SERPL-SCNC: 24 MMOL/L (ref 21–32)
COLOR UR: ABNORMAL
CREAT SERPL-MCNC: 0.6 MG/DL (ref 0.6–1)
EPI CELLS #/AREA URNS HPF: ABNORMAL /HPF (ref 0–5)
GLUCOSE SERPL-MCNC: 95 MG/DL (ref 65–100)
GLUCOSE UR STRIP.AUTO-MCNC: NEGATIVE MG/DL
HDLC SERPL-MCNC: 52 MG/DL (ref 40–60)
HDLC SERPL: 2
HGB UR QL STRIP: NEGATIVE
KETONES UR QL STRIP.AUTO: NEGATIVE MG/DL
LDLC SERPL CALC-MCNC: 37.6 MG/DL
LEUKOCYTE ESTERASE UR QL STRIP.AUTO: NEGATIVE
MUCOUS THREADS URNS QL MICRO: 0 /LPF
NITRITE UR QL STRIP.AUTO: NEGATIVE
PH UR STRIP: 7 (ref 5–9)
POTASSIUM SERPL-SCNC: 5 MMOL/L (ref 3.5–5.1)
PROT UR STRIP-MCNC: 30 MG/DL
RBC #/AREA URNS HPF: ABNORMAL /HPF (ref 0–5)
SODIUM SERPL-SCNC: 135 MMOL/L (ref 133–143)
SP GR UR REFRACTOMETRY: 1.01 (ref 1–1.02)
TRIGL SERPL-MCNC: 72 MG/DL (ref 35–150)
TSH, 3RD GENERATION: 0.59 UIU/ML (ref 0.36–3.74)
URINE CULTURE IF INDICATED: ABNORMAL
UROBILINOGEN UR QL STRIP.AUTO: 1 EU/DL (ref 0.2–1)
VLDLC SERPL CALC-MCNC: 14.4 MG/DL (ref 6–23)
WBC URNS QL MICRO: ABNORMAL /HPF (ref 0–4)

## 2023-10-17 NOTE — CARE COORDINATION
Ambulatory Care Coordination Note  10/17/2023    Patient Current Location:  [facility]     ACM contacted the patient by telephone. Verified name and  with patient as identifiers. Provided introduction to self, and explanation of the ACM role. Challenges to be reviewed by the provider   Additional needs identified to be addressed with provider: No  none               Method of communication with provider: none. ACM: Donaldo Pfeiffer RN    Ccm outreached to patient. Patient states she is doing well at this time. Patient states no questions or concerns. Ccm discussed that I would outreach next week. Patient agreeable. Offered patient enrollment in the Remote Patient Monitoring (RPM) program for in-home monitoring: NA. Lab Results       None                 Goals Addressed                   This Visit's Progress     Conditions and Symptoms   On track     I will schedule office visits, as directed by my provider. I will keep my appointment or reschedule if I have to cancel. I will follow my Zone Management tool to seek urgent or emergent care. I will notify my provider of any symptoms that indicate a worsening of my condition. Patient is able to monitor and record orthostatic BP daily for MD review. Patient is able to monitor and record weights daily. Reports increase if 2 lbs. overnight or 5 lbs.  in a week to MD.    Barriers: none  Plan for overcoming my barriers: N/A  Confidence: 9/10  Anticipated Goal Completion Date: 23                Future Appointments   Date Time Provider [address] [place] [address]   10/17/2023 11:00 AM [facility] LAB RESOURCE [facility] AMB   2023 10:40 AM [facility] LAB RESOURCE [facility] AMB   2023 10:40 AM HT[facility] LAB RESOURCE [facility] AMB   2024 10:40 AM HT[facility] LAB RESOURCE [facility] AMB   2024  9:00 AM Sarahi Mccarty MD [facility] AMB   2024  1:00 PM Garrett Molina MD [facility] AMB   2024  8:00 AM Sarahi Mccarty MD [facility] AMB   2024 10:30 AM

## 2023-10-20 ENCOUNTER — TELEPHONE (OUTPATIENT)
Dept: FAMILY MEDICINE CLINIC | Facility: CLINIC | Age: 83
End: 2023-10-20

## 2023-10-20 NOTE — TELEPHONE ENCOUNTER
FYI:  BP Systolic 195 this AM after meds, no other symptoms (out of their parameters so notifying PCP)

## 2023-10-23 ENCOUNTER — PATIENT MESSAGE (OUTPATIENT)
Age: 83
End: 2023-10-23

## 2023-10-23 ENCOUNTER — APPOINTMENT (OUTPATIENT)
Dept: GENERAL RADIOLOGY | Age: 83
End: 2023-10-23
Payer: MEDICARE

## 2023-10-23 ENCOUNTER — HOSPITAL ENCOUNTER (EMERGENCY)
Age: 83
Discharge: HOME | End: 2023-10-23
Attending: EMERGENCY MEDICINE
Payer: MEDICARE

## 2023-10-23 VITALS
HEART RATE: 70 BPM | BODY MASS INDEX: 24.96 KG/M2 | DIASTOLIC BLOOD PRESSURE: 62 MMHG | RESPIRATION RATE: 17 BRPM | SYSTOLIC BLOOD PRESSURE: 168 MMHG | WEIGHT: 159 LBS | HEIGHT: 67 IN | TEMPERATURE: 98.6 F | OXYGEN SATURATION: 95 %

## 2023-10-23 DIAGNOSIS — R04.2 HEMOPTYSIS: Primary | ICD-10-CM

## 2023-10-23 DIAGNOSIS — D72.829 LEUKOCYTOSIS, UNSPECIFIED TYPE: ICD-10-CM

## 2023-10-23 DIAGNOSIS — E87.1 HYPONATREMIA: ICD-10-CM

## 2023-10-23 LAB
ALBUMIN SERPL-MCNC: 3.7 G/DL (ref 3.2–4.6)
ALBUMIN/GLOB SERPL: 0.9 (ref 0.4–1.6)
ALP SERPL-CCNC: 95 U/L (ref 50–136)
ALT SERPL-CCNC: 17 U/L (ref 12–65)
ANION GAP SERPL CALC-SCNC: 6 MMOL/L (ref 2–11)
APTT PPP: 32.1 SEC (ref 24.5–34.2)
AST SERPL-CCNC: 19 U/L (ref 15–37)
BASOPHILS # BLD: 0.1 K/UL (ref 0–0.2)
BASOPHILS NFR BLD: 1 % (ref 0–2)
BILIRUB SERPL-MCNC: 0.9 MG/DL (ref 0.2–1.1)
BUN SERPL-MCNC: 16 MG/DL (ref 8–23)
CALCIUM SERPL-MCNC: 9.6 MG/DL (ref 8.3–10.4)
CHLORIDE SERPL-SCNC: 100 MMOL/L (ref 101–110)
CO2 SERPL-SCNC: 24 MMOL/L (ref 21–32)
CREAT SERPL-MCNC: 0.7 MG/DL (ref 0.6–1)
DIFFERENTIAL METHOD BLD: ABNORMAL
EOSINOPHIL # BLD: 0.3 K/UL (ref 0–0.8)
EOSINOPHIL NFR BLD: 1 % (ref 0.5–7.8)
ERYTHROCYTE [DISTWIDTH] IN BLOOD BY AUTOMATED COUNT: 14.1 % (ref 11.9–14.6)
GLOBULIN SER CALC-MCNC: 4.2 G/DL (ref 2.8–4.5)
GLUCOSE SERPL-MCNC: 110 MG/DL (ref 65–100)
HCT VFR BLD AUTO: 38.5 % (ref 35.8–46.3)
HGB BLD-MCNC: 13.1 G/DL (ref 11.7–15.4)
IMM GRANULOCYTES # BLD AUTO: 0.1 K/UL (ref 0–0.5)
IMM GRANULOCYTES NFR BLD AUTO: 0 % (ref 0–5)
INR PPP: 1.1
LYMPHOCYTES # BLD: 1.5 K/UL (ref 0.5–4.6)
LYMPHOCYTES NFR BLD: 7 % (ref 13–44)
MCH RBC QN AUTO: 29.1 PG (ref 26.1–32.9)
MCHC RBC AUTO-ENTMCNC: 34 G/DL (ref 31.4–35)
MCV RBC AUTO: 85.6 FL (ref 82–102)
MONOCYTES # BLD: 1.6 K/UL (ref 0.1–1.3)
MONOCYTES NFR BLD: 7 % (ref 4–12)
NEUTS SEG # BLD: 17.9 K/UL (ref 1.7–8.2)
NEUTS SEG NFR BLD: 84 % (ref 43–78)
NRBC # BLD: 0 K/UL (ref 0–0.2)
PLATELET # BLD AUTO: 333 K/UL (ref 150–450)
PMV BLD AUTO: 9.1 FL (ref 9.4–12.3)
POTASSIUM SERPL-SCNC: 4.5 MMOL/L (ref 3.5–5.1)
PROT SERPL-MCNC: 7.9 G/DL (ref 6.3–8.2)
PROTHROMBIN TIME: 14.5 SEC (ref 12.6–14.3)
RBC # BLD AUTO: 4.5 M/UL (ref 4.05–5.2)
SODIUM SERPL-SCNC: 130 MMOL/L (ref 133–143)
TROPONIN I SERPL HS-MCNC: 7.2 PG/ML (ref 0–14)
WBC # BLD AUTO: 21.5 K/UL (ref 4.3–11.1)

## 2023-10-23 PROCEDURE — 85025 COMPLETE CBC W/AUTO DIFF WBC: CPT

## 2023-10-23 PROCEDURE — 99285 EMERGENCY DEPT VISIT HI MDM: CPT

## 2023-10-23 PROCEDURE — 85610 PROTHROMBIN TIME: CPT

## 2023-10-23 PROCEDURE — 71045 X-RAY EXAM CHEST 1 VIEW: CPT

## 2023-10-23 PROCEDURE — 85730 THROMBOPLASTIN TIME PARTIAL: CPT

## 2023-10-23 PROCEDURE — 84484 ASSAY OF TROPONIN QUANT: CPT

## 2023-10-23 PROCEDURE — 80053 COMPREHEN METABOLIC PANEL: CPT

## 2023-10-23 RX ORDER — BENZONATATE 100 MG/1
100 CAPSULE ORAL 3 TIMES DAILY PRN
Qty: 15 CAPSULE | Refills: 0 | Status: SHIPPED | OUTPATIENT
Start: 2023-10-23 | End: 2023-10-28

## 2023-10-23 RX ORDER — DOXYCYCLINE HYCLATE 100 MG
100 TABLET ORAL 2 TIMES DAILY
Qty: 14 TABLET | Refills: 0 | Status: SHIPPED | OUTPATIENT
Start: 2023-10-23 | End: 2023-10-30

## 2023-10-23 RX ORDER — BENZONATATE 100 MG/1
100 CAPSULE ORAL 3 TIMES DAILY PRN
Qty: 15 CAPSULE | Refills: 0 | Status: SHIPPED | OUTPATIENT
Start: 2023-10-23 | End: 2023-10-23 | Stop reason: SDUPTHER

## 2023-10-23 RX ORDER — DOXYCYCLINE HYCLATE 100 MG
100 TABLET ORAL 2 TIMES DAILY
Qty: 14 TABLET | Refills: 0 | Status: SHIPPED | OUTPATIENT
Start: 2023-10-23 | End: 2023-10-23 | Stop reason: SDUPTHER

## 2023-10-23 ASSESSMENT — LIFESTYLE VARIABLES
HOW OFTEN DO YOU HAVE A DRINK CONTAINING ALCOHOL: NEVER
HOW MANY STANDARD DRINKS CONTAINING ALCOHOL DO YOU HAVE ON A TYPICAL DAY: PATIENT DOES NOT DRINK

## 2023-10-23 ASSESSMENT — ENCOUNTER SYMPTOMS
ABDOMINAL PAIN: 0
VOMITING: 0
COUGH: 1
BLOOD IN STOOL: 0
WHEEZING: 0
SHORTNESS OF BREATH: 1

## 2023-10-23 ASSESSMENT — PAIN - FUNCTIONAL ASSESSMENT: PAIN_FUNCTIONAL_ASSESSMENT: NONE - DENIES PAIN

## 2023-10-23 NOTE — DISCHARGE INSTRUCTIONS
Continue current medications. Take antibiotic until complete. Cough medication may be helpful as well. Consider humidifier. Call your doctor to recheck, either later this week or next week to potentially recheck your sodium level. Recheck sooner for worse breathing or bleeding.

## 2023-10-23 NOTE — ED PROVIDER NOTES
Emergency Department Provider Note       PCP: Gregory Joel MD   Age: 78 y.o. Sex: female     DISPOSITION Decision To Discharge 10/23/2023 12:49:13 PM       ICD-10-CM    1. Hemoptysis  R04.2       2. Leukocytosis, unspecified type  D72.829       3. Hyponatremia  E87.1           Medical Decision Making   Epistaxis any obvious evidence for nasal or pharyngeal source. Will get chest x-ray. Screening lab work. Doubt pulmonary embolism due to normal vital signs and the fact the patient is very compliant with her Eliquis. Complexity of Problems Addressed:  1 or more acute illnesses that pose a threat to life or bodily function. Data Reviewed and Analyzed:   I independently ordered and reviewed each unique test.  I reviewed external records: provider visit note from outside specialist.     She notes regarding carotid stenosis and status post aortic valve replacement        I interpreted the X-rays agree with radiologist.  No pneumothorax. Discussion of management or test interpretation. Some leukocytosis. Will place patient on antibiotics. Doubt pulmonary embolism. No hemoptysis here. Needs follow-up with primary care doctor regarding sodium trending downward. Risk of Complications and/or Morbidity of Patient Management:  Prescription drug management performed. Chronic medical problems impacting care include aortic valve replacement/atrial fibrillation requiring use of anticoagulants. Is this patient to be included in the SEP-1 core measure due to severe sepsis or septic shock? No Exclusion criteria - the patient is NOT to be included for SEP-1 Core Measure due to: 2+ SIRS criteria are not met      History      78-year-old female brought in by family member. Patient states she has had 3 episodes of coughing up a small amount of blood in the last 24 hours. Denies any fever or chills. No chest pain. She ate some popcorn on Friday.   She thinks she had an episode a year ago where

## 2023-10-23 NOTE — ED TRIAGE NOTES
Pt arrives in [facility] w/ cc of coughing up small amounts of bright red blood x 1 day. Pt states that it started last night. Pt is on eloquis for impaired carotid circulation. Pt states that she had popcorn and is concerned she may have scrapped her throat. Pt Hx bronchitis, GERD. Pt denies chest pain, worsening shortness of breath, NVD, Fever.  Pt A&O x 4

## 2023-10-23 NOTE — TELEPHONE ENCOUNTER
I am aware. Make sure patient has close follow-up with her primary care physician to address these issues.

## 2023-10-24 ENCOUNTER — CARE COORDINATION (OUTPATIENT)
Dept: CARE COORDINATION | Facility: CLINIC | Age: 83
End: 2023-10-24

## 2023-10-24 NOTE — CARE COORDINATION
Ambulatory Care Management Note        Date/Time:  10/24/2023 9:04 AM    Ccm outreached to patient. No answer at this time, message left. Awaiting response. If no response, ccm will outreach next week.

## 2023-10-27 ENCOUNTER — OFFICE VISIT (OUTPATIENT)
Dept: FAMILY MEDICINE CLINIC | Facility: CLINIC | Age: 83
End: 2023-10-27
Payer: MEDICARE

## 2023-10-27 VITALS
SYSTOLIC BLOOD PRESSURE: 134 MMHG | DIASTOLIC BLOOD PRESSURE: 72 MMHG | OXYGEN SATURATION: 96 % | HEART RATE: 70 BPM | TEMPERATURE: 97.6 F | BODY MASS INDEX: 25.06 KG/M2 | WEIGHT: 160 LBS

## 2023-10-27 DIAGNOSIS — Z87.898 HISTORY OF HEMOPTYSIS: Primary | ICD-10-CM

## 2023-10-27 DIAGNOSIS — D72.829 LEUKOCYTOSIS, UNSPECIFIED TYPE: ICD-10-CM

## 2023-10-27 DIAGNOSIS — E87.1 HYPONATREMIA: ICD-10-CM

## 2023-10-27 LAB
ALBUMIN SERPL-MCNC: 3.5 G/DL (ref 3.2–4.6)
ALBUMIN/GLOB SERPL: 1.1 (ref 0.4–1.6)
ALP SERPL-CCNC: 87 U/L (ref 50–136)
ALT SERPL-CCNC: 18 U/L (ref 12–65)
ANION GAP SERPL CALC-SCNC: 10 MMOL/L (ref 2–11)
AST SERPL-CCNC: 15 U/L (ref 15–37)
BASOPHILS # BLD: 0.1 K/UL (ref 0–0.2)
BASOPHILS NFR BLD: 1 % (ref 0–2)
BILIRUB SERPL-MCNC: 0.6 MG/DL (ref 0.2–1.1)
BUN SERPL-MCNC: 22 MG/DL (ref 8–23)
CALCIUM SERPL-MCNC: 9.5 MG/DL (ref 8.3–10.4)
CHLORIDE SERPL-SCNC: 98 MMOL/L (ref 101–110)
CO2 SERPL-SCNC: 22 MMOL/L (ref 21–32)
CREAT SERPL-MCNC: 0.7 MG/DL (ref 0.6–1)
DIFFERENTIAL METHOD BLD: ABNORMAL
EOSINOPHIL # BLD: 0.3 K/UL (ref 0–0.8)
EOSINOPHIL NFR BLD: 3 % (ref 0.5–7.8)
ERYTHROCYTE [DISTWIDTH] IN BLOOD BY AUTOMATED COUNT: 14 % (ref 11.9–14.6)
GLOBULIN SER CALC-MCNC: 3.1 G/DL (ref 2.8–4.5)
GLUCOSE SERPL-MCNC: 96 MG/DL (ref 65–100)
HCT VFR BLD AUTO: 34.6 % (ref 35.8–46.3)
HGB BLD-MCNC: 11.8 G/DL (ref 11.7–15.4)
IMM GRANULOCYTES # BLD AUTO: 0.1 K/UL (ref 0–0.5)
IMM GRANULOCYTES NFR BLD AUTO: 1 % (ref 0–5)
LYMPHOCYTES # BLD: 2.1 K/UL (ref 0.5–4.6)
LYMPHOCYTES NFR BLD: 17 % (ref 13–44)
MCH RBC QN AUTO: 29.2 PG (ref 26.1–32.9)
MCHC RBC AUTO-ENTMCNC: 34.1 G/DL (ref 31.4–35)
MCV RBC AUTO: 85.6 FL (ref 82–102)
MONOCYTES # BLD: 1.2 K/UL (ref 0.1–1.3)
MONOCYTES NFR BLD: 9 % (ref 4–12)
NEUTS SEG # BLD: 8.4 K/UL (ref 1.7–8.2)
NEUTS SEG NFR BLD: 69 % (ref 43–78)
NRBC # BLD: 0 K/UL (ref 0–0.2)
PLATELET # BLD AUTO: 361 K/UL (ref 150–450)
PMV BLD AUTO: 9.9 FL (ref 9.4–12.3)
POTASSIUM SERPL-SCNC: 4.8 MMOL/L (ref 3.5–5.1)
PROT SERPL-MCNC: 6.6 G/DL (ref 6.3–8.2)
RBC # BLD AUTO: 4.04 M/UL (ref 4.05–5.2)
SODIUM SERPL-SCNC: 130 MMOL/L (ref 133–143)
WBC # BLD AUTO: 12.3 K/UL (ref 4.3–11.1)

## 2023-10-27 PROCEDURE — 1123F ACP DISCUSS/DSCN MKR DOCD: CPT | Performed by: FAMILY MEDICINE

## 2023-10-27 PROCEDURE — 3075F SYST BP GE 130 - 139MM HG: CPT | Performed by: FAMILY MEDICINE

## 2023-10-27 PROCEDURE — 99213 OFFICE O/P EST LOW 20 MIN: CPT | Performed by: FAMILY MEDICINE

## 2023-10-27 PROCEDURE — 3078F DIAST BP <80 MM HG: CPT | Performed by: FAMILY MEDICINE

## 2023-10-27 ASSESSMENT — ENCOUNTER SYMPTOMS
WHEEZING: 0
GASTROINTESTINAL NEGATIVE: 1
CHEST TIGHTNESS: 0
EYES NEGATIVE: 1
COUGH: 1
CHOKING: 0
SHORTNESS OF BREATH: 0
APNEA: 0

## 2023-10-27 NOTE — PROGRESS NOTES
HISTORY OF PRESENT ILLNESS    Estephanie Chávez is a 78 y.o. female. HPI  Chief Complaint   Patient presents with    Follow-Up from [facility]     10/23/2024 - cough with blood    Discuss Medications     Aspirin and Eliquis      See above. Patient was evaluated in the ER as noted. She had been having episodes of cough productive of bloody mucus. Initial was mostly bright red blood. Subsequently was small amount of blood mixed with clear mucus. Evaluation in ER indicated elevated WBC and decreased sodium. CXR indicated some residual inflammation in the right lung. Patient is on dual blood thinners and had eaten popcorn prior to onset. Symptoms have improved. Yesterday had a small amount of bright red blood mixed with clear mucus. None since then. Denies fever or sore throat. No difficulty swallowing. Occasional SOB with exertion. No chest pain. Finishing antibiotic prescribed at ER. Stable on maintenance medications. Current Outpatient Medications on File Prior to Visit   Medication Sig Dispense Refill    benzonatate (TESSALON) 100 MG capsule Take 1 capsule by mouth 3 times daily as needed for Cough 15 capsule 0    doxycycline hyclate (VIBRA-TABS) 100 MG tablet Take 1 tablet by mouth 2 times daily for 7 days 14 tablet 0    sotalol (BETAPACE) 80 MG tablet Take 1 tablet by mouth daily 60 tablet 3    aspirin 81 MG EC tablet Take 1 tablet by mouth daily      atorvastatin (LIPITOR) 40 MG tablet Take 1 tablet by mouth daily 90 tablet 1    fluticasone (FLONASE) 50 MCG/ACT nasal spray 1 spray by Each Nostril route daily 32 g 3    losartan (COZAAR) 25 MG tablet Take 1 tablet by mouth daily 30 tablet 5    albuterol sulfate HFA (VENTOLIN HFA) 108 (90 Base) MCG/ACT inhaler Inhale 2 puffs into the lungs 4 times daily as needed for Wheezing 54 g 1    mometasone (ELOCON) 0.1 % lotion       apixaban (ELIQUIS) 5 MG TABS tablet Take 1 tablet by mouth in the morning and 1 tablet in the evening. TAKE 1 TABLET TWICE DAILY.  180 tablet 3

## 2023-10-31 ENCOUNTER — PATIENT MESSAGE (OUTPATIENT)
Dept: FAMILY MEDICINE CLINIC | Facility: CLINIC | Age: 83
End: 2023-10-31

## 2023-10-31 ENCOUNTER — CARE COORDINATION (OUTPATIENT)
Dept: CARE COORDINATION | Facility: CLINIC | Age: 83
End: 2023-10-31

## 2023-10-31 RX ORDER — DOXYCYCLINE HYCLATE 100 MG
100 TABLET ORAL 2 TIMES DAILY
Qty: 20 TABLET | Refills: 0 | Status: SHIPPED | OUTPATIENT
Start: 2023-10-31 | End: 2023-11-10

## 2023-10-31 NOTE — CARE COORDINATION
Ambulatory Care Coordination Note  10/31/2023    Patient Current Location:  [facility]     ACM contacted the patient by telephone. Verified name and  with patient as identifiers. Provided introduction to self, and explanation of the ACM role. Challenges to be reviewed by the provider   Additional needs identified to be addressed with provider: No  none               Method of communication with provider: none. ACM: Marcin Mascorro RN    Ccm outreached to patient. Patient states she is feeling much better. Patient states cough has improved and she completes her abt therapy today. Patient states she is eating and drinking well. Patient had follow up with pcp on 10/27/23. Patient states no questions or concerns. Ccm discussed that I would outreach next week. Patient agreeable. Offered patient enrollment in the Remote Patient Monitoring (RPM) program for in-home monitoring: NA. Lab Results       None            Care Coordination Interventions    Referral from Primary Care Provider: No  Suggested Interventions and Community Resources          Goals Addressed                   This Visit's Progress     Conditions and Symptoms   On track     I will schedule office visits, as directed by my provider. I will keep my appointment or reschedule if I have to cancel. I will follow my Zone Management tool to seek urgent or emergent care. I will notify my provider of any symptoms that indicate a worsening of my condition. Patient is able to monitor and record orthostatic BP daily for MD review. Patient is able to monitor and record weights daily. Reports increase if 2 lbs. overnight or 5 lbs.  in a week to MD.    Barriers: none  Plan for overcoming my barriers: N/A  Confidence: 9/10  Anticipated Goal Completion Date: 23                Future Appointments   Date Time Provider [address]   2023 10:40 AM [facility] LAB RESOURCE [facility] AMB   2023 10:40 AM [facility] LAB RESOURCE F [facility] AMB

## 2023-11-07 ENCOUNTER — CARE COORDINATION (OUTPATIENT)
Dept: CARE COORDINATION | Facility: CLINIC | Age: 83
End: 2023-11-07

## 2023-11-07 NOTE — CARE COORDINATION
Ambulatory Care Management Note        Date/Time:  11/7/2023 10:14 AM    Ccm outreached to patient. No answer at this time, message left. Awaiting response. If no response, ccm will outreach next week.

## 2023-11-10 DIAGNOSIS — I10 PRIMARY HYPERTENSION: ICD-10-CM

## 2023-11-10 RX ORDER — LOSARTAN POTASSIUM 25 MG/1
25 TABLET ORAL DAILY
Qty: 90 TABLET | Refills: 1 | OUTPATIENT
Start: 2023-11-10

## 2023-11-13 ENCOUNTER — CARE COORDINATION (OUTPATIENT)
Dept: CARE COORDINATION | Facility: CLINIC | Age: 83
End: 2023-11-13

## 2023-11-13 DIAGNOSIS — Z09 HOSPITAL DISCHARGE FOLLOW-UP: ICD-10-CM

## 2023-11-13 DIAGNOSIS — E87.1 HYPONATREMIA: Primary | ICD-10-CM

## 2023-11-13 NOTE — CARE COORDINATION
Ambulatory Care Coordination Note  2023    Patient Current Location:  [place]     ACM contacted the patient by telephone. Verified name and  with patient as identifiers. Provided introduction to self, and explanation of the ACM role. Challenges to be reviewed by the provider   Additional needs identified to be addressed with provider: No  none               Method of communication with provider: none. ACM: Edson Allison RN    Ccm outreached to patient. Patient states she is doing well at this time. Patient states no questions or concerns. Ccm discussed that I would outreach next week. Patient agreeable. Offered patient enrollment in the Remote Patient Monitoring (RPM) program for in-home monitoring: Patient is not eligible for RPM program.    Lab Results       None            Care Coordination Interventions    Referral from Primary Care Provider: No  Suggested Interventions and Community Resources          Goals Addressed                   This Visit's Progress     Conditions and Symptoms   On track     I will schedule office visits, as directed by my provider. I will keep my appointment or reschedule if I have to cancel. I will follow my Zone Management tool to seek urgent or emergent care. I will notify my provider of any symptoms that indicate a worsening of my condition. Patient is able to monitor and record orthostatic BP daily for MD review. Patient is able to monitor and record weights daily. Reports increase if 2 lbs. overnight or 5 lbs.  in a week to MD.    Barriers: none  Plan for overcoming my barriers: N/A  Confidence: 9/10  Anticipated Goal Completion Date: 23                Future Appointments   Date Time Provider [address]   2023 10:40 AM [facility] LAB RESOURCE [facility] AMB   2023 10:40 AM [facility] LAB RESOURCE [facility] AMB   2024 10:40 AM [facility] LAB RESOURCE [facility] AMB   2024  9:00 AM Wilbert Harman MD [facility] AMB   2024  1:00 PM

## 2023-11-13 NOTE — CARE COORDINATION
Heart Failure Education outreach Date/Time: 2023 1:44 PM    Ambulatory Care Manager (ACM) contacted the patient by telephone to perform Ambulatory Care Coordination. Verified name and  with patient as identifiers. Provided introduction to self, and explanation of the Ambulatory Care Manager's role. ACM reviewed that a Health Healthy tips for the Holiday packet has been sent to [employer]. ACM reviewed CHF zones, daily weights, fluid restriction, the importance of low sodium diet, and healthy tips packet with the patient. Instructed patient to call their PCP if they have a weight gain of 3 lbs in 2 days or 5 lbs in a week. Patient reminded that there is a physician on call 24 hours a day / 7 days a week should the patient have questions or concerns. The patient verbalized understanding.

## 2023-11-14 ENCOUNTER — NURSE ONLY (OUTPATIENT)
Dept: FAMILY MEDICINE CLINIC | Facility: CLINIC | Age: 83
End: 2023-11-14

## 2023-11-14 DIAGNOSIS — Z09 HOSPITAL DISCHARGE FOLLOW-UP: ICD-10-CM

## 2023-11-14 DIAGNOSIS — E87.1 HYPONATREMIA: ICD-10-CM

## 2023-11-14 LAB
ANION GAP SERPL CALC-SCNC: 9 MMOL/L (ref 2–11)
BUN SERPL-MCNC: 19 MG/DL (ref 8–23)
CALCIUM SERPL-MCNC: 9.1 MG/DL (ref 8.3–10.4)
CHLORIDE SERPL-SCNC: 102 MMOL/L (ref 101–110)
CO2 SERPL-SCNC: 23 MMOL/L (ref 21–32)
CREAT SERPL-MCNC: 0.8 MG/DL (ref 0.6–1)
GLUCOSE SERPL-MCNC: 129 MG/DL (ref 65–100)
POTASSIUM SERPL-SCNC: 4.2 MMOL/L (ref 3.5–5.1)
SODIUM SERPL-SCNC: 134 MMOL/L (ref 133–143)

## 2023-11-20 ENCOUNTER — CARE COORDINATION (OUTPATIENT)
Dept: CARE COORDINATION | Facility: CLINIC | Age: 83
End: 2023-11-20

## 2023-11-20 NOTE — CARE COORDINATION
Ambulatory Care Management Note        Date/Time:  11/20/2023 12:31 PM    Ccm outreached to patient. No answer at this time, message left. Awaiting response. If no response, ccm will outreach next week.

## 2023-11-28 ENCOUNTER — CARE COORDINATION (OUTPATIENT)
Dept: CARE COORDINATION | Facility: CLINIC | Age: 83
End: 2023-11-28

## 2023-11-28 NOTE — CARE COORDINATION
Ambulatory Care Coordination Note  2023    Patient Current Location:  [place]     ACM contacted the patient by telephone. Verified name and  with patient as identifiers. Provided introduction to self, and explanation of the ACM role. Challenges to be reviewed by the provider   Additional needs identified to be addressed with provider: No  none               Method of communication with provider: none. ACM: Karyle Bimler, RN    Ccm outreached to patient. Patient states she is doing well at this time. Patient states no questions or concerns. Ccm discussed that I would outreach next week. Patient agreeable. Offered patient enrollment in the Remote Patient Monitoring (RPM) program for in-home monitoring: Patient is not eligible for RPM program.    Lab Results       None            Care Coordination Interventions    Referral from Primary Care Provider: No  Suggested Interventions and Community Resources          Goals Addressed                   This Visit's Progress     Conditions and Symptoms   On track     I will schedule office visits, as directed by my provider. I will keep my appointment or reschedule if I have to cancel. I will follow my Zone Management tool to seek urgent or emergent care. I will notify my provider of any symptoms that indicate a worsening of my condition. Patient is able to monitor and record orthostatic BP daily for MD review. Patient is able to monitor and record weights daily. Reports increase if 2 lbs. overnight or 5 lbs.  in a week to MD.    Barriers: none  Plan for overcoming my barriers: N/A  Confidence: 9/10  Anticipated Goal Completion Date: 23                Future Appointments   Date Time Provider [address]   2023 10:40 AM [facility] LAB RESOURCE [facility] AMB   2024 10:40 AM [facility] LAB RESOURCE [facility] AMB   2024  9:00 AM Virginia Sparks MD [facility] AMB   2024  1:00 PM Thierry Vuong MD [facility] AMB   2024  8:00 Subjective:      Patient ID: Alan Gutiérrez is a 77 y.o. male.    Chief Complaint:Follow-up    History of Present Illness  Presents as a follow up for right knee hemarthrosis. Went in for right knee pain. Knee was tapped; cultures were negative. Blood cultures negative. Patient was seen by Dr. Gonzalez, marysol, as well as ID, Dr. Anders in hospital. There was low suspicion for septic joint and thought to be from hemarthrosis as patient is on coumadin and had supratherapeutic INR. Patient was placed on doxycycline 100 mg PO BID for prophylaxis and followed up a few weeks ago. Patient was complaining of severe pain at that time and still had swelling/warmth.    He presents today for follow up. He states his knee pain and swelling is much better. Warmth has resolved. Denies redness. Denies fever or chills. Finished his doxycycline about a week and a half ago. Was unable to follow up with Dr. Gonzalez secondary to weather and is re-scheduled for later this month.     Review of Systems   Constitution: Negative for chills, decreased appetite, fever, weakness, malaise/fatigue and night sweats.   HENT: Negative for congestion, ear pain, hearing loss, hoarse voice, sore throat and tinnitus.    Eyes: Negative for blurred vision, redness and visual disturbance.   Cardiovascular: Positive for leg swelling and palpitations. Negative for chest pain.   Respiratory: Negative for cough, hemoptysis, shortness of breath and sputum production.    Hematologic/Lymphatic: Negative for adenopathy. Bruises/bleeds easily.   Skin: Positive for dry skin. Negative for itching, rash and suspicious lesions.   Musculoskeletal: Positive for joint pain. Negative for back pain, myalgias and neck pain.   Gastrointestinal: Negative for abdominal pain, constipation, diarrhea, heartburn, nausea and vomiting.   Genitourinary: Negative for dysuria, flank pain, frequency, hematuria, hesitancy and urgency.   Neurological: Negative for dizziness, headaches,  numbness and paresthesias.   Psychiatric/Behavioral: Negative for depression and memory loss. The patient has insomnia. The patient is not nervous/anxious.      Objective:   Physical Exam   Constitutional: He is oriented to person, place, and time. He appears well-developed and well-nourished.   HENT:   Head: Normocephalic and atraumatic.   Cardiovascular: Normal rate and regular rhythm.    Pulmonary/Chest: Effort normal and breath sounds normal. No respiratory distress.   Musculoskeletal: Normal range of motion. He exhibits no edema or tenderness.   Right knee still with edema, much improved from last visit. Warmth resolved. Non tender to palpation. No erythema.   Neurological: He is alert and oriented to person, place, and time.   Skin: Skin is dry. No rash noted. No erythema.   Psychiatric: He has a normal mood and affect. His behavior is normal.     Assessment:       1. Hemarthrosis        Pain much improved. Warmth resolved. Non tender. Swelling improved. Finished doxycycline over a week ago.   Cultures were negative.    Plan:      1. Continue off of antibiotics at this time as much improved and no signs of infection.   2. F/u with Dr. Gonzalez as scheduled later this month  3. F/u ID clinic PRN; instructed to call for increased pain, redness, swelling, fever, or chills.

## 2023-12-05 ENCOUNTER — CARE COORDINATION (OUTPATIENT)
Dept: CARE COORDINATION | Facility: CLINIC | Age: 83
End: 2023-12-05

## 2023-12-05 NOTE — CARE COORDINATION
AM Sherryle Rams., MD [facility] AMB   4/30/2024 10:30 AM Giana Crisostomo MD [facility] AMB

## 2023-12-08 PROCEDURE — 93296 REM INTERROG EVL PM/IDS: CPT | Performed by: INTERNAL MEDICINE

## 2023-12-08 PROCEDURE — 93294 REM INTERROG EVL PM/LDLS PM: CPT | Performed by: INTERNAL MEDICINE

## 2023-12-12 ENCOUNTER — CARE COORDINATION (OUTPATIENT)
Dept: CARE COORDINATION | Facility: CLINIC | Age: 83
End: 2023-12-12

## 2023-12-12 NOTE — CARE COORDINATION
Ambulatory Care Coordination Note  2023    Patient Current Location:  [facility]     ACM contacted the patient by telephone. Verified name and  with patient as identifiers. Provided introduction to self, and explanation of the ACM role. Challenges to be reviewed by the provider   Additional needs identified to be addressed with provider: No  none               Method of communication with provider: none. ACM: Charmaine Winkler RN    Ccm outreached to patient. Patient states she is doing well at this time. Patient states no questions or concerns. Ccm discussed that I would outreach next week. Patient agreeable. Offered patient enrollment in the Remote Patient Monitoring (RPM) program for in-home monitoring: Yes, but did not enroll at this time. Lab Results       None            Care Coordination Interventions    Referral from Primary Care Provider: No  Suggested Interventions and Community Resources          Goals Addressed                   This Visit's Progress     Conditions and Symptoms   On track     I will schedule office visits, as directed by my provider. I will keep my appointment or reschedule if I have to cancel. I will follow my Zone Management tool to seek urgent or emergent care. I will notify my provider of any symptoms that indicate a worsening of my condition. Patient is able to monitor and record orthostatic BP daily for MD review. Patient is able to monitor and record weights daily. Reports increase if 2 lbs. overnight or 5 lbs.  in a week to MD.    Barriers: none  Plan for overcoming my barriers: N/A  Confidence: 9/10  Anticipated Goal Completion Date: 23                Future Appointments   Date Time Provider [address]   2023 10:40 AM [facility] LAB RESOURCE F [facility] AMB   2024 10:40 AM [facility] LAB RESOURCE F [facility] AMB   2024  9:00 AM Tomeka Bhakta MD [facility] AMB   2024  1:00 PM Derian Nunn MD [facility]   2024  8:00 AM

## 2023-12-29 ENCOUNTER — PATIENT MESSAGE (OUTPATIENT)
Dept: FAMILY MEDICINE CLINIC | Facility: CLINIC | Age: 83
End: 2023-12-29

## 2024-01-02 RX ORDER — FUROSEMIDE 40 MG/1
TABLET ORAL
Qty: 60 TABLET | Refills: 3 | Status: SHIPPED | OUTPATIENT
Start: 2024-01-02

## 2024-01-03 ENCOUNTER — CARE COORDINATION (OUTPATIENT)
Dept: CARE COORDINATION | Facility: CLINIC | Age: 84
End: 2024-01-03

## 2024-01-03 ENCOUNTER — PATIENT MESSAGE (OUTPATIENT)
Dept: FAMILY MEDICINE CLINIC | Facility: CLINIC | Age: 84
End: 2024-01-03

## 2024-01-03 DIAGNOSIS — I10 PRIMARY HYPERTENSION: ICD-10-CM

## 2024-01-03 RX ORDER — LOSARTAN POTASSIUM 25 MG/1
25 TABLET ORAL DAILY
Qty: 90 TABLET | Refills: 3 | Status: SHIPPED | OUTPATIENT
Start: 2024-01-03

## 2024-01-03 NOTE — CARE COORDINATION
Ambulatory Care Management Note        Date/Time:  1/3/2024 8:23 AM    Ccm outreached to pt. No answer at this time, message left. Awaiting response. If no response, ccm will outreach next week.

## 2024-01-08 ENCOUNTER — CARE COORDINATION (OUTPATIENT)
Dept: CARE COORDINATION | Facility: CLINIC | Age: 84
End: 2024-01-08

## 2024-01-08 NOTE — CARE COORDINATION
Ambulatory Care Management Note        Date/Time:  1/8/2024 1:48 PM    Ccm outreached to patient. No answer at this time, message left. Awaiting response. If no response, ccm will outreach next week.

## 2024-01-15 DIAGNOSIS — E87.1 HYPONATREMIA: Primary | ICD-10-CM

## 2024-01-16 ENCOUNTER — NURSE ONLY (OUTPATIENT)
Dept: FAMILY MEDICINE CLINIC | Facility: CLINIC | Age: 84
End: 2024-01-16

## 2024-01-16 ENCOUNTER — CARE COORDINATION (OUTPATIENT)
Dept: CARE COORDINATION | Facility: CLINIC | Age: 84
End: 2024-01-16

## 2024-01-16 DIAGNOSIS — E87.1 HYPONATREMIA: ICD-10-CM

## 2024-01-16 LAB
ANION GAP SERPL CALC-SCNC: 8 MMOL/L (ref 2–11)
BUN SERPL-MCNC: 16 MG/DL (ref 8–23)
CALCIUM SERPL-MCNC: 9.1 MG/DL (ref 8.3–10.4)
CHLORIDE SERPL-SCNC: 100 MMOL/L (ref 103–113)
CO2 SERPL-SCNC: 24 MMOL/L (ref 21–32)
CREAT SERPL-MCNC: 0.7 MG/DL (ref 0.6–1)
GLUCOSE SERPL-MCNC: 95 MG/DL (ref 65–100)
POTASSIUM SERPL-SCNC: 4.5 MMOL/L (ref 3.5–5.1)
SODIUM SERPL-SCNC: 132 MMOL/L (ref 136–146)

## 2024-01-16 NOTE — CARE COORDINATION
Ambulatory Care Management Note        Date/Time:  1/16/2024 9:08 AM    Ccm outreached to patient. No answer at this time, message left. Ccm will close case at this time d/t unable to reach x3. Ccm will remain available as needed.

## 2024-01-22 ENCOUNTER — OFFICE VISIT (OUTPATIENT)
Dept: FAMILY MEDICINE CLINIC | Facility: CLINIC | Age: 84
End: 2024-01-22
Payer: MEDICARE

## 2024-01-22 VITALS
WEIGHT: 155 LBS | DIASTOLIC BLOOD PRESSURE: 82 MMHG | HEART RATE: 72 BPM | TEMPERATURE: 97.3 F | OXYGEN SATURATION: 98 % | BODY MASS INDEX: 24.28 KG/M2 | SYSTOLIC BLOOD PRESSURE: 132 MMHG

## 2024-01-22 DIAGNOSIS — R05.2 SUBACUTE COUGH: ICD-10-CM

## 2024-01-22 DIAGNOSIS — E87.6 HYPOKALEMIA: ICD-10-CM

## 2024-01-22 DIAGNOSIS — D68.69 SECONDARY HYPERCOAGULABLE STATE (HCC): ICD-10-CM

## 2024-01-22 DIAGNOSIS — I48.0 PAROXYSMAL ATRIAL FIBRILLATION (HCC): ICD-10-CM

## 2024-01-22 DIAGNOSIS — I10 PRIMARY HYPERTENSION: Primary | ICD-10-CM

## 2024-01-22 DIAGNOSIS — I25.118 CORONARY ARTERY DISEASE OF NATIVE ARTERY OF NATIVE HEART WITH STABLE ANGINA PECTORIS (HCC): ICD-10-CM

## 2024-01-22 DIAGNOSIS — E87.1 HYPONATREMIA: ICD-10-CM

## 2024-01-22 DIAGNOSIS — I50.32 CHRONIC DIASTOLIC CONGESTIVE HEART FAILURE (HCC): ICD-10-CM

## 2024-01-22 PROBLEM — J44.9 CHRONIC OBSTRUCTIVE PULMONARY DISEASE, UNSPECIFIED (HCC): Status: RESOLVED | Noted: 2023-10-02 | Resolved: 2024-01-22

## 2024-01-22 LAB
ANION GAP SERPL CALC-SCNC: 6 MMOL/L (ref 2–11)
BUN SERPL-MCNC: 9 MG/DL (ref 8–23)
CALCIUM SERPL-MCNC: 9.3 MG/DL (ref 8.3–10.4)
CHLORIDE SERPL-SCNC: 99 MMOL/L (ref 103–113)
CO2 SERPL-SCNC: 24 MMOL/L (ref 21–32)
CREAT SERPL-MCNC: 0.6 MG/DL (ref 0.6–1)
GLUCOSE SERPL-MCNC: 98 MG/DL (ref 65–100)
POTASSIUM SERPL-SCNC: 5.1 MMOL/L (ref 3.5–5.1)
SODIUM SERPL-SCNC: 129 MMOL/L (ref 136–146)

## 2024-01-22 PROCEDURE — 99214 OFFICE O/P EST MOD 30 MIN: CPT | Performed by: FAMILY MEDICINE

## 2024-01-22 PROCEDURE — 1123F ACP DISCUSS/DSCN MKR DOCD: CPT | Performed by: FAMILY MEDICINE

## 2024-01-22 PROCEDURE — 3075F SYST BP GE 130 - 139MM HG: CPT | Performed by: FAMILY MEDICINE

## 2024-01-22 PROCEDURE — 3079F DIAST BP 80-89 MM HG: CPT | Performed by: FAMILY MEDICINE

## 2024-01-22 RX ORDER — BENZONATATE 200 MG/1
200 CAPSULE ORAL 3 TIMES DAILY PRN
Qty: 30 CAPSULE | Refills: 3 | Status: SHIPPED | OUTPATIENT
Start: 2024-01-22 | End: 2024-01-29

## 2024-01-22 ASSESSMENT — ENCOUNTER SYMPTOMS
WHEEZING: 0
GASTROINTESTINAL NEGATIVE: 1
SHORTNESS OF BREATH: 0
APNEA: 0
RHINORRHEA: 1
SORE THROAT: 0
TROUBLE SWALLOWING: 0
CHEST TIGHTNESS: 0
COUGH: 1
EYES NEGATIVE: 1

## 2024-01-22 ASSESSMENT — PATIENT HEALTH QUESTIONNAIRE - PHQ9
SUM OF ALL RESPONSES TO PHQ QUESTIONS 1-9: 0
SUM OF ALL RESPONSES TO PHQ9 QUESTIONS 1 & 2: 0
SUM OF ALL RESPONSES TO PHQ QUESTIONS 1-9: 0
1. LITTLE INTEREST OR PLEASURE IN DOING THINGS: 0
2. FEELING DOWN, DEPRESSED OR HOPELESS: 0
SUM OF ALL RESPONSES TO PHQ QUESTIONS 1-9: 0
SUM OF ALL RESPONSES TO PHQ QUESTIONS 1-9: 0

## 2024-01-22 NOTE — PROGRESS NOTES
HISTORY OF PRESENT ILLNESS    Marcelina Collins is a 83 y.o. female.  HPI  Chief Complaint   Patient presents with    3 Month Follow-Up    Cough     With drainage over a week      See above. Overall feeling well.  Continues to have dry cough. Does not keep her awake. No SOB or wheezing. Cough improves with Tessalon.  Followed by cardiology for CHF and PAF. Has been stable.  No side effects from BP medication. No headache or vision change. Denies chest pain or SOB. No swelling.  Tolerating statin with no side effects.  Has a history of hyponatremia and hypokalemia. For the last 2 years has had to be hospitalized because of associated symptoms.      Current Outpatient Medications on File Prior to Visit   Medication Sig Dispense Refill    losartan (COZAAR) 25 MG tablet Take 1 tablet by mouth daily 90 tablet 3    furosemide (LASIX) 40 MG tablet 1/2-1 po qam  prn swelling 60 tablet 3    sotalol (BETAPACE) 80 MG tablet Take 1 tablet by mouth daily 60 tablet 3    aspirin 81 MG EC tablet Take 1 tablet by mouth daily      atorvastatin (LIPITOR) 40 MG tablet Take 1 tablet by mouth daily 90 tablet 1    fluticasone (FLONASE) 50 MCG/ACT nasal spray 1 spray by Each Nostril route daily 32 g 3    albuterol sulfate HFA (VENTOLIN HFA) 108 (90 Base) MCG/ACT inhaler Inhale 2 puffs into the lungs 4 times daily as needed for Wheezing 54 g 1    mometasone (ELOCON) 0.1 % lotion       apixaban (ELIQUIS) 5 MG TABS tablet Take 1 tablet by mouth in the morning and 1 tablet in the evening. TAKE 1 TABLET TWICE DAILY. 180 tablet 3    docusate (COLACE, DULCOLAX) 100 MG CAPS Take 100 mg by mouth nightly as needed (constipation)      loratadine (CLARITIN) 10 MG tablet Take 1 tablet by mouth daily as needed (allergies)       No current facility-administered medications on file prior to visit.     Past Medical History:   Diagnosis Date    Adverse effect of anesthesia     delayed awakening x1- with heart surgery    DARRELL (acute kidney injury) (HCC)

## 2024-01-23 ENCOUNTER — PATIENT MESSAGE (OUTPATIENT)
Dept: FAMILY MEDICINE CLINIC | Facility: CLINIC | Age: 84
End: 2024-01-23

## 2024-01-23 DIAGNOSIS — E87.6 HYPOKALEMIA: Primary | ICD-10-CM

## 2024-02-02 ENCOUNTER — OFFICE VISIT (OUTPATIENT)
Age: 84
End: 2024-02-02

## 2024-02-02 VITALS
SYSTOLIC BLOOD PRESSURE: 136 MMHG | WEIGHT: 156 LBS | BODY MASS INDEX: 24.48 KG/M2 | DIASTOLIC BLOOD PRESSURE: 76 MMHG | HEART RATE: 72 BPM | HEIGHT: 67 IN

## 2024-02-02 DIAGNOSIS — E78.5 DYSLIPIDEMIA: Chronic | ICD-10-CM

## 2024-02-02 DIAGNOSIS — I25.118 CORONARY ARTERY DISEASE OF NATIVE ARTERY OF NATIVE HEART WITH STABLE ANGINA PECTORIS (HCC): ICD-10-CM

## 2024-02-02 DIAGNOSIS — I10 ESSENTIAL HYPERTENSION: Chronic | ICD-10-CM

## 2024-02-02 DIAGNOSIS — I65.23 BILATERAL CAROTID ARTERY STENOSIS: Chronic | ICD-10-CM

## 2024-02-02 DIAGNOSIS — Z95.0 CARDIAC PACEMAKER: Chronic | ICD-10-CM

## 2024-02-02 DIAGNOSIS — Z95.2 S/P AVR: Primary | Chronic | ICD-10-CM

## 2024-02-02 DIAGNOSIS — I48.0 PAROXYSMAL ATRIAL FIBRILLATION (HCC): Chronic | ICD-10-CM

## 2024-02-02 ASSESSMENT — ENCOUNTER SYMPTOMS
SHORTNESS OF BREATH: 1
BACK PAIN: 0
ABDOMINAL PAIN: 0
COUGH: 0

## 2024-02-02 NOTE — PROGRESS NOTES
[facility] CARDIOLOGY  [address] [place] [address], [address]  [place] [address]      24      NAME:  Marcelina Collins  : 1940  MRN: [id number]      SUBJECTIVE:   Marcelina Collins is a 83 y.o. female seen for a follow up visit regarding the following:     Chief Complaint   Patient presents with    Coronary Artery Disease    Atrial Fibrillation       HPI:   Patient s/p AVR and MAZE for aortic stenosis and atrial fibrillation 2015.  Minimal atrial fibrillation on PM. Normal LVEF and AVR by echo 2019 and normal PM function.  No AF on PM.    Patient was admitted 10/2022 with UTI, pain with hypotension/hyponatremia likely due to volume depletion.  Patient was noted to be significantly orthostatic upon standing.  She appears to have developed some component of dysautonomia.  This has improved over the last 6 months. Echo was normal 2023.               Past Medical History, Past Surgical History, Family history, Social History, and Medications were all reviewed with the patient today and updated as necessary.     Current Outpatient Medications   Medication Sig Dispense Refill    apixaban (ELIQUIS) 5 MG TABS tablet Take 1 tablet by mouth in the morning and 1 tablet in the evening. TAKE 1 TABLET TWICE DAILY. 180 tablet 3    losartan (COZAAR) 25 MG tablet Take 1 tablet by mouth daily 90 tablet 3    furosemide (LASIX) 40 MG tablet 1/2-1 po qam  prn swelling 60 tablet 3    sotalol (BETAPACE) 80 MG tablet Take 1 tablet by mouth daily 60 tablet 3    aspirin 81 MG EC tablet Take 1 tablet by mouth daily      atorvastatin (LIPITOR) 40 MG tablet Take 1 tablet by mouth daily 90 tablet 1    fluticasone (FLONASE) 50 MCG/ACT nasal spray 1 spray by Each Nostril route daily 32 g 3    albuterol sulfate HFA (VENTOLIN HFA) 108 (90 Base) MCG/ACT inhaler Inhale 2 puffs into the lungs 4 times daily as needed for Wheezing 54 g 1    docusate (COLACE, DULCOLAX) 100 MG CAPS Take 100 mg by mouth nightly as needed (constipation)

## 2024-02-06 NOTE — TELEPHONE ENCOUNTER
Fam, Processor 2/6/2024 10:19 AM EST    Hey Dr Aguilar and Jordyn,  I’m trying to set up a BMP for mom. I called a little bit ago and they said there were no orders for it in her information. Just wanted to get in touch with you to let you know so we can get this set up.    Thanks,  Rip

## 2024-02-13 ENCOUNTER — NURSE ONLY (OUTPATIENT)
Dept: FAMILY MEDICINE CLINIC | Facility: CLINIC | Age: 84
End: 2024-02-13

## 2024-02-13 DIAGNOSIS — E87.6 HYPOKALEMIA: ICD-10-CM

## 2024-02-14 LAB
ANION GAP SERPL CALC-SCNC: 7 MMOL/L (ref 2–11)
BUN SERPL-MCNC: 14 MG/DL (ref 8–23)
CALCIUM SERPL-MCNC: 9.2 MG/DL (ref 8.3–10.4)
CHLORIDE SERPL-SCNC: 98 MMOL/L (ref 103–113)
CO2 SERPL-SCNC: 24 MMOL/L (ref 21–32)
CREAT SERPL-MCNC: 0.6 MG/DL (ref 0.6–1)
GLUCOSE SERPL-MCNC: 89 MG/DL (ref 65–100)
POTASSIUM SERPL-SCNC: 4.7 MMOL/L (ref 3.5–5.1)
SODIUM SERPL-SCNC: 129 MMOL/L (ref 136–146)

## 2024-02-16 ENCOUNTER — PATIENT MESSAGE (OUTPATIENT)
Dept: FAMILY MEDICINE CLINIC | Facility: CLINIC | Age: 84
End: 2024-02-16

## 2024-02-16 DIAGNOSIS — E87.1 HYPONATREMIA: Primary | ICD-10-CM

## 2024-02-16 NOTE — TELEPHONE ENCOUNTER
Fam, Processor 2/16/2024 9:51 AM EST    We will do it in 3 weeks. If you can go ahead and put the order in I will call Monday and schedule it.  Thanks,  Rip

## 2024-02-20 NOTE — TELEPHONE ENCOUNTER
MEDICATION REFILL REQUEST      Name of Medication:  eliquis   Dose:  5 mg  Frequency:  twice a day   Quantity:    Days' supply:  90      Pharmacy Name/Location:  [facility]

## 2024-02-21 ENCOUNTER — PATIENT MESSAGE (OUTPATIENT)
Age: 84
End: 2024-02-21

## 2024-02-21 NOTE — TELEPHONE ENCOUNTER
From: Marcelina NATALY Collins  To: Dr. Olayinka Travis  Sent: 2/21/2024 8:31 AM EST  Subject: Refill Needed for Eliquis    Hi Dr. KILPATRICK,  I am writing on behalf of my mom Marcelina Collins.   She is needing a refill on her 5mg Eliquis sent in.   She tried to call your office yesterday but hasn't heard anything back from your office or the pharmacy the refill would be sent to.   Thank you. I appreciate your attention to this matter.    Rip Collins

## 2024-03-12 ENCOUNTER — NURSE ONLY (OUTPATIENT)
Dept: FAMILY MEDICINE CLINIC | Facility: CLINIC | Age: 84
End: 2024-03-12

## 2024-03-12 DIAGNOSIS — E87.1 HYPONATREMIA: ICD-10-CM

## 2024-03-12 LAB
ANION GAP SERPL CALC-SCNC: 7 MMOL/L (ref 2–11)
BUN SERPL-MCNC: 13 MG/DL (ref 8–23)
CALCIUM SERPL-MCNC: 8.6 MG/DL (ref 8.3–10.4)
CHLORIDE SERPL-SCNC: 104 MMOL/L (ref 103–113)
CO2 SERPL-SCNC: 25 MMOL/L (ref 21–32)
CREAT SERPL-MCNC: 0.6 MG/DL (ref 0.6–1)
GLUCOSE SERPL-MCNC: 71 MG/DL (ref 65–100)
POTASSIUM SERPL-SCNC: 4.7 MMOL/L (ref 3.5–5.1)
SODIUM SERPL-SCNC: 136 MMOL/L (ref 136–146)

## 2024-03-14 ENCOUNTER — NURSE ONLY (OUTPATIENT)
Age: 84
End: 2024-03-14

## 2024-03-14 DIAGNOSIS — I48.0 PAROXYSMAL ATRIAL FIBRILLATION (HCC): Primary | ICD-10-CM

## 2024-03-14 DIAGNOSIS — I49.5 SSS (SICK SINUS SYNDROME) (HCC): ICD-10-CM

## 2024-03-22 PROCEDURE — 93294 REM INTERROG EVL PM/LDLS PM: CPT | Performed by: INTERNAL MEDICINE

## 2024-03-22 PROCEDURE — 93296 REM INTERROG EVL PM/IDS: CPT | Performed by: INTERNAL MEDICINE

## 2024-04-01 RX ORDER — SOTALOL HYDROCHLORIDE 80 MG/1
80 TABLET ORAL DAILY
Qty: 90 TABLET | Refills: 2 | Status: SHIPPED | OUTPATIENT
Start: 2024-04-01

## 2024-04-08 SDOH — HEALTH STABILITY: PHYSICAL HEALTH: ON AVERAGE, HOW MANY MINUTES DO YOU ENGAGE IN EXERCISE AT THIS LEVEL?: 0 MIN

## 2024-04-08 ASSESSMENT — PATIENT HEALTH QUESTIONNAIRE - PHQ9
SUM OF ALL RESPONSES TO PHQ QUESTIONS 1-9: 0
SUM OF ALL RESPONSES TO PHQ QUESTIONS 1-9: 0
1. LITTLE INTEREST OR PLEASURE IN DOING THINGS: NOT AT ALL
SUM OF ALL RESPONSES TO PHQ QUESTIONS 1-9: 0
2. FEELING DOWN, DEPRESSED OR HOPELESS: NOT AT ALL
SUM OF ALL RESPONSES TO PHQ9 QUESTIONS 1 & 2: 0
SUM OF ALL RESPONSES TO PHQ QUESTIONS 1-9: 0

## 2024-04-08 ASSESSMENT — LIFESTYLE VARIABLES
HOW OFTEN DO YOU HAVE A DRINK CONTAINING ALCOHOL: 1
HOW MANY STANDARD DRINKS CONTAINING ALCOHOL DO YOU HAVE ON A TYPICAL DAY: 0
HOW OFTEN DO YOU HAVE A DRINK CONTAINING ALCOHOL: NEVER
HOW OFTEN DO YOU HAVE SIX OR MORE DRINKS ON ONE OCCASION: 1
HOW MANY STANDARD DRINKS CONTAINING ALCOHOL DO YOU HAVE ON A TYPICAL DAY: PATIENT DOES NOT DRINK

## 2024-04-09 ENCOUNTER — OFFICE VISIT (OUTPATIENT)
Dept: FAMILY MEDICINE CLINIC | Facility: CLINIC | Age: 84
End: 2024-04-09
Payer: MEDICARE

## 2024-04-09 VITALS
BODY MASS INDEX: 24.43 KG/M2 | OXYGEN SATURATION: 98 % | TEMPERATURE: 98 F | DIASTOLIC BLOOD PRESSURE: 86 MMHG | HEART RATE: 71 BPM | WEIGHT: 156 LBS | SYSTOLIC BLOOD PRESSURE: 150 MMHG

## 2024-04-09 DIAGNOSIS — E87.6 HYPOKALEMIA: ICD-10-CM

## 2024-04-09 DIAGNOSIS — I10 PRIMARY HYPERTENSION: ICD-10-CM

## 2024-04-09 DIAGNOSIS — Z00.00 ENCOUNTER FOR SUBSEQUENT ANNUAL WELLNESS VISIT (AWV) IN MEDICARE PATIENT: Primary | ICD-10-CM

## 2024-04-09 DIAGNOSIS — E78.5 HYPERLIPIDEMIA, UNSPECIFIED HYPERLIPIDEMIA TYPE: ICD-10-CM

## 2024-04-09 DIAGNOSIS — I48.0 PAROXYSMAL ATRIAL FIBRILLATION (HCC): ICD-10-CM

## 2024-04-09 LAB
ALBUMIN SERPL-MCNC: 3.3 G/DL (ref 3.2–4.6)
ALBUMIN/GLOB SERPL: 0.8 (ref 0.4–1.6)
ALP SERPL-CCNC: 256 U/L (ref 50–136)
ALT SERPL-CCNC: 25 U/L (ref 12–65)
ANION GAP SERPL CALC-SCNC: 4 MMOL/L (ref 2–11)
APPEARANCE UR: CLEAR
AST SERPL-CCNC: 34 U/L (ref 15–37)
BACTERIA URNS QL MICRO: NEGATIVE /HPF
BASOPHILS # BLD: 0.2 K/UL (ref 0–0.2)
BASOPHILS NFR BLD: 2 % (ref 0–2)
BILIRUB SERPL-MCNC: 0.7 MG/DL (ref 0.2–1.1)
BILIRUB UR QL: NEGATIVE
BUN SERPL-MCNC: 11 MG/DL (ref 8–23)
CALCIUM SERPL-MCNC: 9.6 MG/DL (ref 8.3–10.4)
CASTS URNS QL MICRO: ABNORMAL /LPF (ref 0–2)
CHLORIDE SERPL-SCNC: 99 MMOL/L (ref 103–113)
CHOLEST SERPL-MCNC: 133 MG/DL
CO2 SERPL-SCNC: 27 MMOL/L (ref 21–32)
COLOR UR: ABNORMAL
CREAT SERPL-MCNC: 0.6 MG/DL (ref 0.6–1)
DIFFERENTIAL METHOD BLD: ABNORMAL
EOSINOPHIL # BLD: 0.4 K/UL (ref 0–0.8)
EOSINOPHIL NFR BLD: 4 % (ref 0.5–7.8)
EPI CELLS #/AREA URNS HPF: ABNORMAL /HPF (ref 0–5)
ERYTHROCYTE [DISTWIDTH] IN BLOOD BY AUTOMATED COUNT: 13.2 % (ref 11.9–14.6)
GLOBULIN SER CALC-MCNC: 3.9 G/DL (ref 2.8–4.5)
GLUCOSE SERPL-MCNC: 95 MG/DL (ref 65–100)
GLUCOSE UR STRIP.AUTO-MCNC: NEGATIVE MG/DL
HCT VFR BLD AUTO: 37.6 % (ref 35.8–46.3)
HDLC SERPL-MCNC: 57 MG/DL (ref 40–60)
HDLC SERPL: 2.3
HGB BLD-MCNC: 12.3 G/DL (ref 11.7–15.4)
HGB UR QL STRIP: NEGATIVE
IMM GRANULOCYTES # BLD AUTO: 0 K/UL (ref 0–0.5)
IMM GRANULOCYTES NFR BLD AUTO: 0 % (ref 0–5)
KETONES UR QL STRIP.AUTO: NEGATIVE MG/DL
LDLC SERPL CALC-MCNC: 55.2 MG/DL
LEUKOCYTE ESTERASE UR QL STRIP.AUTO: NEGATIVE
LYMPHOCYTES # BLD: 1.9 K/UL (ref 0.5–4.6)
LYMPHOCYTES NFR BLD: 17 % (ref 13–44)
MCH RBC QN AUTO: 28.5 PG (ref 26.1–32.9)
MCHC RBC AUTO-ENTMCNC: 32.7 G/DL (ref 31.4–35)
MCV RBC AUTO: 87 FL (ref 82–102)
MONOCYTES # BLD: 1.1 K/UL (ref 0.1–1.3)
MONOCYTES NFR BLD: 10 % (ref 4–12)
NEUTS SEG # BLD: 7.2 K/UL (ref 1.7–8.2)
NEUTS SEG NFR BLD: 67 % (ref 43–78)
NITRITE UR QL STRIP.AUTO: NEGATIVE
NRBC # BLD: 0 K/UL (ref 0–0.2)
PH UR STRIP: 6.5 (ref 5–9)
PLATELET # BLD AUTO: 462 K/UL (ref 150–450)
PMV BLD AUTO: 9.1 FL (ref 9.4–12.3)
POTASSIUM SERPL-SCNC: 4.6 MMOL/L (ref 3.5–5.1)
PROT SERPL-MCNC: 7.2 G/DL (ref 6.3–8.2)
PROT UR STRIP-MCNC: 30 MG/DL
RBC # BLD AUTO: 4.32 M/UL (ref 4.05–5.2)
RBC #/AREA URNS HPF: ABNORMAL /HPF (ref 0–5)
SODIUM SERPL-SCNC: 130 MMOL/L (ref 136–146)
SP GR UR REFRACTOMETRY: 1.01 (ref 1–1.02)
TRIGL SERPL-MCNC: 104 MG/DL (ref 35–150)
TSH, 3RD GENERATION: 2.85 UIU/ML (ref 0.36–3.74)
UROBILINOGEN UR QL STRIP.AUTO: 0.2 EU/DL (ref 0.2–1)
VLDLC SERPL CALC-MCNC: 20.8 MG/DL (ref 6–23)
WBC # BLD AUTO: 10.8 K/UL (ref 4.3–11.1)
WBC URNS QL MICRO: ABNORMAL /HPF (ref 0–4)

## 2024-04-09 PROCEDURE — 1123F ACP DISCUSS/DSCN MKR DOCD: CPT | Performed by: FAMILY MEDICINE

## 2024-04-09 PROCEDURE — 3079F DIAST BP 80-89 MM HG: CPT | Performed by: FAMILY MEDICINE

## 2024-04-09 PROCEDURE — G0439 PPPS, SUBSEQ VISIT: HCPCS | Performed by: FAMILY MEDICINE

## 2024-04-09 PROCEDURE — 99214 OFFICE O/P EST MOD 30 MIN: CPT | Performed by: FAMILY MEDICINE

## 2024-04-09 PROCEDURE — 3077F SYST BP >= 140 MM HG: CPT | Performed by: FAMILY MEDICINE

## 2024-04-09 ASSESSMENT — ENCOUNTER SYMPTOMS
EYES NEGATIVE: 1
GASTROINTESTINAL NEGATIVE: 1
RESPIRATORY NEGATIVE: 1

## 2024-04-09 NOTE — PATIENT INSTRUCTIONS
and medicines. These can increase your chances of quitting for good. Quitting is one of the most important things you can do to protect your heart. It is never too late to quit. Try to avoid secondhand smoke too.     Stay at a weight that's healthy for you. Talk to your doctor if you need help losing weight.     Try to get 7 to 9 hours of sleep each night.     Limit alcohol to 2 drinks a day for men and 1 drink a day for women. Too much alcohol can cause health problems.     Manage other health problems such as diabetes, high blood pressure, and high cholesterol. If you think you may have a problem with alcohol or drug use, talk to your doctor.   Medicines    Take your medicines exactly as prescribed. Call your doctor if you think you are having a problem with your medicine.     If your doctor recommends aspirin, take the amount directed each day. Make sure you take aspirin and not another kind of pain reliever, such as acetaminophen (Tylenol).   When should you call for help?   Call 911 if you have symptoms of a heart attack. These may include:    Chest pain or pressure, or a strange feeling in the chest.     Sweating.     Shortness of breath.     Pain, pressure, or a strange feeling in the back, neck, jaw, or upper belly or in one or both shoulders or arms.     Lightheadedness or sudden weakness.     A fast or irregular heartbeat.   After you call 911, the  may tell you to chew 1 adult-strength or 2 to 4 low-dose aspirin. Wait for an ambulance. Do not try to drive yourself.  Watch closely for changes in your health, and be sure to contact your doctor if you have any problems.  Where can you learn more?  Go to https://www.[employer].net/patientEd and enter F075 to learn more about \"A Healthy Heart: Care Instructions.\"  Current as of: June 24, 2023               Content Version: 14.0  © 0518-0296 [employer].   Care instructions adapted under license by [employer]. If you have questions about

## 2024-04-09 NOTE — PROGRESS NOTES
HISTORY OF PRESENT ILLNESS    Marcelina Collins is a 83 y.o. female.  HPI  Chief Complaint   Patient presents with    Medicare AWV     See above. Stable on current regimen.  Followed by cardiology for PAF. Has been stable no symptoms.  History of hypokalemia. Last lab was normal. Need to keep it closely monitored. When low it has lead to hospitalizations in the past.  No side effects from BP medication. No headache or vision change. Denies chest pain or SOB. No swelling.  Tolerating statin well.      Current Outpatient Medications on File Prior to Visit   Medication Sig Dispense Refill    sotalol (BETAPACE) 80 MG tablet TAKE 1 TABLET BY MOUTH EVERY DAY 90 tablet 2    apixaban (ELIQUIS) 5 MG TABS tablet Take 1 tablet by mouth in the morning and 1 tablet in the evening. TAKE 1 TABLET TWICE DAILY. 180 tablet 3    losartan (COZAAR) 25 MG tablet Take 1 tablet by mouth daily 90 tablet 3    furosemide (LASIX) 40 MG tablet 1/2-1 po qam  prn swelling 60 tablet 3    aspirin 81 MG EC tablet Take 1 tablet by mouth daily      atorvastatin (LIPITOR) 40 MG tablet Take 1 tablet by mouth daily 90 tablet 1    fluticasone (FLONASE) 50 MCG/ACT nasal spray 1 spray by Each Nostril route daily 32 g 3    albuterol sulfate HFA (VENTOLIN HFA) 108 (90 Base) MCG/ACT inhaler Inhale 2 puffs into the lungs 4 times daily as needed for Wheezing 54 g 1    mometasone (ELOCON) 0.1 % lotion       docusate (COLACE, DULCOLAX) 100 MG CAPS Take 100 mg by mouth nightly as needed (constipation)      loratadine (CLARITIN) 10 MG tablet Take 1 tablet by mouth daily as needed (allergies)       No current facility-administered medications on file prior to visit.     Past Medical History:   Diagnosis Date    Adverse effect of anesthesia     delayed awakening x1- with heart surgery    DARRELL (acute kidney injury) (HCC) 06/08/2013    pt reports after TIA    Allergic rhinitis     has inhaler daily (pt denies asthma)    Anemia, unspecified 08/14/2015    no recent infusions

## 2024-04-10 NOTE — PROGRESS NOTES
05/14/18 0900   Oxygen Therapy   O2 Sat (%) 96 %   Pulse via Oximetry 70 beats per minute   O2 Device Room air   Pre-Treatment   Cough Productive; Congested   Breath Sounds Bilateral Rhonchi  (CLEARS AFTER COUGH)   Incentive Spirometry Treatment   Actual Volume (ml) 1500 ml   Sleep Disorder Breathing Screen:     Patient reports symptoms of:   · Snoring   · EAST 4  · SACS 25  · A-FIB  · STOP-BANG ___5_  · HTN  · Height_5'7\"____ Weight_185 lbs____     Refer patient for sleep study based on above assessment. Initial respiratory Assessment completed with pt. Pt was interviewed and evaluated in [facility] prior to surgery. Patient ID:  Darleen Height  [id number]  64 y.o.  1940  Surgeon: Dr. Shayy Hodge  Date of Surgery: 6/6/2018  Procedure: Total Right Knee Arthroplasty  Primary Care Physician: Bob Mccoy [phone]  Specialists: [facility] - LAST APPOINTMENT January 2018- SEES FOR PULMONARY NODULE                                 Pt instructed in the use of Incentive Spirometry. Pt instructed to bring Incentive Spirometer back on date of surgery & to start using Is upon return to pt room. Pt taught proper cough technique    History of smoking:   NONE                                                       Quit date:            Secondhand smoke:      Past procedures with Oxygen desaturation:NONE    Past Medical History:   Diagnosis Date    Adverse effect of anesthesia     delayed awakening    NILA (acute kidney injury) ([address] 06/08/2013    pt reports after TIA    Allergic rhinitis     has inhaler daily (pt denies asthma)    Anemia, unspecified 8/14/2015    Aortic stenosis     sp AVR 2015    Atrial fibrillation ([address] 09/30/2015    s/p MAZE     Bell's palsy 2013    Blurry vision, bilateral 6/8/2013    Cardiac pacemaker     Biotronik MRI compatible (dual chamber)     Constipation     Critical aortic valve stenosis 8/14/2015    8/13/15 (Dr Arlene Maloney) 1. Left and right sided maze.  Please note that the left pulmonary veins were not done due to difficult anatomy. 2. Clipping, left atrial appendage. 3. Aortic valve replacement with a 23 mm Magna Ease Goyo-Oneill pericardial valve.  GERD (gastroesophageal reflux disease)     managed with medication    Hypertension     Hyponatremia 3/1/4283    Metabolic encephalopathy 3/7/3835    Neuropathy     Osteoarthritis     Pancreatic cyst     Paroxysmal atrial fibrillation (HCC) 8/14/2015    Pulmonary nodule     benign per pulm note (1/2018)    Sick sinus syndrome (HCC)     SOB (shortness of breath) on exertion 2015    cannot climb flight of stairs or walk to mailbox- s/p AVR and pacemaker (pt reports no problems at this time 5/2018)    Spinal stenosis, lumbar     Syncope and collapse 2015    TIA (transient ischemic attack) 2013    pt reports bells palsy started at same time                                    ENT:SINUS      PT HAS A CURRENT UPPER RESPIRATORY INFECTION AND WILL FOLLOW UP WITH PCP. PULMONARY NODULES, INFILTRATES ON CT AUGUST 2017     , CHRONIC COLLAPSE OF POSTERIOR SEGMENT OF RIGHT UPPER LOBE WHICH DR JOHNSON STATES IS OF NO CONSEQUENCE AT THIS TIME   Respiratory history:DENIES SOB                                 SOB  ON EXERTION                                    Respiratory meds:  SYMBICORT                                       FAMILY PRESENT:            SON                                                                                       PAST SLEEP STUDY:                    NO  HX OF MO:                                          NO                                     MO assessment:     HX OF A-FIB                                          SLEEPS ON SIDE                                                              PHYSICAL EXAM   Body mass index is 29.03 kg/(m^2).    Visit Vitals    /76 (BP 1 Location: Left arm, BP Patient Position: At rest;Sitting)    Pulse 70    Temp 97.5 °F (36.4 °C)    Resp 14    Ht 5' 7\" (1.702 m)    Wt 84.1 kg (185 lb 6 oz)    SpO2 97%    BMI 29.03 kg/m2     Neck circumference: 43.5     cm    Loud snoring:       NOT SURE                                  Witnessed apnea or wakening gasping or choking:,             DENIES,                                                                                                  Awakens with headaches:                                                  DENIES    Morning or daytime tiredness/ sleepiness:                                                                                                           TIRED   Dry mouth or sore throat in morning:                YES                                                                            Reyes stage:  4    SACS score:25    STOP/BAN                              CPAP:                       NONE                                         ALBUTEROL NEB Q6 PRN          SPACER         CONT SAT HS            Referrals:  SLEEP STUDY  Pt.  Phone Number:  [phone] 2 seconds or less

## 2024-04-25 ENCOUNTER — PATIENT MESSAGE (OUTPATIENT)
Dept: FAMILY MEDICINE CLINIC | Facility: CLINIC | Age: 84
End: 2024-04-25

## 2024-04-25 RX ORDER — BENZONATATE 200 MG/1
200 CAPSULE ORAL 3 TIMES DAILY PRN
Qty: 30 CAPSULE | Refills: 0 | Status: SHIPPED | OUTPATIENT
Start: 2024-04-25 | End: 2024-05-05

## 2024-04-25 NOTE — TELEPHONE ENCOUNTER
From: Marcelina Collins  To: Dr. Francisco Javier Aguilar  Sent: 4/25/2024 12:30 PM EDT  Subject: Possible allergy issues with Mom    Hi Dr. Aguilar,  Mom has been expressing some issues with coughing and runny nose/drainage. About once a week when she coughs there is some blood but not much. It does have mucous but it’s not yellow or green. It’s usually pasty. No fever. Just constant coughing. She’s on a regular allergy medicine. Has not been since September due to concerns that what she was on affected her blood pressure. She has been taking coricidin but not all the time. She is nearly out of the cough pearls you prescribed to her. Blood pressure is fine and no other issues as far as we can tell. She has started taking potassium supplement as you suggested.   Just wanted to run all this by you. Have a good day.  -Rip

## 2024-04-30 ENCOUNTER — PATIENT MESSAGE (OUTPATIENT)
Age: 84
End: 2024-04-30

## 2024-04-30 ENCOUNTER — OFFICE VISIT (OUTPATIENT)
Dept: NEUROLOGY | Age: 84
End: 2024-04-30
Payer: MEDICARE

## 2024-04-30 VITALS
HEIGHT: 67 IN | WEIGHT: 156 LBS | OXYGEN SATURATION: 98 % | SYSTOLIC BLOOD PRESSURE: 180 MMHG | DIASTOLIC BLOOD PRESSURE: 91 MMHG | BODY MASS INDEX: 24.48 KG/M2 | HEART RATE: 70 BPM

## 2024-04-30 DIAGNOSIS — I65.23 BILATERAL CAROTID ARTERY STENOSIS: Chronic | ICD-10-CM

## 2024-04-30 DIAGNOSIS — I48.0 PAROXYSMAL ATRIAL FIBRILLATION (HCC): Chronic | ICD-10-CM

## 2024-04-30 DIAGNOSIS — Z95.0 CARDIAC PACEMAKER: Chronic | ICD-10-CM

## 2024-04-30 DIAGNOSIS — R40.4 TRANSIENT ALTERATION OF AWARENESS: Primary | ICD-10-CM

## 2024-04-30 DIAGNOSIS — E87.1 HYPONATREMIA: ICD-10-CM

## 2024-04-30 PROCEDURE — 99205 OFFICE O/P NEW HI 60 MIN: CPT | Performed by: PSYCHIATRY & NEUROLOGY

## 2024-04-30 PROCEDURE — 3077F SYST BP >= 140 MM HG: CPT | Performed by: PSYCHIATRY & NEUROLOGY

## 2024-04-30 PROCEDURE — 3080F DIAST BP >= 90 MM HG: CPT | Performed by: PSYCHIATRY & NEUROLOGY

## 2024-04-30 PROCEDURE — 1123F ACP DISCUSS/DSCN MKR DOCD: CPT | Performed by: PSYCHIATRY & NEUROLOGY

## 2024-04-30 ASSESSMENT — PATIENT HEALTH QUESTIONNAIRE - PHQ9
2. FEELING DOWN, DEPRESSED OR HOPELESS: NOT AT ALL
SUM OF ALL RESPONSES TO PHQ QUESTIONS 1-9: 0
1. LITTLE INTEREST OR PLEASURE IN DOING THINGS: NOT AT ALL
SUM OF ALL RESPONSES TO PHQ QUESTIONS 1-9: 0
SUM OF ALL RESPONSES TO PHQ9 QUESTIONS 1 & 2: 0

## 2024-04-30 ASSESSMENT — ENCOUNTER SYMPTOMS
SORE THROAT: 0
SHORTNESS OF BREATH: 0
TROUBLE SWALLOWING: 0
EYE PAIN: 0
COUGH: 0
ABDOMINAL PAIN: 0

## 2024-04-30 NOTE — TELEPHONE ENCOUNTER
Patient may stop aspirin indefinitely.  She must stay on Eliquis 5 mg twice daily.  Patient is on no medications that should lower her sodium.  That is if she has stopped using furosemide as I directed her during her last office visit.

## 2024-04-30 NOTE — PROGRESS NOTES
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 Sherry GUTHRIE    COLONOSCOPY N/A 3/12/2021    COLONOSCOPY/ 30 performed by Layla Powell MD at [facility]    ENDOSCOPY, COLON, DIAGNOSTIC      HYSTERECTOMY (CERVIX STATUS UNKNOWN)  1994    PACEMAKER  9/24/14    Biotronik MRI compatible (dual chamber)     TONSILLECTOMY  1951    TOTAL KNEE ARTHROPLASTY Right 06/06/2018    UPPER GASTROINTESTINAL ENDOSCOPY      UPPER GASTROINTESTINAL ENDOSCOPY N/A 7/22/2022    EGD DILATION franks. performed by Hammad Vaughan MD at [facility] ENDOSCOPY    UPPER GASTROINTESTINAL ENDOSCOPY N/A 9/12/2022    EGD BIOPSY performed by Hammad Vaughan MD at [facility] ENDOSCOPY       FAMILY HISTORY:  Family History   Problem Relation Age of Onset    Heart Disease Sister     Hypertension Sister     Diabetes Sister     Diabetes Brother     Cancer Brother         pancreatic cancer    Heart Disease Father     Diabetes Son     Diabetes Mother     Heart Disease

## 2024-05-06 DIAGNOSIS — E87.1 HYPONATREMIA: Primary | ICD-10-CM

## 2024-05-07 ENCOUNTER — NURSE ONLY (OUTPATIENT)
Dept: FAMILY MEDICINE CLINIC | Facility: CLINIC | Age: 84
End: 2024-05-07

## 2024-05-07 DIAGNOSIS — E87.1 HYPONATREMIA: ICD-10-CM

## 2024-05-07 LAB
ANION GAP SERPL CALC-SCNC: 7 MMOL/L (ref 9–18)
BUN SERPL-MCNC: 11 MG/DL (ref 8–23)
CALCIUM SERPL-MCNC: 9.2 MG/DL (ref 8.8–10.2)
CHLORIDE SERPL-SCNC: 96 MMOL/L (ref 98–107)
CO2 SERPL-SCNC: 24 MMOL/L (ref 20–28)
CREAT SERPL-MCNC: 0.62 MG/DL (ref 0.6–1.1)
GLUCOSE SERPL-MCNC: 106 MG/DL (ref 70–99)
POTASSIUM SERPL-SCNC: 4.6 MMOL/L (ref 3.5–5.1)
SODIUM SERPL-SCNC: 127 MMOL/L (ref 136–145)

## 2024-05-15 ENCOUNTER — PATIENT MESSAGE (OUTPATIENT)
Dept: FAMILY MEDICINE CLINIC | Facility: CLINIC | Age: 84
End: 2024-05-15

## 2024-05-15 RX ORDER — BENZONATATE 100 MG/1
100-200 CAPSULE ORAL 3 TIMES DAILY PRN
Qty: 60 CAPSULE | Refills: 3 | Status: SHIPPED | OUTPATIENT
Start: 2024-05-15 | End: 2024-05-22

## 2024-05-28 ENCOUNTER — OFFICE VISIT (OUTPATIENT)
Dept: FAMILY MEDICINE CLINIC | Facility: CLINIC | Age: 84
End: 2024-05-28
Payer: MEDICARE

## 2024-05-28 VITALS
OXYGEN SATURATION: 96 % | HEART RATE: 70 BPM | DIASTOLIC BLOOD PRESSURE: 88 MMHG | SYSTOLIC BLOOD PRESSURE: 140 MMHG | HEIGHT: 67 IN | TEMPERATURE: 98 F | RESPIRATION RATE: 12 BRPM | WEIGHT: 152 LBS | BODY MASS INDEX: 23.86 KG/M2

## 2024-05-28 DIAGNOSIS — B96.89 ACUTE BACTERIAL SINUSITIS: Primary | ICD-10-CM

## 2024-05-28 DIAGNOSIS — J01.90 ACUTE BACTERIAL SINUSITIS: Primary | ICD-10-CM

## 2024-05-28 PROCEDURE — 1123F ACP DISCUSS/DSCN MKR DOCD: CPT | Performed by: NURSE PRACTITIONER

## 2024-05-28 PROCEDURE — 99213 OFFICE O/P EST LOW 20 MIN: CPT | Performed by: NURSE PRACTITIONER

## 2024-05-28 PROCEDURE — 96372 THER/PROPH/DIAG INJ SC/IM: CPT | Performed by: NURSE PRACTITIONER

## 2024-05-28 RX ORDER — METHYLPREDNISOLONE SODIUM SUCCINATE 40 MG/ML
40 INJECTION, POWDER, LYOPHILIZED, FOR SOLUTION INTRAMUSCULAR; INTRAVENOUS ONCE
Status: COMPLETED | OUTPATIENT
Start: 2024-05-28 | End: 2024-05-28

## 2024-05-28 RX ORDER — AZITHROMYCIN 250 MG/1
TABLET, FILM COATED ORAL
Qty: 6 TABLET | Refills: 0 | Status: SHIPPED | OUTPATIENT
Start: 2024-05-28 | End: 2024-06-07

## 2024-05-28 RX ORDER — ATORVASTATIN CALCIUM 40 MG/1
40 TABLET, FILM COATED ORAL DAILY
COMMUNITY
Start: 2023-09-25 | End: 2024-09-19

## 2024-05-28 RX ADMIN — METHYLPREDNISOLONE SODIUM SUCCINATE 40 MG: 40 INJECTION, POWDER, LYOPHILIZED, FOR SOLUTION INTRAMUSCULAR; INTRAVENOUS at 11:11

## 2024-05-28 SDOH — ECONOMIC STABILITY: FOOD INSECURITY: WITHIN THE PAST 12 MONTHS, THE FOOD YOU BOUGHT JUST DIDN'T LAST AND YOU DIDN'T HAVE MONEY TO GET MORE.: NEVER TRUE

## 2024-05-28 SDOH — ECONOMIC STABILITY: FOOD INSECURITY: WITHIN THE PAST 12 MONTHS, YOU WORRIED THAT YOUR FOOD WOULD RUN OUT BEFORE YOU GOT MONEY TO BUY MORE.: NEVER TRUE

## 2024-05-28 SDOH — ECONOMIC STABILITY: INCOME INSECURITY: HOW HARD IS IT FOR YOU TO PAY FOR THE VERY BASICS LIKE FOOD, HOUSING, MEDICAL CARE, AND HEATING?: NOT HARD AT ALL

## 2024-05-28 ASSESSMENT — ENCOUNTER SYMPTOMS
EYE DISCHARGE: 0
TROUBLE SWALLOWING: 0
SORE THROAT: 1
WHEEZING: 0
COLOR CHANGE: 0
SINUS PAIN: 1
EYE ITCHING: 0
SHORTNESS OF BREATH: 1
ABDOMINAL PAIN: 0
RHINORRHEA: 1
VOMITING: 0
EYE REDNESS: 0
SINUS PRESSURE: 1
COUGH: 1
GASTROINTESTINAL NEGATIVE: 1
NAUSEA: 0
DIARRHEA: 0
CHEST TIGHTNESS: 0
CONSTIPATION: 0

## 2024-05-28 NOTE — PROGRESS NOTES
Subjective  Marcelina Collins is a 84 y.o. y.o. female who presents with   Chief Complaint   Patient presents with    Head Congestion     Hurts around eyes, coughing stuff up, SOB, onset 1 month, got worse over the weekend        History of Present Illness  Presents for congestion for about 1 month. Symptoms worsened over the weekend. Did not do home covid test. She is now having sinus pain in her forehead and cheeks. Has been coughing up yellow-green mucus day and night for past several days. Occasionally cough is hacking cough. Frequent yellow-green nasal drainage. Having a mild sore throat with post-nasal drip. Has noticed fatigue since this weekend with some dyspnea only on exertion. Denies wheezing or CP. Has had some chest tightness. Has not used inhaler since she has not been wheezing. Denies fever or chills. No body aches. No ear pain or drainage. Has been taking tessalon pearls for cough with good relief. Has been using flonase and Coricidin HBP with minimal relief of congestion. Reports has done well with Zpak in the past.         ROS  Review of Systems   Constitutional:  Positive for fatigue. Negative for appetite change, chills, diaphoresis and fever.   HENT:  Positive for congestion, hearing loss, postnasal drip, rhinorrhea, sinus pressure, sinus pain and sore throat. Negative for ear discharge, ear pain, tinnitus and trouble swallowing.    Eyes:  Negative for discharge, redness, itching and visual disturbance.   Respiratory:  Positive for cough and shortness of breath. Negative for chest tightness and wheezing.    Cardiovascular:  Negative for chest pain, palpitations and leg swelling.   Gastrointestinal: Negative.  Negative for abdominal pain, constipation, diarrhea, nausea and vomiting.   Endocrine: Negative.  Negative for cold intolerance and heat intolerance.   Genitourinary: Negative.    Musculoskeletal: Negative.  Negative for myalgias.   Skin:  Negative for color change, pallor and rash.

## 2024-06-03 ENCOUNTER — PATIENT MESSAGE (OUTPATIENT)
Dept: FAMILY MEDICINE CLINIC | Facility: CLINIC | Age: 84
End: 2024-06-03

## 2024-06-03 DIAGNOSIS — L98.9 SKIN LESION: Primary | ICD-10-CM

## 2024-06-03 NOTE — TELEPHONE ENCOUNTER
From: Marcelina Collins  To: Dr. Francisco Javier Aguilar  Sent: 6/3/2024 10:54 AM EDT  Subject: Dermatologist referral needed    Van Doctor Lauren,  Mom wanted me to message you about getting a referral to a dermatologist. When we were in to see Nicky Peters last Monday, she noticed a mole that mom has scraped some while shaving and suggested she see a dermatologist. She’s also got an on going issue with dry skin that she has seen a dermatologist in the past for. It’s been a while since she’s been and wants to go back but doesn’t remember who she saw the last time. Can you give us a hand?    Thanks for all you do,  Rip

## 2024-06-14 DIAGNOSIS — E87.1 HYPONATREMIA: Primary | ICD-10-CM

## 2024-06-17 ENCOUNTER — NURSE ONLY (OUTPATIENT)
Dept: FAMILY MEDICINE CLINIC | Facility: CLINIC | Age: 84
End: 2024-06-17

## 2024-06-17 DIAGNOSIS — E87.1 HYPONATREMIA: ICD-10-CM

## 2024-06-17 LAB
ANION GAP SERPL CALC-SCNC: 9 MMOL/L (ref 9–18)
BUN SERPL-MCNC: 11 MG/DL (ref 8–23)
CALCIUM SERPL-MCNC: 8.6 MG/DL (ref 8.8–10.2)
CHLORIDE SERPL-SCNC: 98 MMOL/L (ref 98–107)
CO2 SERPL-SCNC: 22 MMOL/L (ref 20–28)
CREAT SERPL-MCNC: 0.65 MG/DL (ref 0.6–1.1)
GLUCOSE SERPL-MCNC: 95 MG/DL (ref 70–99)
POTASSIUM SERPL-SCNC: 4.6 MMOL/L (ref 3.5–5.1)
SODIUM SERPL-SCNC: 129 MMOL/L (ref 136–145)

## 2024-07-01 ENCOUNTER — PATIENT MESSAGE (OUTPATIENT)
Dept: FAMILY MEDICINE CLINIC | Facility: CLINIC | Age: 84
End: 2024-07-01

## 2024-07-01 NOTE — TELEPHONE ENCOUNTER
From: Marcelina Collins  To: Dr. Francisco Javier Aguilar  Sent: 7/1/2024 3:26 PM EDT  Subject: Question regarding a wellness check visit for mom    Hi Dr. Aguilar,  I wanted to get in touch with you about possibly scheduling a wellness check visit for Mom.   She is doing well.   We are happy regarding the improvement shown in the results from her recent blood work and would like to stay on top of things as much as possible and continue to improve her numbers.   She has also had her appointment with the dermatologist today and we are awaiting the results from a biopsy they did on a place on her neck.  As of today she does not have another appointment with you scheduled. Would it be possible for you to send in an order so we can call and schedule a wellness visit?  Thanks for all you do.    Rip

## 2024-07-11 PROCEDURE — 93294 REM INTERROG EVL PM/LDLS PM: CPT | Performed by: INTERNAL MEDICINE

## 2024-07-11 PROCEDURE — 93296 REM INTERROG EVL PM/IDS: CPT | Performed by: INTERNAL MEDICINE

## 2024-07-25 ENCOUNTER — PATIENT MESSAGE (OUTPATIENT)
Dept: FAMILY MEDICINE CLINIC | Facility: CLINIC | Age: 84
End: 2024-07-25

## 2024-07-25 RX ORDER — BROMPHENIRAMINE MALEATE, PSEUDOEPHEDRINE HYDROCHLORIDE, AND DEXTROMETHORPHAN HYDROBROMIDE 2; 30; 10 MG/5ML; MG/5ML; MG/5ML
5 SYRUP ORAL 4 TIMES DAILY PRN
Qty: 180 ML | Refills: 0 | Status: SHIPPED | OUTPATIENT
Start: 2024-07-25

## 2024-07-25 NOTE — TELEPHONE ENCOUNTER
From: Marcelina Collins  To: Dr. Francisco Javier Aguilar  Sent: 7/25/2024 1:12 PM EDT  Subject: Mom’s coughing    Hey Doctor Lauren,  Mom still has her nagging cough. She still takes the cough pearls every so often. Seems to happen most when she lies down. But also when she’s sitting up.   Almost 2 hours in duration yesterday and has used inhaler. Doesn’t feel bad or anything just a nagging cough.  We have an appointment to see you August 9. If there is anything we can do between now and then please let us know. This has been going on a long time.  Thanks for all you do,  Rip

## 2024-08-02 ENCOUNTER — OFFICE VISIT (OUTPATIENT)
Age: 84
End: 2024-08-02

## 2024-08-02 ENCOUNTER — NURSE ONLY (OUTPATIENT)
Age: 84
End: 2024-08-02

## 2024-08-02 VITALS
SYSTOLIC BLOOD PRESSURE: 150 MMHG | WEIGHT: 148 LBS | HEIGHT: 67 IN | DIASTOLIC BLOOD PRESSURE: 80 MMHG | HEART RATE: 78 BPM | BODY MASS INDEX: 23.23 KG/M2

## 2024-08-02 DIAGNOSIS — I48.0 PAROXYSMAL ATRIAL FIBRILLATION (HCC): Chronic | ICD-10-CM

## 2024-08-02 DIAGNOSIS — I10 ESSENTIAL HYPERTENSION: Chronic | ICD-10-CM

## 2024-08-02 DIAGNOSIS — I25.118 CORONARY ARTERY DISEASE OF NATIVE ARTERY OF NATIVE HEART WITH STABLE ANGINA PECTORIS (HCC): ICD-10-CM

## 2024-08-02 DIAGNOSIS — Z95.0 CARDIAC PACEMAKER: Chronic | ICD-10-CM

## 2024-08-02 DIAGNOSIS — I65.23 BILATERAL CAROTID ARTERY STENOSIS: Chronic | ICD-10-CM

## 2024-08-02 DIAGNOSIS — Z95.2 S/P AVR: Primary | Chronic | ICD-10-CM

## 2024-08-02 DIAGNOSIS — I50.32 CHRONIC DIASTOLIC CONGESTIVE HEART FAILURE (HCC): Chronic | ICD-10-CM

## 2024-08-02 DIAGNOSIS — I49.5 SSS (SICK SINUS SYNDROME) (HCC): Primary | ICD-10-CM

## 2024-08-02 ASSESSMENT — ENCOUNTER SYMPTOMS
SHORTNESS OF BREATH: 1
COUGH: 0
BACK PAIN: 0
ABDOMINAL PAIN: 0

## 2024-08-02 NOTE — PROGRESS NOTES
[facility] CARDIOLOGY  [address] [place] [address], [address]  [place] [address]      24      NAME:  Marcelina Collins  : 1940  MRN: [id number]      SUBJECTIVE:   Marcelina Collins is a 84 y.o. female seen for a follow up visit regarding the following:     Chief Complaint   Patient presents with    Shortness of Breath    Hypertension    Coronary Artery Disease       HPI:   84 y.o. fermale s/p AVR and MAZE for aortic stenosis and atrial fibrillation 2015.  Minimal atrial fibrillation on PM. Normal LVEF and AVR by echo 2019 and normal PM function.  No AF on PM.    Patient was admitted 10/2022 with UTI, pain with hypotension/hyponatremia likely due to volume depletion.  Patient was noted to be significantly orthostatic upon standing.  She appears to have developed some component of dysautonomia.  This has improved.               Past Medical History, Past Surgical History, Family history, Social History, and Medications were all reviewed with the patient today and updated as necessary.     Current Outpatient Medications   Medication Sig Dispense Refill    brompheniramine-pseudoephedrine-DM 2-30-10 MG/5ML syrup Take 5 mLs by mouth 4 times daily as needed for Cough 180 mL 0    atorvastatin (LIPITOR) 40 MG tablet Take 1 tablet by mouth daily      sotalol (BETAPACE) 80 MG tablet TAKE 1 TABLET BY MOUTH EVERY DAY 90 tablet 2    apixaban (ELIQUIS) 5 MG TABS tablet Take 1 tablet by mouth in the morning and 1 tablet in the evening. TAKE 1 TABLET TWICE DAILY. 180 tablet 3    losartan (COZAAR) 25 MG tablet Take 1 tablet by mouth daily 90 tablet 3    furosemide (LASIX) 40 MG tablet 1/2-1 po qam  prn swelling (Patient taking differently: as needed 1/2-1 po qam  prn swelling) 60 tablet 3    fluticasone (FLONASE) 50 MCG/ACT nasal spray 1 spray by Each Nostril route daily 32 g 3    albuterol sulfate HFA (VENTOLIN HFA) 108 (90 Base) MCG/ACT inhaler Inhale 2 puffs into the lungs 4 times daily as needed for Wheezing 54 g 1

## 2024-08-09 ENCOUNTER — OFFICE VISIT (OUTPATIENT)
Dept: FAMILY MEDICINE CLINIC | Facility: CLINIC | Age: 84
End: 2024-08-09
Payer: MEDICARE

## 2024-08-09 VITALS
BODY MASS INDEX: 23.34 KG/M2 | WEIGHT: 149 LBS | HEART RATE: 78 BPM | SYSTOLIC BLOOD PRESSURE: 122 MMHG | OXYGEN SATURATION: 99 % | TEMPERATURE: 97.5 F | DIASTOLIC BLOOD PRESSURE: 78 MMHG

## 2024-08-09 DIAGNOSIS — K21.9 GASTROESOPHAGEAL REFLUX DISEASE WITHOUT ESOPHAGITIS: ICD-10-CM

## 2024-08-09 DIAGNOSIS — E87.1 HYPONATREMIA: Primary | ICD-10-CM

## 2024-08-09 DIAGNOSIS — R05.3 CHRONIC COUGH: ICD-10-CM

## 2024-08-09 DIAGNOSIS — I10 PRIMARY HYPERTENSION: ICD-10-CM

## 2024-08-09 LAB
ANION GAP SERPL CALC-SCNC: 15 MMOL/L (ref 9–18)
BUN SERPL-MCNC: 11 MG/DL (ref 8–23)
CHLORIDE SERPL-SCNC: 93 MMOL/L (ref 98–107)
CO2 SERPL-SCNC: 20 MMOL/L (ref 20–28)
CREAT SERPL-MCNC: 0.73 MG/DL (ref 0.6–1.1)
GLUCOSE SERPL-MCNC: 102 MG/DL (ref 70–99)
POTASSIUM SERPL-SCNC: 5 MMOL/L (ref 3.5–5.1)
SODIUM SERPL-SCNC: 128 MMOL/L (ref 136–145)

## 2024-08-09 PROCEDURE — 1123F ACP DISCUSS/DSCN MKR DOCD: CPT | Performed by: FAMILY MEDICINE

## 2024-08-09 PROCEDURE — 99214 OFFICE O/P EST MOD 30 MIN: CPT | Performed by: FAMILY MEDICINE

## 2024-08-09 RX ORDER — FLUTICASONE PROPIONATE AND SALMETEROL 250; 50 UG/1; UG/1
1 POWDER RESPIRATORY (INHALATION) EVERY 12 HOURS
Qty: 60 EACH | Refills: 3 | Status: SHIPPED | OUTPATIENT
Start: 2024-08-09

## 2024-08-09 ASSESSMENT — ENCOUNTER SYMPTOMS
SORE THROAT: 0
SHORTNESS OF BREATH: 0
RHINORRHEA: 1
TROUBLE SWALLOWING: 0
WHEEZING: 0
APNEA: 0
CHOKING: 0
COUGH: 1
CHEST TIGHTNESS: 0
EYES NEGATIVE: 1
GASTROINTESTINAL NEGATIVE: 1

## 2024-08-09 NOTE — PROGRESS NOTES
HISTORY OF PRESENT ILLNESS    Marcelina Collins is a 84 y.o. female.  HPI  Chief Complaint   Patient presents with    Follow-up     See above. Follow up on HTN; chronic cough and hyponatremia.  Feeling well except for continued cough. Has had for almost 10 years but is worse. Feels like she has constant drainage in back of throat. No pain. Cough is usually dry. Has not seen pulmonary since lung nodule was found to be stable. States albuterol inhaler helps but does not use it frequently.  Followed by cardiology for PAF; stable. Cardiologist recommended ENT referral to evaluate further.  History of GERD and esophageal stricture. Symptoms stable but needs referral to new GI.  No side effects from BP medication. No headache or vision change. Denies chest pain or SOB. No swelling.      Current Outpatient Medications on File Prior to Visit   Medication Sig Dispense Refill    atorvastatin (LIPITOR) 40 MG tablet Take 1 tablet by mouth daily      sotalol (BETAPACE) 80 MG tablet TAKE 1 TABLET BY MOUTH EVERY DAY 90 tablet 2    apixaban (ELIQUIS) 5 MG TABS tablet Take 1 tablet by mouth in the morning and 1 tablet in the evening. TAKE 1 TABLET TWICE DAILY. 180 tablet 3    losartan (COZAAR) 25 MG tablet Take 1 tablet by mouth daily 90 tablet 3    furosemide (LASIX) 40 MG tablet 1/2-1 po qam  prn swelling (Patient taking differently: as needed 1/2-1 po qam  prn swelling) 60 tablet 3    fluticasone (FLONASE) 50 MCG/ACT nasal spray 1 spray by Each Nostril route daily 32 g 3    albuterol sulfate HFA (VENTOLIN HFA) 108 (90 Base) MCG/ACT inhaler Inhale 2 puffs into the lungs 4 times daily as needed for Wheezing 54 g 1    mometasone (ELOCON) 0.1 % lotion       docusate (COLACE, DULCOLAX) 100 MG CAPS Take 100 mg by mouth nightly as needed (constipation)       No current facility-administered medications on file prior to visit.           Past Medical History:   Diagnosis Date    Adverse effect of anesthesia     delayed awakening x1- with

## 2024-08-28 ENCOUNTER — APPOINTMENT (OUTPATIENT)
Dept: GENERAL RADIOLOGY | Age: 84
End: 2024-08-28
Payer: MEDICARE

## 2024-08-28 ENCOUNTER — HOSPITAL ENCOUNTER (EMERGENCY)
Age: 84
Discharge: HOME | End: 2024-08-28
Attending: EMERGENCY MEDICINE
Payer: MEDICARE

## 2024-08-28 VITALS
BODY MASS INDEX: 23.39 KG/M2 | OXYGEN SATURATION: 100 % | DIASTOLIC BLOOD PRESSURE: 84 MMHG | WEIGHT: 149 LBS | SYSTOLIC BLOOD PRESSURE: 163 MMHG | HEIGHT: 67 IN | RESPIRATION RATE: 18 BRPM | TEMPERATURE: 98 F | HEART RATE: 79 BPM

## 2024-08-28 DIAGNOSIS — H53.8 BLURRY VISION, BILATERAL: Primary | ICD-10-CM

## 2024-08-28 LAB
ALBUMIN SERPL-MCNC: 3.4 G/DL (ref 3.2–4.6)
ALBUMIN/GLOB SERPL: 0.8 (ref 1–1.9)
ALP SERPL-CCNC: 230 U/L (ref 35–104)
ALT SERPL-CCNC: 33 U/L (ref 12–65)
ANION GAP SERPL CALC-SCNC: 8 MMOL/L (ref 9–18)
APPEARANCE UR: CLEAR
AST SERPL-CCNC: 57 U/L (ref 15–37)
BACTERIA URNS QL MICRO: ABNORMAL /HPF
BASOPHILS # BLD: 0.1 K/UL (ref 0–0.2)
BASOPHILS NFR BLD: 1 % (ref 0–2)
BILIRUB SERPL-MCNC: 0.7 MG/DL (ref 0–1.2)
BILIRUB UR QL: NEGATIVE
BUN SERPL-MCNC: 17 MG/DL (ref 8–23)
CALCIUM SERPL-MCNC: 9.1 MG/DL (ref 8.8–10.2)
CASTS URNS QL MICRO: ABNORMAL /LPF
CHLORIDE SERPL-SCNC: 93 MMOL/L (ref 98–107)
CO2 SERPL-SCNC: 26 MMOL/L (ref 20–28)
COLOR UR: ABNORMAL
CREAT SERPL-MCNC: 0.75 MG/DL (ref 0.6–1.1)
CRYSTALS URNS QL MICRO: 0 /LPF
DIFFERENTIAL METHOD BLD: ABNORMAL
EKG ATRIAL RATE: 75 BPM
EKG DIAGNOSIS: NORMAL
EKG P-R INTERVAL: 138 MS
EKG Q-T INTERVAL: 452 MS
EKG QRS DURATION: 160 MS
EKG QTC CALCULATION (BAZETT): 504 MS
EKG R AXIS: -78 DEGREES
EKG T AXIS: 89 DEGREES
EKG VENTRICULAR RATE: 75 BPM
EOSINOPHIL # BLD: 0.2 K/UL (ref 0–0.8)
EOSINOPHIL NFR BLD: 1 % (ref 0.5–7.8)
EPI CELLS #/AREA URNS HPF: ABNORMAL /HPF
ERYTHROCYTE [DISTWIDTH] IN BLOOD BY AUTOMATED COUNT: 14.7 % (ref 11.9–14.6)
GLOBULIN SER CALC-MCNC: 4.1 G/DL (ref 2.3–3.5)
GLUCOSE SERPL-MCNC: 150 MG/DL (ref 70–99)
GLUCOSE UR STRIP.AUTO-MCNC: NEGATIVE MG/DL
HCT VFR BLD AUTO: 36.1 % (ref 35.8–46.3)
HGB BLD-MCNC: 11.9 G/DL (ref 11.7–15.4)
HGB UR QL STRIP: NEGATIVE
IMM GRANULOCYTES # BLD AUTO: 0.1 K/UL (ref 0–0.5)
IMM GRANULOCYTES NFR BLD AUTO: 0 % (ref 0–5)
KETONES UR QL STRIP.AUTO: NEGATIVE MG/DL
LEUKOCYTE ESTERASE UR QL STRIP.AUTO: NEGATIVE
LYMPHOCYTES # BLD: 1.5 K/UL (ref 0.5–4.6)
LYMPHOCYTES NFR BLD: 13 % (ref 13–44)
MAGNESIUM SERPL-MCNC: 2 MG/DL (ref 1.8–2.4)
MCH RBC QN AUTO: 27.5 PG (ref 26.1–32.9)
MCHC RBC AUTO-ENTMCNC: 33 G/DL (ref 31.4–35)
MCV RBC AUTO: 83.6 FL (ref 82–102)
MONOCYTES # BLD: 1.4 K/UL (ref 0.1–1.3)
MONOCYTES NFR BLD: 13 % (ref 4–12)
MUCOUS THREADS URNS QL MICRO: ABNORMAL /LPF
NEUTS SEG # BLD: 8.1 K/UL (ref 1.7–8.2)
NEUTS SEG NFR BLD: 72 % (ref 43–78)
NITRITE UR QL STRIP.AUTO: NEGATIVE
NRBC # BLD: 0 K/UL (ref 0–0.2)
OTHER OBSERVATIONS: ABNORMAL
PH UR STRIP: 7 (ref 5–9)
PLATELET # BLD AUTO: 303 K/UL (ref 150–450)
PMV BLD AUTO: 9.4 FL (ref 9.4–12.3)
POTASSIUM SERPL-SCNC: 4.4 MMOL/L (ref 3.5–5.1)
PROT SERPL-MCNC: 7.5 G/DL (ref 6.3–8.2)
PROT UR STRIP-MCNC: 30 MG/DL
RBC # BLD AUTO: 4.32 M/UL (ref 4.05–5.2)
RBC #/AREA URNS HPF: ABNORMAL /HPF
SODIUM SERPL-SCNC: 127 MMOL/L (ref 136–145)
SP GR UR REFRACTOMETRY: 1.02 (ref 1–1.02)
URINE CULTURE IF INDICATED: ABNORMAL
UROBILINOGEN UR QL STRIP.AUTO: 1 EU/DL (ref 0.2–1)
WBC # BLD AUTO: 11.3 K/UL (ref 4.3–11.1)
WBC URNS QL MICRO: ABNORMAL /HPF

## 2024-08-28 PROCEDURE — 72040 X-RAY EXAM NECK SPINE 2-3 VW: CPT

## 2024-08-28 PROCEDURE — 85025 COMPLETE CBC W/AUTO DIFF WBC: CPT

## 2024-08-28 PROCEDURE — 93010 ELECTROCARDIOGRAM REPORT: CPT | Performed by: INTERNAL MEDICINE

## 2024-08-28 PROCEDURE — 96360 HYDRATION IV INFUSION INIT: CPT

## 2024-08-28 PROCEDURE — 2580000003 HC RX 258: Performed by: EMERGENCY MEDICINE

## 2024-08-28 PROCEDURE — 81001 URINALYSIS AUTO W/SCOPE: CPT

## 2024-08-28 PROCEDURE — 83735 ASSAY OF MAGNESIUM: CPT

## 2024-08-28 PROCEDURE — 80053 COMPREHEN METABOLIC PANEL: CPT

## 2024-08-28 PROCEDURE — 93005 ELECTROCARDIOGRAM TRACING: CPT | Performed by: EMERGENCY MEDICINE

## 2024-08-28 PROCEDURE — 99285 EMERGENCY DEPT VISIT HI MDM: CPT

## 2024-08-28 RX ORDER — 0.9 % SODIUM CHLORIDE 0.9 %
1000 INTRAVENOUS SOLUTION INTRAVENOUS ONCE
Status: COMPLETED | OUTPATIENT
Start: 2024-08-28 | End: 2024-08-28

## 2024-08-28 RX ADMIN — SODIUM CHLORIDE 1000 ML: 9 INJECTION, SOLUTION INTRAVENOUS at 15:13

## 2024-08-28 ASSESSMENT — PAIN - FUNCTIONAL ASSESSMENT: PAIN_FUNCTIONAL_ASSESSMENT: 0-10

## 2024-08-28 NOTE — ED PROVIDER NOTES
History     Socioeconomic History    Marital status:    Tobacco Use    Smoking status: Never    Smokeless tobacco: Never   Vaping Use    Vaping status: Never Used   Substance and Sexual Activity    Alcohol use: No    Drug use: No   Social History Narrative    Lives with her daughter     Social Determinants of Health     Financial Resource Strain: Low Risk  (9/5/2023)    Received from [employer]    Financial Resource Strain     Difficulty Paying Living Expenses: Not hard at all     Difficulty Paying Medical Expenses: No   Transportation Needs: No Transportation Needs (9/5/2023)    Received from [employer]    Transportation Needs     Lack of Transportation: No   Physical Activity: Unknown (4/8/2024)    Exercise Vital Sign     Days of Exercise per Week: Patient declined     Minutes of Exercise per Session: 0 min   Stress: No Stress Concern Present (9/5/2023)    Received from [employer]    Stress     Feeling of Stress : Not at all   Social Connections: Socially Integrated (9/5/2023)    Received from [employer]    Social Connections     Frequency of Communication with Friends and Family: More than three times a week     Frequency of Social Gatherings with Friends and Family: More than three times a week   Intimate Partner Violence: Not At Risk (9/5/2023)    Received from [employer]    Intimate Partner Violence     Fear of Current or Ex-Partner: No     Emotionally Abused: No     Physically Abused: No     Sexually Abused: No   Housing Stability: Not At Risk (9/5/2023)    Received from [employer]    Housing Stability     Was there a time when you did not have a steady place to sleep: No     Worried that the place you are staying is making you sick: No         Sulfa antibiotics, Amoxicillin, Amoxicillin-pot clavulanate, Clavulanic acid, Lisinopril, Lansoprazole, and Terbinafine     Previous Medications    ALBUTEROL SULFATE HFA (VENTOLIN HFA) 108 (90 BASE) MCG/ACT INHALER    Inhale 2 puffs into the  orders placed or performed during the hospital encounter of 08/28/24   XR CERVICAL SPINE (2-3 VIEWS)    Narrative    CERVICAL SPINE 2 OR 3 VIEWS    INDICATION: Bilateral neck pain.    COMPARISON: April 20, 2007.    TECHNIQUE: AP, lateral and open mouth views of the cervical spine were obtained.    FINDINGS: The examination demonstrates  normal cervical height and alignment.   No compression fractures or wedging is noted.  No soft tissue abnormalities are  seen. No aggressive osseous lesion. Moderate multilevel degenerative spondylosis  with disc height loss greatest at C4-C5 and C5-C6. The facets are normally  aligned. The dens is suboptimally visualized.      Chronic changes are present within the visualized portions of the lungs. Prior  median sternotomy. Left-sided pacemaker is present.      Impression    No fractures or prevertebral soft tissue thickening.    Moderate multilevel degenerative spondylosis.      Electronically signed by JENNY TOSCANO   CBC with Auto Differential   Result Value Ref Range    WBC 11.3 (H) 4.3 - 11.1 K/uL    RBC 4.32 4.05 - 5.2 M/uL    Hemoglobin 11.9 11.7 - 15.4 g/dL    Hematocrit 36.1 35.8 - 46.3 %    MCV 83.6 82 - 102 FL    MCH 27.5 26.1 - 32.9 PG    MCHC 33.0 31.4 - 35.0 g/dL    RDW 14.7 (H) 11.9 - 14.6 %    Platelets 303 150 - 450 K/uL    MPV 9.4 9.4 - 12.3 FL    nRBC 0.00 0.0 - 0.2 K/uL    Differential Type AUTOMATED      Neutrophils % 72 43 - 78 %    Lymphocytes % 13 13 - 44 %    Monocytes % 13 (H) 4.0 - 12.0 %    Eosinophils % 1 0.5 - 7.8 %    Basophils % 1 0.0 - 2.0 %    Immature Granulocytes % 0 0.0 - 5.0 %    Neutrophils Absolute 8.1 1.7 - 8.2 K/UL    Lymphocytes Absolute 1.5 0.5 - 4.6 K/UL    Monocytes Absolute 1.4 (H) 0.1 - 1.3 K/UL    Eosinophils Absolute 0.2 0.0 - 0.8 K/UL    Basophils Absolute 0.1 0.0 - 0.2 K/UL    Immature Granulocytes Absolute 0.1 0.0 - 0.5 K/UL   Comprehensive Metabolic Panel   Result Value Ref Range    Sodium 127 (L) 136 - 145 mmol/L    Potassium

## 2024-08-28 NOTE — ED TRIAGE NOTES
WC to triage.   Bilaterally blurry vision that started around 1030 this AM along with bilateral neck pain that started close to the same time.   Denies any numbness or tingling or blurry vision currently.   Hx of hyponatremia, CVA, and AVR

## 2024-08-28 NOTE — ED NOTES
I have reviewed discharge instructions with the patient.  The patient verbalized understanding.    Patient left ED via Discharge Method: ambulatory to Home with self.    Opportunity for questions and clarification provided.       Patient given 0 scripts.         To continue your aftercare when you leave the hospital, you may receive an automated call from our care team to check in on how you are doing.  This is a free service and part of our promise to provide the best care and service to meet your aftercare needs.” If you have questions, or wish to unsubscribe from this service please call [phone].  Thank you for Choosing our [facility] Emergency Department.        Mary Mejia LPN  08/28/24 6029

## 2024-08-29 ENCOUNTER — CARE COORDINATION (OUTPATIENT)
Dept: CARE COORDINATION | Facility: CLINIC | Age: 84
End: 2024-08-29

## 2024-08-29 ENCOUNTER — OFFICE VISIT (OUTPATIENT)
Dept: FAMILY MEDICINE CLINIC | Facility: CLINIC | Age: 84
End: 2024-08-29
Payer: MEDICARE

## 2024-08-29 VITALS
DIASTOLIC BLOOD PRESSURE: 70 MMHG | WEIGHT: 151 LBS | OXYGEN SATURATION: 100 % | TEMPERATURE: 97.2 F | HEART RATE: 77 BPM | BODY MASS INDEX: 23.65 KG/M2 | SYSTOLIC BLOOD PRESSURE: 102 MMHG

## 2024-08-29 DIAGNOSIS — R09.82 POST-NASAL DRAINAGE: ICD-10-CM

## 2024-08-29 DIAGNOSIS — N30.00 ACUTE CYSTITIS WITHOUT HEMATURIA: Primary | ICD-10-CM

## 2024-08-29 DIAGNOSIS — Z86.39 HISTORY OF DEHYDRATION: ICD-10-CM

## 2024-08-29 PROCEDURE — 1123F ACP DISCUSS/DSCN MKR DOCD: CPT | Performed by: FAMILY MEDICINE

## 2024-08-29 PROCEDURE — 99213 OFFICE O/P EST LOW 20 MIN: CPT | Performed by: FAMILY MEDICINE

## 2024-08-29 RX ORDER — CIPROFLOXACIN 250 MG/1
250 TABLET, FILM COATED ORAL 2 TIMES DAILY
Qty: 14 TABLET | Refills: 0 | Status: SHIPPED | OUTPATIENT
Start: 2024-08-29 | End: 2024-09-05

## 2024-08-29 ASSESSMENT — ENCOUNTER SYMPTOMS
RESPIRATORY NEGATIVE: 1
EYES NEGATIVE: 1
GASTROINTESTINAL NEGATIVE: 1

## 2024-08-29 NOTE — CARE COORDINATION
Ambulatory Care Coordination Note     8/29/2024 7:57 AM     ACM outreach attempt by this ACM today to offer care management services. ACM was unable to reach the patient by telephone today; left voice message requesting a return phone call to this ACM.     ACM: Chery Whitney, RN         PCP/Specialist follow up:   Future Appointments         Provider Specialty Dept Phone    8/29/2024 2:45 PM Francisco Javier Aguilar Jr., MD Family Medicine [phone]    11/12/2024 10:45 AM Francisco Javier Aguilar Jr., MD Family [facility] [phone]    2/13/2025 8:45 AM Olayinka Travis MD Cardiology [phone]    8/8/2025 8:00 AM HTF LAB RESOURCE Family [facility] [phone]    8/15/2025 8:00 AM Francisco Javier Aguilar Jr., MD [facility] [phone]            Follow Up:   Plan for next ACM outreach in approximately 1-2 days  to complete:  - outreach attempt to offer care management services.

## 2024-08-29 NOTE — PROGRESS NOTES
HISTORY OF PRESENT ILLNESS    Marcelina Collins is a 84 y.o. female.  HPI  Chief Complaint   Patient presents with    Follow-up     ER 08/28/2024      See above. Yesterday had increased fatigue. Awoke from nap with blurred vision in both eyes. Evaluation in ER indicated dehydration and possible UTI. Felt much better after IV fluids. No focal neuro deficit. Afterward was able to do routine activities with no difficulty. Denies urinary symptoms but often does not feel symptoms with UTI.  Chronic cough persists. Feels related to post nasal drainage. Pulmonologist recommended ENT evaluation.      Current Outpatient Medications on File Prior to Visit   Medication Sig Dispense Refill    fluticasone-salmeterol (ADVAIR) 250-50 MCG/ACT AEPB diskus inhaler Inhale 1 puff into the lungs in the morning and 1 puff in the evening. 60 each 3    atorvastatin (LIPITOR) 40 MG tablet Take 1 tablet by mouth daily      sotalol (BETAPACE) 80 MG tablet TAKE 1 TABLET BY MOUTH EVERY DAY 90 tablet 2    apixaban (ELIQUIS) 5 MG TABS tablet Take 1 tablet by mouth in the morning and 1 tablet in the evening. TAKE 1 TABLET TWICE DAILY. 180 tablet 3    losartan (COZAAR) 25 MG tablet Take 1 tablet by mouth daily 90 tablet 3    furosemide (LASIX) 40 MG tablet 1/2-1 po qam  prn swelling (Patient taking differently: as needed 1/2-1 po qam  prn swelling) 60 tablet 3    fluticasone (FLONASE) 50 MCG/ACT nasal spray 1 spray by Each Nostril route daily 32 g 3    albuterol sulfate HFA (VENTOLIN HFA) 108 (90 Base) MCG/ACT inhaler Inhale 2 puffs into the lungs 4 times daily as needed for Wheezing 54 g 1    mometasone (ELOCON) 0.1 % lotion       docusate (COLACE, DULCOLAX) 100 MG CAPS Take 100 mg by mouth nightly as needed (constipation)       No current facility-administered medications on file prior to visit.     Past Medical History:   Diagnosis Date    Adverse effect of anesthesia     delayed awakening x1- with heart surgery    DARRELL (acute kidney injury) (HCC)  symptoms worsen or fail to improve.    TRAMAINE PAUL JR, MD

## 2024-08-30 ENCOUNTER — CARE COORDINATION (OUTPATIENT)
Dept: CARE COORDINATION | Facility: CLINIC | Age: 84
End: 2024-08-30

## 2024-08-30 NOTE — CARE COORDINATION
Ambulatory Care Coordination Note     8/30/2024 8:19 AM     ACM outreach attempt by this ACM today to offer care management services. ACM was unable to reach the patient by telephone today; left voice message requesting a return phone call to this ACM.     ACM: Chery Whitney RN         PCP/Specialist follow up:   Future Appointments         Provider Specialty Dept Phone    11/12/2024 10:45 AM Francisco Javier Aguilar Jr., MD [facility] [phone]    2/13/2025 8:45 AM Olayinka Travis MD Cardiology [phone]    8/8/2025 8:00 AM [facility] LAB RESOURCE [facility] [phone]    8/15/2025 8:00 AM Francisco Javier Aguilar Jr., MD Family [facility] [phone]            Follow Up:   Plan for next ACM outreach in approximately 1 week to complete:  - outreach attempt to offer care management services.

## 2024-09-03 ENCOUNTER — CARE COORDINATION (OUTPATIENT)
Dept: CARE COORDINATION | Facility: CLINIC | Age: 84
End: 2024-09-03

## 2024-09-03 NOTE — CARE COORDINATION
Ambulatory Care Coordination Note     9/3/2024 1:23 PM     Patient Current Location:  [place]     This patient was received as a referral from Ambulatory Care Manager .    ACM contacted the patient by telephone. Verified name and  with patient as identifiers. Provided introduction to self, and explanation of the ACM role.   Patient declined care management services at this time.

## 2024-09-07 ENCOUNTER — APPOINTMENT (OUTPATIENT)
Dept: GENERAL RADIOLOGY | Age: 84
End: 2024-09-07
Payer: MEDICARE

## 2024-09-07 ENCOUNTER — HOSPITAL ENCOUNTER (EMERGENCY)
Age: 84
Discharge: HOME | End: 2024-09-07
Attending: EMERGENCY MEDICINE
Payer: MEDICARE

## 2024-09-07 ENCOUNTER — APPOINTMENT (OUTPATIENT)
Dept: CT IMAGING | Age: 84
End: 2024-09-07
Payer: MEDICARE

## 2024-09-07 VITALS
HEART RATE: 74 BPM | HEIGHT: 67 IN | SYSTOLIC BLOOD PRESSURE: 164 MMHG | OXYGEN SATURATION: 98 % | RESPIRATION RATE: 17 BRPM | TEMPERATURE: 98.4 F | BODY MASS INDEX: 23.7 KG/M2 | WEIGHT: 151 LBS | DIASTOLIC BLOOD PRESSURE: 80 MMHG

## 2024-09-07 DIAGNOSIS — R55 SYNCOPE AND COLLAPSE: Primary | ICD-10-CM

## 2024-09-07 LAB
ALBUMIN SERPL-MCNC: 3 G/DL (ref 3.2–4.6)
ALBUMIN/GLOB SERPL: 0.9 (ref 1–1.9)
ALP SERPL-CCNC: 194 U/L (ref 35–104)
ALT SERPL-CCNC: 35 U/L (ref 12–65)
ANION GAP SERPL CALC-SCNC: 11 MMOL/L (ref 9–18)
AST SERPL-CCNC: 51 U/L (ref 15–37)
BASOPHILS # BLD: 0.1 K/UL (ref 0–0.2)
BASOPHILS NFR BLD: 1 % (ref 0–2)
BILIRUB SERPL-MCNC: 0.4 MG/DL (ref 0–1.2)
BILIRUB UR QL: NEGATIVE
BUN SERPL-MCNC: 15 MG/DL (ref 8–23)
CALCIUM SERPL-MCNC: 8.3 MG/DL (ref 8.8–10.2)
CHLORIDE SERPL-SCNC: 95 MMOL/L (ref 98–107)
CO2 SERPL-SCNC: 21 MMOL/L (ref 20–28)
CREAT SERPL-MCNC: 0.66 MG/DL (ref 0.6–1.1)
DIFFERENTIAL METHOD BLD: ABNORMAL
EKG ATRIAL RATE: 71 BPM
EKG DIAGNOSIS: ABNORMAL
EKG P AXIS: 150 DEGREES
EKG P-R INTERVAL: 184 MS
EKG Q-T INTERVAL: 468 MS
EKG QRS DURATION: 147 MS
EKG QTC CALCULATION (BAZETT): 513 MS
EKG R AXIS: -82 DEGREES
EKG T AXIS: 85 DEGREES
EKG VENTRICULAR RATE: 72 BPM
EOSINOPHIL # BLD: 0.3 K/UL (ref 0–0.8)
EOSINOPHIL NFR BLD: 2 % (ref 0.5–7.8)
ERYTHROCYTE [DISTWIDTH] IN BLOOD BY AUTOMATED COUNT: 14.2 % (ref 11.9–14.6)
GLOBULIN SER CALC-MCNC: 3.4 G/DL (ref 2.3–3.5)
GLUCOSE SERPL-MCNC: 122 MG/DL (ref 70–99)
GLUCOSE UR QL STRIP.AUTO: NEGATIVE MG/DL
HCT VFR BLD AUTO: 34.3 % (ref 35.8–46.3)
HGB BLD-MCNC: 11.3 G/DL (ref 11.7–15.4)
IMM GRANULOCYTES # BLD AUTO: 0 K/UL (ref 0–0.5)
IMM GRANULOCYTES NFR BLD AUTO: 0 % (ref 0–5)
KETONES UR-MCNC: NEGATIVE MG/DL
LEUKOCYTE ESTERASE UR QL STRIP: NEGATIVE
LYMPHOCYTES # BLD: 1.7 K/UL (ref 0.5–4.6)
LYMPHOCYTES NFR BLD: 15 % (ref 13–44)
MAGNESIUM SERPL-MCNC: 1.9 MG/DL (ref 1.8–2.4)
MCH RBC QN AUTO: 27.8 PG (ref 26.1–32.9)
MCHC RBC AUTO-ENTMCNC: 32.9 G/DL (ref 31.4–35)
MCV RBC AUTO: 84.3 FL (ref 82–102)
MONOCYTES # BLD: 1.2 K/UL (ref 0.1–1.3)
MONOCYTES NFR BLD: 10 % (ref 4–12)
NEUTS SEG # BLD: 7.9 K/UL (ref 1.7–8.2)
NEUTS SEG NFR BLD: 72 % (ref 43–78)
NITRITE UR QL: NEGATIVE
NRBC # BLD: 0 K/UL (ref 0–0.2)
PH UR: 7 (ref 5–9)
PLATELET # BLD AUTO: 280 K/UL (ref 150–450)
PMV BLD AUTO: 8.6 FL (ref 9.4–12.3)
POTASSIUM SERPL-SCNC: 4.4 MMOL/L (ref 3.5–5.1)
PROT SERPL-MCNC: 6.4 G/DL (ref 6.3–8.2)
PROT UR QL: 30 MG/DL
RBC # BLD AUTO: 4.07 M/UL (ref 4.05–5.2)
RBC # UR STRIP: NEGATIVE
SERVICE CMNT-IMP: ABNORMAL
SODIUM SERPL-SCNC: 127 MMOL/L (ref 136–145)
SP GR UR: 1.02 (ref 1–1.02)
TROPONIN T SERPL HS-MCNC: 14 NG/L (ref 0–14)
TROPONIN T SERPL HS-MCNC: 9 NG/L (ref 0–14)
TSH W FREE THYROID IF ABNORMAL: 2.95 UIU/ML (ref 0.27–4.2)
UROBILINOGEN UR QL: 0.2 EU/DL (ref 0.2–1)
WBC # BLD AUTO: 11.2 K/UL (ref 4.3–11.1)

## 2024-09-07 PROCEDURE — 85025 COMPLETE CBC W/AUTO DIFF WBC: CPT

## 2024-09-07 PROCEDURE — 93010 ELECTROCARDIOGRAM REPORT: CPT | Performed by: INTERNAL MEDICINE

## 2024-09-07 PROCEDURE — 84484 ASSAY OF TROPONIN QUANT: CPT

## 2024-09-07 PROCEDURE — 84443 ASSAY THYROID STIM HORMONE: CPT

## 2024-09-07 PROCEDURE — 36415 COLL VENOUS BLD VENIPUNCTURE: CPT

## 2024-09-07 PROCEDURE — 81003 URINALYSIS AUTO W/O SCOPE: CPT

## 2024-09-07 PROCEDURE — 93005 ELECTROCARDIOGRAM TRACING: CPT | Performed by: EMERGENCY MEDICINE

## 2024-09-07 PROCEDURE — 99285 EMERGENCY DEPT VISIT HI MDM: CPT

## 2024-09-07 PROCEDURE — 70450 CT HEAD/BRAIN W/O DYE: CPT

## 2024-09-07 PROCEDURE — 71045 X-RAY EXAM CHEST 1 VIEW: CPT

## 2024-09-07 PROCEDURE — 2580000003 HC RX 258: Performed by: EMERGENCY MEDICINE

## 2024-09-07 PROCEDURE — 83735 ASSAY OF MAGNESIUM: CPT

## 2024-09-07 PROCEDURE — 80053 COMPREHEN METABOLIC PANEL: CPT

## 2024-09-07 RX ORDER — 0.9 % SODIUM CHLORIDE 0.9 %
1000 INTRAVENOUS SOLUTION INTRAVENOUS
Status: COMPLETED | OUTPATIENT
Start: 2024-09-07 | End: 2024-09-07

## 2024-09-07 RX ADMIN — SODIUM CHLORIDE 1000 ML: 9 INJECTION, SOLUTION INTRAVENOUS at 16:57

## 2024-09-07 ASSESSMENT — ENCOUNTER SYMPTOMS
EYE REDNESS: 0
DIFFICULTY BREATHING: 0
ABDOMINAL PAIN: 0
WHEEZING: 0
CONSTIPATION: 0
BACK PAIN: 0
SHORTNESS OF BREATH: 0
NAUSEA: 0
VOMITING: 0
EYE ITCHING: 0
DIARRHEA: 0
SINUS PAIN: 0
TROUBLE SWALLOWING: 0
COUGH: 0
EYE PAIN: 0

## 2024-09-07 ASSESSMENT — PAIN SCALES - GENERAL: PAINLEVEL_OUTOF10: 0

## 2024-09-07 ASSESSMENT — PAIN - FUNCTIONAL ASSESSMENT: PAIN_FUNCTIONAL_ASSESSMENT: 0-10

## 2024-09-07 NOTE — ED PROVIDER NOTES
Emergency Department Provider Note       PCP: Francisco Javier Aguilar Jr., MD   Age: 84 y.o.   Sex: female     DISPOSITION Decision To Discharge 09/07/2024 07:44:25 PM  Condition at Disposition: Stable       ICD-10-CM    1. Syncope and collapse  R55 BS - AMB Referral to Neurology Testing (Nerve Conduction/NCS/EMG/EEG)          Medical Decision Making     84-year-old female patient presents to the ER with a recurrent episode of syncope  Occurred while she was seated at a restaurant and eating a sandwich  Pacer interrogation revealed no evidence of cardiac dysrhythmia  Other workup unremarkable  Patient will be referred to neurology for outpatient workup including to assess for the possibility of a seizure disorder  Reviewed findings with patient and family and they are in agreement with the plan of care     1 or more chronic illnesses with a severe exacerbation or progression.  Patient was discharged risks and benefits of hospitalization were considered.  Chronic medical problems impacting care include heart failure hypertension CAD pacemaker.  Shared medical decision making was utilized in creating the patients health plan today.    I independently ordered and reviewed each unique test.  I reviewed external records: provider visit note from PCP.  I reviewed external records: provider visit note from outside specialist.   The patients assessment required an independent historian: Family at bedside.  The reason they were needed is important historical information not provided by the patient.  I interpreted the X-rays chest x-ray reveals some nonspecific increased markings in the lung base.  I interpreted the CT Scan CT head negative for acute disease.              History     84-year-old female patient presents to the ER via EMS after witnessed syncopal episode  Patient states that she was sitting eating a sandwich in a restaurant with family members she felt a little woozy but then has no recall until waking up in the

## 2024-09-07 NOTE — DISCHARGE INSTRUCTIONS
Drink plenty of fluids  Continue all your current medications  Strict fall precautions  Do not drive or operate machinery until you are cleared by neurology and or your family doctor    Call your doctor or the follow up doctor to set up appointment for recheck visit    Return to ER for any worsening symptoms or new problems which may arise

## 2024-09-07 NOTE — ED TRIAGE NOTES
Pt 83 y/o female arrives via [facility] ems from home with a complaint syncope. Pt states she has a history of hypotension and syncope episodes. Pt received approximately 650ml of NS during transport.

## 2024-09-08 NOTE — ED NOTES
Patient mobility status  with mild difficulty. Provider aware     I have reviewed discharge instructions with the patient and caregiver.  The patient and caregiver verbalized understanding.    Patient left ED via Discharge Method: wheelchair to Home with adult Children.    Opportunity for questions and clarification provided.     Patient given 0 scripts.           Patrice Osman RN  09/07/24 2010

## 2024-09-09 ENCOUNTER — OFFICE VISIT (OUTPATIENT)
Dept: FAMILY MEDICINE CLINIC | Facility: CLINIC | Age: 84
End: 2024-09-09
Payer: MEDICARE

## 2024-09-09 VITALS
HEART RATE: 78 BPM | SYSTOLIC BLOOD PRESSURE: 122 MMHG | OXYGEN SATURATION: 98 % | DIASTOLIC BLOOD PRESSURE: 82 MMHG | BODY MASS INDEX: 23.95 KG/M2 | WEIGHT: 152.6 LBS | HEIGHT: 67 IN

## 2024-09-09 DIAGNOSIS — R55 SYNCOPE AND COLLAPSE: ICD-10-CM

## 2024-09-09 DIAGNOSIS — R55 NEAR SYNCOPE: Primary | ICD-10-CM

## 2024-09-09 PROCEDURE — 99213 OFFICE O/P EST LOW 20 MIN: CPT | Performed by: FAMILY MEDICINE

## 2024-09-09 PROCEDURE — 1123F ACP DISCUSS/DSCN MKR DOCD: CPT | Performed by: FAMILY MEDICINE

## 2024-09-09 ASSESSMENT — ENCOUNTER SYMPTOMS
RESPIRATORY NEGATIVE: 1
GASTROINTESTINAL NEGATIVE: 1
EYES NEGATIVE: 1

## 2024-09-10 ENCOUNTER — OFFICE VISIT (OUTPATIENT)
Dept: NEUROLOGY | Age: 84
End: 2024-09-10
Payer: MEDICARE

## 2024-09-10 VITALS
WEIGHT: 153 LBS | RESPIRATION RATE: 16 BRPM | HEART RATE: 90 BPM | DIASTOLIC BLOOD PRESSURE: 84 MMHG | BODY MASS INDEX: 24.01 KG/M2 | SYSTOLIC BLOOD PRESSURE: 186 MMHG | HEIGHT: 67 IN | OXYGEN SATURATION: 97 %

## 2024-09-10 DIAGNOSIS — G25.3 MYOKYMIA: ICD-10-CM

## 2024-09-10 DIAGNOSIS — I65.23 BILATERAL CAROTID ARTERY STENOSIS: Chronic | ICD-10-CM

## 2024-09-10 DIAGNOSIS — Z95.0 CARDIAC PACEMAKER: ICD-10-CM

## 2024-09-10 DIAGNOSIS — R40.4 TRANSIENT ALTERATION OF AWARENESS: Primary | ICD-10-CM

## 2024-09-10 DIAGNOSIS — I69.30 HISTORY OF STROKE WITH RESIDUAL DEFICIT: Chronic | ICD-10-CM

## 2024-09-10 DIAGNOSIS — I48.0 PAROXYSMAL ATRIAL FIBRILLATION (HCC): ICD-10-CM

## 2024-09-10 DIAGNOSIS — R40.4 TRANSIENT ALTERATION OF AWARENESS: ICD-10-CM

## 2024-09-10 DIAGNOSIS — E87.1 HYPONATREMIA: ICD-10-CM

## 2024-09-10 PROBLEM — I65.22 STENOSIS OF LEFT INTERNAL CAROTID ARTERY: Status: ACTIVE | Noted: 2023-09-07

## 2024-09-10 PROCEDURE — 1123F ACP DISCUSS/DSCN MKR DOCD: CPT | Performed by: NURSE PRACTITIONER

## 2024-09-10 PROCEDURE — 99215 OFFICE O/P EST HI 40 MIN: CPT | Performed by: NURSE PRACTITIONER

## 2024-09-10 RX ORDER — LOSARTAN POTASSIUM 25 MG/1
12.5 TABLET ORAL DAILY
COMMUNITY

## 2024-09-10 RX ORDER — LEVETIRACETAM 500 MG/1
500 TABLET ORAL 2 TIMES DAILY
Qty: 60 TABLET | Refills: 3 | Status: SHIPPED | OUTPATIENT
Start: 2024-09-10

## 2024-09-10 ASSESSMENT — ENCOUNTER SYMPTOMS
GASTROINTESTINAL NEGATIVE: 1
TROUBLE SWALLOWING: 1
ALLERGIC/IMMUNOLOGIC NEGATIVE: 1
VOICE CHANGE: 0
COUGH: 1
EYES NEGATIVE: 1

## 2024-09-10 ASSESSMENT — PATIENT HEALTH QUESTIONNAIRE - PHQ9
SUM OF ALL RESPONSES TO PHQ9 QUESTIONS 1 & 2: 0
1. LITTLE INTEREST OR PLEASURE IN DOING THINGS: NOT AT ALL
SUM OF ALL RESPONSES TO PHQ QUESTIONS 1-9: 0
2. FEELING DOWN, DEPRESSED OR HOPELESS: NOT AT ALL

## 2024-09-11 ENCOUNTER — PATIENT MESSAGE (OUTPATIENT)
Age: 84
End: 2024-09-11

## 2024-09-13 DIAGNOSIS — R40.4 TRANSIENT ALTERATION OF AWARENESS: ICD-10-CM

## 2024-09-13 RX ORDER — LEVETIRACETAM 500 MG/1
500 TABLET ORAL 2 TIMES DAILY
Qty: 180 TABLET | Refills: 1 | OUTPATIENT
Start: 2024-09-13

## 2024-09-23 ENCOUNTER — OFFICE VISIT (OUTPATIENT)
Dept: FAMILY MEDICINE CLINIC | Facility: CLINIC | Age: 84
End: 2024-09-23
Payer: MEDICARE

## 2024-09-23 VITALS
BODY MASS INDEX: 23.65 KG/M2 | OXYGEN SATURATION: 96 % | WEIGHT: 151 LBS | HEART RATE: 91 BPM | DIASTOLIC BLOOD PRESSURE: 76 MMHG | SYSTOLIC BLOOD PRESSURE: 128 MMHG

## 2024-09-23 DIAGNOSIS — D72.829 LEUKOCYTOSIS, UNSPECIFIED TYPE: ICD-10-CM

## 2024-09-23 DIAGNOSIS — R40.4 TRANSIENT ALTERATION OF AWARENESS: ICD-10-CM

## 2024-09-23 DIAGNOSIS — E87.1 HYPONATREMIA: ICD-10-CM

## 2024-09-23 DIAGNOSIS — R05.3 CHRONIC COUGH: Primary | ICD-10-CM

## 2024-09-23 LAB
ANION GAP SERPL CALC-SCNC: 10 MMOL/L (ref 9–18)
BASOPHILS # BLD: 0.1 K/UL (ref 0–0.2)
BASOPHILS NFR BLD: 1 % (ref 0–2)
BUN SERPL-MCNC: 11 MG/DL (ref 8–23)
CALCIUM SERPL-MCNC: 8.9 MG/DL (ref 8.8–10.2)
CHLORIDE SERPL-SCNC: 90 MMOL/L (ref 98–107)
CO2 SERPL-SCNC: 26 MMOL/L (ref 20–28)
CREAT SERPL-MCNC: 0.61 MG/DL (ref 0.6–1.1)
DIFFERENTIAL METHOD BLD: ABNORMAL
EOSINOPHIL # BLD: 0.2 K/UL (ref 0–0.8)
EOSINOPHIL NFR BLD: 2 % (ref 0.5–7.8)
ERYTHROCYTE [DISTWIDTH] IN BLOOD BY AUTOMATED COUNT: 13.9 % (ref 11.9–14.6)
GLUCOSE SERPL-MCNC: 84 MG/DL (ref 70–99)
HCT VFR BLD AUTO: 36.9 % (ref 35.8–46.3)
HGB BLD-MCNC: 12.2 G/DL (ref 11.7–15.4)
IMM GRANULOCYTES # BLD AUTO: 0.1 K/UL (ref 0–0.5)
IMM GRANULOCYTES NFR BLD AUTO: 1 % (ref 0–5)
LYMPHOCYTES # BLD: 1.7 K/UL (ref 0.5–4.6)
LYMPHOCYTES NFR BLD: 14 % (ref 13–44)
MCH RBC QN AUTO: 27.9 PG (ref 26.1–32.9)
MCHC RBC AUTO-ENTMCNC: 33.1 G/DL (ref 31.4–35)
MCV RBC AUTO: 84.4 FL (ref 82–102)
MONOCYTES # BLD: 1.6 K/UL (ref 0.1–1.3)
MONOCYTES NFR BLD: 12 % (ref 4–12)
NEUTS SEG # BLD: 9 K/UL (ref 1.7–8.2)
NEUTS SEG NFR BLD: 70 % (ref 43–78)
NRBC # BLD: 0 K/UL (ref 0–0.2)
PLATELET # BLD AUTO: 363 K/UL (ref 150–450)
PMV BLD AUTO: 9.3 FL (ref 9.4–12.3)
POTASSIUM SERPL-SCNC: 5 MMOL/L (ref 3.5–5.1)
RBC # BLD AUTO: 4.37 M/UL (ref 4.05–5.2)
SODIUM SERPL-SCNC: 125 MMOL/L (ref 136–145)
WBC # BLD AUTO: 12.7 K/UL (ref 4.3–11.1)

## 2024-09-23 PROCEDURE — 99213 OFFICE O/P EST LOW 20 MIN: CPT | Performed by: FAMILY MEDICINE

## 2024-09-23 PROCEDURE — 1123F ACP DISCUSS/DSCN MKR DOCD: CPT | Performed by: FAMILY MEDICINE

## 2024-09-23 RX ORDER — BROMPHENIRAMINE MALEATE, PSEUDOEPHEDRINE HYDROCHLORIDE, AND DEXTROMETHORPHAN HYDROBROMIDE 2; 30; 10 MG/5ML; MG/5ML; MG/5ML
5 SYRUP ORAL 4 TIMES DAILY PRN
Qty: 180 ML | Refills: 0 | Status: SHIPPED | OUTPATIENT
Start: 2024-09-23

## 2024-09-23 RX ORDER — ALBUTEROL SULFATE 90 UG/1
2 INHALANT RESPIRATORY (INHALATION) 4 TIMES DAILY PRN
Qty: 54 G | Refills: 1 | Status: SHIPPED | OUTPATIENT
Start: 2024-09-23

## 2024-09-23 ASSESSMENT — ENCOUNTER SYMPTOMS
TROUBLE SWALLOWING: 0
GASTROINTESTINAL NEGATIVE: 1
EYES NEGATIVE: 1
SHORTNESS OF BREATH: 1
RHINORRHEA: 0
SORE THROAT: 0
WHEEZING: 0
CHEST TIGHTNESS: 0
COUGH: 1
CHOKING: 0
APNEA: 0

## 2024-09-23 ASSESSMENT — PATIENT HEALTH QUESTIONNAIRE - PHQ9
SUM OF ALL RESPONSES TO PHQ9 QUESTIONS 1 & 2: 0
SUM OF ALL RESPONSES TO PHQ QUESTIONS 1-9: 0
2. FEELING DOWN, DEPRESSED OR HOPELESS: NOT AT ALL
1. LITTLE INTEREST OR PLEASURE IN DOING THINGS: NOT AT ALL

## 2024-09-24 ENCOUNTER — TELEPHONE (OUTPATIENT)
Dept: FAMILY MEDICINE CLINIC | Facility: CLINIC | Age: 84
End: 2024-09-24

## 2024-09-24 DIAGNOSIS — D72.829 LEUKOCYTOSIS, UNSPECIFIED TYPE: Primary | ICD-10-CM

## 2024-10-03 ENCOUNTER — PATIENT MESSAGE (OUTPATIENT)
Dept: VASCULAR SURGERY | Age: 84
End: 2024-10-03

## 2024-10-03 RX ORDER — AZITHROMYCIN 250 MG/1
250 TABLET, FILM COATED ORAL SEE ADMIN INSTRUCTIONS
Qty: 6 TABLET | Refills: 0 | Status: SHIPPED | OUTPATIENT
Start: 2024-10-03 | End: 2024-10-08

## 2024-10-09 ENCOUNTER — PROCEDURE VISIT (OUTPATIENT)
Dept: NEUROLOGY | Age: 84
End: 2024-10-09

## 2024-10-09 DIAGNOSIS — R40.4 TRANSIENT ALTERATION OF AWARENESS: Primary | ICD-10-CM

## 2024-10-10 DIAGNOSIS — E87.1 HYPONATREMIA: Primary | ICD-10-CM

## 2024-10-11 ENCOUNTER — LAB (OUTPATIENT)
Dept: FAMILY MEDICINE CLINIC | Facility: CLINIC | Age: 84
End: 2024-10-11

## 2024-10-11 ENCOUNTER — TELEPHONE (OUTPATIENT)
Dept: FAMILY MEDICINE CLINIC | Facility: CLINIC | Age: 84
End: 2024-10-11

## 2024-10-11 DIAGNOSIS — E87.1 HYPONATREMIA: ICD-10-CM

## 2024-10-11 LAB
ANION GAP SERPL CALC-SCNC: 11 MMOL/L (ref 9–18)
BUN SERPL-MCNC: 10 MG/DL (ref 8–23)
CALCIUM SERPL-MCNC: 9.2 MG/DL (ref 8.8–10.2)
CHLORIDE SERPL-SCNC: 89 MMOL/L (ref 98–107)
CO2 SERPL-SCNC: 25 MMOL/L (ref 20–28)
CREAT SERPL-MCNC: 0.74 MG/DL (ref 0.6–1.1)
GLUCOSE SERPL-MCNC: 92 MG/DL (ref 70–99)
POTASSIUM SERPL-SCNC: 4.6 MMOL/L (ref 3.5–5.1)
SODIUM SERPL-SCNC: 124 MMOL/L (ref 136–145)

## 2024-10-11 RX ORDER — DEXTROMETHORPHAN HYDROBROMIDE AND PROMETHAZINE HYDROCHLORIDE 15; 6.25 MG/5ML; MG/5ML
5 SYRUP ORAL 4 TIMES DAILY PRN
Qty: 180 ML | Refills: 0 | Status: SHIPPED | OUTPATIENT
Start: 2024-10-11 | End: 2024-10-20

## 2024-10-11 RX ORDER — DOXYCYCLINE HYCLATE 100 MG
100 TABLET ORAL 2 TIMES DAILY
Qty: 20 TABLET | Refills: 0 | Status: SHIPPED | OUTPATIENT
Start: 2024-10-11 | End: 2024-10-21

## 2024-10-11 RX ORDER — PREDNISONE 10 MG/1
TABLET ORAL
Qty: 21 EACH | Refills: 0 | Status: SHIPPED | OUTPATIENT
Start: 2024-10-11

## 2024-10-11 NOTE — TELEPHONE ENCOUNTER
Pt came in for her blood work and states she is still not feeling better. Lots of coughing up green phlegm. Would like another antibiotic or steroid pack. Bromfed helps some

## 2024-10-14 ENCOUNTER — PATIENT MESSAGE (OUTPATIENT)
Age: 84
End: 2024-10-14

## 2024-10-14 ENCOUNTER — OFFICE VISIT (OUTPATIENT)
Dept: VASCULAR SURGERY | Age: 84
End: 2024-10-14
Payer: MEDICARE

## 2024-10-14 VITALS
BODY MASS INDEX: 23.23 KG/M2 | HEIGHT: 67 IN | SYSTOLIC BLOOD PRESSURE: 191 MMHG | DIASTOLIC BLOOD PRESSURE: 111 MMHG | WEIGHT: 148 LBS | OXYGEN SATURATION: 95 % | HEART RATE: 88 BPM

## 2024-10-14 DIAGNOSIS — I65.22 STENOSIS OF LEFT CAROTID ARTERY: Primary | ICD-10-CM

## 2024-10-14 PROCEDURE — 99213 OFFICE O/P EST LOW 20 MIN: CPT | Performed by: STUDENT IN AN ORGANIZED HEALTH CARE EDUCATION/TRAINING PROGRAM

## 2024-10-14 PROCEDURE — 1123F ACP DISCUSS/DSCN MKR DOCD: CPT | Performed by: STUDENT IN AN ORGANIZED HEALTH CARE EDUCATION/TRAINING PROGRAM

## 2024-10-14 RX ORDER — ATORVASTATIN CALCIUM 40 MG/1
40 TABLET, FILM COATED ORAL DAILY
Qty: 90 TABLET | Refills: 3 | Status: SHIPPED | OUTPATIENT
Start: 2024-10-14 | End: 2025-11-03

## 2024-10-14 NOTE — PROGRESS NOTES
VASCULAR SURGERY   [address] [address]  [phone] FAX: [phone]        Marcelina Collins  : 1940    Reason for visit: 2nd opinion for carotid stenosis     Chief Complaint: 84 y.o. female asymptomatic left ICA stenosis. No further episodes of symptoms. Denies slurred speech, unilateral weakness and mono-ocular vision loss. DUS with >70% left ICA stenosis. Compliant with Eliquis and statin.        Plan:   Asymptomatic left carotid stenosis: recommend against surgical intervention for >85 year asymptomatic patient. Recommend ASA, Eliquis and statin. F/u PRN      Imaging interrupted:   Carotid DUS    Mild (<50%) stenosis in the right internal carotid artery.    Severe (>=70%) stenosis in the left internal carotid artery.    Normal antegrade flow involving the right vertebral artery.    Normal antegrade flow involving the left vertebral artery.    Physical Examination:   Height: 1.702 m (5' 7\"), Weight - Scale: 67.1 kg (148 lb), BP: (!) 191/111    General: No acute distress  HENT: AT  CV: Regular rhythm.    LUNG: No respiratory distress on RA  Abdominal: No distension.  Extremities: No wounds or edema, motor and sensation grossly intact  Vascular:   Radial:  L    2+     R    2+        Past Medical History:   Diagnosis Date    Adverse effect of anesthesia     delayed awakening x1- with heart surgery    DARRELL (acute kidney injury) (HCC) 2013    pt reports after TIA    Allergic rhinitis     has inhaler daily (pt denies asthma)    Anemia, unspecified 2015    no recent infusions    Aortic stenosis     sp AVR     Blurry vision, bilateral 2013    CAD (coronary artery disease)     Cardiac pacemaker     Biotronik MRI compatible (dual chamber)  on Left chest; last in person interrogation 3/31/22  AP 58%,  2%    Cerebral artery occlusion with cerebral infarction (HCC)     Constipation     Critical aortic valve stenosis 2015    8/13/15 (Dr Winter) 1. Left and

## 2024-10-17 ENCOUNTER — APPOINTMENT (OUTPATIENT)
Dept: CT IMAGING | Age: 84
End: 2024-10-17
Payer: MEDICARE

## 2024-10-17 ENCOUNTER — HOSPITAL ENCOUNTER (EMERGENCY)
Age: 84
Discharge: HOME | End: 2024-10-17
Attending: EMERGENCY MEDICINE
Payer: MEDICARE

## 2024-10-17 ENCOUNTER — APPOINTMENT (OUTPATIENT)
Dept: GENERAL RADIOLOGY | Age: 84
End: 2024-10-17
Payer: MEDICARE

## 2024-10-17 VITALS
DIASTOLIC BLOOD PRESSURE: 96 MMHG | OXYGEN SATURATION: 97 % | WEIGHT: 142 LBS | BODY MASS INDEX: 22.29 KG/M2 | TEMPERATURE: 98.2 F | RESPIRATION RATE: 16 BRPM | HEART RATE: 78 BPM | SYSTOLIC BLOOD PRESSURE: 180 MMHG | HEIGHT: 67 IN

## 2024-10-17 DIAGNOSIS — R51.9 GENERALIZED HEADACHE: ICD-10-CM
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
## 2024-10-17 DIAGNOSIS — S16.1XXA STRAIN OF NECK MUSCLE, INITIAL ENCOUNTER: ICD-10-CM

## 2024-10-17 DIAGNOSIS — M25.561 ACUTE PAIN OF RIGHT KNEE: ICD-10-CM

## 2024-10-17 LAB
ALBUMIN SERPL-MCNC: 3.5 G/DL (ref 3.2–4.6)
ALBUMIN/GLOB SERPL: 0.9 (ref 1–1.9)
ALP SERPL-CCNC: 145 U/L (ref 35–104)
ALT SERPL-CCNC: 23 U/L (ref 8–45)
ANION GAP SERPL CALC-SCNC: 11 MMOL/L (ref 9–18)
APPEARANCE UR: CLEAR
AST SERPL-CCNC: 34 U/L (ref 15–37)
BACTERIA URNS QL MICRO: NEGATIVE /HPF
BASOPHILS # BLD: 0.1 K/UL (ref 0–0.2)
BASOPHILS NFR BLD: 0 % (ref 0–2)
BILIRUB SERPL-MCNC: 0.6 MG/DL (ref 0–1.2)
BILIRUB UR QL: NEGATIVE
BUN SERPL-MCNC: 16 MG/DL (ref 8–23)
CALCIUM SERPL-MCNC: 9.6 MG/DL (ref 8.8–10.2)
CASTS URNS QL MICRO: 0 /LPF
CHLORIDE SERPL-SCNC: 93 MMOL/L (ref 98–107)
CO2 SERPL-SCNC: 23 MMOL/L (ref 20–28)
COLOR UR: NORMAL
CREAT SERPL-MCNC: 0.62 MG/DL (ref 0.6–1.1)
CRYSTALS URNS QL MICRO: 0 /LPF
DIFFERENTIAL METHOD BLD: ABNORMAL
EOSINOPHIL # BLD: 0 K/UL (ref 0–0.8)
EOSINOPHIL NFR BLD: 0 % (ref 0.5–7.8)
EPI CELLS #/AREA URNS HPF: NORMAL /HPF
ERYTHROCYTE [DISTWIDTH] IN BLOOD BY AUTOMATED COUNT: 14.1 % (ref 11.9–14.6)
GLOBULIN SER CALC-MCNC: 4.1 G/DL (ref 2.3–3.5)
GLUCOSE SERPL-MCNC: 91 MG/DL (ref 70–99)
GLUCOSE UR STRIP.AUTO-MCNC: NEGATIVE MG/DL
HCT VFR BLD AUTO: 42 % (ref 35.8–46.3)
HGB BLD-MCNC: 14 G/DL (ref 11.7–15.4)
HGB UR QL STRIP: NEGATIVE
HYALINE CASTS URNS QL MICRO: NORMAL /LPF
IMM GRANULOCYTES # BLD AUTO: 0.2 K/UL (ref 0–0.5)
IMM GRANULOCYTES NFR BLD AUTO: 1 % (ref 0–5)
KETONES UR QL STRIP.AUTO: NEGATIVE MG/DL
LEUKOCYTE ESTERASE UR QL STRIP.AUTO: NEGATIVE
LYMPHOCYTES # BLD: 2.1 K/UL (ref 0.5–4.6)
LYMPHOCYTES NFR BLD: 9 % (ref 13–44)
MCH RBC QN AUTO: 27.3 PG (ref 26.1–32.9)
MCHC RBC AUTO-ENTMCNC: 33.3 G/DL (ref 31.4–35)
MCV RBC AUTO: 82 FL (ref 82–102)
MONOCYTES # BLD: 1.5 K/UL (ref 0.1–1.3)
MONOCYTES NFR BLD: 7 % (ref 4–12)
MUCOUS THREADS URNS QL MICRO: 0 /LPF
NEUTS SEG # BLD: 18.3 K/UL (ref 1.7–8.2)
NEUTS SEG NFR BLD: 83 % (ref 43–78)
NITRITE UR QL STRIP.AUTO: NEGATIVE
NRBC # BLD: 0 K/UL (ref 0–0.2)
NT PRO BNP: 989 PG/ML (ref 0–450)
PH UR STRIP: 7 (ref 5–9)
PLATELET # BLD AUTO: 472 K/UL (ref 150–450)
PMV BLD AUTO: 8.5 FL (ref 9.4–12.3)
POTASSIUM SERPL-SCNC: 4.9 MMOL/L (ref 3.5–5.1)
PROT SERPL-MCNC: 7.6 G/DL (ref 6.3–8.2)
PROT UR STRIP-MCNC: NEGATIVE MG/DL
RBC # BLD AUTO: 5.12 M/UL (ref 4.05–5.2)
RBC #/AREA URNS HPF: NORMAL /HPF
SODIUM SERPL-SCNC: 127 MMOL/L (ref 136–145)
SODIUM UR-SCNC: 84 MMOL/L
SP GR UR REFRACTOMETRY: 1.01 (ref 1–1.02)
URINE CULTURE IF INDICATED: NORMAL
UROBILINOGEN UR QL STRIP.AUTO: 0.2 EU/DL (ref 0.2–1)
WBC # BLD AUTO: 22.1 K/UL (ref 4.3–11.1)
WBC URNS QL MICRO: NORMAL /HPF

## 2024-10-17 PROCEDURE — 72125 CT NECK SPINE W/O DYE: CPT

## 2024-10-17 PROCEDURE — 83880 ASSAY OF NATRIURETIC PEPTIDE: CPT

## 2024-10-17 PROCEDURE — 6360000002 HC RX W HCPCS: Performed by: EMERGENCY MEDICINE

## 2024-10-17 PROCEDURE — 84300 ASSAY OF URINE SODIUM: CPT

## 2024-10-17 PROCEDURE — 81001 URINALYSIS AUTO W/SCOPE: CPT

## 2024-10-17 PROCEDURE — 80053 COMPREHEN METABOLIC PANEL: CPT

## 2024-10-17 PROCEDURE — 96374 THER/PROPH/DIAG INJ IV PUSH: CPT

## 2024-10-17 PROCEDURE — 73562 X-RAY EXAM OF KNEE 3: CPT

## 2024-10-17 PROCEDURE — 70450 CT HEAD/BRAIN W/O DYE: CPT

## 2024-10-17 PROCEDURE — 85025 COMPLETE CBC W/AUTO DIFF WBC: CPT

## 2024-10-17 PROCEDURE — 99284 EMERGENCY DEPT VISIT MOD MDM: CPT

## 2024-10-17 RX ORDER — METOCLOPRAMIDE HYDROCHLORIDE 5 MG/ML
5 INJECTION INTRAMUSCULAR; INTRAVENOUS ONCE
Status: COMPLETED | OUTPATIENT
Start: 2024-10-17 | End: 2024-10-17

## 2024-10-17 RX ORDER — GINSENG 100 MG
CAPSULE ORAL ONCE
Status: DISCONTINUED | OUTPATIENT
Start: 2024-10-17 | End: 2024-10-17 | Stop reason: HOSPADM

## 2024-10-17 RX ADMIN — METOCLOPRAMIDE HYDROCHLORIDE 5 MG: 5 INJECTION, SOLUTION INTRAMUSCULAR; INTRAVENOUS at 16:00

## 2024-10-17 ASSESSMENT — PAIN SCALES - GENERAL
PAINLEVEL_OUTOF10: 7
PAINLEVEL_OUTOF10: 5

## 2024-10-17 ASSESSMENT — PAIN - FUNCTIONAL ASSESSMENT
PAIN_FUNCTIONAL_ASSESSMENT: 0-10
PAIN_FUNCTIONAL_ASSESSMENT: 0-10

## 2024-10-17 NOTE — ED NOTES
Patient mobility status  with no difficulty. Provider aware     I have reviewed discharge instructions with the patient and caregiver.  The patient and caregiver verbalized understanding.    Patient left ED via Discharge Method: wheelchair to Home with Extended Family:.    Opportunity for questions and clarification provided.     Patient given 0 scripts.

## 2024-10-17 NOTE — DISCHARGE INSTRUCTIONS
You may take Tylenol as needed for pain symptoms.  We recommend follow-up with your primary care provider for further discussion, consideration of referral to physical therapy, further reevaluation.    Return as needed for any questions, concerns or worsening symptoms, particularly increasing/unremitting abdominal pain, development of increasing/unremitting chest pain or shortness of breath symptoms, recurrent vomiting, difficulty walking or talking, frequent falls, change in personality, increasing/unremitting headache.

## 2024-10-17 NOTE — ED TRIAGE NOTES
Patient fell while sitting in a chair. She was leaning forward to pick something up and fell onto the floor. Patient c/o right knee pain, has abrasions on bilateral elbows. No LOC. Is on blood thinner. . /102

## 2024-10-17 NOTE — ED PROVIDER NOTES
Emergency Department Provider Note                   PCP:                Francisco Javier Aguilar Jr., MD               Age: 84 y.o.      Sex: female   Final diagnosis/impression:  1. Fall, initial encounter    2. Generalized headache    3. Strain of neck muscle, initial encounter    4. Acute pain of right knee       Disposition: Discharged    MDM/Clinical Course:  Patient seen by myself at the [facility] emergency department. Patient had signs symptoms and clinical history most consistent with suspected mechanical fall. My independent analysis/interpretation of laboratory work-up here shows CBC shows leukocytosis at 22.1 suspect this is in conjunction with recent steroid use, hemoglobin 14, platelets 472.  CMP shows mild hyponatremia which is likely chronic, sodium 127, chloride 93, normal renal function, LFTs generally unremarkable.  NT proBNP only minimally elevated at 989, not felt to be acute/emergent.  Urinalysis is unremarkable for acute/emergent abnormality.  Noncontrast CT head and cervical spine negative for acute/emergent abnormality.  This was ordered given age greater than 65, headache symptoms in the context of fall, chronic anticoagulation use.  Right knee x-ray shows no acute fracture dislocation or effusion.  Overall recommended follow-up with primary care provider, close monitor of symptoms, low threshold to return as needed.  While under my care, patient received 5 mg IV Reglan for headache, several areas of skin tear on the forearm were cleaned and dressed.  Patient/family given instructions to return as needed for any questions, concerns or worsening symptoms, particularly those as outlined in the disposition section / discharge section of patient discharge paperwork. Patient/family verbalizes understanding and agreement with ED course/plan in shared medical decision making. Questions answered.    Complexity of Problems Addressed:  1 or more acute illnesses that pose a threat to life or

## 2024-10-18 ENCOUNTER — PATIENT MESSAGE (OUTPATIENT)
Age: 84
End: 2024-10-18

## 2024-10-18 ENCOUNTER — CARE COORDINATION (OUTPATIENT)
Dept: CARE COORDINATION | Facility: CLINIC | Age: 84
End: 2024-10-18

## 2024-10-18 NOTE — CARE COORDINATION
Ambulatory Care Coordination Note     10/18/2024 7:42 AM     ACM outreach attempt by this ACM today to offer care management services. ACM was unable to reach the patient by telephone today; left voice message requesting a return phone call to this ACM.     ACM: Chery Whitney RN       PCP/Specialist follow up:   Future Appointments         Provider Specialty Dept Phone    10/19/2024 10:00 AM [facility] MRI UNIT 1 Radiology [phone]    10/22/2024 1:00 PM Blanche Huerta MD Oncology [phone]    10/24/2024 9:45 AM Francisco Javier Aguilar Jr., MD Family [facility] [phone]    11/12/2024 10:45 AM Francisco Javier Aguilar Jr., MD [facility] [phone]    2/13/2025 8:45 AM Olayinka Travis MD Cardiology [phone]    2/13/2025 8:45 AM [facility] DEVICE 39 Cardiology [phone]    2/26/2025 10:00 AM Stephen Masterson MD Neurology [phone]    8/8/2025 8:00 AM [facility] LAB RESOURCE [facility] [phone]    8/15/2025 8:00 AM Francisco Javier Aguilar Jr., MD [facility] [phone]            Follow Up:   Plan for next ACM outreach in approximately 1 week to complete:  - outreach attempt to offer care management services.

## 2024-10-19 ENCOUNTER — HOSPITAL ENCOUNTER (OUTPATIENT)
Dept: MRI IMAGING | Age: 84
Discharge: HOME | End: 2024-10-22

## 2024-10-19 NOTE — PROGRESS NOTES
Patient arrived for MRI today. Upon screening patient for MRI safety, I was informed Mrs Collins has a pacemaker. When the appointment was scheduled, the question asked about any implanted devices was denied. We were uninformed of her device to properly schedule. These are only done [place] Monday-Friday between the hours of 8am-4pm with a device representative on site to place device in MRI mode. We were unfortunately unable to complete Mrs Wang MRI today. I will personally reach out to scheduling to have her placed on the schedule properly to accommodate her device.

## 2024-10-21 ENCOUNTER — CARE COORDINATION (OUTPATIENT)
Dept: CARE COORDINATION | Facility: CLINIC | Age: 84
End: 2024-10-21

## 2024-10-21 NOTE — PROGRESS NOTES
NEW PATIENT INTAKE    Referral Diagnosis:  Leukocytosis, unspecified type      Referring Provider:  Francisco Javier Aguilar Jr., MD    Primary Care Provider: Francisco Javier Aguilar Jr., MD    Family History of Cancer/ Hematology Disorders: Brother with pancreatic cancer    Presenting Symptoms: Leukocytosis, unspecified type    Chronological History of Pertinent Events:     85yo white female    9/7/24 - Patient went to ED ([facility])  Patient presents to the ED with a recurrent episode of syncope  Occurred while she was seated at a restaurant and eating a sandwich  Pacer interrogation revealed no evidence of cardiac dysrhythmia  Reports fatigue, malaise, arthralgias, syncope and confusion.  Other workup unremarkable  Patient will be referred to neurology for outpatient workup including to assess for the possibility of a seizure disorder.  CT Scan CT head negative for acute disease.  Abnormal labs (9/7/24 - EPIC)  Sodium - 127   Chloride - 95   Glucose - 122  Calcium - 8.3   A/G Ratio - 0.9  Albumin - 3.0  Alk Phos - 194  AST - 51   WBC - 11.2  Hgb - 11.3   Hct - 34.3   MPV - 8.6    9/23/24 - Met with PCP ([facility])  Here for f/u  Since last visit has seen neuro. States that the most recent episode was likely related to BP fluctuation because of the timing but that the episode last year might have been seizure activity. Has MRI and EEG planned. Started her on Keppra.  Chronic cough had been stable with a mixture containing 1-2 tbsp of ETOH. Told her to stop this because of possible decrease in seizure threshold. As a result, cough has increased with a vengeance. No fever. No mucus production. Continues to walk regularly. Some SOB resolves with rest. No chest pain or swelling.  Latest labs indicated mild WBC elevation. Sodium was stable but still a little low.  Only taking half losartan. Home BP readings are stable.  ASSESSMENT and PLAN  Chronic cough - Rx: brompheniramine-pseudoephedrine-DM provided.  Transient alteration of awareness -

## 2024-10-21 NOTE — CARE COORDINATION
Understanding    Educate ambulation safety, taught by Chery Whitney RN at 10/21/2024 11:08 AM.  Learner: Patient  Readiness: Acceptance  Method: Explanation  Response: Verbalizes Understanding    Lifestyle Changes/Goal Setting, taught by Chery Whitney RN at 10/21/2024 11:08 AM.  Learner: Patient  Readiness: Acceptance  Method: Explanation  Response: Verbalizes Understanding    General medication information, taught by Chery Whitney RN at 10/21/2024 11:08 AM.  Learner: Patient  Readiness: Acceptance  Method: Explanation  Response: Verbalizes Understanding    Educate reporting changes in condition, taught by Chery Whitney RN at 10/21/2024 11:08 AM.  Learner: Patient  Readiness: Acceptance  Method: Explanation  Response: Verbalizes Understanding    Educate Patient on When to Call for Symptoms, taught by Chery Whitney RN at 10/21/2024 11:08 AM.  Learner: Patient  Readiness: Acceptance  Method: Explanation  Response: Verbalizes Understanding    Educate limitations or barriers to activity and/or exercise on discharge, taught by Chery Whitney RN at 10/21/2024 11:08 AM.  Learner: Patient  Readiness: Acceptance  Method: Explanation  Response: Verbalizes Understanding    COGNITIVE : DISCHARGE PLANNING, taught by Chery Whitney RN at 10/21/2024 11:08 AM.  Learner: Patient  Readiness: Acceptance  Method: Explanation  Response: Verbalizes Understanding    Educate dietary adherence benefits, taught by Chery Whitney RN at 10/21/2024 11:08 AM.  Learner: Patient  Readiness: Acceptance  Method: Explanation  Response: Verbalizes Understanding    Educate caregiver effective coping behavior, taught by Chery Whitney RN at 10/21/2024 11:08 AM.  Learner: Patient  Readiness: Acceptance  Method: Explanation  Response: Verbalizes Understanding    Adaptive Equipment, taught by Chery Whitney RN at 10/21/2024 11:08 AM.  Learner: Patient  Readiness: Acceptance  Method: Explanation  Response: Verbalizes

## 2024-10-22 ENCOUNTER — HOSPITAL ENCOUNTER (OUTPATIENT)
Dept: LAB | Age: 84
Discharge: HOME | End: 2024-10-22
Payer: MEDICARE

## 2024-10-22 ENCOUNTER — OFFICE VISIT (OUTPATIENT)
Dept: ONCOLOGY | Age: 84
End: 2024-10-22
Payer: MEDICARE

## 2024-10-22 VITALS
HEART RATE: 78 BPM | HEIGHT: 67 IN | RESPIRATION RATE: 16 BRPM | DIASTOLIC BLOOD PRESSURE: 82 MMHG | OXYGEN SATURATION: 100 % | BODY MASS INDEX: 23.23 KG/M2 | TEMPERATURE: 97.9 F | WEIGHT: 148 LBS | SYSTOLIC BLOOD PRESSURE: 182 MMHG

## 2024-10-22 DIAGNOSIS — D72.829 LEUKOCYTOSIS, UNSPECIFIED TYPE: ICD-10-CM

## 2024-10-22 DIAGNOSIS — M17.11 OSTEOARTHRITIS OF RIGHT KNEE, UNSPECIFIED OSTEOARTHRITIS TYPE: Primary | ICD-10-CM

## 2024-10-22 DIAGNOSIS — M17.11 OSTEOARTHRITIS OF RIGHT KNEE, UNSPECIFIED OSTEOARTHRITIS TYPE: ICD-10-CM

## 2024-10-22 LAB
BASOPHILS # BLD: 0.1 K/UL (ref 0–0.2)
BASOPHILS NFR BLD: 1 % (ref 0–2)
DIFFERENTIAL METHOD BLD: ABNORMAL
EOSINOPHIL # BLD: 0.3 K/UL (ref 0–0.8)
EOSINOPHIL NFR BLD: 2 % (ref 0.5–7.8)
ERYTHROCYTE [DISTWIDTH] IN BLOOD BY AUTOMATED COUNT: 15 % (ref 11.9–14.6)
ERYTHROCYTE [SEDIMENTATION RATE] IN BLOOD: 30 MM/HR (ref 0–30)
HCT VFR BLD AUTO: 34.9 % (ref 35.8–46.3)
HGB BLD-MCNC: 11.8 G/DL (ref 11.7–15.4)
IMM GRANULOCYTES # BLD AUTO: 0.1 K/UL (ref 0–0.5)
IMM GRANULOCYTES NFR BLD AUTO: 1 % (ref 0–5)
LDH SERPL L TO P-CCNC: 220 U/L (ref 127–281)
LYMPHOCYTES # BLD: 2.1 K/UL (ref 0.5–4.6)
LYMPHOCYTES NFR BLD: 17 % (ref 13–44)
MCH RBC QN AUTO: 28 PG (ref 26.1–32.9)
MCHC RBC AUTO-ENTMCNC: 33.8 G/DL (ref 31.4–35)
MCV RBC AUTO: 82.7 FL (ref 82–102)
MONOCYTES # BLD: 1.7 K/UL (ref 0.1–1.3)
MONOCYTES NFR BLD: 13 % (ref 4–12)
NEUTS SEG # BLD: 8.4 K/UL (ref 1.7–8.2)
NEUTS SEG NFR BLD: 66 % (ref 43–78)
NRBC # BLD: 0 K/UL (ref 0–0.2)
PLATELET # BLD AUTO: 352 K/UL (ref 150–450)
PMV BLD AUTO: 9.1 FL (ref 9.4–12.3)
RBC # BLD AUTO: 4.22 M/UL (ref 4.05–5.2)
WBC # BLD AUTO: 12.6 K/UL (ref 4.3–11.1)

## 2024-10-22 PROCEDURE — 1123F ACP DISCUSS/DSCN MKR DOCD: CPT | Performed by: STUDENT IN AN ORGANIZED HEALTH CARE EDUCATION/TRAINING PROGRAM

## 2024-10-22 PROCEDURE — 86140 C-REACTIVE PROTEIN: CPT

## 2024-10-22 PROCEDURE — 85652 RBC SED RATE AUTOMATED: CPT

## 2024-10-22 PROCEDURE — 85025 COMPLETE CBC W/AUTO DIFF WBC: CPT

## 2024-10-22 PROCEDURE — 86038 ANTINUCLEAR ANTIBODIES: CPT

## 2024-10-22 PROCEDURE — 36415 COLL VENOUS BLD VENIPUNCTURE: CPT

## 2024-10-22 PROCEDURE — 99204 OFFICE O/P NEW MOD 45 MIN: CPT | Performed by: STUDENT IN AN ORGANIZED HEALTH CARE EDUCATION/TRAINING PROGRAM

## 2024-10-22 PROCEDURE — 83615 LACTATE (LD) (LDH) ENZYME: CPT

## 2024-10-22 PROCEDURE — 86430 RHEUMATOID FACTOR TEST QUAL: CPT

## 2024-10-22 ASSESSMENT — PATIENT HEALTH QUESTIONNAIRE - PHQ9
SUM OF ALL RESPONSES TO PHQ QUESTIONS 1-9: 1
1. LITTLE INTEREST OR PLEASURE IN DOING THINGS: NOT AT ALL
SUM OF ALL RESPONSES TO PHQ QUESTIONS 1-9: 1
SUM OF ALL RESPONSES TO PHQ9 QUESTIONS 1 & 2: 1
2. FEELING DOWN, DEPRESSED OR HOPELESS: SEVERAL DAYS

## 2024-10-22 NOTE — PATIENT INSTRUCTIONS
Patient Information from Today's Visit    Diagnosis: Leukocytosis      Follow Up Instructions:   - We will get labs today and contact you if there are any results that require an action.  - Dr. Huerta ordered an abdominal ultrasound. Radiology should call you to schedule it but you can call to schedule this also at [phone].   - Dr. Huerta will see you back in 3 months.    Treatment Summary has been discussed and given to patient: N/A    Current Labs: We will have blood work done today and call you if there are any abnormal results that require an action. Some lab testing can take 7-10 days or longer to get results.       Please refer to After Visit Summary or [facility] for upcoming appointment information. If you have any questions regarding your upcoming schedule please reach out to your care team through [facility] or call [phone].    Please notify your assigned Nurse Navigator of any unplanned hospital admissions or Emergency Room visits within 24 hours of discharge.    -------------------------------------------------------------------------------------------------------------------  Please call our office at [phone] if you have any  of the following symptoms:   Fever of 100.5 or greater  Chills  Shortness of breath  Swelling or pain in one leg    After office hours an answering service is available and will contact a provider for emergencies or if you are experiencing any of the above symptoms.    THERESE MAIER RN

## 2024-10-22 NOTE — PROGRESS NOTES
the right vertebral artery.   Normal antegrade flow involving the left vertebral artery.         Patient Active Problem List   Diagnosis    Essential hypertension    Paroxysmal atrial fibrillation (HCC)    Anemia, unspecified    Spinal stenosis    Osteoarthritis of right knee    Dyslipidemia    Chronic pain    Arthritis    Coronary artery disease of native artery of native heart with stable angina pectoris (HCC)    Gastroesophageal reflux disease with esophagitis without hemorrhage    Obesity (BMI 30-39.9)    S/P AVR    Abnormal CT of the chest    Cardiac pacemaker    Peripheral neuropathy    Status post total right knee replacement    Hyponatremia    S/P dilatation of esophageal stricture    Short-segment Liriano's esophagus    Allergic rhinitis    Hypomagnesemia    History of CVA with residual deficit    Chronic diastolic congestive heart failure (HCC)    Secondary hypercoagulable state (HCC)    Bilateral carotid artery stenosis    Transient alteration of awareness    Stenosis of left internal carotid artery         ASSESSMENT:     Diagnosis Orders   1. Osteoarthritis of right knee, unspecified osteoarthritis type  C-Reactive Protein    Sedimentation Rate    Rheumatoid Factor    FLORIN, Direct, w/Reflex      2. Leukocytosis, unspecified type  C-Reactive Protein    Sedimentation Rate    Rheumatoid Factor    FLORIN, Direct, w/Reflex    Path Review, Smear    CBC with Auto Differential    Lactate Dehydrogenase    US ABDOMEN COMPLETE        84-year-old woman is here to set up care for leukocytosis.      Patient has had high WBC count on and off since 2/18/2021 when the count was 12.5.  Patient's white blood cell count has returned to baseline multiple times last normal count was noted in 4/9/2024 at 10.8.  Patient's mild elevation in WBC count was noted on 8/28/2024 at 11.3 with subsequently increased to 22.1 on 10/17/2024.  Also noted to have slightly elevated platelet count of 472 on 10/17/2024 and of 462 on/9/24.

## 2024-10-23 LAB
CRP SERPL-MCNC: 6 MG/L (ref 0–10)
PATH REV BLD -IMP: NORMAL
RHEUMATOID FACT SER QL LA: NEGATIVE

## 2024-10-24 ENCOUNTER — OFFICE VISIT (OUTPATIENT)
Dept: FAMILY MEDICINE CLINIC | Facility: CLINIC | Age: 84
End: 2024-10-24

## 2024-10-24 VITALS
OXYGEN SATURATION: 97 % | HEART RATE: 78 BPM | TEMPERATURE: 97.1 F | BODY MASS INDEX: 23.34 KG/M2 | DIASTOLIC BLOOD PRESSURE: 70 MMHG | WEIGHT: 149 LBS | SYSTOLIC BLOOD PRESSURE: 118 MMHG

## 2024-10-24 DIAGNOSIS — T14.8XXA SKIN AVULSION: Primary | ICD-10-CM

## 2024-10-24 DIAGNOSIS — Z23 NEED FOR TDAP VACCINATION: ICD-10-CM

## 2024-10-24 DIAGNOSIS — E87.1 HYPONATREMIA: ICD-10-CM

## 2024-10-24 DIAGNOSIS — S80.01XD CONTUSION OF RIGHT KNEE, SUBSEQUENT ENCOUNTER: ICD-10-CM

## 2024-10-24 DIAGNOSIS — I10 PRIMARY HYPERTENSION: ICD-10-CM

## 2024-10-24 LAB — ANA SER QL: NEGATIVE

## 2024-10-24 ASSESSMENT — ENCOUNTER SYMPTOMS
WHEEZING: 1
COUGH: 1
SHORTNESS OF BREATH: 0
CHEST TIGHTNESS: 0
EYES NEGATIVE: 1
GASTROINTESTINAL NEGATIVE: 1
CHOKING: 0
APNEA: 0

## 2024-10-24 NOTE — PROGRESS NOTES
ASSESSMENT and PLAN    Marcelina was seen today for follow-up from hospital.    Diagnoses and all orders for this visit:    Skin avulsion    Contusion of right knee, subsequent encounter    Need for Tdap vaccination    Primary hypertension    Hyponatremia     Notified BP is stable.  Reviewed wound care. Follow up if signs of infection develop.  Tdap given.  Discussed history and medications with patient. Continue current measures. Follow up if side effects occur or if new symptoms develop.  Follow up with specialists as planned.    Return in about 4 weeks (around 11/21/2024), or if symptoms worsen or fail to improve.    TRAMAINE PAUL JR, MD

## 2024-10-28 ENCOUNTER — CARE COORDINATION (OUTPATIENT)
Dept: CARE COORDINATION | Facility: CLINIC | Age: 84
End: 2024-10-28

## 2024-10-28 NOTE — CARE COORDINATION
Ambulatory Care Coordination Note     10/28/2024 10:10 AM     ACM outreach attempt by this ACM today to perform care management follow up . ACM was unable to reach the patient by telephone today; left voice message requesting a return phone call to this ACM.     ACM: Chery Whitney RN      PCP/Specialist follow up:   Future Appointments         Provider Specialty Dept Phone    11/11/2024 9:30 AM [facility] US LOGIC 7 Radiology [phone]    11/12/2024 10:45 AM Francisco Javier Aguilar Jr., MD Family Medicine [phone]    12/17/2024 3:00 PM Blanche Huerta MD Oncology [phone]    2/13/2025 8:45 AM Olayinka Travis MD Cardiology [phone]    2/13/2025 8:45 AM [facility] DEVICE 39 Cardiology [phone]    2/26/2025 10:00 AM Stephen Masterson MD Neurology [phone]    8/8/2025 8:00 AM [facility] LAB RESOURCE [facility] [phone]    8/15/2025 8:00 AM Francisco Javier Aguilar Jr., MD Family Medicine [phone]            Follow Up:   Plan for next ACM outreach in approximately 1 week to complete:  - goal progression  - education .

## 2024-11-04 ENCOUNTER — CARE COORDINATION (OUTPATIENT)
Dept: CARE COORDINATION | Facility: CLINIC | Age: 84
End: 2024-11-04

## 2024-11-04 PROCEDURE — 93294 REM INTERROG EVL PM/LDLS PM: CPT | Performed by: INTERNAL MEDICINE

## 2024-11-04 PROCEDURE — 93296 REM INTERROG EVL PM/IDS: CPT | Performed by: INTERNAL MEDICINE

## 2024-11-04 NOTE — CARE COORDINATION
Ambulatory Care Coordination Note     11/4/2024 9:41 AM     ACM outreach attempt by this ACM today to perform care management follow up . ACM was unable to reach the patient by telephone today; left voice message requesting a return phone call to this ACM.     ACM: Chery Whitney RN         PCP/Specialist follow up:   Future Appointments         Provider Specialty Dept Phone    11/11/2024 9:30 AM [facility] US LOGIC 7 Radiology [phone]    11/12/2024 10:45 AM Francisco Javier Aguilar Jr., MD Family Medicine [phone]    11/20/2024 2:00 PM [facility] MRI UNIT 1 Radiology [phone]    12/17/2024 3:00 PM Blanche Huerta MD Oncology [phone]    2/13/2025 8:45 AM Olayinka Travis MD Cardiology [phone]    2/13/2025 8:45 AM [facility] DEVICE 39 Cardiology [phone]    2/26/2025 10:00 AM Stephen Masterson MD Neurology [phone]    8/8/2025 8:00 AM [facility] LAB RESOURCE [facility] [phone]    8/15/2025 8:00 AM Francisco Javier Aguilar Jr., MD Family Medicine [phone]            Follow Up:   Plan for next ACM outreach in approximately 1 week to complete:  - goal progression  - education .

## 2024-11-11 ENCOUNTER — HOSPITAL ENCOUNTER (OUTPATIENT)
Dept: ULTRASOUND IMAGING | Age: 84
Discharge: HOME | End: 2024-11-14
Payer: MEDICARE

## 2024-11-11 ENCOUNTER — CARE COORDINATION (OUTPATIENT)
Dept: CARE COORDINATION | Facility: CLINIC | Age: 84
End: 2024-11-11

## 2024-11-11 DIAGNOSIS — D72.829 LEUKOCYTOSIS, UNSPECIFIED TYPE: ICD-10-CM

## 2024-11-11 PROCEDURE — 76700 US EXAM ABDOM COMPLETE: CPT

## 2024-11-11 NOTE — CARE COORDINATION
Ambulatory Care Coordination Note     11/11/2024 10:40 AM     patient outreach attempt by this ACM today to perform care management follow up . ACM was unable to reach the patient by telephone today; left voice message requesting a return phone call to this ACM.

## 2024-11-12 ENCOUNTER — TELEPHONE (OUTPATIENT)
Dept: FAMILY MEDICINE CLINIC | Facility: CLINIC | Age: 84
End: 2024-11-12

## 2024-11-12 ENCOUNTER — OFFICE VISIT (OUTPATIENT)
Dept: FAMILY MEDICINE CLINIC | Facility: CLINIC | Age: 84
End: 2024-11-12

## 2024-11-12 VITALS
OXYGEN SATURATION: 93 % | HEART RATE: 81 BPM | TEMPERATURE: 97.1 F | SYSTOLIC BLOOD PRESSURE: 112 MMHG | BODY MASS INDEX: 22.87 KG/M2 | DIASTOLIC BLOOD PRESSURE: 62 MMHG | WEIGHT: 146 LBS

## 2024-11-12 DIAGNOSIS — D72.829 LEUKOCYTOSIS, UNSPECIFIED TYPE: ICD-10-CM

## 2024-11-12 DIAGNOSIS — R10.11 RIGHT UPPER QUADRANT ABDOMINAL PAIN: Primary | ICD-10-CM

## 2024-11-12 DIAGNOSIS — K86.89 DILATED PANCREATIC DUCT: Primary | ICD-10-CM

## 2024-11-12 ASSESSMENT — ENCOUNTER SYMPTOMS
ABDOMINAL PAIN: 0
GASTROINTESTINAL NEGATIVE: 1
RESPIRATORY NEGATIVE: 1

## 2024-11-12 NOTE — TELEPHONE ENCOUNTER
----- Message from Dr. Francisco Javier Aguilar MD sent at 11/12/2024  7:27 AM EST -----  Please make sure [facility] is planned to check for blockage.  ----- Message -----  From: Masood Mirza Incoming Orders Results To Radiant  Sent: 11/11/2024  11:53 PM EST  To: Francisco Javier Aguilar Jr., MD

## 2024-11-12 NOTE — PROGRESS NOTES
HISTORY OF PRESENT ILLNESS    Marcelina Collins is a 84 y.o. female.  HPI  Chief Complaint   Patient presents with    3 Month Follow-Up     See above. Stable on current regimen.  Had seen hematologist recently and pt had some RUQ tenderness. US was ordered and indicated some duct dilation implying possible retained stone vs obstruction af pancreatic duct. MRCP was recommended. No pain at present.      Current Outpatient Medications on File Prior to Visit   Medication Sig Dispense Refill    atorvastatin (LIPITOR) 40 MG tablet Take 1 tablet by mouth daily Pt reports taking in the evening 90 tablet 3    albuterol sulfate HFA (VENTOLIN HFA) 108 (90 Base) MCG/ACT inhaler Inhale 2 puffs into the lungs 4 times daily as needed for Wheezing 54 g 1    levETIRAcetam (KEPPRA) 500 MG tablet Take 1 tablet by mouth 2 times daily 60 tablet 3    fluticasone-salmeterol (ADVAIR) 250-50 MCG/ACT AEPB diskus inhaler Inhale 1 puff into the lungs in the morning and 1 puff in the evening. 60 each 3    sotalol (BETAPACE) 80 MG tablet TAKE 1 TABLET BY MOUTH EVERY DAY 90 tablet 2    apixaban (ELIQUIS) 5 MG TABS tablet Take 1 tablet by mouth in the morning and 1 tablet in the evening. TAKE 1 TABLET TWICE DAILY. 180 tablet 3    furosemide (LASIX) 40 MG tablet 1/2-1 po qam  prn swelling 60 tablet 3    fluticasone (FLONASE) 50 MCG/ACT nasal spray 1 spray by Each Nostril route daily 32 g 3    mometasone (ELOCON) 0.1 % lotion       docusate (COLACE, DULCOLAX) 100 MG CAPS Take 100 mg by mouth nightly as needed (constipation)      losartan (COZAAR) 25 MG tablet Take 0.5 tablets by mouth daily (Patient not taking: Reported on 11/12/2024)       No current facility-administered medications on file prior to visit.     Past Medical History:   Diagnosis Date    Adverse effect of anesthesia     delayed awakening x1- with heart surgery    DARRELL (acute kidney injury) (HCC) 06/08/2013    pt reports after TIA    Allergic rhinitis     has inhaler daily (pt denies

## 2024-11-12 NOTE — RESULT ENCOUNTER NOTE
Patient has Borderline dilated pancreatic and common bile ducts. MRCP is recommended to exclude obstructing lesion. Dr. Aguilar has already ordered MRCP for her. Please let her know the results and to get the scan done. She has also been referred to GI by Dr. Aguilar.   Thanks

## 2024-11-13 ENCOUNTER — TELEPHONE (OUTPATIENT)
Dept: ONCOLOGY | Age: 84
End: 2024-11-13

## 2024-11-13 NOTE — TELEPHONE ENCOUNTER
Spoke to patient's son, Rip, who is aware the MRCP needs to be scheduled. He was given the Radiology Scheduling number and will call the number if he doesn't hear from them in a few days. He is aware the MRCP has to be authorized by pt's insurance first.    ----- Message from Dr. Blanche Huerta MD sent at 11/12/2024  4:53 PM EST -----  Patient has Borderline dilated pancreatic and common bile ducts. MRCP is recommended to exclude obstructing lesion. Dr. Aguilar has already ordered MRCP for her. Please let her know the results and to get the scan done. She has also been referred to GI by Dr. Aguilar.   Thanks

## 2024-11-14 ENCOUNTER — TELEPHONE (OUTPATIENT)
Dept: FAMILY MEDICINE CLINIC | Facility: CLINIC | Age: 84
End: 2024-11-14

## 2024-11-14 DIAGNOSIS — D72.829 LEUKOCYTOSIS, UNSPECIFIED TYPE: ICD-10-CM

## 2024-11-14 DIAGNOSIS — K86.89 DILATED PANCREATIC DUCT: Primary | ICD-10-CM

## 2024-11-14 NOTE — TELEPHONE ENCOUNTER
[facility] Sched[facility] called and said They need a new referral of the MRI Abdomen but it has to be without contrast.

## 2024-11-20 ENCOUNTER — TELEPHONE (OUTPATIENT)
Dept: NEUROLOGY | Age: 84
End: 2024-11-20

## 2024-11-20 ENCOUNTER — HOSPITAL ENCOUNTER (OUTPATIENT)
Dept: MRI IMAGING | Age: 84
Discharge: HOME | End: 2024-11-23
Payer: MEDICARE

## 2024-11-20 DIAGNOSIS — I65.22 STENOSIS OF LEFT INTERNAL CAROTID ARTERY: Primary | ICD-10-CM

## 2024-11-20 PROCEDURE — 70551 MRI BRAIN STEM W/O DYE: CPT

## 2024-11-20 NOTE — TELEPHONE ENCOUNTER
MRI of brain negative for acute abnormalities.  Flow void for the left internal carotid artery at the skull base is newly diminutive in comparison to the 3/10/2021 exam. She has known severe LICA stenosis, followed by vascular surgery. She is currently on full medical management and she is not a surgical candidate. Will plan to repeat CTA after discussion with patient and son. Maintain SBP >160 and avoid preciptious drops and drastic fluctuations in BP. She is high risk for recurrent stroke. Patient and son verbalized understanding.    [id number]

## 2024-12-10 ENCOUNTER — HOSPITAL ENCOUNTER (OUTPATIENT)
Dept: MRI IMAGING | Age: 84
Discharge: HOME | End: 2024-12-13
Attending: FAMILY MEDICINE
Payer: MEDICARE

## 2024-12-10 DIAGNOSIS — D72.829 LEUKOCYTOSIS, UNSPECIFIED TYPE: ICD-10-CM

## 2024-12-10 DIAGNOSIS — K86.89 DILATED PANCREATIC DUCT: ICD-10-CM

## 2024-12-10 PROCEDURE — 74181 MRI ABDOMEN W/O CONTRAST: CPT

## 2024-12-12 ENCOUNTER — TELEPHONE (OUTPATIENT)
Dept: FAMILY MEDICINE CLINIC | Facility: CLINIC | Age: 84
End: 2024-12-12

## 2024-12-12 NOTE — TELEPHONE ENCOUNTER
----- Message from Dr. Francisco Javier Aguilar MD sent at 12/12/2024  1:15 PM EST -----  Shows multiple small lesions around pancreas. Probably cysts but recommend GI referral to confirm: \"evaluate pancreas lesions seen on MRI\"  ----- Message -----  From: Hermes, [facility] Incoming Orders Results To Radiant  Sent: 12/12/2024  12:36 PM EST  To: Francisco Javier Aguilar Jr., MD

## 2025-01-03 ENCOUNTER — OFFICE VISIT (OUTPATIENT)
Dept: GASTROENTEROLOGY | Age: 85
End: 2025-01-03

## 2025-01-03 ENCOUNTER — PATIENT MESSAGE (OUTPATIENT)
Age: 85
End: 2025-01-03

## 2025-01-03 VITALS
HEIGHT: 67 IN | BODY MASS INDEX: 22.41 KG/M2 | WEIGHT: 142.8 LBS | SYSTOLIC BLOOD PRESSURE: 163 MMHG | DIASTOLIC BLOOD PRESSURE: 72 MMHG | TEMPERATURE: 97.6 F | OXYGEN SATURATION: 97 % | RESPIRATION RATE: 18 BRPM | HEART RATE: 72 BPM

## 2025-01-03 DIAGNOSIS — K59.00 CONSTIPATION, UNSPECIFIED CONSTIPATION TYPE: ICD-10-CM

## 2025-01-03 DIAGNOSIS — R74.8 ELEVATED ALKALINE PHOSPHATASE MEASUREMENT: ICD-10-CM

## 2025-01-03 DIAGNOSIS — R19.8 ABDOMINAL FULLNESS: ICD-10-CM

## 2025-01-03 DIAGNOSIS — D49.0 IPMN (INTRADUCTAL PAPILLARY MUCINOUS NEOPLASM): Primary | ICD-10-CM

## 2025-01-03 DIAGNOSIS — K22.70 BARRETT'S ESOPHAGUS WITHOUT DYSPLASIA: ICD-10-CM

## 2025-01-03 ASSESSMENT — ENCOUNTER SYMPTOMS
ABDOMINAL PAIN: 1
CONSTIPATION: 1
BLOOD IN STOOL: 0
VOMITING: 0
NAUSEA: 0
DIARRHEA: 0

## 2025-01-03 NOTE — PATIENT INSTRUCTIONS
I would recommend taking Metamucil (psyllium husk) and increasing your fiber intake (goal of 21 to 25 grams of fiber per day; though I would start at 5 grams and increase by 5 every few days or so). If you would like a list of high fiber foods, let our office know and we can send you one. Use a daily stool softener as well; can use laxatives on occasion if you are having a hard time passing stools.    -If you start to develop significant diarrhea from the miralax, cut back on the dose. You can continue to take the metamucil/psyllium husk as it helps with both constipation and diarrhea. If you are still experiencing constipation after a month or so after trying the above recommendations, let our office know as we can consider starting you on constipation prescription medications. The constipation prescription medications are safe, but we like to use conservative/over the counter treatments first.     -I will have a doctor evaluate your MRI as well to make sure nothing concerning; we will re-check your MRCP in about 6 months to ensure the cysts have not grown in size. I will recheck your liver enzymes in about a month; if they trend up then we may need to complete more liver labs.

## 2025-01-03 NOTE — PROGRESS NOTES
she can take an H2 blocker, but would still recommend a daily therapy to control her reflux.            No follow-ups on file.       Subjective   HPI    Patient is an 84 year old female, with a PMHx of CHF (EF 55-60% in 2024), HTN, AFIB, pacemaker, carotid artery stenosis, Liriano's, GERD< esophageal stricture, who is presenting for RUQ pain. Had an US on 11/11 showing removed gallbladder, CBD 6 mm and no intrahepatic ductal dilation. Subsequent MRCP showing mild intra/extrahepatic dilation likely from prior makenzie, no CBD stones. Multiple cystic lesions communicating with main duct of pancreas, likely IPMN; recommending 6 month repeat.     Today: Patient reports some RUQ discomfort with palpation when at here Hematologist office. She may occasional have RUQ pain under her ribcage when she is trying to have a bowel movement. She may have occasional LUQ pain over past 3x days, though she has had a chronic cough that has been worsening; she states for at least the past year. Somewhat better with mucinex. Occasional blood tinged sputum. She has heartburn on occasion, but takes prilosec prn; doesn't help with her cough. She has not had heartburn for over a month. Aware of her history of Liriano's and told she no longer needs screening EGD.    She does report some fullness in generalized abdomen on occasion. She is unsure if its due to constipation; she does not have bowel movements daily. She takes Colace/Miralax generic daily and her BM have been softer the past few days. She was told to limit miralax due to chronic hyponatremia; she takes Lasix prn for LE swelling. She takes salt tablets. No nausea, vomiting, weight loss, blood in stool.     Labs in October: , ALT/AST and TB WNL, FLORIN negative.     Endoscopy: EGD in 9/2022 showing short segment of Liriano's, path showing without dysplasia. Also had EGD 7/2022 showing stenosis in proximal esophagus; dilated, and a sliding hiatal hernia.     Colonoscopy in 2021

## 2025-01-06 ENCOUNTER — HOSPITAL ENCOUNTER (OUTPATIENT)
Dept: CT IMAGING | Age: 85
Discharge: HOME | End: 2025-01-09
Payer: MEDICARE

## 2025-01-06 DIAGNOSIS — R40.4 TRANSIENT ALTERATION OF AWARENESS: ICD-10-CM

## 2025-01-06 DIAGNOSIS — I65.22 STENOSIS OF LEFT INTERNAL CAROTID ARTERY: ICD-10-CM

## 2025-01-06 LAB — CREAT BLD-MCNC: 0.55 MG/DL (ref 0.8–1.5)

## 2025-01-06 PROCEDURE — 82565 ASSAY OF CREATININE: CPT

## 2025-01-06 PROCEDURE — 70496 CT ANGIOGRAPHY HEAD: CPT

## 2025-01-06 PROCEDURE — 70498 CT ANGIOGRAPHY NECK: CPT | Performed by: RADIOLOGY

## 2025-01-06 PROCEDURE — 6360000004 HC RX CONTRAST MEDICATION: Performed by: NURSE PRACTITIONER

## 2025-01-06 PROCEDURE — 70496 CT ANGIOGRAPHY HEAD: CPT | Performed by: RADIOLOGY

## 2025-01-06 RX ORDER — LEVETIRACETAM 500 MG/1
500 TABLET ORAL 2 TIMES DAILY
Qty: 60 TABLET | Refills: 2 | Status: SHIPPED | OUTPATIENT
Start: 2025-01-06

## 2025-01-06 RX ORDER — IOPAMIDOL 755 MG/ML
100 INJECTION, SOLUTION INTRAVASCULAR
Status: COMPLETED | OUTPATIENT
Start: 2025-01-06 | End: 2025-01-06

## 2025-01-06 RX ADMIN — IOPAMIDOL 100 ML: 755 INJECTION, SOLUTION INTRAVENOUS at 13:42

## 2025-01-16 DIAGNOSIS — D72.829 LEUKOCYTOSIS, UNSPECIFIED TYPE: Primary | ICD-10-CM

## 2025-01-17 ENCOUNTER — HOSPITAL ENCOUNTER (OUTPATIENT)
Dept: LAB | Age: 85
Discharge: HOME | End: 2025-01-17
Payer: MEDICARE

## 2025-01-17 ENCOUNTER — OFFICE VISIT (OUTPATIENT)
Dept: ONCOLOGY | Age: 85
End: 2025-01-17
Payer: MEDICARE

## 2025-01-17 VITALS
DIASTOLIC BLOOD PRESSURE: 87 MMHG | RESPIRATION RATE: 11 BRPM | BODY MASS INDEX: 22.21 KG/M2 | OXYGEN SATURATION: 99 % | SYSTOLIC BLOOD PRESSURE: 186 MMHG | WEIGHT: 141.5 LBS | TEMPERATURE: 97.9 F | HEART RATE: 88 BPM | HEIGHT: 67 IN

## 2025-01-17 DIAGNOSIS — D72.821 MONOCYTOSIS: ICD-10-CM

## 2025-01-17 DIAGNOSIS — D72.829 LEUKOCYTOSIS, UNSPECIFIED TYPE: ICD-10-CM

## 2025-01-17 DIAGNOSIS — D72.829 LEUKOCYTOSIS, UNSPECIFIED TYPE: Primary | ICD-10-CM

## 2025-01-17 LAB
BASOPHILS # BLD: 0.08 K/UL (ref 0–0.2)
BASOPHILS NFR BLD: 0.6 % (ref 0–2)
DIFFERENTIAL METHOD BLD: ABNORMAL
EOSINOPHIL # BLD: 0.24 K/UL (ref 0–0.8)
EOSINOPHIL NFR BLD: 1.7 % (ref 0.5–7.8)
ERYTHROCYTE [DISTWIDTH] IN BLOOD BY AUTOMATED COUNT: 14 % (ref 11.9–14.6)
HCT VFR BLD AUTO: 37.2 % (ref 35.8–46.3)
HGB BLD-MCNC: 12.1 G/DL (ref 11.7–15.4)
IMM GRANULOCYTES # BLD AUTO: 0.05 K/UL (ref 0–0.5)
IMM GRANULOCYTES NFR BLD AUTO: 0.4 % (ref 0–5)
LYMPHOCYTES # BLD: 1.66 K/UL (ref 0.5–4.6)
LYMPHOCYTES NFR BLD: 12 % (ref 13–44)
MCH RBC QN AUTO: 27.3 PG (ref 26.1–32.9)
MCHC RBC AUTO-ENTMCNC: 32.5 G/DL (ref 31.4–35)
MCV RBC AUTO: 84 FL (ref 82–102)
MONOCYTES # BLD: 1.68 K/UL (ref 0.1–1.3)
MONOCYTES NFR BLD: 12.1 % (ref 4–12)
NEUTS SEG # BLD: 10.15 K/UL (ref 1.7–8.2)
NEUTS SEG NFR BLD: 73.2 % (ref 43–78)
NRBC # BLD: 0 K/UL (ref 0–0.2)
PLATELET # BLD AUTO: 360 K/UL (ref 150–450)
PMV BLD AUTO: 9 FL (ref 9.4–12.3)
RBC # BLD AUTO: 4.43 M/UL (ref 4.05–5.2)
WBC # BLD AUTO: 13.9 K/UL (ref 4.3–11.1)

## 2025-01-17 PROCEDURE — 85025 COMPLETE CBC W/AUTO DIFF WBC: CPT

## 2025-01-17 PROCEDURE — 1126F AMNT PAIN NOTED NONE PRSNT: CPT | Performed by: STUDENT IN AN ORGANIZED HEALTH CARE EDUCATION/TRAINING PROGRAM

## 2025-01-17 PROCEDURE — 99214 OFFICE O/P EST MOD 30 MIN: CPT | Performed by: STUDENT IN AN ORGANIZED HEALTH CARE EDUCATION/TRAINING PROGRAM

## 2025-01-17 PROCEDURE — 36415 COLL VENOUS BLD VENIPUNCTURE: CPT

## 2025-01-17 PROCEDURE — 1123F ACP DISCUSS/DSCN MKR DOCD: CPT | Performed by: STUDENT IN AN ORGANIZED HEALTH CARE EDUCATION/TRAINING PROGRAM

## 2025-01-17 ASSESSMENT — PATIENT HEALTH QUESTIONNAIRE - PHQ9
SUM OF ALL RESPONSES TO PHQ QUESTIONS 1-9: 0
SUM OF ALL RESPONSES TO PHQ QUESTIONS 1-9: 0
2. FEELING DOWN, DEPRESSED OR HOPELESS: NOT AT ALL
SUM OF ALL RESPONSES TO PHQ QUESTIONS 1-9: 0
1. LITTLE INTEREST OR PLEASURE IN DOING THINGS: NOT AT ALL
SUM OF ALL RESPONSES TO PHQ9 QUESTIONS 1 & 2: 0
SUM OF ALL RESPONSES TO PHQ QUESTIONS 1-9: 0

## 2025-01-17 NOTE — PROGRESS NOTES
week and drowsy state. Has chronic hyponatremia. Patient's follows up with vascular surgery for asymptomatic left ICA stenosis. She also recently had respiratory infection for which she just finished doxycyline today. She also received steroids prednisone 10 mg for 5-6 days and finished it last week on Thursday.       Sed rate 30.  Smear showed morphologically unremarkable red blood cells.  Absolute neutrophilia and monocytosis favor leukemoid reaction.  Persistent consider myeloproliferative neoplasm.  Morphologically unremarkable platelets.    Persistent leukocytosis with relative monocytosis.  We will do flow cytometry and BCR-ABL at the follow-up visit.    Follow-up in 6 months with prior CBC, flow cytometry in BCR-ABL.    All questions were asked and answered to the best of my ability.  Asked patient to call clinic for any questions or concerns prior to next visit.     In all, I spent 30 minutes in the care of Ms. Collins today, over 50% of which was in direct counseling and coordination of care.    Elements of this note have been dictated using speech recognition software. As a result, errors of speech recognition may have occurred.           Blanche Huerta MD  [facility]  [address] [place] [address]  Office : [phone]  Fax : [phone]

## 2025-01-17 NOTE — PATIENT INSTRUCTIONS
Patient Information from Today's Visit    Diagnosis: Leukocytosis      Follow Up Instructions:   -Reviewed lab  -We will get additional labs in 6 months since you are a hard stick.      Treatment Summary has been discussed and given to patient: N/A      Current Labs:   Hospital Outpatient Visit on 01/17/2025   Component Date Value Ref Range Status    WBC 01/17/2025 13.9 (H)  4.3 - 11.1 K/uL Final    RBC 01/17/2025 4.43  4.05 - 5.2 M/uL Final    Hemoglobin 01/17/2025 12.1  11.7 - 15.4 g/dL Final    Hematocrit 01/17/2025 37.2  35.8 - 46.3 % Final    MCV 01/17/2025 84.0  82.0 - 102.0 FL Final    MCH 01/17/2025 27.3  26.1 - 32.9 PG Final    MCHC 01/17/2025 32.5  31.4 - 35.0 g/dL Final    RDW 01/17/2025 14.0  11.9 - 14.6 % Final    Platelets 01/17/2025 360  150 - 450 K/uL Final    MPV 01/17/2025 9.0 (L)  9.4 - 12.3 FL Final    nRBC 01/17/2025 0.00  0.0 - 0.2 K/uL Final    **Note: Absolute NRBC parameter is now reported with Hemogram**    Neutrophils % 01/17/2025 73.2  43.0 - 78.0 % Final    Lymphocytes % 01/17/2025 12.0 (L)  13.0 - 44.0 % Final    Monocytes % 01/17/2025 12.1 (H)  4.0 - 12.0 % Final    Eosinophils % 01/17/2025 1.7  0.5 - 7.8 % Final    Basophils % 01/17/2025 0.6  0.0 - 2.0 % Final    Immature Granulocytes % 01/17/2025 0.4  0.0 - 5.0 % Final    Neutrophils Absolute 01/17/2025 10.15 (H)  1.70 - 8.20 K/UL Final    Lymphocytes Absolute 01/17/2025 1.66  0.50 - 4.60 K/UL Final    Monocytes Absolute 01/17/2025 1.68 (H)  0.10 - 1.30 K/UL Final    Eosinophils Absolute 01/17/2025 0.24  0.00 - 0.80 K/UL Final    Basophils Absolute 01/17/2025 0.08  0.00 - 0.20 K/UL Final    Immature Granulocytes Absolute 01/17/2025 0.05  0.00 - 0.50 K/UL Final    Differential Type 01/17/2025 AUTOMATED    Final       Please refer to After Visit Summary or LLUSTRE for upcoming appointment information. If you have any questions regarding your upcoming schedule please reach out to your care team through LLUSTRE or call

## 2025-02-11 SDOH — ECONOMIC STABILITY: FOOD INSECURITY: WITHIN THE PAST 12 MONTHS, THE FOOD YOU BOUGHT JUST DIDN'T LAST AND YOU DIDN'T HAVE MONEY TO GET MORE.: NEVER TRUE

## 2025-02-11 SDOH — ECONOMIC STABILITY: FOOD INSECURITY: WITHIN THE PAST 12 MONTHS, YOU WORRIED THAT YOUR FOOD WOULD RUN OUT BEFORE YOU GOT MONEY TO BUY MORE.: NEVER TRUE

## 2025-02-11 SDOH — ECONOMIC STABILITY: INCOME INSECURITY: IN THE LAST 12 MONTHS, WAS THERE A TIME WHEN YOU WERE NOT ABLE TO PAY THE MORTGAGE OR RENT ON TIME?: NO

## 2025-02-11 SDOH — ECONOMIC STABILITY: TRANSPORTATION INSECURITY
IN THE PAST 12 MONTHS, HAS THE LACK OF TRANSPORTATION KEPT YOU FROM MEDICAL APPOINTMENTS OR FROM GETTING MEDICATIONS?: NO

## 2025-02-13 ENCOUNTER — OFFICE VISIT (OUTPATIENT)
Dept: FAMILY MEDICINE CLINIC | Facility: CLINIC | Age: 85
End: 2025-02-13
Payer: MEDICARE

## 2025-02-13 VITALS
WEIGHT: 140 LBS | DIASTOLIC BLOOD PRESSURE: 80 MMHG | SYSTOLIC BLOOD PRESSURE: 120 MMHG | BODY MASS INDEX: 21.93 KG/M2 | TEMPERATURE: 97 F | HEART RATE: 97 BPM | OXYGEN SATURATION: 93 %

## 2025-02-13 DIAGNOSIS — I48.0 PAROXYSMAL ATRIAL FIBRILLATION (HCC): ICD-10-CM

## 2025-02-13 DIAGNOSIS — I50.32 CHRONIC DIASTOLIC CONGESTIVE HEART FAILURE (HCC): ICD-10-CM

## 2025-02-13 DIAGNOSIS — R04.2 HEMOPTYSIS: ICD-10-CM

## 2025-02-13 DIAGNOSIS — R05.2 SUBACUTE COUGH: Primary | ICD-10-CM

## 2025-02-13 DIAGNOSIS — E87.1 HYPONATREMIA: ICD-10-CM

## 2025-02-13 PROCEDURE — 99213 OFFICE O/P EST LOW 20 MIN: CPT | Performed by: FAMILY MEDICINE

## 2025-02-13 PROCEDURE — 1123F ACP DISCUSS/DSCN MKR DOCD: CPT | Performed by: FAMILY MEDICINE

## 2025-02-13 PROCEDURE — 1159F MED LIST DOCD IN RCRD: CPT | Performed by: FAMILY MEDICINE

## 2025-02-13 RX ORDER — DEXTROMETHORPHAN HYDROBROMIDE AND PROMETHAZINE HYDROCHLORIDE 15; 6.25 MG/5ML; MG/5ML
SYRUP ORAL
Qty: 240 ML | Refills: 1 | Status: SHIPPED | OUTPATIENT
Start: 2025-02-13

## 2025-02-13 ASSESSMENT — ENCOUNTER SYMPTOMS
COUGH: 1
GASTROINTESTINAL NEGATIVE: 1
EYES NEGATIVE: 1
CHEST TIGHTNESS: 0
SHORTNESS OF BREATH: 0
WHEEZING: 0
CHOKING: 0
APNEA: 0

## 2025-02-13 NOTE — PROGRESS NOTES
(HCC)    Hemoptysis  -     Cancel: CBC with Auto Differential; Future  -     Cancel: CBC with Auto Differential  -     XR CHEST PA LAT (2 VIEWS); Future    Hyponatremia  -     Cancel: Basic Metabolic Panel; Future  -     Cancel: Basic Metabolic Panel  -     Basic Metabolic Panel; Future  -     CBC with Auto Differential; Future     Resume Advair. Continue albuterol prn. Trial of phenergan DM to see if this works better to control cough.  Notify CXR results.  Notify lab results  Follow up with specialists as planned.    Return in about 3 months (around 5/13/2025), or if symptoms worsen or fail to improve.    TRAMAINE PAUL JR, MD

## 2025-02-18 ENCOUNTER — LAB (OUTPATIENT)
Dept: FAMILY MEDICINE CLINIC | Facility: CLINIC | Age: 85
End: 2025-02-18

## 2025-02-18 DIAGNOSIS — R05.3 CHRONIC COUGH: ICD-10-CM

## 2025-02-18 DIAGNOSIS — I50.32 CHRONIC DIASTOLIC CONGESTIVE HEART FAILURE (HCC): ICD-10-CM

## 2025-02-18 DIAGNOSIS — E87.1 HYPONATREMIA: ICD-10-CM

## 2025-02-18 LAB
ANION GAP SERPL CALC-SCNC: 11 MMOL/L (ref 7–16)
BASOPHILS # BLD: 0.13 K/UL (ref 0–0.2)
BASOPHILS NFR BLD: 0.8 % (ref 0–2)
BUN SERPL-MCNC: 11 MG/DL (ref 8–23)
CALCIUM SERPL-MCNC: 9.4 MG/DL (ref 8.8–10.2)
CHLORIDE SERPL-SCNC: 92 MMOL/L (ref 98–107)
CO2 SERPL-SCNC: 23 MMOL/L (ref 20–29)
CREAT SERPL-MCNC: 0.69 MG/DL (ref 0.6–1.1)
DIFFERENTIAL METHOD BLD: ABNORMAL
EOSINOPHIL # BLD: 0.38 K/UL (ref 0–0.8)
EOSINOPHIL NFR BLD: 2.2 % (ref 0.5–7.8)
ERYTHROCYTE [DISTWIDTH] IN BLOOD BY AUTOMATED COUNT: 14.1 % (ref 11.9–14.6)
GLUCOSE SERPL-MCNC: 98 MG/DL (ref 70–99)
HCT VFR BLD AUTO: 37 % (ref 35.8–46.3)
HGB BLD-MCNC: 11.9 G/DL (ref 11.7–15.4)
IMM GRANULOCYTES # BLD AUTO: 0.1 K/UL (ref 0–0.5)
IMM GRANULOCYTES NFR BLD AUTO: 0.6 % (ref 0–5)
LYMPHOCYTES # BLD: 1.75 K/UL (ref 0.5–4.6)
LYMPHOCYTES NFR BLD: 10.2 % (ref 13–44)
MCH RBC QN AUTO: 26.6 PG (ref 26.1–32.9)
MCHC RBC AUTO-ENTMCNC: 32.2 G/DL (ref 31.4–35)
MCV RBC AUTO: 82.6 FL (ref 82–102)
MONOCYTES # BLD: 2.27 K/UL (ref 0.1–1.3)
MONOCYTES NFR BLD: 13.2 % (ref 4–12)
NEUTS SEG # BLD: 12.61 K/UL (ref 1.7–8.2)
NEUTS SEG NFR BLD: 73 % (ref 43–78)
NRBC # BLD: 0 K/UL (ref 0–0.2)
PLATELET # BLD AUTO: 425 K/UL (ref 150–450)
PMV BLD AUTO: 9.5 FL (ref 9.4–12.3)
POTASSIUM SERPL-SCNC: 4.7 MMOL/L (ref 3.5–5.1)
RBC # BLD AUTO: 4.48 M/UL (ref 4.05–5.2)
SODIUM SERPL-SCNC: 126 MMOL/L (ref 136–145)
WBC # BLD AUTO: 17.2 K/UL (ref 4.3–11.1)

## 2025-02-18 RX ORDER — FLUTICASONE PROPIONATE AND SALMETEROL 250; 50 UG/1; UG/1
1 POWDER RESPIRATORY (INHALATION) EVERY 12 HOURS
Qty: 60 EACH | Refills: 3 | Status: SHIPPED | OUTPATIENT
Start: 2025-02-18

## 2025-02-26 ENCOUNTER — HOSPITAL ENCOUNTER (OUTPATIENT)
Dept: GENERAL RADIOLOGY | Age: 85
Discharge: HOME | End: 2025-03-01
Payer: MEDICARE

## 2025-02-26 DIAGNOSIS — R04.2 HEMOPTYSIS: ICD-10-CM

## 2025-02-26 PROBLEM — I69.30 HISTORY OF STROKE WITH RESIDUAL DEFICIT: Status: ACTIVE | Noted: 2025-02-26

## 2025-02-26 PROCEDURE — 71046 X-RAY EXAM CHEST 2 VIEWS: CPT

## 2025-03-03 ENCOUNTER — TELEPHONE (OUTPATIENT)
Dept: FAMILY MEDICINE CLINIC | Facility: CLINIC | Age: 85
End: 2025-03-03

## 2025-03-03 RX ORDER — SOTALOL HYDROCHLORIDE 80 MG/1
80 TABLET ORAL DAILY
Qty: 90 TABLET | Refills: 2 | Status: SHIPPED | OUTPATIENT
Start: 2025-03-03

## 2025-03-03 NOTE — TELEPHONE ENCOUNTER
----- Message from Dr. Francisco Javier Aguilar MD sent at 3/1/2025  3:20 PM EST -----  Shows patches of cloudiness which radiologist says are non-specific (meaning it does not indicate specific pneumonia or tumor). Recommend follow up with pulmonologist  ----- Message -----  From: Masood Mirza Incoming Orders Results To Radiant  Sent: 2/28/2025  10:01 PM EST  To: Francisco Javier Aguilar Jr., MD

## 2025-03-04 ENCOUNTER — PATIENT MESSAGE (OUTPATIENT)
Age: 85
End: 2025-03-04

## 2025-03-04 ENCOUNTER — PATIENT MESSAGE (OUTPATIENT)
Dept: FAMILY MEDICINE CLINIC | Facility: CLINIC | Age: 85
End: 2025-03-04

## 2025-03-04 DIAGNOSIS — R93.89 ABNORMAL FINDING ON CHEST XRAY: Primary | ICD-10-CM

## 2025-03-04 NOTE — TELEPHONE ENCOUNTER
Unfortunately your neurologist is misinformed.  The dosing of Eliquis is based on age but also weight and renal function.  The patient should qualify for 2 of the 3 in order to reduce her dose to 2.5 mg twice daily.  Currently she still meets criteria for 5 mg twice daily.  Nuisance bruising as certainly an issue and if she wishes she can accept the fact that there is an increased risk of stroke compared to the 5 mg in patients who are inappropriately dosed at the lower dose.

## 2025-04-02 DIAGNOSIS — R40.4 TRANSIENT ALTERATION OF AWARENESS: ICD-10-CM

## 2025-04-11 RX ORDER — LEVETIRACETAM 500 MG/1
500 TABLET ORAL 2 TIMES DAILY
Qty: 180 TABLET | OUTPATIENT
Start: 2025-04-11

## 2025-04-29 RX ORDER — ATORVASTATIN CALCIUM 40 MG/1
40 TABLET, FILM COATED ORAL DAILY
Qty: 90 TABLET | Refills: 3 | Status: SHIPPED | OUTPATIENT
Start: 2025-04-29 | End: 2026-05-19

## 2025-04-30 DIAGNOSIS — R40.4 TRANSIENT ALTERATION OF AWARENESS: ICD-10-CM

## 2025-05-01 RX ORDER — LEVETIRACETAM 500 MG/1
500 TABLET ORAL 2 TIMES DAILY
Qty: 60 TABLET | Refills: 5 | Status: SHIPPED | OUTPATIENT
Start: 2025-05-01

## 2025-05-05 PROCEDURE — 93296 REM INTERROG EVL PM/IDS: CPT | Performed by: INTERNAL MEDICINE

## 2025-05-05 PROCEDURE — 93294 REM INTERROG EVL PM/LDLS PM: CPT | Performed by: INTERNAL MEDICINE

## 2025-05-13 ENCOUNTER — OFFICE VISIT (OUTPATIENT)
Dept: FAMILY MEDICINE CLINIC | Facility: CLINIC | Age: 85
End: 2025-05-13
Payer: MEDICARE

## 2025-05-13 VITALS
SYSTOLIC BLOOD PRESSURE: 130 MMHG | TEMPERATURE: 97.2 F | OXYGEN SATURATION: 93 % | HEART RATE: 55 BPM | BODY MASS INDEX: 21.3 KG/M2 | WEIGHT: 136 LBS | DIASTOLIC BLOOD PRESSURE: 82 MMHG

## 2025-05-13 DIAGNOSIS — E87.1 HYPONATREMIA: ICD-10-CM

## 2025-05-13 DIAGNOSIS — I73.9 PVD (PERIPHERAL VASCULAR DISEASE): ICD-10-CM

## 2025-05-13 DIAGNOSIS — R56.9 SEIZURE (HCC): ICD-10-CM

## 2025-05-13 DIAGNOSIS — R05.3 CHRONIC COUGH: Primary | ICD-10-CM

## 2025-05-13 DIAGNOSIS — I48.0 PAROXYSMAL ATRIAL FIBRILLATION (HCC): ICD-10-CM

## 2025-05-13 PROCEDURE — 1123F ACP DISCUSS/DSCN MKR DOCD: CPT | Performed by: FAMILY MEDICINE

## 2025-05-13 PROCEDURE — 99213 OFFICE O/P EST LOW 20 MIN: CPT | Performed by: FAMILY MEDICINE

## 2025-05-13 PROCEDURE — 1159F MED LIST DOCD IN RCRD: CPT | Performed by: FAMILY MEDICINE

## 2025-05-13 ASSESSMENT — PATIENT HEALTH QUESTIONNAIRE - PHQ9
2. FEELING DOWN, DEPRESSED OR HOPELESS: NOT AT ALL
SUM OF ALL RESPONSES TO PHQ QUESTIONS 1-9: 0
SUM OF ALL RESPONSES TO PHQ QUESTIONS 1-9: 0
1. LITTLE INTEREST OR PLEASURE IN DOING THINGS: NOT AT ALL
SUM OF ALL RESPONSES TO PHQ QUESTIONS 1-9: 0
SUM OF ALL RESPONSES TO PHQ QUESTIONS 1-9: 0

## 2025-05-13 ASSESSMENT — ENCOUNTER SYMPTOMS
CHOKING: 0
SHORTNESS OF BREATH: 0
EYES NEGATIVE: 1
APNEA: 0
CHEST TIGHTNESS: 0
WHEEZING: 0
GASTROINTESTINAL NEGATIVE: 1
COUGH: 1

## 2025-05-13 NOTE — PROGRESS NOTES
HISTORY OF PRESENT ILLNESS    Marcelina Collins is a 85 y.o. female.  HPI  Chief Complaint   Patient presents with    3 Month Follow-Up     See above. Overall stable on current regimen.  History of hyponatremia for several years. Had a seizure episode apparently provoked by low sodium. Remains seizure free on Keppra. Followed by neurologist.  History of CAD; paroxysmal atrial fib and PVD including carotid stenosis. Followed by cardiology. Stable with no symptoms. Remains on Eliquis.  Main current problem is ongoing cough for several years. Addition of Advair seems to help but the residual powder in her throat is irritating. Still has relatively frequent dry cough. Denies SOB. Infrequent wheezing responds to albuterol. Was finally able to get in with pulmonologist tomorrow.  No side effects from BP medication. No headache or vision change. Denies chest pain or SOB. No swelling.  Has been taking some supplements to help maintain sodium level.        Current Outpatient Medications on File Prior to Visit   Medication Sig Dispense Refill    levETIRAcetam (KEPPRA) 500 MG tablet Take 1 tablet by mouth 2 times daily 60 tablet 5    apixaban (ELIQUIS) 5 MG TABS tablet Take 1 tablet by mouth in the morning and 1 tablet in the evening. TAKE 1 TABLET TWICE DAILY. 180 tablet 3    atorvastatin (LIPITOR) 40 MG tablet Take 1 tablet by mouth daily Pt reports taking in the evening 90 tablet 3    sotalol (BETAPACE) 80 MG tablet TAKE 1 TABLET BY MOUTH EVERY DAY 90 tablet 2    fluticasone-salmeterol (ADVAIR) 250-50 MCG/ACT AEPB diskus inhaler Inhale 1 puff into the lungs in the morning and 1 puff in the evening. 60 each 3    albuterol sulfate HFA (VENTOLIN HFA) 108 (90 Base) MCG/ACT inhaler Inhale 2 puffs into the lungs 4 times daily as needed for Wheezing 54 g 1    furosemide (LASIX) 40 MG tablet 1/2-1 po qam  prn swelling 60 tablet 3    fluticasone (FLONASE) 50 MCG/ACT nasal spray 1 spray by Each Nostril route daily 32 g 3    docusate

## 2025-05-14 ENCOUNTER — OFFICE VISIT (OUTPATIENT)
Dept: PULMONOLOGY | Age: 85
End: 2025-05-14
Payer: MEDICARE

## 2025-05-14 VITALS
DIASTOLIC BLOOD PRESSURE: 76 MMHG | HEART RATE: 90 BPM | WEIGHT: 136.4 LBS | BODY MASS INDEX: 21.41 KG/M2 | OXYGEN SATURATION: 99 % | HEIGHT: 67 IN | SYSTOLIC BLOOD PRESSURE: 134 MMHG | TEMPERATURE: 97.2 F | RESPIRATION RATE: 17 BRPM

## 2025-05-14 DIAGNOSIS — R06.02 SOB (SHORTNESS OF BREATH): Primary | ICD-10-CM

## 2025-05-14 DIAGNOSIS — J47.9 BRONCHIECTASIS WITHOUT COMPLICATION (HCC): ICD-10-CM

## 2025-05-14 DIAGNOSIS — R05.3 CHRONIC COUGH: ICD-10-CM

## 2025-05-14 DIAGNOSIS — R93.89 ABNORMAL CHEST CT: ICD-10-CM

## 2025-05-14 LAB
EXPIRATORY TIME: NORMAL
FEF 25-75% %PRED-PRE: NORMAL
FEF 25-75% PRED: NORMAL
FEF 25-75-PRE: NORMAL
FEV1 %PRED-PRE: 56 %
FEV1 PRED: 1.95 L
FEV1/FVC %PRED-PRE: NORMAL
FEV1/FVC PRED: NORMAL
FEV1/FVC: 68 %
FEV1: 1.09 L
FVC %PRED-PRE: 61 %
FVC PRED: 2.62 L
FVC: 1.6 L
PEF %PRED-PRE: NORMAL
PEF PRED: NORMAL
PEF-PRE: NORMAL

## 2025-05-14 PROCEDURE — 1123F ACP DISCUSS/DSCN MKR DOCD: CPT | Performed by: INTERNAL MEDICINE

## 2025-05-14 PROCEDURE — 99205 OFFICE O/P NEW HI 60 MIN: CPT | Performed by: INTERNAL MEDICINE

## 2025-05-14 PROCEDURE — 94010 BREATHING CAPACITY TEST: CPT | Performed by: INTERNAL MEDICINE

## 2025-05-14 PROCEDURE — 1126F AMNT PAIN NOTED NONE PRSNT: CPT | Performed by: INTERNAL MEDICINE

## 2025-05-14 PROCEDURE — 1159F MED LIST DOCD IN RCRD: CPT | Performed by: INTERNAL MEDICINE

## 2025-05-14 ASSESSMENT — PULMONARY FUNCTION TESTS
FEV1/FVC: 68
FVC_PREDICTED: 2.62
FEV1_PERCENT_PREDICTED_PRE: 56
FVC: 1.60
FVC_PERCENT_PREDICTED_PRE: 61
FEV1_PREDICTED: 1.95
FEV1: 1.09

## 2025-05-14 NOTE — PROGRESS NOTES
Name:  Marcelina Collins  YOB: 1940   MRN: [id number]      Office Visit: 5/14/2025       Assessment & Plan (Medical Decision Making)    Impression: 85 y.o. female known to us from previous assessment of a pulmonary nodule in 2018 presents for further evaluation of persistent pulmonary infiltrates cough, weight loss, low-grade fevers and lack of appetite.  Cough is productive of mucus    1. SOB (shortness of breath)  See discussion below  - Spirometry Without Bronchodilator    2. 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  She tells me that she coughs mucus up daily especially in the mornings and in the evenings prior to bedtime.  She should continue with the Wixela that she is currently on and intermittent albuterol and in the meantime we will obtain sputum cultures to look whether she has AFB positive smear and whether the culture is consistent with MAC which I highly suspect.  Patient will obtain daily morning sputum samples beginning this coming Saturday following this on Sunday and then Monday and bring all the specimens to the lab for further analysis.  We will see her in 8 weeks time to discuss the results and it looks like she may need treatment with triple antibiotic therapy for active MAC disease.  If MAC is excluded then she will likely need a bronchoscopy and further workup for these bronchiectasis and changes.  This was discussed with her and her son who accompanied her on today's visit.  - AFB Culture + Smear W/Rflx ID From Culture; Future  - AFB Culture + Smear W/Rflx ID From Culture; Future  - AFB Culture + Smear W/Rflx ID From Culture; Future  - AFB Culture + Smear W/Rflx ID From Culture  - AFB Culture + Smear W/Rflx ID From

## 2025-05-21 ENCOUNTER — OFFICE VISIT (OUTPATIENT)
Age: 85
End: 2025-05-21
Payer: MEDICARE

## 2025-05-21 ENCOUNTER — CLINICAL SUPPORT (OUTPATIENT)
Age: 85
End: 2025-05-21

## 2025-05-21 VITALS
DIASTOLIC BLOOD PRESSURE: 82 MMHG | HEIGHT: 67 IN | HEART RATE: 76 BPM | SYSTOLIC BLOOD PRESSURE: 118 MMHG | BODY MASS INDEX: 21.19 KG/M2 | WEIGHT: 135 LBS

## 2025-05-21 DIAGNOSIS — E78.5 DYSLIPIDEMIA: Chronic | ICD-10-CM

## 2025-05-21 DIAGNOSIS — I10 ESSENTIAL HYPERTENSION: Chronic | ICD-10-CM

## 2025-05-21 DIAGNOSIS — I49.5 SSS (SICK SINUS SYNDROME) (HCC): Primary | ICD-10-CM

## 2025-05-21 DIAGNOSIS — I48.0 PAROXYSMAL ATRIAL FIBRILLATION (HCC): Chronic | ICD-10-CM

## 2025-05-21 DIAGNOSIS — Z95.0 CARDIAC PACEMAKER: Chronic | ICD-10-CM

## 2025-05-21 DIAGNOSIS — I65.23 BILATERAL CAROTID ARTERY STENOSIS: Chronic | ICD-10-CM

## 2025-05-21 DIAGNOSIS — I50.32 CHRONIC DIASTOLIC CONGESTIVE HEART FAILURE (HCC): Chronic | ICD-10-CM

## 2025-05-21 DIAGNOSIS — Z95.2 S/P AVR: Primary | Chronic | ICD-10-CM

## 2025-05-21 DIAGNOSIS — I25.118 CORONARY ARTERY DISEASE OF NATIVE ARTERY OF NATIVE HEART WITH STABLE ANGINA PECTORIS: ICD-10-CM

## 2025-05-21 LAB
ACID FAST STN SPEC: NEGATIVE
SPECIMEN PREPARATION: NORMAL
SPECIMEN SOURCE: NORMAL

## 2025-05-21 PROCEDURE — 1123F ACP DISCUSS/DSCN MKR DOCD: CPT | Performed by: INTERNAL MEDICINE

## 2025-05-21 PROCEDURE — 99214 OFFICE O/P EST MOD 30 MIN: CPT | Performed by: INTERNAL MEDICINE

## 2025-05-21 PROCEDURE — 1126F AMNT PAIN NOTED NONE PRSNT: CPT | Performed by: INTERNAL MEDICINE

## 2025-05-21 RX ORDER — SODIUM CHLORIDE 1 G/1
1 TABLET ORAL 2 TIMES DAILY
COMMUNITY

## 2025-05-21 RX ORDER — ASPIRIN 81 MG/1
81 TABLET ORAL DAILY
COMMUNITY

## 2025-05-21 RX ORDER — OMEPRAZOLE 20 MG/1
20 CAPSULE, DELAYED RELEASE ORAL DAILY
COMMUNITY

## 2025-05-21 ASSESSMENT — ENCOUNTER SYMPTOMS
ABDOMINAL PAIN: 0
SHORTNESS OF BREATH: 1
COUGH: 0
BACK PAIN: 0

## 2025-05-21 NOTE — PROGRESS NOTES
MCG/ACT inhaler Inhale 2 puffs into the lungs 4 times daily as needed for Wheezing 54 g 1    losartan (COZAAR) 25 MG tablet Take 1 tablet by mouth as needed      furosemide (LASIX) 40 MG tablet 1/2-1 po qam  prn swelling 60 tablet 3    fluticasone (FLONASE) 50 MCG/ACT nasal spray 1 spray by Each Nostril route daily 32 g 3    docusate (COLACE, DULCOLAX) 100 MG CAPS Take 100 mg by mouth nightly as needed (constipation)      mometasone (ELOCON) 0.1 % lotion  (Patient not taking: Reported on 2/26/2025)       No current facility-administered medications for this visit.     Patient Active Problem List    Diagnosis Date Noted    Bilateral carotid artery stenosis 10/02/2023     Priority: High    Secondary hypercoagulable state 03/31/2023     Priority: High    Chronic diastolic congestive heart failure (HCC) 09/27/2022     Priority: Medium    History of CVA with residual deficit 09/26/2022     Priority: Medium    Hyponatremia 09/24/2022     Priority: Medium    Short-segment Liriano's esophagus 09/24/2022     Priority: Medium    Allergic rhinitis 09/24/2022     Priority: Medium    Hypomagnesemia 09/24/2022     Priority: Medium    History of stroke with residual deficit 02/26/2025     Priority: Low    Leukocytosis 01/17/2025     Priority: Low    Transient alteration of awareness 04/30/2024     Priority: Low    Stenosis of left internal carotid artery 09/07/2023     Priority: Low    S/P dilatation of esophageal stricture 09/24/2022     Priority: Low     7/2022       Dyslipidemia 07/09/2020     Priority: Low    Osteoarthritis of right knee 06/06/2018     Priority: Low    Status post total right knee replacement 06/06/2018     Priority: Low    Essential hypertension      Priority: Low    Peripheral neuropathy      Priority: Low    Abnormal CT of the chest 11/02/2017     Priority: Low    Coronary artery disease of native artery of native heart with stable angina pectoris      Priority: Low     05/2015:  Moderate mLAD

## 2025-05-22 ENCOUNTER — HOSPITAL ENCOUNTER (OUTPATIENT)
Dept: CT IMAGING | Age: 85
Discharge: HOME | End: 2025-05-25
Attending: INTERNAL MEDICINE
Payer: MEDICARE

## 2025-05-22 DIAGNOSIS — J47.9 BRONCHIECTASIS WITHOUT COMPLICATION (HCC): ICD-10-CM

## 2025-05-22 DIAGNOSIS — R93.89 ABNORMAL CHEST CT: ICD-10-CM

## 2025-05-22 PROCEDURE — 71250 CT THORAX DX C-: CPT

## 2025-05-30 LAB
ACID FAST STN SPEC: NEGATIVE
MYCOBACTERIUM AVIUM COMPLEX: POSITIVE
MYCOBACTERIUM SPEC QL CULT: POSITIVE
MYCOBACTERIUM TUBERCULOSIS COMPLEX: NEGATIVE
OTHER: ABNORMAL
REFLEX ID BY MALDI: ABNORMAL
SOURCE: ABNORMAL
SPECIMEN PREPARATION: ABNORMAL
SPECIMEN SOURCE: ABNORMAL
SUSCEPT TESTING: ABNORMAL

## 2025-06-13 LAB
ACID FAST STN SPEC: NEGATIVE
AMIKACIN ISLT MIC: ABNORMAL
CIPROFLOXACIN ISLT MIC: ABNORMAL
CLARITHRO ISLT MIC: ABNORMAL
CLOFAZIMINE: ABNORMAL
DOXYCYCLINE: ABNORMAL
LINEZOLID ISLT MIC: ABNORMAL
MICROORGANISM/AGENT SPEC: ABNORMAL
MINOCYCLINE: ABNORMAL
MOXIFLOXACIN ISLT MIC: ABNORMAL
MYCOBACTERIUM SPEC QL CULT: POSITIVE
RIFABUTIN: ABNORMAL
RIFAMPIN ISLT MIC: ABNORMAL
SPECIMEN PREPARATION: ABNORMAL
SPECIMEN SOURCE: ABNORMAL
SPECIMEN SOURCE: ABNORMAL
STREPTOMYCIN ISLT MIC: ABNORMAL
TRIMETHOPRIM/SULFA: ABNORMAL

## 2025-06-20 ENCOUNTER — OFFICE VISIT (OUTPATIENT)
Dept: FAMILY MEDICINE CLINIC | Facility: CLINIC | Age: 85
End: 2025-06-20
Payer: MEDICARE

## 2025-06-20 VITALS
WEIGHT: 134 LBS | OXYGEN SATURATION: 95 % | HEART RATE: 88 BPM | TEMPERATURE: 97.4 F | BODY MASS INDEX: 20.99 KG/M2 | SYSTOLIC BLOOD PRESSURE: 128 MMHG | DIASTOLIC BLOOD PRESSURE: 84 MMHG

## 2025-06-20 DIAGNOSIS — J47.9 BRONCHIECTASIS WITHOUT COMPLICATION (HCC): Primary | ICD-10-CM

## 2025-06-20 DIAGNOSIS — A31.0 MYCOBACTERIUM AVIUM INFECTION (HCC): ICD-10-CM

## 2025-06-20 PROCEDURE — 1159F MED LIST DOCD IN RCRD: CPT | Performed by: FAMILY MEDICINE

## 2025-06-20 PROCEDURE — 1123F ACP DISCUSS/DSCN MKR DOCD: CPT | Performed by: FAMILY MEDICINE

## 2025-06-20 PROCEDURE — 99213 OFFICE O/P EST LOW 20 MIN: CPT | Performed by: FAMILY MEDICINE

## 2025-06-20 RX ORDER — CLARITHROMYCIN 500 MG/1
500 TABLET ORAL 2 TIMES DAILY
Qty: 60 TABLET | Refills: 5 | Status: SHIPPED | OUTPATIENT
Start: 2025-06-20 | End: 2025-06-30

## 2025-06-20 RX ORDER — BENZONATATE 100 MG/1
100-200 CAPSULE ORAL 3 TIMES DAILY PRN
Qty: 60 CAPSULE | Refills: 0 | Status: SHIPPED | OUTPATIENT
Start: 2025-06-20 | End: 2025-06-27

## 2025-06-20 RX ORDER — FLUTICASONE FUROATE AND VILANTEROL 100; 25 UG/1; UG/1
1 POWDER RESPIRATORY (INHALATION) DAILY
Qty: 28 EACH | Refills: 3 | Status: SHIPPED | OUTPATIENT
Start: 2025-06-20

## 2025-06-20 ASSESSMENT — PATIENT HEALTH QUESTIONNAIRE - PHQ9
SUM OF ALL RESPONSES TO PHQ QUESTIONS 1-9: 0
2. FEELING DOWN, DEPRESSED OR HOPELESS: NOT AT ALL
SUM OF ALL RESPONSES TO PHQ QUESTIONS 1-9: 0
SUM OF ALL RESPONSES TO PHQ QUESTIONS 1-9: 0
1. LITTLE INTEREST OR PLEASURE IN DOING THINGS: NOT AT ALL
SUM OF ALL RESPONSES TO PHQ QUESTIONS 1-9: 0

## 2025-06-20 ASSESSMENT — ENCOUNTER SYMPTOMS
CHOKING: 0
SHORTNESS OF BREATH: 1
COUGH: 1
WHEEZING: 0
APNEA: 0
EYES NEGATIVE: 1
GASTROINTESTINAL NEGATIVE: 1
CHEST TIGHTNESS: 0

## 2025-06-20 NOTE — PROGRESS NOTES
HISTORY OF PRESENT ILLNESS    Marcelina Collins is a 85 y.o. female.  HPI  Chief Complaint   Patient presents with    1 Month Follow-Up     See above. Continues to have cough; usually dry but harsh. Since last visit had work up with pulmonologist. Evaluation indicated bronchiectasis. Sputum culture indicated mycobacterium avium. Has follow up with pulmonary in July. Specialist is currently on vacation so patient was not aware of culture results. No fever. Has some cough relief with Tessalon perles.        Current Outpatient Medications on File Prior to Visit   Medication Sig Dispense Refill    omeprazole (PRILOSEC) 20 MG delayed release capsule Take 1 capsule by mouth daily      aspirin 81 MG EC tablet Take 1 tablet by mouth daily      sodium chloride 1 g tablet Take 1 tablet by mouth in the morning and at bedtime      levETIRAcetam (KEPPRA) 500 MG tablet Take 1 tablet by mouth 2 times daily 60 tablet 5    apixaban (ELIQUIS) 5 MG TABS tablet Take 1 tablet by mouth in the morning and 1 tablet in the evening. TAKE 1 TABLET TWICE DAILY. 180 tablet 3    atorvastatin (LIPITOR) 40 MG tablet Take 1 tablet by mouth daily Pt reports taking in the evening 90 tablet 3    sotalol (BETAPACE) 80 MG tablet TAKE 1 TABLET BY MOUTH EVERY DAY 90 tablet 2    fluticasone-salmeterol (ADVAIR) 250-50 MCG/ACT AEPB diskus inhaler Inhale 1 puff into the lungs in the morning and 1 puff in the evening. 60 each 3    albuterol sulfate HFA (VENTOLIN HFA) 108 (90 Base) MCG/ACT inhaler Inhale 2 puffs into the lungs 4 times daily as needed for Wheezing 54 g 1    losartan (COZAAR) 25 MG tablet Take 1 tablet by mouth as needed      furosemide (LASIX) 40 MG tablet 1/2-1 po qam  prn swelling 60 tablet 3    fluticasone (FLONASE) 50 MCG/ACT nasal spray 1 spray by Each Nostril route daily 32 g 3    docusate (COLACE, DULCOLAX) 100 MG CAPS Take 100 mg by mouth nightly as needed (constipation)      mometasone (ELOCON) 0.1 % lotion  (Patient not taking: Reported on

## 2025-07-02 ENCOUNTER — APPOINTMENT (OUTPATIENT)
Dept: CT IMAGING | Age: 85
DRG: 329 | End: 2025-07-02
Payer: MEDICARE

## 2025-07-02 ENCOUNTER — APPOINTMENT (OUTPATIENT)
Dept: GENERAL RADIOLOGY | Age: 85
DRG: 329 | End: 2025-07-02
Payer: MEDICARE

## 2025-07-02 ENCOUNTER — ANESTHESIA EVENT (OUTPATIENT)
Dept: SURGERY | Age: 85
End: 2025-07-02
Payer: MEDICARE

## 2025-07-02 ENCOUNTER — HOSPITAL ENCOUNTER (INPATIENT)
Age: 85
LOS: 9 days | Discharge: HOME | DRG: 329 | End: 2025-07-11
Attending: EMERGENCY MEDICINE | Admitting: SURGERY
Payer: MEDICARE

## 2025-07-02 ENCOUNTER — ANESTHESIA (OUTPATIENT)
Dept: SURGERY | Age: 85
End: 2025-07-02
Payer: MEDICARE

## 2025-07-02 ENCOUNTER — TELEPHONE (OUTPATIENT)
Dept: PULMONOLOGY | Age: 85
End: 2025-07-02

## 2025-07-02 DIAGNOSIS — R10.84 GENERALIZED ABDOMINAL PAIN: ICD-10-CM

## 2025-07-02 DIAGNOSIS — I10 ESSENTIAL HYPERTENSION: ICD-10-CM

## 2025-07-02 DIAGNOSIS — K56.609 INTESTINAL OBSTRUCTION, UNSPECIFIED CAUSE, UNSPECIFIED WHETHER PARTIAL OR COMPLETE (HCC): Primary | ICD-10-CM

## 2025-07-02 PROBLEM — A31.0 MYCOBACTERIUM AVIUM COMPLEX (HCC): Status: ACTIVE | Noted: 2025-07-02

## 2025-07-02 PROBLEM — R05.3 CHRONIC COUGH: Status: ACTIVE | Noted: 2025-07-02

## 2025-07-02 LAB
ALBUMIN SERPL-MCNC: 3.2 G/DL (ref 3.2–4.6)
ALBUMIN/GLOB SERPL: 0.7 (ref 1–1.9)
ALP SERPL-CCNC: 146 U/L (ref 35–104)
ALT SERPL-CCNC: 17 U/L (ref 8–45)
ANION GAP SERPL CALC-SCNC: 15 MMOL/L (ref 7–16)
APPEARANCE UR: ABNORMAL
AST SERPL-CCNC: 29 U/L (ref 15–37)
BACTERIA URNS QL MICRO: NEGATIVE /HPF
BASOPHILS # BLD: 0.08 K/UL (ref 0–0.2)
BASOPHILS NFR BLD: 0.5 % (ref 0–2)
BILIRUB SERPL-MCNC: 0.8 MG/DL (ref 0–1.2)
BILIRUB UR QL: NEGATIVE
BUN SERPL-MCNC: 19 MG/DL (ref 8–23)
CALCIUM SERPL-MCNC: 9.3 MG/DL (ref 8.8–10.2)
CHLORIDE SERPL-SCNC: 96 MMOL/L (ref 98–107)
CO2 SERPL-SCNC: 19 MMOL/L (ref 20–29)
COLOR UR: ABNORMAL
CREAT SERPL-MCNC: 0.63 MG/DL (ref 0.6–1.1)
DIFFERENTIAL METHOD BLD: ABNORMAL
EOSINOPHIL # BLD: 0.03 K/UL (ref 0–0.8)
EOSINOPHIL NFR BLD: 0.2 % (ref 0.5–7.8)
EPI CELLS #/AREA URNS HPF: ABNORMAL /HPF
ERYTHROCYTE [DISTWIDTH] IN BLOOD BY AUTOMATED COUNT: 14.8 % (ref 11.9–14.6)
ERYTHROCYTE [DISTWIDTH] IN BLOOD BY AUTOMATED COUNT: 15.2 % (ref 11.9–14.6)
GLOBULIN SER CALC-MCNC: 4.4 G/DL (ref 2.3–3.5)
GLUCOSE SERPL-MCNC: 175 MG/DL (ref 70–99)
GLUCOSE UR STRIP.AUTO-MCNC: NEGATIVE MG/DL
HCT VFR BLD AUTO: 23.7 % (ref 35.8–46.3)
HCT VFR BLD AUTO: 32.3 % (ref 35.8–46.3)
HCT VFR BLD AUTO: 34.5 % (ref 35.8–46.3)
HGB BLD-MCNC: 10.5 G/DL (ref 11.7–15.4)
HGB BLD-MCNC: 11.4 G/DL (ref 11.7–15.4)
HGB BLD-MCNC: 7.6 G/DL (ref 11.7–15.4)
HGB UR QL STRIP: ABNORMAL
HISTORY CHECK: NORMAL
HYALINE CASTS URNS QL MICRO: ABNORMAL /LPF
IMM GRANULOCYTES # BLD AUTO: 0.05 K/UL (ref 0–0.5)
IMM GRANULOCYTES NFR BLD AUTO: 0.3 % (ref 0–5)
KETONES UR QL STRIP.AUTO: 40 MG/DL
LACTATE SERPL-SCNC: 2.3 MMOL/L (ref 0.5–2)
LEUKOCYTE ESTERASE UR QL STRIP.AUTO: NEGATIVE
LIPASE SERPL-CCNC: 8 U/L (ref 13–60)
LYMPHOCYTES # BLD: 1.36 K/UL (ref 0.5–4.6)
LYMPHOCYTES NFR BLD: 9.3 % (ref 13–44)
MAGNESIUM SERPL-MCNC: 1.8 MG/DL (ref 1.8–2.4)
MCH RBC QN AUTO: 26.4 PG (ref 26.1–32.9)
MCH RBC QN AUTO: 26.6 PG (ref 26.1–32.9)
MCHC RBC AUTO-ENTMCNC: 32.1 G/DL (ref 31.4–35)
MCHC RBC AUTO-ENTMCNC: 33 G/DL (ref 31.4–35)
MCV RBC AUTO: 79.9 FL (ref 82–102)
MCV RBC AUTO: 82.9 FL (ref 82–102)
MONOCYTES # BLD: 0.83 K/UL (ref 0.1–1.3)
MONOCYTES NFR BLD: 5.7 % (ref 4–12)
NEUTS SEG # BLD: 12.28 K/UL (ref 1.7–8.2)
NEUTS SEG NFR BLD: 84 % (ref 43–78)
NITRITE UR QL STRIP.AUTO: NEGATIVE
NRBC # BLD: 0 K/UL (ref 0–0.2)
NRBC # BLD: 0 K/UL (ref 0–0.2)
PH UR STRIP: 8 (ref 5–9)
PLATELET # BLD AUTO: 239 K/UL (ref 150–450)
PLATELET # BLD AUTO: 423 K/UL (ref 150–450)
PMV BLD AUTO: 8.9 FL (ref 9.4–12.3)
PMV BLD AUTO: 9.1 FL (ref 9.4–12.3)
POTASSIUM SERPL-SCNC: 4 MMOL/L (ref 3.5–5.1)
PROCALCITONIN SERPL-MCNC: 0.02 NG/ML (ref 0–0.1)
PROT SERPL-MCNC: 7.6 G/DL (ref 6.3–8.2)
PROT UR STRIP-MCNC: 100 MG/DL
RBC # BLD AUTO: 2.86 M/UL (ref 4.05–5.2)
RBC # BLD AUTO: 4.32 M/UL (ref 4.05–5.2)
RBC #/AREA URNS HPF: ABNORMAL /HPF
SODIUM SERPL-SCNC: 130 MMOL/L (ref 136–145)
SP GR UR REFRACTOMETRY: 1.02 (ref 1–1.02)
TROPONIN T SERPL HS-MCNC: 6.6 NG/L (ref 0–14)
UROBILINOGEN UR QL STRIP.AUTO: 1 EU/DL (ref 0.2–1)
WBC # BLD AUTO: 14.6 K/UL (ref 4.3–11.1)
WBC # BLD AUTO: 19 K/UL (ref 4.3–11.1)
WBC URNS QL MICRO: ABNORMAL /HPF

## 2025-07-02 PROCEDURE — 85014 HEMATOCRIT: CPT

## 2025-07-02 PROCEDURE — 86850 RBC ANTIBODY SCREEN: CPT

## 2025-07-02 PROCEDURE — 85027 COMPLETE CBC AUTOMATED: CPT

## 2025-07-02 PROCEDURE — 85025 COMPLETE CBC W/AUTO DIFF WBC: CPT

## 2025-07-02 PROCEDURE — 30233N1 TRANSFUSION OF NONAUTOLOGOUS RED BLOOD CELLS INTO PERIPHERAL VEIN, PERCUTANEOUS APPROACH: ICD-10-PCS | Performed by: SURGERY

## 2025-07-02 PROCEDURE — 84484 ASSAY OF TROPONIN QUANT: CPT

## 2025-07-02 PROCEDURE — 74018 RADEX ABDOMEN 1 VIEW: CPT

## 2025-07-02 PROCEDURE — 2500000003 HC RX 250 WO HCPCS: Performed by: NURSE PRACTITIONER

## 2025-07-02 PROCEDURE — 2580000003 HC RX 258: Performed by: NURSE PRACTITIONER

## 2025-07-02 PROCEDURE — 1100000000 HC RM PRIVATE

## 2025-07-02 PROCEDURE — 2709999900 HC NON-CHARGEABLE SUPPLY: Performed by: SURGERY

## 2025-07-02 PROCEDURE — 2580000003 HC RX 258: Performed by: ANESTHESIOLOGY

## 2025-07-02 PROCEDURE — 82803 BLOOD GASES ANY COMBINATION: CPT

## 2025-07-02 PROCEDURE — 99223 1ST HOSP IP/OBS HIGH 75: CPT | Performed by: SURGERY

## 2025-07-02 PROCEDURE — 6360000002 HC RX W HCPCS: Performed by: NURSE ANESTHETIST, CERTIFIED REGISTERED

## 2025-07-02 PROCEDURE — 99223 1ST HOSP IP/OBS HIGH 75: CPT | Performed by: INTERNAL MEDICINE

## 2025-07-02 PROCEDURE — 6360000002 HC RX W HCPCS: Performed by: NURSE PRACTITIONER

## 2025-07-02 PROCEDURE — 6360000004 HC RX CONTRAST MEDICATION: Performed by: EMERGENCY MEDICINE

## 2025-07-02 PROCEDURE — 83735 ASSAY OF MAGNESIUM: CPT

## 2025-07-02 PROCEDURE — 44202 LAP ENTERECTOMY: CPT | Performed by: SURGERY

## 2025-07-02 PROCEDURE — 74177 CT ABD & PELVIS W/CONTRAST: CPT

## 2025-07-02 PROCEDURE — 3700000000 HC ANESTHESIA ATTENDED CARE: Performed by: SURGERY

## 2025-07-02 PROCEDURE — 4A133J1 MONITORING OF ARTERIAL PULSE, PERIPHERAL, PERCUTANEOUS APPROACH: ICD-10-PCS | Performed by: SURGERY

## 2025-07-02 PROCEDURE — 84145 PROCALCITONIN (PCT): CPT

## 2025-07-02 PROCEDURE — 2580000003 HC RX 258: Performed by: EMERGENCY MEDICINE

## 2025-07-02 PROCEDURE — 3600000009 HC SURGERY ROBOT BASE: Performed by: SURGERY

## 2025-07-02 PROCEDURE — 82330 ASSAY OF CALCIUM: CPT

## 2025-07-02 PROCEDURE — 7100000001 HC PACU RECOVERY - ADDTL 15 MIN: Performed by: SURGERY

## 2025-07-02 PROCEDURE — 6360000002 HC RX W HCPCS: Performed by: ANESTHESIOLOGY

## 2025-07-02 PROCEDURE — 1100000003 HC PRIVATE W/ TELEMETRY

## 2025-07-02 PROCEDURE — 84132 ASSAY OF SERUM POTASSIUM: CPT

## 2025-07-02 PROCEDURE — 80053 COMPREHEN METABOLIC PANEL: CPT

## 2025-07-02 PROCEDURE — 86901 BLOOD TYPING SEROLOGIC RH(D): CPT

## 2025-07-02 PROCEDURE — 3700000001 HC ADD 15 MINUTES (ANESTHESIA): Performed by: SURGERY

## 2025-07-02 PROCEDURE — 7100000000 HC PACU RECOVERY - FIRST 15 MIN: Performed by: SURGERY

## 2025-07-02 PROCEDURE — 96374 THER/PROPH/DIAG INJ IV PUSH: CPT

## 2025-07-02 PROCEDURE — P9045 ALBUMIN (HUMAN), 5%, 250 ML: HCPCS

## 2025-07-02 PROCEDURE — 88309 TISSUE EXAM BY PATHOLOGIST: CPT

## 2025-07-02 PROCEDURE — 4A133B1 MONITORING OF ARTERIAL PRESSURE, PERIPHERAL, PERCUTANEOUS APPROACH: ICD-10-PCS | Performed by: SURGERY

## 2025-07-02 PROCEDURE — 36430 TRANSFUSION BLD/BLD COMPNT: CPT

## 2025-07-02 PROCEDURE — 96375 TX/PRO/DX INJ NEW DRUG ADDON: CPT

## 2025-07-02 PROCEDURE — 6360000002 HC RX W HCPCS

## 2025-07-02 PROCEDURE — 0DT80ZZ RESECTION OF SMALL INTESTINE, OPEN APPROACH: ICD-10-PCS | Performed by: SURGERY

## 2025-07-02 PROCEDURE — 36415 COLL VENOUS BLD VENIPUNCTURE: CPT

## 2025-07-02 PROCEDURE — P9016 RBC LEUKOCYTES REDUCED: HCPCS

## 2025-07-02 PROCEDURE — 85018 HEMOGLOBIN: CPT

## 2025-07-02 PROCEDURE — 2720000010 HC SURG SUPPLY STERILE: Performed by: SURGERY

## 2025-07-02 PROCEDURE — 3600000019 HC SURGERY ROBOT ADDTL 15MIN: Performed by: SURGERY

## 2025-07-02 PROCEDURE — C1889 IMPLANT/INSERT DEVICE, NOC: HCPCS | Performed by: SURGERY

## 2025-07-02 PROCEDURE — 6360000002 HC RX W HCPCS: Performed by: EMERGENCY MEDICINE

## 2025-07-02 PROCEDURE — 99285 EMERGENCY DEPT VISIT HI MDM: CPT

## 2025-07-02 PROCEDURE — 2500000003 HC RX 250 WO HCPCS: Performed by: SURGERY

## 2025-07-02 PROCEDURE — 03HY32Z INSERTION OF MONITORING DEVICE INTO UPPER ARTERY, PERCUTANEOUS APPROACH: ICD-10-PCS | Performed by: SURGERY

## 2025-07-02 PROCEDURE — 86900 BLOOD TYPING SEROLOGIC ABO: CPT

## 2025-07-02 PROCEDURE — 96361 HYDRATE IV INFUSION ADD-ON: CPT

## 2025-07-02 PROCEDURE — S2900 ROBOTIC SURGICAL SYSTEM: HCPCS | Performed by: SURGERY

## 2025-07-02 PROCEDURE — 86923 COMPATIBILITY TEST ELECTRIC: CPT

## 2025-07-02 PROCEDURE — 83605 ASSAY OF LACTIC ACID: CPT

## 2025-07-02 PROCEDURE — 2580000003 HC RX 258

## 2025-07-02 PROCEDURE — 82947 ASSAY GLUCOSE BLOOD QUANT: CPT

## 2025-07-02 PROCEDURE — 81001 URINALYSIS AUTO W/SCOPE: CPT

## 2025-07-02 PROCEDURE — 83690 ASSAY OF LIPASE: CPT

## 2025-07-02 PROCEDURE — 8E0W4CZ ROBOTIC ASSISTED PROCEDURE OF TRUNK REGION, PERCUTANEOUS ENDOSCOPIC APPROACH: ICD-10-PCS | Performed by: SURGERY

## 2025-07-02 PROCEDURE — 84295 ASSAY OF SERUM SODIUM: CPT

## 2025-07-02 PROCEDURE — 6360000002 HC RX W HCPCS: Performed by: SURGERY

## 2025-07-02 PROCEDURE — 2500000003 HC RX 250 WO HCPCS

## 2025-07-02 DEVICE — CLIP INT M L POLYMER LOK LIG HEM O LOK: Type: IMPLANTABLE DEVICE | Site: ABDOMEN | Status: FUNCTIONAL

## 2025-07-02 RX ORDER — POTASSIUM CHLORIDE 1500 MG/1
40 TABLET, EXTENDED RELEASE ORAL PRN
Status: DISCONTINUED | OUTPATIENT
Start: 2025-07-02 | End: 2025-07-03

## 2025-07-02 RX ORDER — DEXAMETHASONE SODIUM PHOSPHATE 4 MG/ML
INJECTION, SOLUTION INTRA-ARTICULAR; INTRALESIONAL; INTRAMUSCULAR; INTRAVENOUS; SOFT TISSUE
Status: DISCONTINUED | OUTPATIENT
Start: 2025-07-02 | End: 2025-07-02 | Stop reason: SDUPTHER

## 2025-07-02 RX ORDER — EPHEDRINE SULFATE 5 MG/ML
INJECTION INTRAVENOUS
Status: DISCONTINUED | OUTPATIENT
Start: 2025-07-02 | End: 2025-07-02 | Stop reason: SDUPTHER

## 2025-07-02 RX ORDER — IOPAMIDOL 755 MG/ML
100 INJECTION, SOLUTION INTRAVASCULAR
Status: COMPLETED | OUTPATIENT
Start: 2025-07-02 | End: 2025-07-02

## 2025-07-02 RX ORDER — HYDRALAZINE HYDROCHLORIDE 20 MG/ML
10 INJECTION INTRAMUSCULAR; INTRAVENOUS ONCE
Status: COMPLETED | OUTPATIENT
Start: 2025-07-02 | End: 2025-07-02

## 2025-07-02 RX ORDER — SODIUM CHLORIDE, SODIUM LACTATE, POTASSIUM CHLORIDE, CALCIUM CHLORIDE 600; 310; 30; 20 MG/100ML; MG/100ML; MG/100ML; MG/100ML
INJECTION, SOLUTION INTRAVENOUS CONTINUOUS
Status: DISCONTINUED | OUTPATIENT
Start: 2025-07-02 | End: 2025-07-02 | Stop reason: HOSPADM

## 2025-07-02 RX ORDER — HYDROMORPHONE HYDROCHLORIDE 1 MG/ML
0.5 INJECTION, SOLUTION INTRAMUSCULAR; INTRAVENOUS; SUBCUTANEOUS EVERY 4 HOURS PRN
Status: DISCONTINUED | OUTPATIENT
Start: 2025-07-02 | End: 2025-07-11 | Stop reason: HOSPADM

## 2025-07-02 RX ORDER — HYDROMORPHONE HYDROCHLORIDE 1 MG/ML
1 INJECTION, SOLUTION INTRAMUSCULAR; INTRAVENOUS; SUBCUTANEOUS EVERY 4 HOURS PRN
Status: DISCONTINUED | OUTPATIENT
Start: 2025-07-02 | End: 2025-07-11 | Stop reason: HOSPADM

## 2025-07-02 RX ORDER — MORPHINE SULFATE 4 MG/ML
4 INJECTION, SOLUTION INTRAMUSCULAR; INTRAVENOUS
Refills: 0 | Status: COMPLETED | OUTPATIENT
Start: 2025-07-02 | End: 2025-07-02

## 2025-07-02 RX ORDER — ESMOLOL HYDROCHLORIDE 10 MG/ML
INJECTION INTRAVENOUS
Status: DISCONTINUED | OUTPATIENT
Start: 2025-07-02 | End: 2025-07-02 | Stop reason: SDUPTHER

## 2025-07-02 RX ORDER — ONDANSETRON 2 MG/ML
4 INJECTION INTRAMUSCULAR; INTRAVENOUS
Status: COMPLETED | OUTPATIENT
Start: 2025-07-02 | End: 2025-07-02

## 2025-07-02 RX ORDER — PROPOFOL 10 MG/ML
INJECTION, EMULSION INTRAVENOUS
Status: DISCONTINUED | OUTPATIENT
Start: 2025-07-02 | End: 2025-07-02 | Stop reason: SDUPTHER

## 2025-07-02 RX ORDER — ROCURONIUM BROMIDE 10 MG/ML
INJECTION, SOLUTION INTRAVENOUS
Status: DISCONTINUED | OUTPATIENT
Start: 2025-07-02 | End: 2025-07-02 | Stop reason: SDUPTHER

## 2025-07-02 RX ORDER — ALBUMIN HUMAN 50 G/1000ML
SOLUTION INTRAVENOUS
Status: DISCONTINUED | OUTPATIENT
Start: 2025-07-02 | End: 2025-07-02 | Stop reason: SDUPTHER

## 2025-07-02 RX ORDER — ACETAMINOPHEN 650 MG/1
650 SUPPOSITORY RECTAL EVERY 6 HOURS PRN
Status: DISCONTINUED | OUTPATIENT
Start: 2025-07-02 | End: 2025-07-11 | Stop reason: HOSPADM

## 2025-07-02 RX ORDER — SUCCINYLCHOLINE/SOD CL,ISO/PF 200MG/10ML
SYRINGE (ML) INTRAVENOUS
Status: DISCONTINUED | OUTPATIENT
Start: 2025-07-02 | End: 2025-07-02 | Stop reason: SDUPTHER

## 2025-07-02 RX ORDER — MAGNESIUM SULFATE IN WATER 40 MG/ML
2000 INJECTION, SOLUTION INTRAVENOUS PRN
Status: DISCONTINUED | OUTPATIENT
Start: 2025-07-02 | End: 2025-07-11 | Stop reason: HOSPADM

## 2025-07-02 RX ORDER — SODIUM CHLORIDE 0.9 % (FLUSH) 0.9 %
5-40 SYRINGE (ML) INJECTION EVERY 12 HOURS SCHEDULED
Status: DISCONTINUED | OUTPATIENT
Start: 2025-07-02 | End: 2025-07-11 | Stop reason: HOSPADM

## 2025-07-02 RX ORDER — INDOMETHACIN 25 MG/1
CAPSULE ORAL
Status: DISCONTINUED | OUTPATIENT
Start: 2025-07-02 | End: 2025-07-02 | Stop reason: SDUPTHER

## 2025-07-02 RX ORDER — SODIUM CHLORIDE 9 MG/ML
50 INJECTION, SOLUTION INTRAVENOUS ONCE
Status: DISCONTINUED | OUTPATIENT
Start: 2025-07-02 | End: 2025-07-11 | Stop reason: HOSPADM

## 2025-07-02 RX ORDER — ACETAMINOPHEN 325 MG/1
650 TABLET ORAL EVERY 6 HOURS PRN
Status: DISCONTINUED | OUTPATIENT
Start: 2025-07-02 | End: 2025-07-11 | Stop reason: HOSPADM

## 2025-07-02 RX ORDER — LIDOCAINE HYDROCHLORIDE 20 MG/ML
INJECTION, SOLUTION EPIDURAL; INFILTRATION; INTRACAUDAL; PERINEURAL
Status: DISCONTINUED | OUTPATIENT
Start: 2025-07-02 | End: 2025-07-02 | Stop reason: SDUPTHER

## 2025-07-02 RX ORDER — ENOXAPARIN SODIUM 100 MG/ML
40 INJECTION SUBCUTANEOUS DAILY
Status: DISCONTINUED | OUTPATIENT
Start: 2025-07-02 | End: 2025-07-04

## 2025-07-02 RX ORDER — LABETALOL HYDROCHLORIDE 5 MG/ML
20 INJECTION, SOLUTION INTRAVENOUS
Status: COMPLETED | OUTPATIENT
Start: 2025-07-02 | End: 2025-07-02

## 2025-07-02 RX ORDER — ONDANSETRON 4 MG/1
4 TABLET, ORALLY DISINTEGRATING ORAL EVERY 8 HOURS PRN
Status: DISCONTINUED | OUTPATIENT
Start: 2025-07-02 | End: 2025-07-11 | Stop reason: HOSPADM

## 2025-07-02 RX ORDER — ONDANSETRON 2 MG/ML
4 INJECTION INTRAMUSCULAR; INTRAVENOUS EVERY 6 HOURS PRN
Status: DISCONTINUED | OUTPATIENT
Start: 2025-07-02 | End: 2025-07-11 | Stop reason: HOSPADM

## 2025-07-02 RX ORDER — SODIUM CHLORIDE 0.9 % (FLUSH) 0.9 %
5-40 SYRINGE (ML) INJECTION PRN
Status: DISCONTINUED | OUTPATIENT
Start: 2025-07-02 | End: 2025-07-11 | Stop reason: HOSPADM

## 2025-07-02 RX ORDER — BUPIVACAINE HYDROCHLORIDE AND EPINEPHRINE 2.5; 5 MG/ML; UG/ML
INJECTION, SOLUTION EPIDURAL; INFILTRATION; INTRACAUDAL; PERINEURAL PRN
Status: DISCONTINUED | OUTPATIENT
Start: 2025-07-02 | End: 2025-07-02 | Stop reason: ALTCHOICE

## 2025-07-02 RX ORDER — KETAMINE HCL IN NACL, ISO-OSM 20 MG/2 ML
SYRINGE (ML) INJECTION
Status: DISCONTINUED | OUTPATIENT
Start: 2025-07-02 | End: 2025-07-02 | Stop reason: SDUPTHER

## 2025-07-02 RX ORDER — ONDANSETRON 2 MG/ML
INJECTION INTRAMUSCULAR; INTRAVENOUS
Status: DISCONTINUED | OUTPATIENT
Start: 2025-07-02 | End: 2025-07-02 | Stop reason: SDUPTHER

## 2025-07-02 RX ORDER — SODIUM CHLORIDE 9 MG/ML
INJECTION, SOLUTION INTRAVENOUS CONTINUOUS
Status: ACTIVE | OUTPATIENT
Start: 2025-07-02 | End: 2025-07-03

## 2025-07-02 RX ORDER — FENTANYL CITRATE 50 UG/ML
INJECTION, SOLUTION INTRAMUSCULAR; INTRAVENOUS
Status: DISCONTINUED | OUTPATIENT
Start: 2025-07-02 | End: 2025-07-02 | Stop reason: SDUPTHER

## 2025-07-02 RX ORDER — SODIUM CHLORIDE 9 MG/ML
INJECTION, SOLUTION INTRAVENOUS PRN
Status: DISCONTINUED | OUTPATIENT
Start: 2025-07-02 | End: 2025-07-11 | Stop reason: HOSPADM

## 2025-07-02 RX ORDER — POTASSIUM CHLORIDE 7.45 MG/ML
10 INJECTION INTRAVENOUS PRN
Status: DISCONTINUED | OUTPATIENT
Start: 2025-07-02 | End: 2025-07-03

## 2025-07-02 RX ORDER — 0.9 % SODIUM CHLORIDE 0.9 %
500 INTRAVENOUS SOLUTION INTRAVENOUS ONCE
Status: COMPLETED | OUTPATIENT
Start: 2025-07-02 | End: 2025-07-02

## 2025-07-02 RX ORDER — 0.9 % SODIUM CHLORIDE 0.9 %
1000 INTRAVENOUS SOLUTION INTRAVENOUS ONCE
Status: COMPLETED | OUTPATIENT
Start: 2025-07-02 | End: 2025-07-02

## 2025-07-02 RX ADMIN — FENTANYL CITRATE 100 MCG: 50 INJECTION, SOLUTION INTRAMUSCULAR; INTRAVENOUS at 07:59

## 2025-07-02 RX ADMIN — PHENYLEPHRINE HYDROCHLORIDE 300 MCG: 0.1 INJECTION, SOLUTION INTRAVENOUS at 08:01

## 2025-07-02 RX ADMIN — ALBUMIN (HUMAN) 12.5 G: 12.5 INJECTION, SOLUTION INTRAVENOUS at 09:39

## 2025-07-02 RX ADMIN — PROPOFOL 80 MG: 10 INJECTION, EMULSION INTRAVENOUS at 07:59

## 2025-07-02 RX ADMIN — PHENYLEPHRINE HYDROCHLORIDE 200 MCG: 0.1 INJECTION, SOLUTION INTRAVENOUS at 09:04

## 2025-07-02 RX ADMIN — PHENYLEPHRINE HYDROCHLORIDE 15 MCG/KG/MIN: 10 INJECTION INTRAVENOUS at 09:31

## 2025-07-02 RX ADMIN — PHENYLEPHRINE HYDROCHLORIDE 100 MCG: 0.1 INJECTION, SOLUTION INTRAVENOUS at 08:15

## 2025-07-02 RX ADMIN — SODIUM BICARBONATE 50 MEQ: 84 INJECTION INTRAVENOUS at 11:05

## 2025-07-02 RX ADMIN — PHENYLEPHRINE HYDROCHLORIDE 150 MCG: 0.1 INJECTION, SOLUTION INTRAVENOUS at 08:29

## 2025-07-02 RX ADMIN — SODIUM CHLORIDE, SODIUM LACTATE, POTASSIUM CHLORIDE, AND CALCIUM CHLORIDE: 600; 310; 30; 20 INJECTION, SOLUTION INTRAVENOUS at 06:39

## 2025-07-02 RX ADMIN — EPHEDRINE SULFATE 10 MG: 5 INJECTION INTRAVENOUS at 09:14

## 2025-07-02 RX ADMIN — Medication 10 MG: at 09:24

## 2025-07-02 RX ADMIN — PHENYLEPHRINE HYDROCHLORIDE 100 MCG: 0.1 INJECTION, SOLUTION INTRAVENOUS at 07:59

## 2025-07-02 RX ADMIN — SODIUM CHLORIDE: 0.9 INJECTION, SOLUTION INTRAVENOUS at 16:03

## 2025-07-02 RX ADMIN — LIDOCAINE HYDROCHLORIDE 100 MG: 20 INJECTION, SOLUTION EPIDURAL; INFILTRATION; INTRACAUDAL; PERINEURAL at 07:59

## 2025-07-02 RX ADMIN — Medication 10 MG: at 10:27

## 2025-07-02 RX ADMIN — PIPERACILLIN AND TAZOBACTAM 4500 MG: 4; .5 INJECTION, POWDER, LYOPHILIZED, FOR SOLUTION INTRAVENOUS at 04:32

## 2025-07-02 RX ADMIN — SODIUM CHLORIDE, PRESERVATIVE FREE 10 ML: 5 INJECTION INTRAVENOUS at 21:22

## 2025-07-02 RX ADMIN — FENTANYL CITRATE 50 MCG: 50 INJECTION INTRAMUSCULAR; INTRAVENOUS at 02:15

## 2025-07-02 RX ADMIN — Medication 120 MG: at 07:59

## 2025-07-02 RX ADMIN — PROTHROMBIN COMPLEX CONCENTRATE (HUMAN) 2000 UNITS: 25.5; 16.5; 24; 22; 22; 26 POWDER, FOR SOLUTION INTRAVENOUS at 05:07

## 2025-07-02 RX ADMIN — LABETALOL HYDROCHLORIDE 20 MG: 5 INJECTION INTRAVENOUS at 02:16

## 2025-07-02 RX ADMIN — MORPHINE SULFATE 4 MG: 4 INJECTION INTRAVENOUS at 00:51

## 2025-07-02 RX ADMIN — PIPERACILLIN AND TAZOBACTAM 3375 MG: 3; .375 INJECTION, POWDER, FOR SOLUTION INTRAVENOUS at 10:05

## 2025-07-02 RX ADMIN — PIPERACILLIN AND TAZOBACTAM 3375 MG: 3; .375 INJECTION, POWDER, FOR SOLUTION INTRAVENOUS at 18:24

## 2025-07-02 RX ADMIN — ALBUMIN (HUMAN) 12.5 G: 12.5 INJECTION, SOLUTION INTRAVENOUS at 10:17

## 2025-07-02 RX ADMIN — PHENYLEPHRINE HYDROCHLORIDE 100 MCG: 0.1 INJECTION, SOLUTION INTRAVENOUS at 09:14

## 2025-07-02 RX ADMIN — EPHEDRINE SULFATE 20 MG: 5 INJECTION INTRAVENOUS at 08:05

## 2025-07-02 RX ADMIN — SODIUM CHLORIDE 1000 ML: 0.9 INJECTION, SOLUTION INTRAVENOUS at 05:05

## 2025-07-02 RX ADMIN — SODIUM CHLORIDE 500 ML: 0.9 INJECTION, SOLUTION INTRAVENOUS at 01:04

## 2025-07-02 RX ADMIN — EPHEDRINE SULFATE 15 MG: 5 INJECTION INTRAVENOUS at 08:15

## 2025-07-02 RX ADMIN — HYDRALAZINE HYDROCHLORIDE 10 MG: 20 INJECTION INTRAMUSCULAR; INTRAVENOUS at 13:48

## 2025-07-02 RX ADMIN — PHENYLEPHRINE HYDROCHLORIDE 100 MCG: 0.1 INJECTION, SOLUTION INTRAVENOUS at 08:05

## 2025-07-02 RX ADMIN — ESMOLOL HYDROCHLORIDE 20 MG: 10 INJECTION, SOLUTION INTRAVENOUS at 09:25

## 2025-07-02 RX ADMIN — ROCURONIUM BROMIDE 20 MG: 10 INJECTION, SOLUTION INTRAVENOUS at 09:19

## 2025-07-02 RX ADMIN — IOPAMIDOL 100 ML: 755 INJECTION, SOLUTION INTRAVENOUS at 01:29

## 2025-07-02 RX ADMIN — FAMOTIDINE 20 MG: 10 INJECTION, SOLUTION INTRAVENOUS at 21:21

## 2025-07-02 RX ADMIN — ONDANSETRON 4 MG: 2 INJECTION, SOLUTION INTRAMUSCULAR; INTRAVENOUS at 00:53

## 2025-07-02 RX ADMIN — DEXAMETHASONE SODIUM PHOSPHATE 4 MG: 4 INJECTION INTRA-ARTICULAR; INTRALESIONAL; INTRAMUSCULAR; INTRAVENOUS; SOFT TISSUE at 08:41

## 2025-07-02 RX ADMIN — SODIUM CHLORIDE, SODIUM LACTATE, POTASSIUM CHLORIDE, AND CALCIUM CHLORIDE: 600; 310; 30; 20 INJECTION, SOLUTION INTRAVENOUS at 08:57

## 2025-07-02 RX ADMIN — ONDANSETRON 4 MG: 2 INJECTION, SOLUTION INTRAMUSCULAR; INTRAVENOUS at 08:41

## 2025-07-02 RX ADMIN — SUGAMMADEX 200 MG: 100 INJECTION, SOLUTION INTRAVENOUS at 11:12

## 2025-07-02 RX ADMIN — FENTANYL CITRATE 25 MCG: 50 INJECTION, SOLUTION INTRAMUSCULAR; INTRAVENOUS at 11:23

## 2025-07-02 RX ADMIN — ROCURONIUM BROMIDE 30 MG: 10 INJECTION, SOLUTION INTRAVENOUS at 08:26

## 2025-07-02 ASSESSMENT — PAIN DESCRIPTION - DESCRIPTORS
DESCRIPTORS: CRAMPING
DESCRIPTORS: SORE

## 2025-07-02 ASSESSMENT — PAIN - FUNCTIONAL ASSESSMENT
PAIN_FUNCTIONAL_ASSESSMENT: 0-10
PAIN_FUNCTIONAL_ASSESSMENT: 0-10

## 2025-07-02 ASSESSMENT — PAIN SCALES - WONG BAKER: WONGBAKER_NUMERICALRESPONSE: NO HURT

## 2025-07-02 ASSESSMENT — PAIN SCALES - GENERAL
PAINLEVEL_OUTOF10: 8
PAINLEVEL_OUTOF10: 9
PAINLEVEL_OUTOF10: 0
PAINLEVEL_OUTOF10: 3
PAINLEVEL_OUTOF10: 10

## 2025-07-02 ASSESSMENT — PAIN DESCRIPTION - PAIN TYPE: TYPE: SURGICAL PAIN

## 2025-07-02 ASSESSMENT — PAIN DESCRIPTION - ONSET: ONSET: GRADUAL

## 2025-07-02 ASSESSMENT — PAIN DESCRIPTION - LOCATION
LOCATION: ABDOMEN
LOCATION: ABDOMEN

## 2025-07-02 ASSESSMENT — PAIN DESCRIPTION - ORIENTATION: ORIENTATION: ANTERIOR

## 2025-07-02 ASSESSMENT — PAIN DESCRIPTION - FREQUENCY: FREQUENCY: INTERMITTENT

## 2025-07-02 NOTE — TELEPHONE ENCOUNTER
Patient had emergency surgery this morning She is being admitted to ICU, but is waiting for a room. Patient would like to see Dr. Horton while she is there. Family was told to request a consult when she gets to her room.   She never received lab results from 5/19 and family is requesting the results.  Her appointment on 7/3/25 has been cancelled.

## 2025-07-02 NOTE — PERIOP NOTE
CBC lab sent to lab.   
Dr. Leroy notified of hgb 7.6   Will assess and give orders.   
Pt's bp steadily getting higher. Dr. Leroy aware and will place order for hydralazine.   
TRANSFER - OUT REPORT:    Verbal report given to KRIS Bowen on Marcelina Collins  being transferred to [facility] for routine progression of patient care       Report consisted of patient's Situation, Background, Assessment and   Recommendations(SBAR).     Information from the following report(s) Nurse Handoff Report, Surgery Report, MAR, Recent Results, Cardiac Rhythm NSR, and Neuro Assessment was reviewed with the receiving nurse.           Lines:   Peripheral IV 07/02/25 Left;Posterior Hand (Active)   Site Assessment Clean, dry & intact 07/02/25 1211   Line Status No blood return;Flushed 07/02/25 0636   Phlebitis Assessment No symptoms 07/02/25 0636   Infiltration Assessment 0 07/02/25 0636   Alcohol Cap Used No 07/02/25 0636   Dressing Status Clean, dry & intact 07/02/25 1211   Dressing Type Transparent 07/02/25 1211       Peripheral IV 07/02/25 Posterior;Right Forearm (Active)   Site Assessment Clean, dry & intact 07/02/25 1211   Line Status No blood return;Flushed 07/02/25 0636   Phlebitis Assessment No symptoms 07/02/25 0636   Infiltration Assessment 0 07/02/25 0636   Alcohol Cap Used No 07/02/25 0636   Dressing Status Clean, dry & intact 07/02/25 1211   Dressing Type Transparent 07/02/25 1211       Peripheral IV 07/02/25 Right Arm (Active)   Site Assessment Clean, dry & intact 07/02/25 1211   Dressing Status Clean, dry & intact 07/02/25 1211   Dressing Type Transparent 07/02/25 1211       Arterial Line 07/02/25 Radial (Active)   Site Assessment Clean, dry & intact 07/02/25 1211   Line Status Intact and in place;Arterial fluids per protocol 07/02/25 1211   Art Line Interventions Connections checked and tightened 07/02/25 1211   Color/Movement/Sensation Capillary refill less than 3 sec 07/02/25 1211   Dressing Type Transparent 07/02/25 1211   Dressing Status Clean, dry & intact 07/02/25 1211        Opportunity for questions and clarification was provided.      Patient transported with:  Patient-specific medications from 
Verbal order from Dr. Leroy to transfuse 1 unit of blood.  
XRAY at bedside  
0

## 2025-07-02 NOTE — ANESTHESIA PROCEDURE NOTES
Arterial Line:    An arterial line was placed using surface landmarks, in the OR for the following indication(s): continuous blood pressure monitoring and blood sampling needed.    A 20 gauge (size), 4.45 cm (length), Arrow (type) catheter was placed, Seldinger technique used, into the right radial artery, secured by Tegaderm.  Anesthesia type: General    Events:  patient tolerated procedure well with no complications.7/2/2025 7:54 AM7/2/2025 7:56 AM  Resident/CRNA: Monica Urbina APRN - CRNA  Performed: Resident/CRNA   Preanesthetic Checklist  Completed: patient identified, IV checked, site marked, risks and benefits discussed, surgical/procedural consents, equipment checked, pre-op evaluation, timeout performed, anesthesia consent given, oxygen available, monitors applied/VS acknowledged, fire risk safety assessment completed and verbalized and blood product R/B/A discussed and consented

## 2025-07-02 NOTE — PROGRESS NOTES
TRANSFER - IN REPORT:    Verbal report received from KRIS Lewis on Marcelina Collins  being received from ED for routine progression of patient care      Report consisted of patient's Situation, Background, Assessment and   Recommendations(SBAR).     Information from the following report(s) Nurse Handoff Report was reviewed with the receiving nurse.    Opportunity for questions and clarification was provided.      Pt to go to surgery then be admitted to 6.

## 2025-07-02 NOTE — CONSULTS
History and Physical Initial Visit NOTE           7/2/2025    Marcelina Collins                          Date of Admission:  7/2/2025   Length of Stay: 0 days    The patient's chart is reviewed and the patient is discussed with the staff.    Subjective:     Patient is a 85 y.o.  female seen and evaluated at the request of Desi Jones per patient request.    She was scheduled to see us in the clinic tomorrow. She was seen on May 14,2025 for follow up for pulmonary nodule dating back to 2018, weight loss, persistent pulmonary infiltrates, chronic cough and low grade fevers. Chest CT was obtained showing tree-in-bud changes and inflammatory changes. On her CBC, abs eosinophils were 380 in February. He ordered AFB X 2 which were positive for MAC. PFT with moderate obstruction based on spirometry; she does not have complete PFTs since 2015.She has not been on treatment.     In interim, she presented here with paralytic ileus of small intestine and colon and underwent robotic resection of bowel. She is * Day of Surgery *.  She is on RA with oxygen saturation of 99%. We were asked to see her per patient request as she will miss her appointment in the office tomorrow.     Her son and DIL are at the bedside. Questions answered.       Review of Systems  Pertinent items are noted in HPI. + cough    Current Outpatient Medications   Medication Instructions    albuterol sulfate HFA (VENTOLIN HFA) 108 (90 Base) MCG/ACT inhaler 2 puffs, Inhalation, 4 TIMES DAILY PRN    apixaban (ELIQUIS) 5 mg, Oral, 2 TIMES DAILY, TAKE 1 TABLET TWICE DAILY    aspirin 81 mg, DAILY    atorvastatin (LIPITOR) 40 mg, Oral, DAILY, Pt reports taking in the evening    docusate (COLACE, DULCOLAX) 100 mg, NIGHTLY PRN    fluticasone (FLONASE) 50 MCG/ACT nasal spray 1 spray, Each Nostril, DAILY    fluticasone furoate-vilanterol (BREO ELLIPTA) 100-25 MCG/ACT inhaler 1 puff, Inhalation, DAILY    fluticasone-salmeterol (ADVAIR) 250-50  patients's H&P, available images, labs, cultures., performed a medically appropriate history and exam, documented information in EMR, independently interpreted images, and coordinated care. which accounted for 22 minutes clinical time.     Impression: patient known to our service with chronic cough, bronchiectasis and recently sent lab studies with noted to have a diagnosis of MAC infection in mid June from cultures not smear. She was to come to the office tomorrow to see the provider.     Did note her last CT noted mostly scattered infiltrates with tree and bud ( more in the Right). No cavitary areas noted. She does report frequent flare up in her condition and has taking multiple rounds of abx that only last for about 3 week then symptoms return.     Overall would monitor condition and symptoms and see if significant or not. Ct did not have any cavitary area and was not smear positive. Therefore for now would do some watchful waiting. If worsening symptoms or condition changes will start treatment. Note, treatment duration in 12+ months till sputum is culture negative. Therapy duration is prolonged and usually difficult to tolerate especially in this elderly patient who is now s/p Post op. So would hold for now. Consider repeating sputum to see if sputum is positive in about a month. If treatment is needed above regimen is adequate.     S/p SBO and appears to be doing well. Still with NGT. Continue management per surgery.          Jose Ayala MD

## 2025-07-02 NOTE — ANESTHESIA PRE PROCEDURE
Department of Anesthesiology  Preprocedure Note       Name:  Marcelina Collins   Age:  85 y.o.  :  1940                                          MRN:  [id number]         Date:  2025      Surgeon: Surgeon(s):  Marcell Schumacher MD    Procedure: Procedure(s):  LAPAROSCOPY ROBOTIC  POSS OPEN    Medications prior to admission:   Prior to Admission medications    Medication Sig Start Date End Date Taking? Authorizing Provider   apixaban (ELIQUIS) 5 MG TABS tablet Take 1 tablet by mouth in the morning and 1 tablet in the evening. TAKE 1 TABLET TWICE DAILY. 25  Yes Olayinka Travis MD   fluticasone furoate-vilanterol (BREO ELLIPTA) 100-25 MCG/ACT inhaler Inhale 1 puff into the lungs daily 25   Francisco Javier Aguilar Jr., MD   omeprazole (PRILOSEC) 20 MG delayed release capsule Take 1 capsule by mouth daily    Yumiko Rivera MD   aspirin 81 MG EC tablet Take 1 tablet by mouth daily    Yumiko Rivera MD   sodium chloride 1 g tablet Take 1 tablet by mouth in the morning and at bedtime    Yumiko Rivera MD   levETIRAcetam (KEPPRA) 500 MG tablet Take 1 tablet by mouth 2 times daily 25   Neisha Prater APRN   atorvastatin (LIPITOR) 40 MG tablet Take 1 tablet by mouth daily Pt reports taking in the evening 25  Olayinka Travis MD   sotalol (BETAPACE) 80 MG tablet TAKE 1 TABLET BY MOUTH EVERY DAY 3/3/25   Olayinka Travis MD   fluticasone-salmeterol (ADVAIR) 250-50 MCG/ACT AEPB diskus inhaler Inhale 1 puff into the lungs in the morning and 1 puff in the evening. 25   Francisco Javier Aguilar Jr., MD   albuterol sulfate HFA (VENTOLIN HFA) 108 (90 Base) MCG/ACT inhaler Inhale 2 puffs into the lungs 4 times daily as needed for Wheezing 24   Francisco Javier Aguilar Jr., MD   losartan (COZAAR) 25 MG tablet Take 1 tablet by mouth as needed    Yumiko Rivera MD   furosemide (LASIX) 40 MG tablet 1/2-1 po qam  prn swelling 24   Francisco Javier Aguilar Jr., MD   fluticasone

## 2025-07-02 NOTE — OP NOTE
Operative Note      Patient: Marcelina Collins  YOB: 1940  MRN: [id number]    Date of Procedure: 7/2/2025    Pre-Op Diagnosis Codes:      * Paralytic ileus of small intestine and colon (HCC) [K56.0]    Post-Op Diagnosis: Closed loop obstruction with infarcted bowel    Procedure: Robotic diagnostic laparoscopy with resection of small bowel        Surgeon(s):  Marcell Schumacher MD    Assistant:   Surgical Assistant: Stevie Gatica    Anesthesia: General    Estimated Blood Loss (mL): less than 50     Complications: None    Specimens:   ID Type Source Tests Collected by Time Destination   A : small bowel resection Tissue Ileum SURGICAL PATHOLOGY Marcell Schumacher MD 7/2/2025 1100        Implants:  Implant Name Type Inv. Item Serial No.  Lot No. LRB No. Used Action   CLIP INT M L POLYMER FLAQUITA LIG HEM O FLAQUITA - [id number]  CLIP INT M L POLYMER FLAQUITA LIG HEM O FLAQUITA  [employer] [id number] N/A 2 Implanted         Drains:   Closed/Suction Drain Lateral LLQ Bulb (Active)       NG/OG/NJ/NE Tube Nasogastric 18 fr Right nostril (Active)       Urinary Catheter 07/02/25 (Active)       Findings:  Infection Present At Time Of Surgery (PATOS) (choose all levels that have infection present):  - Organ Space infection (below fascia) present as evidenced by fluid consistent with infection  Other Findings:   This procedure was not performed to treat colon cancer through resection    Detailed Description of Procedure:   The patient was brought to the operating room, placed on the operating table in the supine position.  The patient is intubated endotracheally and placed under general anesthesia.  The abdomen is prepped and draped in a standard sterile fashion.  A 5 mm incision was made at Willis's point in the left upper quadrant allowing passage of a Veress needle into the peritoneal cavity and insufflation with CO2 gas to pressure of 10 mmHg.  Following adequate insufflation a 0 degree 5 mm scope was

## 2025-07-02 NOTE — ED NOTES
TRANSFER - OUT REPORT:    Verbal report given to Razia on Marcelina Collins  being transferred to [facility] for routine progression of patient care       Report consisted of patient's Situation, Background, Assessment and   Recommendations(SBAR).     Information from the following report(s) Nurse Handoff Report was reviewed with the receiving nurse.    Daquan Fall Assessment:    Presents to emergency department  because of falls (Syncope, seizure, or loss of consciousness): No  Age > 70: Yes  Altered Mental Status, Intoxication with alcohol or substance confusion (Disorientation, impaired judgment, poor safety awaremess, or inability to follow instructions): No  Impaired Mobility: Ambulates or transfers with assistive devices or assistance; Unable to ambulate or transer.: No  Nursing Judgement: Yes          Lines:   Peripheral IV 07/02/25 Left;Posterior Hand (Active)        Opportunity for questions and clarification was provided.      Patient transported with:  Registered Nurse          Debbie Banks RN  07/02/25 2005

## 2025-07-02 NOTE — ED TRIAGE NOTES
Patient arrived via EMS from home complaining of generalized abdominal pain and one episode of N/V, patient also complains of constipation (last BM ~3days ago). 4mg Zofran en route with EMS.

## 2025-07-02 NOTE — ED PROVIDER NOTES
Emergency Department Provider Note       [facility] EMERGENCY DEPT   PCP: Francisco Javier Aguilar Jr., MD   Age: 85 y.o.   Sex: female     DISPOSITION Admitted 07/02/2025 02:41:47 AM    ICD-10-CM    1. Intestinal obstruction, unspecified cause, unspecified whether partial or complete (HCC)  K56.609       2. Generalized abdominal pain  R10.84       3. Essential hypertension  I10           Medical Decision Making     Pt comes to the ED for evaluation of diffuse abdominal pain with associated constipation and N/V.  Pt states pain became worse over the past 3 hours.  Denies CP or SOB.     On exam, pt uncomfortable.  HTN present.  Given Morphine, Zofran and IVF for symptoms.      CBC with WBC of 14, stable anemia.  CMP with Na of 130, otherwise unremarkable.  Lactic Acid mildly elevated at 2.3.  Lipase wnl.  Procalcitonin wnl.      0200: Pt still in pain and hypertensive after medications.  Labetalol and Fentanyl ordered.      CT A/P with IV contrast:  IMPRESSION:  Closed-loop obstruction in the lower abdomen/pelvis. Thickened segment of small  bowel in this region is concerning for ischemia. Small abdominal pelvic ascites.    0225: Dr. Schumacher paged regarding pt's presentation and CT results.      0239: Pt and family updated on results.      0258: Surgery to admit primarily.  Family updated on plan.  Pt feels comfortable after Fentanyl.     1 acute illness with systemic symptoms.    Over the counter drug management performed.  Prescription drug management performed.  Shared medical decision making was utilized in creating the patients health plan today.    I independently ordered and reviewed each unique test.    I reviewed external records: provider visit note from PCP.     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 No main ductal dilatation. Follow-up MRCP in 6  months is recommended to ensure stability. Mild biliary

## 2025-07-02 NOTE — H&P
Marcell Schumacher MD   General and Robotic surgery  [address], [address]  [place] [address]  Phone [phone]   Fax [phone]      Date of visit: 2025      Primary/Requesting provider: Francisco Javier Aguilar Jr., MD         Name: Marcelina Collins      MRN: [id number]       : 1940       Age: 85 y.o.    Sex: female        PCP: Francisco Javier Aguilar Jr., MD     CC:    Chief Complaint   Patient presents with    Abdominal Pain    Nausea    Constipation       HPI:     Marcelina Collins is a 85 y.o. female with a history of CABG and aortic valve repair presents with a history of a single day of abdominal pain with nausea and emesis.  CT is significant for what appears to be a closed-loop obstruction with 2 transition points.  White blood cell count is 14.6 and the lactic acid was above 3 on arrival and 2.3 currently.  She has a history of cholecystectomy and hysterectomy as well as the bypass surgery.  She is on Eliquis and a baby aspirin.  I have initiated giving her a PCC-based reversal agent prior to surgery.  Hopefully we are able to perform her procedure before she has any kanu ischemia or necrosis and will avoid a bowel resection.  She is relatively stable hemodynamically and her pain is also relatively well-managed.  I think she is a good candidate for a minimally invasive robotic approach, at least to ascertain what is going on, as opposed to an automatic exploratory laparotomy.  It is fairly likely she will require a small bowel resection however.  I had a full discussion with her and the family regarding her age, the operation and the options available to her.      Past Medical History:   Diagnosis Date    Adverse effect of anesthesia     delayed awakening x1- with heart surgery    DARRELL (acute kidney injury) 2013    pt reports after TIA    Allergic rhinitis     has inhaler daily (pt denies asthma)    Anemia, unspecified 2015    no recent infusions    Aortic stenosis     sp AVR  MCG/ACT AEPB diskus inhaler Inhale 1 puff into the lungs in the morning and 1 puff in the evening. 60 each 3    albuterol sulfate HFA (VENTOLIN HFA) 108 (90 Base) MCG/ACT inhaler Inhale 2 puffs into the lungs 4 times daily as needed for Wheezing 54 g 1    losartan (COZAAR) 25 MG tablet Take 1 tablet by mouth as needed      furosemide (LASIX) 40 MG tablet 1/2-1 po qam  prn swelling 60 tablet 3    fluticasone (FLONASE) 50 MCG/ACT nasal spray 1 spray by Each Nostril route daily 32 g 3    mometasone (ELOCON) 0.1 % lotion  (Patient not taking: Reported on 2025)      docusate (COLACE, DULCOLAX) 100 MG CAPS Take 100 mg by mouth nightly as needed (constipation)          Allergies   Allergen Reactions    Sulfa Antibiotics Rash    Amoxicillin Other (See Comments)    Amoxicillin-Pot Clavulanate Nausea Only    Clavulanic Acid Other (See Comments)    Lisinopril Other (See Comments)    Lansoprazole Rash     flushed    Terbinafine Rash        Social History       Tobacco History       Smoking Status  Never      Passive Exposure  Never      Smokeless Tobacco Use  Never              Alcohol History       Alcohol Use Status  No              Drug Use       Drug Use Status  No              Sexual Activity       Sexually Active  Not Currently Partners  Male Comment  None since   in .                     Family History   Problem Relation Age of Onset    Heart Disease Sister     Hypertension Sister     Diabetes Sister     Diabetes Brother     Cancer Brother         pancreatic cancer    Heart Disease Father     Alzheimer's Disease Father     Diabetes Son     Diabetes Mother     Heart Disease Mother     Hypertension Mother     Diabetes Son     Breast Cancer Neg Hx          Reviewed and updated this visit by provider:         Review of systems:  A 13pt review of systems is unremarkable unless otherwise stated in HPI.     Physical Exam  Constitutional:       Appearance: Normal appearance. She is normal weight.   HENT:

## 2025-07-02 NOTE — PROGRESS NOTES
4 Eyes Skin Assessment     NAME:  Marcelina Collins  YOB: 1940  MEDICAL RECORD NUMBER:  [id number]    The patient is being assessed for  Admission    I agree that at least one RN has performed a thorough Head to Toe Skin Assessment on the patient. ALL assessment sites listed below have been assessed.      Areas assessed by both nurses:    Head, Face, Ears, Shoulders, Back, Chest, Arms, Elbows, Hands, Sacrum. Buttock, Coccyx, Ischium, Legs. Feet and Heels, and Under Medical Devices         Does the Patient have a Wound? Yes wound(s) were present on assessment. LDA wound assessment was Initiated and completed by RN       Isaac Prevention initiated by RN: Yes  Wound Care Orders initiated by RN: Yes    Pressure Injury (Stage 1,2,3,4, Unstageable, DTI, NWPT, and Complex wounds) if present, place Wound referral order by RN under : Yes    New Ostomies, if present place, Ostomy referral order under : No     Nurse 1 eSignature: Electronically signed by MORRIS MENDOZA RN on 7/2/25 at 3:25 PM EDT    **SHARE this note so that the co-signing nurse can place an eSignature**    Nurse 2 eSignature: Electronically signed by Carmenza Camacho RN on 7/2/25 at 4:58 PM EDT

## 2025-07-03 LAB
ALBUMIN SERPL-MCNC: 2.6 G/DL (ref 3.2–4.6)
ALBUMIN/GLOB SERPL: 0.9 (ref 1–1.9)
ALP SERPL-CCNC: 91 U/L (ref 35–104)
ALT SERPL-CCNC: 16 U/L (ref 8–45)
ANION GAP BLD CALC-SCNC: ABNORMAL MMOL/L
ANION GAP BLD CALC-SCNC: ABNORMAL MMOL/L
ANION GAP SERPL CALC-SCNC: 10 MMOL/L (ref 7–16)
AST SERPL-CCNC: 33 U/L (ref 15–37)
BASE DEFICIT BLD-SCNC: 5.3 MMOL/L
BASE DEFICIT BLD-SCNC: 6.5 MMOL/L
BASOPHILS # BLD: 0.07 K/UL (ref 0–0.2)
BASOPHILS NFR BLD: 0.3 % (ref 0–2)
BILIRUB SERPL-MCNC: 0.9 MG/DL (ref 0–1.2)
BUN SERPL-MCNC: 21 MG/DL (ref 8–23)
CA-I BLD-MCNC: 1.21 MMOL/L (ref 1.12–1.32)
CA-I BLD-MCNC: 1.21 MMOL/L (ref 1.12–1.32)
CALCIUM SERPL-MCNC: 8.6 MG/DL (ref 8.8–10.2)
CHLORIDE SERPL-SCNC: 105 MMOL/L (ref 98–107)
CO2 BLD-SCNC: 20 MMOL/L (ref 13–23)
CO2 BLD-SCNC: 21 MMOL/L (ref 13–23)
CO2 SERPL-SCNC: 22 MMOL/L (ref 20–29)
CREAT SERPL-MCNC: 0.55 MG/DL (ref 0.6–1.1)
DIFFERENTIAL METHOD BLD: ABNORMAL
EOSINOPHIL # BLD: 0.02 K/UL (ref 0–0.8)
EOSINOPHIL NFR BLD: 0.1 % (ref 0.5–7.8)
ERYTHROCYTE [DISTWIDTH] IN BLOOD BY AUTOMATED COUNT: 15.4 % (ref 11.9–14.6)
GLOBULIN SER CALC-MCNC: 3 G/DL (ref 2.3–3.5)
GLUCOSE BLD STRIP.AUTO-MCNC: 132 MG/DL (ref 65–100)
GLUCOSE BLD STRIP.AUTO-MCNC: 134 MG/DL (ref 65–100)
GLUCOSE SERPL-MCNC: 115 MG/DL (ref 70–99)
HCO3 BLD-SCNC: 20.7 MMOL/L (ref 21–28)
HCO3 BLD-SCNC: 21.5 MMOL/L (ref 21–28)
HCT VFR BLD AUTO: 27.4 % (ref 35.8–46.3)
HGB BLD-MCNC: 8.8 G/DL (ref 11.7–15.4)
IMM GRANULOCYTES # BLD AUTO: 0.26 K/UL (ref 0–0.5)
IMM GRANULOCYTES NFR BLD AUTO: 1 % (ref 0–5)
LYMPHOCYTES # BLD: 1.98 K/UL (ref 0.5–4.6)
LYMPHOCYTES NFR BLD: 7.9 % (ref 13–44)
MAGNESIUM SERPL-MCNC: 1.7 MG/DL (ref 1.8–2.4)
MCH RBC QN AUTO: 27 PG (ref 26.1–32.9)
MCHC RBC AUTO-ENTMCNC: 32.1 G/DL (ref 31.4–35)
MCV RBC AUTO: 84 FL (ref 82–102)
MONOCYTES # BLD: 2.56 K/UL (ref 0.1–1.3)
MONOCYTES NFR BLD: 10.2 % (ref 4–12)
NEUTS SEG # BLD: 20.21 K/UL (ref 1.7–8.2)
NEUTS SEG NFR BLD: 80.5 % (ref 43–78)
NRBC # BLD: 0 K/UL (ref 0–0.2)
PCO2 BLD: 41.9 MMHG (ref 35–45)
PCO2 BLD: 55.2 MMHG (ref 35–45)
PH BLD: 7.2 (ref 7.35–7.45)
PH BLD: 7.3 (ref 7.35–7.45)
PHOSPHATE SERPL-MCNC: 3.4 MG/DL (ref 2.5–4.5)
PLATELET # BLD AUTO: 211 K/UL (ref 150–450)
PMV BLD AUTO: 9.4 FL (ref 9.4–12.3)
PO2 BLD: 270 MMHG (ref 75–100)
PO2 BLD: 282 MMHG (ref 75–100)
POTASSIUM BLD-SCNC: 4.6 MMOL/L (ref 3.5–5.1)
POTASSIUM BLD-SCNC: 4.7 MMOL/L (ref 3.5–5.1)
POTASSIUM SERPL-SCNC: 4 MMOL/L (ref 3.5–5.1)
PROT SERPL-MCNC: 5.7 G/DL (ref 6.3–8.2)
RBC # BLD AUTO: 3.26 M/UL (ref 4.05–5.2)
SAO2 % BLD: 100 %
SAO2 % BLD: 100 %
SERVICE CMNT-IMP: ABNORMAL
SERVICE CMNT-IMP: ABNORMAL
SODIUM BLD-SCNC: 137 MMOL/L (ref 136–145)
SODIUM BLD-SCNC: 140 MMOL/L (ref 136–145)
SODIUM SERPL-SCNC: 137 MMOL/L (ref 136–145)
SPECIMEN SITE: ABNORMAL
SPECIMEN SITE: ABNORMAL
WBC # BLD AUTO: 25.1 K/UL (ref 4.3–11.1)

## 2025-07-03 PROCEDURE — 2580000003 HC RX 258: Performed by: NURSE PRACTITIONER

## 2025-07-03 PROCEDURE — 6360000002 HC RX W HCPCS: Performed by: STUDENT IN AN ORGANIZED HEALTH CARE EDUCATION/TRAINING PROGRAM

## 2025-07-03 PROCEDURE — 6360000002 HC RX W HCPCS: Performed by: SURGERY

## 2025-07-03 PROCEDURE — 85025 COMPLETE CBC W/AUTO DIFF WBC: CPT

## 2025-07-03 PROCEDURE — 99232 SBSQ HOSP IP/OBS MODERATE 35: CPT | Performed by: INTERNAL MEDICINE

## 2025-07-03 PROCEDURE — 2500000003 HC RX 250 WO HCPCS

## 2025-07-03 PROCEDURE — 6360000002 HC RX W HCPCS

## 2025-07-03 PROCEDURE — 2500000003 HC RX 250 WO HCPCS: Performed by: NURSE PRACTITIONER

## 2025-07-03 PROCEDURE — 2580000003 HC RX 258: Performed by: SURGERY

## 2025-07-03 PROCEDURE — 6360000002 HC RX W HCPCS: Performed by: NURSE PRACTITIONER

## 2025-07-03 PROCEDURE — 94640 AIRWAY INHALATION TREATMENT: CPT

## 2025-07-03 PROCEDURE — 36415 COLL VENOUS BLD VENIPUNCTURE: CPT

## 2025-07-03 PROCEDURE — 2580000003 HC RX 258

## 2025-07-03 PROCEDURE — 94760 N-INVAS EAR/PLS OXIMETRY 1: CPT

## 2025-07-03 PROCEDURE — 84100 ASSAY OF PHOSPHORUS: CPT

## 2025-07-03 PROCEDURE — 80053 COMPREHEN METABOLIC PANEL: CPT

## 2025-07-03 PROCEDURE — 83735 ASSAY OF MAGNESIUM: CPT

## 2025-07-03 PROCEDURE — 1100000003 HC PRIVATE W/ TELEMETRY

## 2025-07-03 RX ORDER — HYDRALAZINE HYDROCHLORIDE 20 MG/ML
10 INJECTION INTRAMUSCULAR; INTRAVENOUS ONCE
Status: COMPLETED | OUTPATIENT
Start: 2025-07-03 | End: 2025-07-03

## 2025-07-03 RX ORDER — POTASSIUM CHLORIDE 7.45 MG/ML
10 INJECTION INTRAVENOUS PRN
Status: DISCONTINUED | OUTPATIENT
Start: 2025-07-03 | End: 2025-07-11 | Stop reason: HOSPADM

## 2025-07-03 RX ORDER — POTASSIUM CHLORIDE 29.8 MG/ML
20 INJECTION INTRAVENOUS PRN
Status: DISCONTINUED | OUTPATIENT
Start: 2025-07-03 | End: 2025-07-11 | Stop reason: HOSPADM

## 2025-07-03 RX ORDER — HYDRALAZINE HYDROCHLORIDE 20 MG/ML
10 INJECTION INTRAMUSCULAR; INTRAVENOUS EVERY 6 HOURS PRN
Status: DISCONTINUED | OUTPATIENT
Start: 2025-07-03 | End: 2025-07-05

## 2025-07-03 RX ADMIN — SODIUM CHLORIDE: 0.9 INJECTION, SOLUTION INTRAVENOUS at 01:46

## 2025-07-03 RX ADMIN — FAMOTIDINE 20 MG: 10 INJECTION, SOLUTION INTRAVENOUS at 09:39

## 2025-07-03 RX ADMIN — PIPERACILLIN AND TAZOBACTAM 4500 MG: 4; .5 INJECTION, POWDER, LYOPHILIZED, FOR SOLUTION INTRAVENOUS at 21:38

## 2025-07-03 RX ADMIN — THIAMINE HYDROCHLORIDE: 100 INJECTION, SOLUTION INTRAMUSCULAR; INTRAVENOUS at 17:53

## 2025-07-03 RX ADMIN — FAMOTIDINE 20 MG: 10 INJECTION, SOLUTION INTRAVENOUS at 20:19

## 2025-07-03 RX ADMIN — SODIUM CHLORIDE, PRESERVATIVE FREE 10 ML: 5 INJECTION INTRAVENOUS at 09:42

## 2025-07-03 RX ADMIN — PIPERACILLIN AND TAZOBACTAM 4500 MG: 4; .5 INJECTION, POWDER, LYOPHILIZED, FOR SOLUTION INTRAVENOUS at 15:53

## 2025-07-03 RX ADMIN — HYDROMORPHONE HYDROCHLORIDE 0.5 MG: 1 INJECTION, SOLUTION INTRAMUSCULAR; INTRAVENOUS; SUBCUTANEOUS at 09:36

## 2025-07-03 RX ADMIN — I.V. FAT EMULSION 250 ML: 20 EMULSION INTRAVENOUS at 17:57

## 2025-07-03 RX ADMIN — SODIUM CHLORIDE, PRESERVATIVE FREE 10 ML: 5 INJECTION INTRAVENOUS at 20:20

## 2025-07-03 RX ADMIN — PIPERACILLIN AND TAZOBACTAM 3375 MG: 3; .375 INJECTION, POWDER, FOR SOLUTION INTRAVENOUS at 02:09

## 2025-07-03 RX ADMIN — HYDRALAZINE HYDROCHLORIDE 10 MG: 20 INJECTION INTRAMUSCULAR; INTRAVENOUS at 20:23

## 2025-07-03 RX ADMIN — ARFORMOTEROL TARTRATE: 15 SOLUTION RESPIRATORY (INHALATION) at 19:35

## 2025-07-03 RX ADMIN — ARFORMOTEROL TARTRATE: 15 SOLUTION RESPIRATORY (INHALATION) at 10:47

## 2025-07-03 RX ADMIN — SODIUM CHLORIDE: 0.9 INJECTION, SOLUTION INTRAVENOUS at 12:34

## 2025-07-03 RX ADMIN — SODIUM CHLORIDE: 0.9 INJECTION, SOLUTION INTRAVENOUS at 15:52

## 2025-07-03 RX ADMIN — PIPERACILLIN AND TAZOBACTAM 3375 MG: 3; .375 INJECTION, POWDER, FOR SOLUTION INTRAVENOUS at 09:48

## 2025-07-03 ASSESSMENT — PAIN DESCRIPTION - DESCRIPTORS: DESCRIPTORS: SORE

## 2025-07-03 ASSESSMENT — PAIN DESCRIPTION - LOCATION: LOCATION: ABDOMEN

## 2025-07-03 ASSESSMENT — PAIN DESCRIPTION - ORIENTATION: ORIENTATION: ANTERIOR

## 2025-07-03 ASSESSMENT — PAIN SCALES - GENERAL
PAINLEVEL_OUTOF10: 6
PAINLEVEL_OUTOF10: 4

## 2025-07-03 ASSESSMENT — PAIN SCALES - WONG BAKER: WONGBAKER_NUMERICALRESPONSE: NO HURT

## 2025-07-03 NOTE — WOUND CARE
Patient seen by wound care for sacral concern.     Patient lying supine, HOB up 45 degrees, alert and awake. I inspect the sacrum. No open wound noted, only blanchable erythema and patient c/o of tenderness. There is foam border to protect and prevent. Patient should be either 30 degrees left or right lateral. I turned patient to her left side 30 degrees lateral.

## 2025-07-03 NOTE — CARE COORDINATION
CM spoke to patient at bedside on this day. Patient confirmed demographic information and insurance information. Patient reports that she lives with her son and daughter in law, in a 1 story house with basement and has 1-2 steps to enter home. Patient reports that she is independent with ADLs and is not an active . Patient uses cane, walker, rollator (for doctor appointments and long distances), raised toilet seat and transfer bench in tub. Patient stated that RN sees her through her insurance benefits and she has home health care for PT services but unsure of name of agency. PT/OT are pending at this time. Patient stated her son will transport her home once medically stable for discharge, if appropriate time.     CM will continue to follow patient for any discharge planning needs.     ASSESSMENT NOTE    Attending Physician: Marcell Schumacher MD  Admit Problem: SBO (small bowel obstruction) (HCC) [K56.609]  Essential hypertension [I10]  Generalized abdominal pain [R10.84]  Intestinal obstruction, unspecified cause, unspecified whether partial or complete (HCC) [K56.609]  Date/Time of Admission: 7/2/2025 12:11 AM  Problem List:  Patient Active Problem List   Diagnosis    Essential hypertension    Paroxysmal atrial fibrillation (HCC)    Anemia, unspecified    Spinal stenosis    Osteoarthritis of right knee    Dyslipidemia    Chronic pain    Arthritis    Coronary artery disease of native artery of native heart with stable angina pectoris    Gastroesophageal reflux disease with esophagitis without hemorrhage    Obesity (BMI 30-39.9)    S/P AVR    Abnormal CT of the chest    Cardiac pacemaker    Peripheral neuropathy    Status post total right knee replacement    Hyponatremia    S/P dilatation of esophageal stricture    Short-segment Liriano's esophagus    Allergic rhinitis    Hypomagnesemia    History of CVA with residual deficit    Chronic diastolic congestive heart failure (HCC)    Secondary hypercoagulable  (son)   Hospital Transport Time of Discharge     Confirm Follow Up Transport (P) Family     Condition of Participation: Discharge Planning  The plan for Transition of Care is related to the following treatment goals: (P) pending   The Patient and/or Patient Representative was provided with a Choice of Provider? (P) Patient   Name of the Patient Representative who was provided with the Choice of Provider and agrees with the Discharge Plan?     The Patient and/or Patient Representative Agree with the Discharge Plan? (P) Yes   Freedom of Choice list was provided with basic dialogue that supports the individualized plan of care/goals, treatment preferences, and shares the quality data associated with the providers? (P) Yes     Documentation for Discharge Appeal  Discharge Appealed by     Date notified by [facility] of appeal request:     Time notified by [facility]IO of appeal request:     Detailed Notice of Discharge given to:     Date Notice of Discharge given:     Time Notice of Discharge given:     Date records sent to QIO     Time records sent to QIO     Date Notified of Outcome     Time Notified of Outcome     Outcome of appeal           Neisha Delgado 07/03/25 5:20 PM

## 2025-07-03 NOTE — CONSULTS
Nutrition Assessment  Assessment Type: Initial  Reason for visit:  Best Practice Alert: Malnutrition Screening Tool  and Parenteral Nutrition Management (Surgery)  Malnutrition Screening Tool Score: 2  Unintentional weight loss PTA: 2 to 13 pounds (1 point)  Eating poorly due to decreased appetite: Yes (1 point)    Nutrition Intervention:   Food and/or Nutrient Delivery:   Parenteral Nutrition:  Peripheral parenteral nutrition (Osmolarity 890)   Peripheral line infusion  Initiate: Dex 5%, 4.25% AA 2 L (85ml/hr)   Initiate 250 ml 20% lipids daily  Electrolytes:   Sodium (75 mEq/L sodium chloride), Potassium (10 mmol/L potassium phosphate, 15 mEq/L potassium chloride), Calcium gluconate (4.5 mEq/L), Magnesium sulfate 15 mEq/L   Additives:   Adult multivitamin with vit K  Trace Elements  Thiamine 100 mg daily x 7 days (EOT 7/9)  Folic Acid 1 mg daily x 7 days (EOT 7/9)  PN regimen provides per 24 hours: 1180 kcal/day (85% of needs), 85 grams of protein/day (100% of needs), 100 grams of CHO/24 hours and ~2250 ml of total volume/24 hours.   Above regimen: Unable to meet calorie and protein goal secondary to PPN  Biochemical Data:   Basic Metabolic Panel, Hepatic Function Panel, Magnesium and Phosphorus active per nutrition parameters  Triglyceride tomorrow  POC Glucoses/SSI Not indicated  Nutrition Related Medication Management:  Electrolyte Replacement:   Initiate prn protocol Phosphorus   Change potassium to peripheral access  Continue Magnesium protocol  Intravenous fluids:  Discontinue at 1800 with PN start   Meals and Snacks:  Diet: Continue NPO and advance as medically appropriate  Medical Food Supplements:   Medical food supplement therapy:  None Deferred NPO      Malnutrition Assessment:  Malnutrition Status: Moderate malnutrition  Context: Chronic Illness  Findings of clinical characteristics of malnutrition:   Energy Intake:  Unable to assess (variable po at baseline)  Weight Loss:  Mild weight loss (5.6%

## 2025-07-03 NOTE — ANESTHESIA POSTPROCEDURE EVALUATION
Department of Anesthesiology  Postprocedure Note    Patient: Marcelina Collins  MRN: [id number]  YOB: 1940  Date of evaluation: 7/2/2025    Procedure Summary       Date: 07/02/25 Room / Location: [facility] MAIN OR 03 / [facility] MAIN OR    Anesthesia Start: 0743 Anesthesia Stop: 1142    Procedure: LAPAROSCOPY ROBOTIC BOWEL RESECTION (Abdomen) Diagnosis:       Paralytic ileus of small intestine and colon (HCC)      (Paralytic ileus of small intestine and colon (HCC) [K56.0])    Providers: Marcell Schumacher MD Responsible Provider: Gilbert Leroy IV, MD    Anesthesia Type: General ASA Status: 4            Anesthesia Type: General    Richard Phase I: Richard Score: 10    Richard Phase II:      Anesthesia Post Evaluation    Patient location during evaluation: PACU  Patient participation: complete - patient participated  Level of consciousness: awake and alert  Airway patency: patent  Nausea & Vomiting: no nausea and no vomiting  Cardiovascular status: hemodynamically stable  Respiratory status: acceptable, nonlabored ventilation and spontaneous ventilation  Hydration status: euvolemic  Comments: BP (!) 150/68   Pulse 89   Temp 97.9 °F (36.6 °C) (Oral)   Resp 19   Ht 1.702 m (5' 7\")   Wt 61.2 kg (135 lb)   SpO2 99%   BMI 21.14 kg/m²     Multimodal analgesia pain management approach  Pain management: adequate and satisfactory to patient        No notable events documented.

## 2025-07-03 NOTE — PROGRESS NOTES
Marcelina Collins  Admission Date: 7/2/2025         Daily Progress Note: 7/3/2025    The patient's chart is reviewed and the patient is discussed with the staff.    Background: 85 y.o female seen and evaluated at the request of patient.   She was scheduled to see us in the clinic 7/3 and since she would miss her appointment she wanted to see us while admitted. She was seen on May 14,2025 for follow up for pulmonary nodule dating back to 2018, weight loss, persistent pulmonary infiltrates, bronchiectasis, chronic cough and low grade fevers. Chest CT was obtained showing tree-in-bud changes and inflammatory changes. On her CBC, abs eosinophils were 380 in February. 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 PFT showed moderate obstruction; she does not have complete PFTs since 2015.She has not started treatment for MAC.      In interim, she presented here on 7/2 with paralytic ileus of small intestine and colon and underwent robotic resection of bowel on 7/2. She is on RA with oxygen saturation of 99%.     Subjective:     Stable on room air. Minimal pain. She states she has not really had the urge to cough. NGT to BARBARA    Current Facility-Administered Medications   Medication Dose Route Frequency    0.9 % sodium chloride infusion   IntraVENous Continuous    sodium chloride flush 0.9 % injection 5-40 mL  5-40 mL IntraVENous 2 times per day    sodium chloride flush 0.9 % injection 5-40 mL  5-40 mL IntraVENous PRN    0.9 % sodium chloride infusion   IntraVENous PRN    potassium chloride (KLOR-CON M) extended release tablet 40 mEq  40 mEq Oral PRN    Or    potassium bicarb-citric acid (EFFER-K) effervescent tablet 40 mEq  40 mEq Oral PRN    Or    potassium chloride 10 mEq/100 mL IVPB (Peripheral Line)  10 mEq IntraVENous PRN    magnesium sulfate 2000 mg in 50 mL IVPB premix  2,000 mg IntraVENous PRN    [Held by provider] enoxaparin (LOVENOX) injection 40 mg  40 mg SubCUTAneous Daily    ondansetron (ZOFRAN-ODT)

## 2025-07-03 NOTE — PROGRESS NOTES
Progress Note    Date:7/3/2025       Room:[facility]  Patient Name:Marcelina Collins     YOB: 1940     Age:85 y.o.  Admit Date: 7/2/2025    POD 1 Day Post-Op    Procedure:  Procedure(s):  LAPAROSCOPY ROBOTIC BOWEL RESECTION    Assessment & Plan        Marcelina Collins is an 85-year-old female with a history of CABG s/p AVR who was admitted to the general surgery service 7/2/25 with a closed-loop small bowel obstruction as seen on CT imaging. She is now s/p robotic diagnostic lap with small bowel resection and drain placement.    Small bowel obstruction  - CT abd/pelvis 7/2 with closed-loop obstruction in lower abdomen/pelvis, c/f ischemia  - s/p robotic diagnostic laparoscopy with SBR and LLQ drain placement 7/2 (Endy)  - NG placed 7/2, 600ml/24hrs bilious OP, keep to LIWS  - LLQ drain with 303ml/24hrs OP  - IV Pepcid BID  - AF, WBC 25.1  - Continue Zosyn, 7/2-p  - MMPC/antiemetics PRN  - Restart Lovenox  - NPO and bowel rest, start PPN/TPN    Chronic cough  MAC (active)  - Hx pulmonary nodule  - Pulmonology consulted, plan to follow up in office in 3 months  - Supplemental O2 as needed to maintain O2 sat >92%    Atrial fibrillation  - Hold home Eliquis, consider restarting 7/4  - Telemetry ordered  - Lovenox daily    Diet: NPO  DVT ppx: Lovenox  Dispo: Continue floor care    Subjective     24-hour update: OLGA MOMIN. Pain is well-controlled with current regimen. Son at bedside. Dressing c/d/I. Has not passed flatus since surgery. Encourage OOB and IS use. Will start PPN or TPN for nutritional needs    HPI:  Marcelina Collins is a 85 y.o. female with a history of CABG and aortic valve repair presents with a history of a single day of abdominal pain with nausea and emesis.  CT is significant for what appears to be a closed-loop obstruction with 2 transition points.  White blood cell count is 14.6 and the lactic acid was above 3 on arrival and 2.3 currently.  She has a history of cholecystectomy and hysterectomy as  RT    sodium chloride flush  5-40 mL IntraVENous 2 times per day    [Held by provider] enoxaparin  40 mg SubCUTAneous Daily    famotidine (PEPCID) injection  20 mg IntraVENous BID    piperacillin-tazobactam  3,375 mg IntraVENous Q8H     Continuous Infusions:    sodium chloride 101 mL/hr at 07/03/25 0146    sodium chloride      sodium chloride       PRN Meds: sodium chloride flush, sodium chloride, potassium chloride **OR** potassium alternative oral replacement **OR** potassium chloride, magnesium sulfate, ondansetron **OR** ondansetron, acetaminophen **OR** acetaminophen, HYDROmorphone, HYDROmorphone    Past History    Past Medical History:   has a past medical history of Adverse effect of anesthesia, DARRELL (acute kidney injury), Allergic rhinitis, Anemia, unspecified, Aortic stenosis, Blurry vision, bilateral, CAD (coronary artery disease), Cardiac pacemaker, Cerebral artery occlusion with cerebral infarction (HCC), Constipation, Critical aortic valve stenosis, Current use of long term anticoagulation, GERD (gastroesophageal reflux disease), H/O maze procedure, History of Bell's palsy, History of TIA (transient ischemic attack), Hx of blood clots, Hypertension, Hyponatremia, Menopause, Metabolic encephalopathy, Neuropathy, Osteoarthritis, Pancreatic cyst, Paroxysmal atrial fibrillation (HCC), Prolonged emergence from general anesthesia, Pulmonary nodule, S/P dilatation of esophageal stricture, Short-segment Liriano's esophagus, Sick sinus syndrome (HCC), SOB (shortness of breath) on exertion, Spinal stenosis, lumbar, Status post total right knee replacement, and Syncope and collapse.    Social History:   reports that she has never smoked. She has never been exposed to tobacco smoke. She has never used smokeless tobacco. She reports that she does not drink alcohol and does not use drugs.     Family History:   Family History   Problem Relation Age of Onset    Heart Disease Sister     Hypertension Sister     Diabetes

## 2025-07-04 ENCOUNTER — APPOINTMENT (OUTPATIENT)
Dept: GENERAL RADIOLOGY | Age: 85
DRG: 329 | End: 2025-07-04
Payer: MEDICARE

## 2025-07-04 LAB
ALBUMIN SERPL-MCNC: 2.4 G/DL (ref 3.2–4.6)
ALBUMIN/GLOB SERPL: 0.8 (ref 1–1.9)
ALP SERPL-CCNC: 81 U/L (ref 35–104)
ALT SERPL-CCNC: 14 U/L (ref 8–45)
ANION GAP SERPL CALC-SCNC: 9 MMOL/L (ref 7–16)
AST SERPL-CCNC: 23 U/L (ref 15–37)
BASOPHILS # BLD: 0.07 K/UL (ref 0–0.2)
BASOPHILS NFR BLD: 0.4 % (ref 0–2)
BILIRUB DIRECT SERPL-MCNC: 0.3 MG/DL (ref 0–0.3)
BILIRUB SERPL-MCNC: 0.5 MG/DL (ref 0–1.2)
BUN SERPL-MCNC: 19 MG/DL (ref 8–23)
CALCIUM SERPL-MCNC: 8.2 MG/DL (ref 8.8–10.2)
CHLORIDE SERPL-SCNC: 105 MMOL/L (ref 98–107)
CO2 SERPL-SCNC: 23 MMOL/L (ref 20–29)
CREAT SERPL-MCNC: 0.42 MG/DL (ref 0.6–1.1)
DIFFERENTIAL METHOD BLD: ABNORMAL
EOSINOPHIL # BLD: 0.17 K/UL (ref 0–0.8)
EOSINOPHIL NFR BLD: 1 % (ref 0.5–7.8)
ERYTHROCYTE [DISTWIDTH] IN BLOOD BY AUTOMATED COUNT: 15.6 % (ref 11.9–14.6)
GLOBULIN SER CALC-MCNC: 3.1 G/DL (ref 2.3–3.5)
GLUCOSE SERPL-MCNC: 128 MG/DL (ref 70–99)
HCT VFR BLD AUTO: 25.6 % (ref 35.8–46.3)
HGB BLD-MCNC: 8.2 G/DL (ref 11.7–15.4)
IMM GRANULOCYTES # BLD AUTO: 0.17 K/UL (ref 0–0.5)
IMM GRANULOCYTES NFR BLD AUTO: 1 % (ref 0–5)
LYMPHOCYTES # BLD: 2.02 K/UL (ref 0.5–4.6)
LYMPHOCYTES NFR BLD: 12.3 % (ref 13–44)
MAGNESIUM SERPL-MCNC: 2.1 MG/DL (ref 1.8–2.4)
MCH RBC QN AUTO: 27 PG (ref 26.1–32.9)
MCHC RBC AUTO-ENTMCNC: 32 G/DL (ref 31.4–35)
MCV RBC AUTO: 84.2 FL (ref 82–102)
MONOCYTES # BLD: 1.85 K/UL (ref 0.1–1.3)
MONOCYTES NFR BLD: 11.2 % (ref 4–12)
NEUTS SEG # BLD: 12.18 K/UL (ref 1.7–8.2)
NEUTS SEG NFR BLD: 74.1 % (ref 43–78)
NRBC # BLD: 0 K/UL (ref 0–0.2)
PHOSPHATE SERPL-MCNC: 1.7 MG/DL (ref 2.5–4.5)
PLATELET # BLD AUTO: 207 K/UL (ref 150–450)
PMV BLD AUTO: 9.1 FL (ref 9.4–12.3)
POTASSIUM SERPL-SCNC: 3.4 MMOL/L (ref 3.5–5.1)
PROT SERPL-MCNC: 5.5 G/DL (ref 6.3–8.2)
RBC # BLD AUTO: 3.04 M/UL (ref 4.05–5.2)
SODIUM SERPL-SCNC: 137 MMOL/L (ref 136–145)
TRIGL SERPL-MCNC: 81 MG/DL (ref 0–150)
WBC # BLD AUTO: 16.5 K/UL (ref 4.3–11.1)

## 2025-07-04 PROCEDURE — 6360000002 HC RX W HCPCS

## 2025-07-04 PROCEDURE — 36415 COLL VENOUS BLD VENIPUNCTURE: CPT

## 2025-07-04 PROCEDURE — 6370000000 HC RX 637 (ALT 250 FOR IP): Performed by: NURSE PRACTITIONER

## 2025-07-04 PROCEDURE — 80076 HEPATIC FUNCTION PANEL: CPT

## 2025-07-04 PROCEDURE — 84100 ASSAY OF PHOSPHORUS: CPT

## 2025-07-04 PROCEDURE — 6360000002 HC RX W HCPCS: Performed by: SURGERY

## 2025-07-04 PROCEDURE — 2500000003 HC RX 250 WO HCPCS: Performed by: SURGERY

## 2025-07-04 PROCEDURE — 85025 COMPLETE CBC W/AUTO DIFF WBC: CPT

## 2025-07-04 PROCEDURE — 97112 NEUROMUSCULAR REEDUCATION: CPT

## 2025-07-04 PROCEDURE — 97535 SELF CARE MNGMENT TRAINING: CPT

## 2025-07-04 PROCEDURE — 2500000003 HC RX 250 WO HCPCS: Performed by: NURSE PRACTITIONER

## 2025-07-04 PROCEDURE — 2500000003 HC RX 250 WO HCPCS

## 2025-07-04 PROCEDURE — 6370000000 HC RX 637 (ALT 250 FOR IP)

## 2025-07-04 PROCEDURE — 6360000002 HC RX W HCPCS: Performed by: STUDENT IN AN ORGANIZED HEALTH CARE EDUCATION/TRAINING PROGRAM

## 2025-07-04 PROCEDURE — 2580000003 HC RX 258: Performed by: SURGERY

## 2025-07-04 PROCEDURE — 97530 THERAPEUTIC ACTIVITIES: CPT

## 2025-07-04 PROCEDURE — 74018 RADEX ABDOMEN 1 VIEW: CPT

## 2025-07-04 PROCEDURE — 94640 AIRWAY INHALATION TREATMENT: CPT

## 2025-07-04 PROCEDURE — 84478 ASSAY OF TRIGLYCERIDES: CPT

## 2025-07-04 PROCEDURE — 83735 ASSAY OF MAGNESIUM: CPT

## 2025-07-04 PROCEDURE — 97166 OT EVAL MOD COMPLEX 45 MIN: CPT

## 2025-07-04 PROCEDURE — 2580000003 HC RX 258

## 2025-07-04 PROCEDURE — 80048 BASIC METABOLIC PNL TOTAL CA: CPT

## 2025-07-04 PROCEDURE — 6360000002 HC RX W HCPCS: Performed by: NURSE PRACTITIONER

## 2025-07-04 PROCEDURE — 97161 PT EVAL LOW COMPLEX 20 MIN: CPT

## 2025-07-04 PROCEDURE — 1100000003 HC PRIVATE W/ TELEMETRY

## 2025-07-04 PROCEDURE — 94760 N-INVAS EAR/PLS OXIMETRY 1: CPT

## 2025-07-04 RX ORDER — MENTHOL/CAMPHOR/ALLANTOIN/PHE 0.6-0.5-1%
OINTMENT(EA) TOPICAL PRN
Status: DISCONTINUED | OUTPATIENT
Start: 2025-07-04 | End: 2025-07-11 | Stop reason: HOSPADM

## 2025-07-04 RX ORDER — BISACODYL 10 MG
10 SUPPOSITORY, RECTAL RECTAL DAILY PRN
Status: DISCONTINUED | OUTPATIENT
Start: 2025-07-04 | End: 2025-07-11 | Stop reason: HOSPADM

## 2025-07-04 RX ADMIN — POTASSIUM CHLORIDE 10 MEQ: 7.46 INJECTION, SOLUTION INTRAVENOUS at 13:34

## 2025-07-04 RX ADMIN — POTASSIUM CHLORIDE 10 MEQ: 7.46 INJECTION, SOLUTION INTRAVENOUS at 10:15

## 2025-07-04 RX ADMIN — HYDRALAZINE HYDROCHLORIDE 10 MG: 20 INJECTION INTRAMUSCULAR; INTRAVENOUS at 03:20

## 2025-07-04 RX ADMIN — FAMOTIDINE 20 MG: 10 INJECTION, SOLUTION INTRAVENOUS at 09:01

## 2025-07-04 RX ADMIN — THIAMINE HYDROCHLORIDE: 100 INJECTION, SOLUTION INTRAMUSCULAR; INTRAVENOUS at 17:33

## 2025-07-04 RX ADMIN — SODIUM PHOSPHATE, MONOBASIC, MONOHYDRATE AND SODIUM PHOSPHATE, DIBASIC, ANHYDROUS 15 MMOL: 142; 276 INJECTION, SOLUTION INTRAVENOUS at 12:03

## 2025-07-04 RX ADMIN — PIPERACILLIN AND TAZOBACTAM 4500 MG: 4; .5 INJECTION, POWDER, LYOPHILIZED, FOR SOLUTION INTRAVENOUS at 16:02

## 2025-07-04 RX ADMIN — ARFORMOTEROL TARTRATE: 15 SOLUTION RESPIRATORY (INHALATION) at 21:59

## 2025-07-04 RX ADMIN — FAMOTIDINE 20 MG: 10 INJECTION, SOLUTION INTRAVENOUS at 20:31

## 2025-07-04 RX ADMIN — PIPERACILLIN AND TAZOBACTAM 4500 MG: 4; .5 INJECTION, POWDER, LYOPHILIZED, FOR SOLUTION INTRAVENOUS at 04:18

## 2025-07-04 RX ADMIN — Medication 7 G: at 13:35

## 2025-07-04 RX ADMIN — POTASSIUM CHLORIDE 10 MEQ: 7.46 INJECTION, SOLUTION INTRAVENOUS at 11:58

## 2025-07-04 RX ADMIN — ARFORMOTEROL TARTRATE: 15 SOLUTION RESPIRATORY (INHALATION) at 07:53

## 2025-07-04 RX ADMIN — PIPERACILLIN AND TAZOBACTAM 4500 MG: 4; .5 INJECTION, POWDER, LYOPHILIZED, FOR SOLUTION INTRAVENOUS at 09:47

## 2025-07-04 RX ADMIN — SODIUM CHLORIDE, PRESERVATIVE FREE 10 ML: 5 INJECTION INTRAVENOUS at 20:32

## 2025-07-04 RX ADMIN — SODIUM CHLORIDE, PRESERVATIVE FREE 10 ML: 5 INJECTION INTRAVENOUS at 09:03

## 2025-07-04 RX ADMIN — PIPERACILLIN AND TAZOBACTAM 4500 MG: 4; .5 INJECTION, POWDER, LYOPHILIZED, FOR SOLUTION INTRAVENOUS at 22:29

## 2025-07-04 RX ADMIN — HYDRALAZINE HYDROCHLORIDE 10 MG: 20 INJECTION INTRAMUSCULAR; INTRAVENOUS at 17:35

## 2025-07-04 RX ADMIN — ENOXAPARIN SODIUM 40 MG: 100 INJECTION SUBCUTANEOUS at 09:03

## 2025-07-04 RX ADMIN — I.V. FAT EMULSION 250 ML: 20 EMULSION INTRAVENOUS at 17:33

## 2025-07-04 RX ADMIN — ACETAMINOPHEN 650 MG: 650 SUPPOSITORY RECTAL at 12:52

## 2025-07-04 RX ADMIN — HYDRALAZINE HYDROCHLORIDE 10 MG: 20 INJECTION INTRAMUSCULAR; INTRAVENOUS at 09:41

## 2025-07-04 RX ADMIN — POTASSIUM CHLORIDE 10 MEQ: 7.46 INJECTION, SOLUTION INTRAVENOUS at 09:11

## 2025-07-04 ASSESSMENT — PAIN DESCRIPTION - ORIENTATION: ORIENTATION: RIGHT;LEFT;LOWER

## 2025-07-04 ASSESSMENT — PAIN SCALES - GENERAL
PAINLEVEL_OUTOF10: 2
PAINLEVEL_OUTOF10: 0
PAINLEVEL_OUTOF10: 0
PAINLEVEL_OUTOF10: 1

## 2025-07-04 ASSESSMENT — PAIN - FUNCTIONAL ASSESSMENT: PAIN_FUNCTIONAL_ASSESSMENT: ACTIVITIES ARE NOT PREVENTED

## 2025-07-04 ASSESSMENT — PAIN DESCRIPTION - DESCRIPTORS: DESCRIPTORS: OTHER (COMMENT)

## 2025-07-04 ASSESSMENT — PAIN DESCRIPTION - LOCATION: LOCATION: ABDOMEN

## 2025-07-04 NOTE — THERAPY EVALUATION
steps  Bathroom Shower/Tub: Tub/Shower unit  Bathroom Toilet: Standard  Bathroom Equipment: Tub transfer bench, Toilet raiser  Home Equipment: Cane, Walker - Rolling, Rollator  Receives Help From: Family  Prior Level of Assist for ADLs: Independent  Prior Level of Assist for Homemaking: Independent  Homemaking Responsibilities: Yes  Prior Level of Assist for Transfers: Independent  Active : No  Patient's  Info: patient's family  Occupation: Retired    OBJECTIVE:     LINES / DRAINS / AIRWAY: Noel Catheter, IV, TIM Drain, Nasogastric Tube, and Telemetry     RESTRICTIONS/PRECAUTIONS:       PAIN: VITALS / O2:   Pre Treatment: unrated abdominal pain         Post Treatment: same       Vitals          Oxygen            GROSS EVALUATION: INTACT IMPAIRED   (See Comments)   UE AROM [] []   UE PROM [] []   Strength []  Generalized weakness     Posture / Balance []  Good sitting balance, fair/ + standing balance   Sensation []     Coordination []  Decreased gross motor speed     Tone []       Edema []    Activity Tolerance []  Limited by pain and fatigue     Hand Dominance R [] L []      COGNITION/  PERCEPTION: INTACT IMPAIRED   (See Comments)   Orientation [x]     Vision [x]     Hearing [x]     Cognition  [x]     Perception [x]       MOBILITY: I Mod I S SBA CGA Min Mod Max Total  NT x2 Comments:   Bed Mobility    Rolling [] [] [] [] [] [] [x] [] [] [] [x]    Supine to Sit [] [] [] [] [] [] [x] [] [] [] [x]    Scooting [] [] [] [] [] [] [x] [] [] [] [x]    Sit to Supine [] [] [] [] [] [] [] [] [] [] []    Transfers    Sit to Stand [] [] [] [] [x] [x] [] [] [] [] [x]    Bed to Chair [] [] [] [] [x] [x] [] [] [] [] [x]    Stand to Sit [] [] [] [] [x] [] [] [] [] [] []    Tub/Shower [] [] [] [] [] [] [] [] [] [] []     Toilet [] [] [] [] [] [] [] [] [] [] []      [] [] [] [] [] [] [] [] [] [] []    I=Independent, Mod I=Modified Independent, S=Supervision/Setup, SBA=Standby Assistance, CGA=Contact Guard Assistance,  Min=Minimal Assistance, Mod=Moderate Assistance, Max=Maximal Assistance, Total=Total Assistance, NT=Not Tested    ACTIVITIES OF DAILY LIVING: I Mod I S SBA CGA Min Mod Max Total NT Comments   BASIC ADLs:              Upper Body Bathing  [] [] [] [] [] [] [] [] [] []     Lower Body Bathing [] [] [] [] [] [] [] [] [] []     Toileting [] [] [] [] [] [] [] [] [] []    Upper Body Dressing [] [] [] [] [] [] [] [] [] []    Lower Body Dressing [] [] [] [] [] [] [] [] [] []    Feeding [] [] [] [] [] [] [] [] [] []    Grooming [] [] [] [] [x] [x] [] [] [] [] Brush teeth, wash face, comb hair standing edge of sink    Personal Device Care [] [] [] [] [] [] [] [] [] []    Functional Mobility [] [] [] [] [x] [x] [] [] [] [] X 1-2 using rolling walker    I=Independent, Mod I=Modified Independent, S=Supervision/Setup, SBA=Standby Assistance, CGA=Contact Guard Assistance, Min=Minimal Assistance, Mod=Moderate Assistance, Max=Maximal Assistance, Total=Total Assistance, NT=Not Tested    PLAN:   FREQUENCY/DURATION   OT Plan of Care: 3 times/week for duration of hospital stay or until stated goals are met, whichever comes first.    PROBLEM LIST:   (Skilled intervention is medically necessary to address:)  Decreased ADL/Functional Activities  Decreased Activity Tolerance  Decreased Balance  Decreased Cognition  Decreased Coordination  Decreased Gait Ability  Decreased Safety Awareness  Decreased Strength  Decreased Transfer Abilities  Increased Pain   INTERVENTIONS PLANNED:  (Benefits and precautions of occupational therapy have been discussed with the patient.)  Self Care Training  Therapeutic Activity  Therapeutic Exercise/HEP  Neuromuscular Re-education  Manual Therapy  Education         TREATMENT:     EVALUATION: MODERATE COMPLEXITY: (Untimed Charge)  The initial evaluation charge encompasses clinical chart review, objective assessment, interpretation of assessment, and skilled monitoring of the patient's response to treatment in

## 2025-07-04 NOTE — PROGRESS NOTES
ACUTE PHYSICAL THERAPY GOALS:   (Developed with and agreed upon by patient and/or caregiver.)    LTG:  (1.)Ms. Collins will move from supine to sit and sit to supine , scoot up and down, and roll side to side in bed with SUPERVISION within 7 treatment day(s).    (2.)Ms. Collins will transfer from bed to chair and chair to bed with SUPERVISION using the least restrictive device within 7 treatment day(s).    (3.)Ms. Collins will ambulate with SUPERVISION for 150 feet with the least restrictive device within 7 treatment day(s).  (4.)Ms. Collins will tolerate at least 30 min of dynamic standing activity to assist standing ADLs with the least restrictive device within 7 treatment days.    ________________________________________________________________________________________________      PHYSICAL THERAPY Initial Assessment, Daily Note, and AM  (Link to Caseload Tracking: PT Visit Days : 1  Acknowledge Orders  Time In/Out  PT Charge Capture  Rehab Caseload Tracker    Marcelina Collins is a 85 y.o. female   PRIMARY DIAGNOSIS: SBO (small bowel obstruction) (HCC)  SBO (small bowel obstruction) (HCC) [K56.609]  Essential hypertension [I10]  Generalized abdominal pain [R10.84]  Intestinal obstruction, unspecified cause, unspecified whether partial or complete (HCC) [K56.609]  Procedure(s) (LRB):  LAPAROSCOPY ROBOTIC BOWEL RESECTION (N/A)  2 Days Post-Op  Reason for Referral: Generalized Muscle Weakness (M62.81)  Other lack of cordination (R27.8)  Difficulty in walking, Not elsewhere classified (R26.2)  Other abnormalities of gait and mobility (R26.89)  Inpatient: Payor: [employer] MEDICARE / Plan: AET MEDICARE-ADVANTAGE PPO / Product Type: Medicare /     ASSESSMENT:     REHAB RECOMMENDATIONS:   Recommendation to date pending progress:  Setting:  Short-term Rehab    Equipment:    To Be Determined     ASSESSMENT:  Ms. Collins is a pleasant and highly motivated 86 y/o F who presents supine in bed and agreeable to PT.    Upon entering, Pnt is agreeable  to PT treatment.  she reports generalized discomfort at rest though wants to get up and move. Pnt performed supine > sit with modA x 2, sitting EOB with fair sitting balance control. Sit > stand with CGA/Trav (occasionally x2) using RW. Gait x 4 ft to the chair, 6 ft to the sink, and 25 feet into the hallway CGA/Trav with RW and chair follow. Pt needed verbal and tactile cues for hand placement with transfers and step length to stay up with the RW.  Gait is noted to be slowed with short step lengths possibly due to LE strength/endurance and comfort level with all her lines. Stand > sit with CGA/Trav, followed by positioning for comfort. At end of session pt seated in chair with legs up, all needs within reach, and alarm activated for safety. Pt shows great motivation to improve movement and work with therapy. Pnt continues to present as functioning below her baseline, with deficits in mobility including transfers, gait, balance, and activity tolerance. Pt will continue to benefit from skilled therapy services to address stated deficits to promote return to highest level of function, independence, and safety. Will continue to follow.       [facility] AM-PAC™ “6 Clicks” Basic Mobility Inpatient Short Form  AM-PAC Basic Mobility - Inpatient   How much help is needed turning from your back to your side while in a flat bed without using bedrails?: A Lot  How much help is needed moving from lying on your back to sitting on the side of a flat bed without using bedrails?: A Lot  How much help is needed moving to and from a bed to a chair?: A Little  How much help is needed standing up from a chair using your arms?: A Little  How much help is needed walking in hospital room?: A Little  How much help is needed climbing 3-5 steps with a railing?: A Little  AM-PAC Inpatient Mobility Raw Score : 16  AM-PAC Inpatient T-Scale Score : 40.78  Mobility Inpatient CMS 0-100% Score: 54.16  Mobility Inpatient CMS G-Code Modifier :

## 2025-07-04 NOTE — PLAN OF CARE
Problem: Pain  Goal: Verbalizes/displays adequate comfort level or baseline comfort level  7/3/2025 2248 by Kellen Cyr RN  Outcome: Progressing  7/3/2025 1114 by Mike Craft RN  Outcome: Progressing     Problem: Safety - Adult  Goal: Free from fall injury  7/3/2025 2248 by Kellen Cyr RN  Outcome: Progressing     Problem: Chronic Conditions and Co-morbidities  Goal: Patient's chronic conditions and co-morbidity symptoms are monitored and maintained or improved  7/3/2025 2248 by Kellen Cyr RN  Outcome: Progressing     Problem: Discharge Planning  Goal: Discharge to home or other facility with appropriate resources  7/3/2025 2248 by Kellen Cyr RN  Outcome: Progressing     Problem: ABCDS Injury Assessment  Goal: Absence of physical injury  Outcome: Progressing

## 2025-07-04 NOTE — PROGRESS NOTES
Progress Note    Date:7/4/2025       Room:[facility]  Patient Name:Marcelina Collins     YOB: 1940     Age:85 y.o.  Admit Date: 7/2/2025    POD 2 Days Post-Op    Procedure:  Procedure(s):  LAPAROSCOPY ROBOTIC BOWEL RESECTION    Assessment & Plan        Marcelina Collins is an 85-year-old female with a history of CABG s/p AVR who was admitted to the general surgery service 7/2/25 with a closed-loop small bowel obstruction as seen on CT imaging. She is now s/p robotic diagnostic lap with small bowel resection and drain placement.    Small bowel obstruction  - CT abd/pelvis 7/2 with closed-loop obstruction in lower abdomen/pelvis, c/f ischemia  - s/p robotic diagnostic laparoscopy with SBR and LLQ drain placement 7/2 (Endy)  - NG placed 7/2, minimal OP over past 24 hrs  - LLQ drain with 195ml/24hrs OP  - IV Pepcid BID  - AF, WBC 16.5 from 25.1  - Continue Zosyn, 7/2-p  - MMPC/antiemetics PRN  - NPO and bowel rest, start PPN/TPN  - dc dick today     Chronic cough  MAC (active)  - Hx pulmonary nodule  - Pulmonology consulted, plan to follow up in office in 3 months  - Supplemental O2 as needed to maintain O2 sat >92%    Atrial fibrillation  - Holding home Eliquis  - continuous telemetry    Diet: NPO  DVT ppx: Lovenox  Dispo: Continue floor care    Subjective     24-hour update: OLGA MOMIN. PPN started yesterday. Pain is well-controlled with current regimen. Drain site C/D/I with decreasing output. Patient has not passed flatus or had a bowel movement, continue bowel regimen and NG.    HPI:  Marcelina Collins is a 85 y.o. female with a history of CABG and aortic valve repair presents with a history of a single day of abdominal pain with nausea and emesis.  CT is significant for what appears to be a closed-loop obstruction with 2 transition points.  White blood cell count is 14.6 and the lactic acid was above 3 on arrival and 2.3 currently.  She has a history of cholecystectomy and hysterectomy as well as the bypass

## 2025-07-04 NOTE — PLAN OF CARE
Problem: Respiratory - Adult  Goal: Achieves optimal ventilation and oxygenation  Outcome: Progressing  Flowsheets (Taken 7/4/2025 3358)  Achieves optimal ventilation and oxygenation: Assess for changes in respiratory status

## 2025-07-04 NOTE — PLAN OF CARE
Problem: Pain  Goal: Verbalizes/displays adequate comfort level or baseline comfort level  7/4/2025 1146 by Maggie Merrill RN  Outcome: Progressing  7/3/2025 2248 by Kellen Cyr RN  Outcome: Progressing     Problem: Safety - Adult  Goal: Free from fall injury  7/4/2025 1146 by Maggie Merrill RN  Outcome: Progressing  7/3/2025 2248 by Kellen Cyr RN  Outcome: Progressing     Problem: Skin/Tissue Integrity  Goal: Skin integrity remains intact  Outcome: Progressing     Problem: Chronic Conditions and Co-morbidities  Goal: Patient's chronic conditions and co-morbidity symptoms are monitored and maintained or improved  7/4/2025 1146 by Maggie Merrill RN  Outcome: Progressing  7/3/2025 2248 by Kellen Cyr RN  Outcome: Progressing     Problem: Discharge Planning  Goal: Discharge to home or other facility with appropriate resources  7/4/2025 1146 by Maggie Merrill RN  Outcome: Progressing  7/3/2025 2248 by Kellen Cyr RN  Outcome: Progressing     Problem: ABCDS Injury Assessment  Goal: Absence of physical injury  7/4/2025 1146 by Maggie Merrill RN  Outcome: Progressing  7/3/2025 2248 by Kellen Cyr RN  Outcome: Progressing     Problem: Respiratory - Adult  Goal: Achieves optimal ventilation and oxygenation  7/4/2025 1146 by Maggie Merrill RN  Outcome: Progressing  7/4/2025 0758 by Taqueria Rosado RCP  Outcome: Progressing  Flowsheets (Taken 7/4/2025 0758)  Achieves optimal ventilation and oxygenation: Assess for changes in respiratory status

## 2025-07-04 NOTE — CONSULTS
Nutrition Assessment  Assessment Type: Reassess  Reason for visit:  Best Practice Alert: Malnutrition Screening Tool   Malnutrition Screening Tool Score: 2  Unintentional weight loss PTA: 2 to 13 pounds (1 point)  Eating poorly due to decreased appetite: Yes (1 point)  Parenteral Nutrition Management (Surgery)7/3    Nutrition Intervention:   Food and/or Nutrient Delivery:   Parenteral Nutrition:  Peripheral parenteral nutrition (Osmolarity 890)   Peripheral line infusion  Continue: Dex 5%, 4.25% AA 2 L (85ml/hr)   Continue 250 ml 20% lipids daily  Electrolytes:   Sodium (65 mEq/L sodium chloride), Potassium (20 mmol/L potassium phosphate, 15 mEq/L potassium chloride), Calcium gluconate (4.5 mEq/L), Magnesium sulfate 10 mEq/L   Additives:   Adult multivitamin with vit K  Trace Elements  Thiamine 100 mg daily x 7 days (EOT 7/9)  Folic Acid 1 mg daily x 7 days (EOT 7/9)  PN regimen provides per 24 hours: 1180 kcal/day (85% of needs), 85 grams of protein/day (100% of needs), 100 grams of CHO/24 hours and ~2250 ml of total volume/24 hours.   Above regimen: Unable to meet calorie goal related to PPN  Biochemical Data:   Basic Metabolic Panel, Hepatic Function Panel, Magnesium and Phosphorus active per nutrition parameters  Triglyceride tomorrow  POC Glucoses/SSI Not indicated  Nutrition Related Medication Management:  Electrolyte Replacement:   Continue prn protocol Magnesium, Potassium, and Phosphorus   Intravenous fluids:  Not applicable   Meals and Snacks:  Diet: Continue NPO and advance as medically appropriate  Medical Food Supplements:   Medical food supplement therapy:  None Deferred NPO      Malnutrition Assessment:  Malnutrition Status: Moderate malnutrition  Context: Chronic Illness  Findings of clinical characteristics of malnutrition:   Energy Intake:  Unable to assess (variable po at baseline)  Weight Loss:  Mild weight loss (5.6% weight loss x6 mo)     Body Fat Loss:  Mild body fat loss Triceps, Orbital  NPO  PN-Adult Premix 4.25/5 - Peripheral Line  Active Parenteral Nutrition Order:  Composition: Premix Peripheral (D5AA4.25)  Lipids: 250ml, Daily  Duration: Continuous  Volume/Rate: 85 mL/hr (2L)  Provides/24 hours: 1180 kcal/day (85% of needs), 85 grams of protein/day (100% of needs), 100 grams of CHO/24 hours and ~2250 ml of total volume/24 hours.    Current Intake:   Average Meal Intake: NPO Average Supplements Intake: NPO      Anthropometric Measures:  Height: 170.2 cm (5' 7.01\")  Current Body Wt: 69.6 kg (153 lb 7 oz) (7/3), Weight source: Bed scale  BMI: 24, Normal Weight (BMI 22.0 to 24.9) age over 65  Admission Body Weight: 61.2 kg (134 lb 14.7 oz) (7/2 stated)  Ideal Body Weight (Kg) (Calculated): 61 kg (135 lbs), 113.7 %  BMI Category Normal Weight (BMI 22.0 to 24.9) age over 65    Comparative Standards:  Energy (kcal/day): 0701-8976 (20-25 kcals/kg) (Kcal/kg Weight used: 69.6 kg Current  Protein (g/day): 70-84 (1-1.2 g/kg) Weight Used: (Current) 69.6 kg  Fluid (ml/day):   (1 ml/kcal)    Nutrition Diagnosis:   Inadequate oral intake related to altered GI function as evidenced by NPO or clear liquid status due to medical condition  Moderate malnutrition, in context of chronic illness related to inadequate protein-energy intake as evidenced by criteria as identified in malnutrition assessment    Nutrition Goal(s):   Previous Goal Met: Progressing toward Goal(s)  Active Goal:  (Tolerate maximum provisions of PPN until able to advance diet vs obtain central access)  Type of Goal: New goal    Discharge Planning:    Too soon to determine    Nicole Sanford RD

## 2025-07-05 LAB
ANION GAP SERPL CALC-SCNC: 11 MMOL/L (ref 7–16)
BUN SERPL-MCNC: 20 MG/DL (ref 8–23)
CALCIUM SERPL-MCNC: 8 MG/DL (ref 8.8–10.2)
CHLORIDE SERPL-SCNC: 104 MMOL/L (ref 98–107)
CO2 SERPL-SCNC: 19 MMOL/L (ref 20–29)
CREAT SERPL-MCNC: 0.46 MG/DL (ref 0.6–1.1)
GLUCOSE SERPL-MCNC: 118 MG/DL (ref 70–99)
MAGNESIUM SERPL-MCNC: 2.3 MG/DL (ref 1.8–2.4)
PHOSPHATE SERPL-MCNC: 2.6 MG/DL (ref 2.5–4.5)
POTASSIUM SERPL-SCNC: 4 MMOL/L (ref 3.5–5.1)
SODIUM SERPL-SCNC: 134 MMOL/L (ref 136–145)

## 2025-07-05 PROCEDURE — 6370000000 HC RX 637 (ALT 250 FOR IP)

## 2025-07-05 PROCEDURE — 2580000003 HC RX 258: Performed by: SURGERY

## 2025-07-05 PROCEDURE — 94640 AIRWAY INHALATION TREATMENT: CPT

## 2025-07-05 PROCEDURE — 6360000002 HC RX W HCPCS: Performed by: HOSPITALIST

## 2025-07-05 PROCEDURE — 6360000002 HC RX W HCPCS: Performed by: NURSE PRACTITIONER

## 2025-07-05 PROCEDURE — 80177 DRUG SCRN QUAN LEVETIRACETAM: CPT

## 2025-07-05 PROCEDURE — 2500000003 HC RX 250 WO HCPCS

## 2025-07-05 PROCEDURE — 6360000002 HC RX W HCPCS: Performed by: SURGERY

## 2025-07-05 PROCEDURE — 84100 ASSAY OF PHOSPHORUS: CPT

## 2025-07-05 PROCEDURE — 6360000002 HC RX W HCPCS: Performed by: STUDENT IN AN ORGANIZED HEALTH CARE EDUCATION/TRAINING PROGRAM

## 2025-07-05 PROCEDURE — 2500000003 HC RX 250 WO HCPCS: Performed by: HOSPITALIST

## 2025-07-05 PROCEDURE — 1100000003 HC PRIVATE W/ TELEMETRY

## 2025-07-05 PROCEDURE — 2500000003 HC RX 250 WO HCPCS: Performed by: SURGERY

## 2025-07-05 PROCEDURE — 36415 COLL VENOUS BLD VENIPUNCTURE: CPT

## 2025-07-05 PROCEDURE — 2500000003 HC RX 250 WO HCPCS: Performed by: NURSE PRACTITIONER

## 2025-07-05 PROCEDURE — 80048 BASIC METABOLIC PNL TOTAL CA: CPT

## 2025-07-05 PROCEDURE — 94760 N-INVAS EAR/PLS OXIMETRY 1: CPT

## 2025-07-05 PROCEDURE — 83735 ASSAY OF MAGNESIUM: CPT

## 2025-07-05 RX ORDER — LEVETIRACETAM 500 MG/5ML
500 INJECTION, SOLUTION, CONCENTRATE INTRAVENOUS EVERY 12 HOURS
Status: DISCONTINUED | OUTPATIENT
Start: 2025-07-05 | End: 2025-07-11 | Stop reason: HOSPADM

## 2025-07-05 RX ORDER — METOPROLOL TARTRATE 1 MG/ML
5 INJECTION, SOLUTION INTRAVENOUS EVERY 6 HOURS
Status: DISCONTINUED | OUTPATIENT
Start: 2025-07-05 | End: 2025-07-06

## 2025-07-05 RX ORDER — HYDRALAZINE HYDROCHLORIDE 20 MG/ML
10 INJECTION INTRAMUSCULAR; INTRAVENOUS EVERY 6 HOURS PRN
Status: DISCONTINUED | OUTPATIENT
Start: 2025-07-05 | End: 2025-07-06

## 2025-07-05 RX ADMIN — APIXABAN 5 MG: 5 TABLET, FILM COATED ORAL at 16:09

## 2025-07-05 RX ADMIN — HYDRALAZINE HYDROCHLORIDE 10 MG: 20 INJECTION INTRAMUSCULAR; INTRAVENOUS at 16:09

## 2025-07-05 RX ADMIN — BISACODYL 10 MG: 10 SUPPOSITORY RECTAL at 10:56

## 2025-07-05 RX ADMIN — HYDRALAZINE HYDROCHLORIDE 10 MG: 20 INJECTION INTRAMUSCULAR; INTRAVENOUS at 03:24

## 2025-07-05 RX ADMIN — SODIUM CHLORIDE, PRESERVATIVE FREE 10 ML: 5 INJECTION INTRAVENOUS at 09:20

## 2025-07-05 RX ADMIN — I.V. FAT EMULSION 250 ML: 20 EMULSION INTRAVENOUS at 17:36

## 2025-07-05 RX ADMIN — SODIUM CHLORIDE, PRESERVATIVE FREE 10 ML: 5 INJECTION INTRAVENOUS at 20:02

## 2025-07-05 RX ADMIN — PIPERACILLIN AND TAZOBACTAM 4500 MG: 4; .5 INJECTION, POWDER, LYOPHILIZED, FOR SOLUTION INTRAVENOUS at 21:42

## 2025-07-05 RX ADMIN — APIXABAN 5 MG: 5 TABLET, FILM COATED ORAL at 11:26

## 2025-07-05 RX ADMIN — ARFORMOTEROL TARTRATE: 15 SOLUTION RESPIRATORY (INHALATION) at 07:26

## 2025-07-05 RX ADMIN — PIPERACILLIN AND TAZOBACTAM 4500 MG: 4; .5 INJECTION, POWDER, LYOPHILIZED, FOR SOLUTION INTRAVENOUS at 16:30

## 2025-07-05 RX ADMIN — CALCIUM GLUCONATE: 98 INJECTION INTRAVENOUS at 17:36

## 2025-07-05 RX ADMIN — PIPERACILLIN AND TAZOBACTAM 4500 MG: 4; .5 INJECTION, POWDER, LYOPHILIZED, FOR SOLUTION INTRAVENOUS at 03:29

## 2025-07-05 RX ADMIN — FAMOTIDINE 20 MG: 10 INJECTION, SOLUTION INTRAVENOUS at 09:13

## 2025-07-05 RX ADMIN — PIPERACILLIN AND TAZOBACTAM 4500 MG: 4; .5 INJECTION, POWDER, LYOPHILIZED, FOR SOLUTION INTRAVENOUS at 09:17

## 2025-07-05 RX ADMIN — METOPROLOL TARTRATE 5 MG: 5 INJECTION INTRAVENOUS at 19:58

## 2025-07-05 RX ADMIN — FAMOTIDINE 20 MG: 10 INJECTION, SOLUTION INTRAVENOUS at 19:59

## 2025-07-05 RX ADMIN — ARFORMOTEROL TARTRATE: 15 SOLUTION RESPIRATORY (INHALATION) at 21:56

## 2025-07-05 RX ADMIN — LEVETIRACETAM 500 MG: 100 INJECTION INTRAVENOUS at 21:37

## 2025-07-05 ASSESSMENT — PAIN SCALES - GENERAL: PAINLEVEL_OUTOF10: 0

## 2025-07-05 NOTE — PLAN OF CARE
Problem: Pain  Goal: Verbalizes/displays adequate comfort level or baseline comfort level  Outcome: Progressing     Problem: Safety - Adult  Goal: Free from fall injury  Outcome: Progressing     Problem: Skin/Tissue Integrity  Goal: Skin integrity remains intact  Outcome: Progressing     Problem: Chronic Conditions and Co-morbidities  Goal: Patient's chronic conditions and co-morbidity symptoms are monitored and maintained or improved  Outcome: Progressing     Problem: Discharge Planning  Goal: Discharge to home or other facility with appropriate resources  Outcome: Progressing     Problem: ABCDS Injury Assessment  Goal: Absence of physical injury  Outcome: Progressing     Problem: Respiratory - Adult  Goal: Achieves optimal ventilation and oxygenation  Outcome: Progressing

## 2025-07-05 NOTE — PLAN OF CARE
Problem: Safety - Adult  Goal: Free from fall injury  7/4/2025 2210 by Kellen Cyr, RN  Outcome: Progressing     Problem: Skin/Tissue Integrity  Goal: Skin integrity remains intact  Description: 1.  Monitor for areas of redness and/or skin breakdown  2.  Assess vascular access sites hourly  3.  Every 4-6 hours minimum:  Change oxygen saturation probe site  4.  Every 4-6 hours:  If on nasal continuous positive airway pressure, respiratory therapy assess nares and determine need for appliance change or resting period  7/4/2025 2210 by Keleln Cyr, RN  Outcome: Progressing

## 2025-07-05 NOTE — CONSULTS
Nutrition Assessment  Assessment Type: Reassess  Reason for visit:  Best Practice Alert: Malnutrition Screening Tool   Malnutrition Screening Tool Score: 2  Unintentional weight loss PTA: 2 to 13 pounds (1 point)  Eating poorly due to decreased appetite: Yes (1 point)  Parenteral Nutrition Management (Surgery)7/3    Nutrition Intervention:   Food and/or Nutrient Delivery:   Parenteral Nutrition:  Peripheral parenteral nutrition (Osmolarity 892)   Peripheral line infusion  Decrease: Dex 5%, 4.25% AA 1.56 L (65ml/hr)   Continue 250 ml 20% lipids daily  Electrolytes:   Sodium (70 mEq/L sodium chloride), Potassium (20 mmol/L potassium phosphate, 15 mEq/L potassium chloride), Calcium gluconate (4.5 mEq/L), Magnesium sulfate 6 mEq/L   Additives:   Adult multivitamin with vit K  Trace Elements  Thiamine 100 mg daily x 7 days (EOT 7/9)  Folic Acid 1 mg daily x 7 days (EOT 7/9)  PN regimen provides per 24 hours: 1180 kcal/day (85% of needs), 85 grams of protein/day (100% of needs), 100 grams of CHO/24 hours and ~2250 ml of total volume/24 hours.   Above regimen: Unable to meet calorie goal related to PPN  Biochemical Data:   Basic Metabolic Panel, Hepatic Function Panel, Magnesium and Phosphorus active per nutrition parameters  Triglyceride weekly on Thursday  POC Glucoses/SSI Not indicated  Nutrition Related Medication Management:  Electrolyte Replacement:   Continue prn protocol Magnesium, Potassium, and Phosphorus   Intravenous fluids:  Not applicable   Meals and Snacks:  Diet: Continue NPO and advance as medically appropriate  Medical Food Supplements:   Medical food supplement therapy:  None Deferred NPO      Malnutrition Assessment:  Malnutrition Status: Moderate malnutrition  Context: Chronic Illness  Findings of clinical characteristics of malnutrition:   Energy Intake:  Unable to assess (variable po at baseline)  Weight Loss:  Mild weight loss (5.6% weight loss x6 mo)     Body Fat Loss:  Mild body fat loss Triceps,  bowel obstruction. Findings suggestive of constipation.   Nutrition Monitoring/Evaluation:  Pt NPO @ admit. NG placed 7/2 to LIS.   7/3: PPN (2L+lipids) initiated  PIV access: 20 g R arm (placed 7/2), 22 g to R forearm (placed 7/2)    Visited with patient in room, son at bedside. She endorses she is in less pain and passing flatus s/p suppository administration. No bowel movement at this time. Minimal NG OP; she complains of discomfort from NG tube. Reviewed with Kim Frommel, NP. Continue PPN for today. New mild swelling to hands and feet- may need to restrict volume of PN if edema worsens.      Abdominal Status (last documented by nursing):   Last BM (including prior to admit): 07/01/25, GI Symptoms: Loss of appetite (noted 7/3 per RN)  Recorded OP x 24 hours:   NG OP: 130 mL  UOP: 1L + 1 unmeasured occurrence  Drains: 380 mL   Pertinent Medications: pepcid, zosyn  7/2: 500 ml & 1L NS boluses  Continuous: none  IVF: NA  Electrolyte Replacement:  7/4: 40 mEq Kcl, 15 mmol NaPhos  Pertinent administered PRN: 7/2: zofran, 7/5: dulcolax suppository   Pertinent Labs:   Lab Results   Component Value Date/Time     07/05/2025 04:27 AM    K 4.0 07/05/2025 04:27 AM     07/05/2025 04:27 AM    CO2 19 07/05/2025 04:27 AM    BUN 20 07/05/2025 04:27 AM    CREATININE 0.46 07/05/2025 04:27 AM    GLUCOSE 118 07/05/2025 04:27 AM    CALCIUM 8.0 07/05/2025 04:27 AM    PHOS 2.6 07/05/2025 04:27 AM    MG 2.3 07/05/2025 04:27 AM      Lab Results   Component Value Date/Time    POCGLU 132 07/02/2025 11:01 AM    POCGLU 134 07/02/2025 09:48 AM    POCGLU 133 09/28/2022 08:27 AM     Lab Results   Component Value Date/Time    TRIG 81 07/04/2025 05:32 AM    TRIG 104 04/09/2024 08:16 AM    TRIG 72 10/17/2023 11:20 AM     No results found for: \"LABA1C\"  Lab Results   Component Value Date/Time    ALKPHOS 81 07/04/2025 05:32 AM    ALKPHOS 113 01/05/2022 09:04 PM    ALT 14 07/04/2025 05:32 AM    AST 23 07/04/2025 05:32 AM    BILITOT 0.5

## 2025-07-05 NOTE — CONSULTS
Nutrition Assessment  Assessment Type: Reassess  Reason for visit:  Best Practice Alert: Malnutrition Screening Tool   Malnutrition Screening Tool Score: 2  Unintentional weight loss PTA: 2 to 13 pounds (1 point)  Eating poorly due to decreased appetite: Yes (1 point)  Parenteral Nutrition Management (Surgery)7/3    Nutrition Intervention:   Food and/or Nutrient Delivery:   Parenteral Nutrition:  Peripheral parenteral nutrition (Osmolarity 892)   Peripheral line infusion  Continue: Dex 5%, 4.25% AA 2 L (85ml/hr)   Continue 250 ml 20% lipids daily  Electrolytes:   Sodium (70 mEq/L sodium chloride), Potassium (20 mmol/L potassium phosphate, 15 mEq/L potassium chloride), Calcium gluconate (4.5 mEq/L), Magnesium sulfate 6 mEq/L   Additives:   Adult multivitamin with vit K  Trace Elements  Thiamine 100 mg daily x 7 days (EOT 7/9)  Folic Acid 1 mg daily x 7 days (EOT 7/9)  PN regimen provides per 24 hours: 1180 kcal/day (85% of needs), 85 grams of protein/day (100% of needs), 100 grams of CHO/24 hours and ~2250 ml of total volume/24 hours.   Above regimen: Unable to meet calorie goal related to PPN  Biochemical Data:   Basic Metabolic Panel, Hepatic Function Panel, Magnesium and Phosphorus active per nutrition parameters  Triglyceride weekly on Thursday  POC Glucoses/SSI Not indicated  Nutrition Related Medication Management:  Electrolyte Replacement:   Continue prn protocol Magnesium, Potassium, and Phosphorus   Intravenous fluids:  Not applicable   Meals and Snacks:  Diet: Continue NPO and advance as medically appropriate  Medical Food Supplements:   Medical food supplement therapy:  None Deferred NPO      Malnutrition Assessment:  Malnutrition Status: Moderate malnutrition  Context: Chronic Illness  Findings of clinical characteristics of malnutrition:   Energy Intake:  Unable to assess (variable po at baseline)  Weight Loss:  Mild weight loss (5.6% weight loss x6 mo)     Body Fat Loss:  Mild body fat loss Triceps,

## 2025-07-05 NOTE — CONSULTS
Hospitalist team consulted for management of uncontrolled BP as patient is currently n.p.o. in view of underlying SBO status post robotic diagnostic laparoscopy with small bowel resection and left lower quadrant drain placement on 7/2.  Patient is currently on TPN/PPN.  Patient has prior history of chronic diastolic CHF status post cardiac pacemaker, paroxysmal A-fib status post Maze procedure on Eliquis, hypertension, dyslipidemia, status post AVR, abnormal EEG in the past currently on Keppra 500 mg twice daily recommended by neurology.    Blood pressure has been suboptimally controlled over the past 24 to 48 hours.  For now we will recommend starting patient on Lopressor 5 mg IV every 6 hours with as needed IV hydralazine for SBP greater than 180 or DBP greater than 110.  Can try adding clonidine patch in next 24 to 48 hours if blood pressure stays persistently elevated.  Once patient is cleared to have clear liquids, IV BP meds can be switched to oral home meds including Cozaar with dose adjustment if necessary.    Patient not billed for this visit.

## 2025-07-05 NOTE — PROGRESS NOTES
Progress Note    Date:2025       Room:[facility]– [place]  Patient Name:Marcelina Collins     YOB: 1940     Age:85 y.o.  Admit Date: 2025    POD 3 Days Post-Op    Procedure:  Procedure(s):  LAPAROSCOPY ROBOTIC BOWEL RESECTION      Subjective     25: Pt awake in bed. C/o discomfort from NG tube. No acute events overnight. PPN in progress. Pain is well-controlled with current regimen. Drain site C/D/I with decreasing output. Patient reports +flatus this am. No BM.     HPI:  Marcelina Collins is a 85 y.o. female with a history of CABG and aortic valve repair presents with a history of a single day of abdominal pain with nausea and emesis.  CT is significant for what appears to be a closed-loop obstruction with 2 transition points.  White blood cell count is 14.6 and the lactic acid was above 3 on arrival and 2.3 currently.  She has a history of cholecystectomy and hysterectomy as well as the bypass surgery.  She is on Eliquis and a baby aspirin.  I have initiated giving her a PCC-based reversal agent prior to surgery.  Hopefully we are able to perform her procedure before she has any kanu ischemia or necrosis and will avoid a bowel resection.  She is relatively stable hemodynamically and her pain is also relatively well-managed.  I think she is a good candidate for a minimally invasive robotic approach, at least to ascertain what is going on, as opposed to an automatic exploratory laparotomy.  It is fairly likely she will require a small bowel resection however.  I had a full discussion with her and the family regarding her age, the operation and the options available to her.       Physical Examination      Vitals:  BP (!) 140/58   Pulse 95   Temp 97.7 °F (36.5 °C)   Resp 16   Ht 1.702 m (5' 7.01\")   Wt 69.6 kg (153 lb 7 oz)   SpO2 96%   BMI 24.03 kg/m²   Temp (24hrs), Av °F (36.7 °C), Min:97.7 °F (36.5 °C), Max:98.2 °F (36.8 °C)      I/O (24Hr):    Intake/Output Summary (Last 24 hours) at 2025

## 2025-07-06 LAB
ABO + RH BLD: NORMAL
ANION GAP SERPL CALC-SCNC: 7 MMOL/L (ref 7–16)
BLD PROD TYP BPU: NORMAL
BLD PROD TYP BPU: NORMAL
BLOOD BANK BLOOD PRODUCT EXPIRATION DATE: NORMAL
BLOOD BANK CMNT PATIENT-IMP: NORMAL
BLOOD BANK DISPENSE STATUS: NORMAL
BLOOD BANK DISPENSE STATUS: NORMAL
BLOOD BANK ISBT PRODUCT BLOOD TYPE: 6200
BLOOD BANK PRODUCT CODE: NORMAL
BLOOD BANK UNIT TYPE AND RH: NORMAL
BLOOD GROUP ANTIBODIES SERPL: NORMAL
BPU ID: NORMAL
BPU ID: NORMAL
BUN SERPL-MCNC: 20 MG/DL (ref 8–23)
CALCIUM SERPL-MCNC: 8 MG/DL (ref 8.8–10.2)
CHLORIDE SERPL-SCNC: 107 MMOL/L (ref 98–107)
CO2 SERPL-SCNC: 19 MMOL/L (ref 20–29)
CREAT SERPL-MCNC: 0.47 MG/DL (ref 0.6–1.1)
CROSSMATCH RESULT: NORMAL
CROSSMATCH RESULT: NORMAL
GLUCOSE SERPL-MCNC: 101 MG/DL (ref 70–99)
MAGNESIUM SERPL-MCNC: 2.1 MG/DL (ref 1.8–2.4)
PHOSPHATE SERPL-MCNC: 3.4 MG/DL (ref 2.5–4.5)
POTASSIUM SERPL-SCNC: 4.6 MMOL/L (ref 3.5–5.1)
SODIUM SERPL-SCNC: 134 MMOL/L (ref 136–145)
SPECIMEN EXP DATE BLD: NORMAL
UNIT DIVISION: 0
UNIT DIVISION: 0
UNIT ISSUE DATE/TIME: NORMAL

## 2025-07-06 PROCEDURE — 2500000003 HC RX 250 WO HCPCS: Performed by: NURSE PRACTITIONER

## 2025-07-06 PROCEDURE — 94760 N-INVAS EAR/PLS OXIMETRY 1: CPT

## 2025-07-06 PROCEDURE — 36415 COLL VENOUS BLD VENIPUNCTURE: CPT

## 2025-07-06 PROCEDURE — 2500000003 HC RX 250 WO HCPCS: Performed by: HOSPITALIST

## 2025-07-06 PROCEDURE — 6360000002 HC RX W HCPCS: Performed by: STUDENT IN AN ORGANIZED HEALTH CARE EDUCATION/TRAINING PROGRAM

## 2025-07-06 PROCEDURE — 94761 N-INVAS EAR/PLS OXIMETRY MLT: CPT

## 2025-07-06 PROCEDURE — 6360000002 HC RX W HCPCS: Performed by: SURGERY

## 2025-07-06 PROCEDURE — 6370000000 HC RX 637 (ALT 250 FOR IP): Performed by: INTERNAL MEDICINE

## 2025-07-06 PROCEDURE — 6370000000 HC RX 637 (ALT 250 FOR IP)

## 2025-07-06 PROCEDURE — 1100000003 HC PRIVATE W/ TELEMETRY

## 2025-07-06 PROCEDURE — 94640 AIRWAY INHALATION TREATMENT: CPT

## 2025-07-06 PROCEDURE — 6360000002 HC RX W HCPCS: Performed by: NURSE PRACTITIONER

## 2025-07-06 PROCEDURE — 84100 ASSAY OF PHOSPHORUS: CPT

## 2025-07-06 PROCEDURE — 6360000002 HC RX W HCPCS: Performed by: HOSPITALIST

## 2025-07-06 PROCEDURE — 2500000003 HC RX 250 WO HCPCS

## 2025-07-06 PROCEDURE — 83735 ASSAY OF MAGNESIUM: CPT

## 2025-07-06 PROCEDURE — 2580000003 HC RX 258: Performed by: SURGERY

## 2025-07-06 PROCEDURE — 80048 BASIC METABOLIC PNL TOTAL CA: CPT

## 2025-07-06 PROCEDURE — 2580000003 HC RX 258: Performed by: NURSE PRACTITIONER

## 2025-07-06 RX ORDER — SOTALOL HYDROCHLORIDE 80 MG/1
80 TABLET ORAL DAILY
Status: DISCONTINUED | OUTPATIENT
Start: 2025-07-07 | End: 2025-07-06

## 2025-07-06 RX ORDER — CLONIDINE 0.1 MG/24H
1 PATCH, EXTENDED RELEASE TRANSDERMAL WEEKLY
Status: DISCONTINUED | OUTPATIENT
Start: 2025-07-06 | End: 2025-07-06

## 2025-07-06 RX ORDER — HYDRALAZINE HYDROCHLORIDE 25 MG/1
25 TABLET, FILM COATED ORAL EVERY 8 HOURS PRN
Status: DISCONTINUED | OUTPATIENT
Start: 2025-07-06 | End: 2025-07-11 | Stop reason: HOSPADM

## 2025-07-06 RX ORDER — LOSARTAN POTASSIUM 25 MG/1
12.5 TABLET ORAL DAILY
Status: DISCONTINUED | OUTPATIENT
Start: 2025-07-07 | End: 2025-07-07

## 2025-07-06 RX ORDER — SOTALOL HYDROCHLORIDE 80 MG/1
80 TABLET ORAL DAILY
Status: DISCONTINUED | OUTPATIENT
Start: 2025-07-06 | End: 2025-07-11 | Stop reason: HOSPADM

## 2025-07-06 RX ADMIN — PIPERACILLIN AND TAZOBACTAM 4500 MG: 4; .5 INJECTION, POWDER, LYOPHILIZED, FOR SOLUTION INTRAVENOUS at 22:31

## 2025-07-06 RX ADMIN — METOPROLOL TARTRATE 5 MG: 5 INJECTION INTRAVENOUS at 08:59

## 2025-07-06 RX ADMIN — METOPROLOL TARTRATE 5 MG: 5 INJECTION INTRAVENOUS at 02:11

## 2025-07-06 RX ADMIN — FAMOTIDINE 20 MG: 10 INJECTION, SOLUTION INTRAVENOUS at 20:27

## 2025-07-06 RX ADMIN — PIPERACILLIN AND TAZOBACTAM 4500 MG: 4; .5 INJECTION, POWDER, LYOPHILIZED, FOR SOLUTION INTRAVENOUS at 04:35

## 2025-07-06 RX ADMIN — LEVETIRACETAM 500 MG: 100 INJECTION INTRAVENOUS at 20:27

## 2025-07-06 RX ADMIN — ARFORMOTEROL TARTRATE: 15 SOLUTION RESPIRATORY (INHALATION) at 07:19

## 2025-07-06 RX ADMIN — SODIUM CHLORIDE, PRESERVATIVE FREE 10 ML: 5 INJECTION INTRAVENOUS at 20:42

## 2025-07-06 RX ADMIN — APIXABAN 5 MG: 5 TABLET, FILM COATED ORAL at 16:06

## 2025-07-06 RX ADMIN — PIPERACILLIN AND TAZOBACTAM 4500 MG: 4; .5 INJECTION, POWDER, LYOPHILIZED, FOR SOLUTION INTRAVENOUS at 16:01

## 2025-07-06 RX ADMIN — I.V. FAT EMULSION 250 ML: 20 EMULSION INTRAVENOUS at 18:00

## 2025-07-06 RX ADMIN — SODIUM CHLORIDE, PRESERVATIVE FREE 10 ML: 5 INJECTION INTRAVENOUS at 09:01

## 2025-07-06 RX ADMIN — ARFORMOTEROL TARTRATE: 15 SOLUTION RESPIRATORY (INHALATION) at 19:41

## 2025-07-06 RX ADMIN — FAMOTIDINE 20 MG: 10 INJECTION, SOLUTION INTRAVENOUS at 08:59

## 2025-07-06 RX ADMIN — METOPROLOL TARTRATE 5 MG: 5 INJECTION INTRAVENOUS at 14:09

## 2025-07-06 RX ADMIN — THIAMINE HYDROCHLORIDE: 100 INJECTION, SOLUTION INTRAMUSCULAR; INTRAVENOUS at 18:00

## 2025-07-06 RX ADMIN — PIPERACILLIN AND TAZOBACTAM 4500 MG: 4; .5 INJECTION, POWDER, LYOPHILIZED, FOR SOLUTION INTRAVENOUS at 10:26

## 2025-07-06 RX ADMIN — LEVETIRACETAM 500 MG: 100 INJECTION INTRAVENOUS at 10:00

## 2025-07-06 RX ADMIN — SOTALOL HYDROCHLORIDE 80 MG: 80 TABLET ORAL at 16:40

## 2025-07-06 RX ADMIN — APIXABAN 5 MG: 5 TABLET, FILM COATED ORAL at 09:00

## 2025-07-06 ASSESSMENT — PAIN SCALES - GENERAL: PAINLEVEL_OUTOF10: 0

## 2025-07-06 NOTE — PLAN OF CARE
Problem: Safety - Adult  Goal: Free from fall injury  7/5/2025 2356 by Kellen Cyr, RN  Outcome: Progressing     Problem: Skin/Tissue Integrity  Goal: Skin integrity remains intact  Description: 1.  Monitor for areas of redness and/or skin breakdown  2.  Assess vascular access sites hourly  3.  Every 4-6 hours minimum:  Change oxygen saturation probe site  4.  Every 4-6 hours:  If on nasal continuous positive airway pressure, respiratory therapy assess nares and determine need for appliance change or resting period  7/5/2025 2356 by Kellen Cyr, RN  Outcome: Progressing     Problem: Discharge Planning  Goal: Discharge to home or other facility with appropriate resources  7/5/2025 2356 by Kellen Cyr, RN  Outcome: Progressing

## 2025-07-06 NOTE — PROGRESS NOTES
Nutrition Assessment  Assessment Type: Reassess  Reason for visit:  Best Practice Alert: Malnutrition Screening Tool   Malnutrition Screening Tool Score: 2  Unintentional weight loss PTA: 2 to 13 pounds (1 point)  Eating poorly due to decreased appetite: Yes (1 point)  Parenteral Nutrition Management (Surgery)7/3    Nutrition Intervention:   Food and/or Nutrient Delivery:   Parenteral Nutrition:  Peripheral parenteral nutrition (Osmolarity 880)   Peripheral line infusion  Continue: Dex 5%, 4.25% AA 2 L (85ml/hr)   Continue 250 ml 20% lipids daily  Electrolytes:   Sodium (80 mEq/L sodium chloride), Potassium (15 mmol/L potassium phosphate, 5 mEq/L potassium chloride), Calcium gluconate (4.5 mEq/L), Magnesium sulfate 6 mEq/L   Additives:   Adult multivitamin with vit K  Trace Elements  Thiamine 100 mg daily x 7 days (EOT 7/9)  Folic Acid 1 mg daily x 7 days (EOT 7/9)  PN regimen provides per 24 hours: 1180 kcal/day (85% of needs), 85 grams of protein/day (100% of needs), 100 grams of CHO/24 hours and ~2250 ml of total volume/24 hours.   Above regimen: Unable to meet calorie goal related to PPN  Biochemical Data:   Basic Metabolic Panel, Hepatic Function Panel, Magnesium and Phosphorus active per nutrition parameters  Triglyceride weekly on Thursday  POC Glucoses/SSI Not indicated  Nutrition Related Medication Management:  Electrolyte Replacement:   Continue prn protocol Magnesium, Potassium, and Phosphorus   Intravenous fluids:  Not applicable   Meals and Snacks:  Diet: Continue NPO and advance as medically appropriate  Medical Food Supplements:   Medical food supplement therapy:  None Deferred NPO   Coordination of Nutrition Care:  Coordination with health care provider Provider, Kim Frommel, NP, Dr. Min and RN, Jessie     Malnutrition Assessment:  Malnutrition Status: Moderate malnutrition  Context: Chronic Illness  Findings of clinical characteristics of malnutrition:   Energy Intake:  Unable to assess (variable po

## 2025-07-06 NOTE — PLAN OF CARE
Problem: Pain  Goal: Verbalizes/displays adequate comfort level or baseline comfort level  Outcome: Progressing     Problem: Safety - Adult  Goal: Free from fall injury  7/6/2025 1158 by Maggie Merrill RN  Outcome: Progressing  7/5/2025 2356 by Kellen Cyr RN  Outcome: Progressing     Problem: Skin/Tissue Integrity  Goal: Skin integrity remains intact  7/6/2025 1158 by Maggie Merrill RN  Outcome: Progressing  7/5/2025 2356 by Kellen Cyr RN  Outcome: Progressing     Problem: Chronic Conditions and Co-morbidities  Goal: Patient's chronic conditions and co-morbidity symptoms are monitored and maintained or improved  Outcome: Progressing     Problem: Discharge Planning  Goal: Discharge to home or other facility with appropriate resources  7/6/2025 1158 by Maggie Merrill RN  Outcome: Progressing  7/5/2025 2356 by Kellen Cyr RN  Outcome: Progressing     Problem: ABCDS Injury Assessment  Goal: Absence of physical injury  Outcome: Progressing     Problem: Respiratory - Adult  Goal: Achieves optimal ventilation and oxygenation  7/6/2025 1158 by Maggie Merrill RN  Outcome: Progressing  7/6/2025 0721 by Taqueria Rosado RCP  Outcome: Progressing  Flowsheets (Taken 7/6/2025 0721)  Achieves optimal ventilation and oxygenation: Assess for changes in respiratory status

## 2025-07-06 NOTE — PROGRESS NOTES
Hospitalist Consult Progress Note   Admit Date:  2025 12:11 AM   Name:  Marcelina Collins   Age:  85 y.o.  Sex:  female  :  1940   MRN:  [id number]   Room:  223/    Presenting/Chief Complaint: Abdominal Pain, Nausea, and Constipation     Reason(s) for Admission: SBO (small bowel obstruction) (HCC) [K56.609]  Essential hypertension [I10]  Generalized abdominal pain [R10.84]  Intestinal obstruction, unspecified cause, unspecified whether partial or complete (HCC) [K56.609]       Hospital Course:   Marcelina Collins is a 85 y.o. female with medical history of chronic diastolic CHF status post cardiac pacemaker, paroxysmal A-fib status post Maze procedure on Eliquis, hypertension, dyslipidemia, status post AVR, seizure disorder who was admitted by general surgery service for SBO.  Patient underwent robotic laparoscopic with resection of small bowel on .  Currently on bowel rest with TPN infusion.    Hospitalist service were consulted due to uncontrolled blood pressure    Subjective & 24hr Events:   Patient was seen and examined at bedside  Sitting up in a recliner.  NGT in place and is clamped.  Patient denies any bowel movement but having some gas.  Patient reports that her arm and leg swelling has improved drastically.    Patient blood pressure elevated to as high as 165/64 overnight but well-controlled this morning at 146/55.      Assessment & Plan:   Small bowel obstruction status post laparoscopic bowel resection  -management as per primary      Hypertension  dCHF  Paroxysmal atrial fibrillation with secondary hypercoagulable  Patient normally takes sotalol, losartan, Lasix as needed  -Patient is now cleared for p.o. intake.  Will restart her home sotalol.    -Monitor blood pressure and restart losartan if needed.  -Continue Eliquis    Seizure disorder // abnormal EEG: Continue with Keppra IV    Anticipated Discharge Arrangements:   Defer to primary team    We'll sign off.      PT/OT evals ordered?

## 2025-07-06 NOTE — PROGRESS NOTES
Progress Note    Date:2025       Room:[facility]  Patient Name:Marcelina Collins     YOB: 1940     Age:85 y.o.  Admit Date: 2025    POD 4 Days Post-Op    Procedure:  Procedure(s):  LAPAROSCOPY ROBOTIC BOWEL RESECTION      Subjective     25: Pt awake in bed. No new issues or acute events overnight. PPN in progress. Pain is well-controlled with current regimen. Drain site C/D/I with decreasing output. NG tube has been clamped 24 hours. Tolerating sips of Clears. No nausea or vomiting. Patient reports +flatus/-BM.       HPI:  Marcelina Collins is a 85 y.o. female with a history of CABG and aortic valve repair presents with a history of a single day of abdominal pain with nausea and emesis.  CT is significant for what appears to be a closed-loop obstruction with 2 transition points.  White blood cell count is 14.6 and the lactic acid was above 3 on arrival and 2.3 currently.  She has a history of cholecystectomy and hysterectomy as well as the bypass surgery.  She is on Eliquis and a baby aspirin.  I have initiated giving her a PCC-based reversal agent prior to surgery.  Hopefully we are able to perform her procedure before she has any kanu ischemia or necrosis and will avoid a bowel resection.  She is relatively stable hemodynamically and her pain is also relatively well-managed.  I think she is a good candidate for a minimally invasive robotic approach, at least to ascertain what is going on, as opposed to an automatic exploratory laparotomy.  It is fairly likely she will require a small bowel resection however.  I had a full discussion with her and the family regarding her age, the operation and the options available to her.       Physical Examination      Vitals:  BP (!) 146/55   Pulse 72   Temp 97.7 °F (36.5 °C)   Resp 16   Ht 1.702 m (5' 7.01\")   Wt 67.3 kg (148 lb 5.9 oz)   SpO2 100%   BMI 23.23 kg/m²   Temp (24hrs), Av.2 °F (36.8 °C), Min:97.7 °F (36.5 °C), Max:98.6 °F (37 °C)      I/O

## 2025-07-06 NOTE — PROGRESS NOTES
Nutrition Assessment  Assessment Type: Reassess  Reason for visit:  Best Practice Alert: Malnutrition Screening Tool   Malnutrition Screening Tool Score: 2  Unintentional weight loss PTA: 2 to 13 pounds (1 point)  Eating poorly due to decreased appetite: Yes (1 point)  Parenteral Nutrition Management (Surgery)7/3    Nutrition Intervention:   Food and/or Nutrient Delivery:   Parenteral Nutrition:  Peripheral parenteral nutrition (Osmolarity 880)   Peripheral line infusion  Continue: Dex 5%, 4.25% AA 2 L (85ml/hr)   Continue 250 ml 20% lipids daily  Electrolytes:   Sodium (80 mEq/L sodium chloride), Potassium (15 mmol/L potassium phosphate, 5 mEq/L potassium chloride), Calcium gluconate (4.5 mEq/L), Magnesium sulfate 6 mEq/L   Additives:   Adult multivitamin with vit K  Trace Elements  Thiamine 100 mg daily x 7 days (EOT 7/9)  Folic Acid 1 mg daily x 7 days (EOT 7/9)  PN regimen provides per 24 hours: 1180 kcal/day (85% of needs), 85 grams of protein/day (100% of needs), 100 grams of CHO/24 hours and ~2250 ml of total volume/24 hours.   Above regimen: Unable to meet calorie goal related to PPN  Biochemical Data:   Basic Metabolic Panel, Hepatic Function Panel, Magnesium and Phosphorus active per nutrition parameters  Triglyceride weekly on Thursday  POC Glucoses/SSI Not indicated  Nutrition Related Medication Management:  Electrolyte Replacement:   Continue prn protocol Magnesium, Potassium, and Phosphorus   Intravenous fluids:  Not applicable   Meals and Snacks:  Diet: Continue NPO and advance as medically appropriate  Medical Food Supplements:   Medical food supplement therapy:  None Deferred NPO   Coordination of Nutrition Care:  Coordination with health care provider Provider, Kim Frommel, NP, Dr. Min and RN, Jessie     Malnutrition Assessment:  Malnutrition Status: Moderate malnutrition  Context: Chronic Illness  Findings of clinical characteristics of malnutrition:   Energy Intake:  Unable to assess (variable po  WC consult for sacrum - no open wound, blanchable erythema  7/4: KUB impression: No radiographic evidence of small bowel obstruction. Findings suggestive of constipation.   Nutrition Monitoring/Evaluation:  Pt NPO @ admit. NG placed 7/2 to LIS.   7/3: PPN (2L+lipids) initiated  PIV access: 20 g R arm (placed 7/2), 22 g to R forearm (placed 7/2)    RD visited with patient in room- she is sitting at the sink getting ready for the day. She is in good spirits. Tolerating NG clamp with sips of clears. + flatus this AM, scant BM yesterday (possible return of suppository). Per discussion with NP - patient to receive additional suppository prior to diet advancement to clear liquid diet. Reviewed continuing with PN at this time until patient able to tolerate full liquids. Pt is agreeable. Mild swelling to hands and feet resolved today.    Abdominal Status (last documented by nursing):   Last BM (including prior to admit): 07/01/25 [1 stool episode noted 7/7] GI Symptoms: Loss of appetite (noted 7/4 per RN)  Recorded OP x 24 hours:   NG OP: 120 mL  UOP: 1850 mL + 1 unmeasured occurrence  Drains: 180 mL   Pertinent Medications: pepcid, keppra, zosyn  7/2: 500 ml & 1L NS boluses  Continuous: none  IVF: NA  Electrolyte Replacement:  7/4: 40 mEq Kcl, 15 mmol NaPhos  Pertinent administered PRN: 7/2: zofran, 7/5: dulcolax suppository   Pertinent Labs:   Lab Results   Component Value Date/Time     07/07/2025 05:36 AM    K 4.4 07/07/2025 05:36 AM     07/07/2025 05:36 AM    CO2 18 07/07/2025 05:36 AM    BUN 20 07/07/2025 05:36 AM    CREATININE 0.44 07/07/2025 05:36 AM    GLUCOSE 100 07/07/2025 05:36 AM    CALCIUM 8.1 07/07/2025 05:36 AM    PHOS 3.3 07/07/2025 05:36 AM    MG 2.1 07/07/2025 05:36 AM      Lab Results   Component Value Date/Time    POCGLU 132 07/02/2025 11:01 AM    POCGLU 134 07/02/2025 09:48 AM    POCGLU 133 09/28/2022 08:27 AM     Lab Results   Component Value Date/Time    TRIG 81 07/04/2025 05:32 AM    TRIG

## 2025-07-06 NOTE — PLAN OF CARE
Problem: Respiratory - Adult  Goal: Achieves optimal ventilation and oxygenation  Outcome: Progressing  Flowsheets (Taken 7/6/2025 0721)  Achieves optimal ventilation and oxygenation: Assess for changes in respiratory status

## 2025-07-07 PROBLEM — E44.0 MODERATE PROTEIN-CALORIE MALNUTRITION: Status: ACTIVE | Noted: 2025-07-07

## 2025-07-07 LAB
ANION GAP SERPL CALC-SCNC: 9 MMOL/L (ref 7–16)
BUN SERPL-MCNC: 20 MG/DL (ref 8–23)
CALCIUM SERPL-MCNC: 8.1 MG/DL (ref 8.8–10.2)
CHLORIDE SERPL-SCNC: 108 MMOL/L (ref 98–107)
CO2 SERPL-SCNC: 18 MMOL/L (ref 20–29)
CREAT SERPL-MCNC: 0.44 MG/DL (ref 0.6–1.1)
GLUCOSE SERPL-MCNC: 100 MG/DL (ref 70–99)
MAGNESIUM SERPL-MCNC: 2.1 MG/DL (ref 1.8–2.4)
PHOSPHATE SERPL-MCNC: 3.3 MG/DL (ref 2.5–4.5)
POTASSIUM SERPL-SCNC: 4.4 MMOL/L (ref 3.5–5.1)
SODIUM SERPL-SCNC: 135 MMOL/L (ref 136–145)

## 2025-07-07 PROCEDURE — 1100000003 HC PRIVATE W/ TELEMETRY

## 2025-07-07 PROCEDURE — 94640 AIRWAY INHALATION TREATMENT: CPT

## 2025-07-07 PROCEDURE — 6360000002 HC RX W HCPCS: Performed by: HOSPITALIST

## 2025-07-07 PROCEDURE — 94761 N-INVAS EAR/PLS OXIMETRY MLT: CPT

## 2025-07-07 PROCEDURE — 6370000000 HC RX 637 (ALT 250 FOR IP): Performed by: INTERNAL MEDICINE

## 2025-07-07 PROCEDURE — 83735 ASSAY OF MAGNESIUM: CPT

## 2025-07-07 PROCEDURE — 2500000003 HC RX 250 WO HCPCS: Performed by: NURSE PRACTITIONER

## 2025-07-07 PROCEDURE — 2580000003 HC RX 258: Performed by: SURGERY

## 2025-07-07 PROCEDURE — 2500000003 HC RX 250 WO HCPCS

## 2025-07-07 PROCEDURE — 6360000002 HC RX W HCPCS: Performed by: NURSE PRACTITIONER

## 2025-07-07 PROCEDURE — 2580000003 HC RX 258: Performed by: NURSE PRACTITIONER

## 2025-07-07 PROCEDURE — 6370000000 HC RX 637 (ALT 250 FOR IP): Performed by: HOSPITALIST

## 2025-07-07 PROCEDURE — 36415 COLL VENOUS BLD VENIPUNCTURE: CPT

## 2025-07-07 PROCEDURE — 6370000000 HC RX 637 (ALT 250 FOR IP)

## 2025-07-07 PROCEDURE — 6360000002 HC RX W HCPCS: Performed by: STUDENT IN AN ORGANIZED HEALTH CARE EDUCATION/TRAINING PROGRAM

## 2025-07-07 PROCEDURE — 80048 BASIC METABOLIC PNL TOTAL CA: CPT

## 2025-07-07 PROCEDURE — 84100 ASSAY OF PHOSPHORUS: CPT

## 2025-07-07 PROCEDURE — 6360000002 HC RX W HCPCS: Performed by: SURGERY

## 2025-07-07 RX ORDER — METRONIDAZOLE 500 MG/100ML
500 INJECTION, SOLUTION INTRAVENOUS EVERY 12 HOURS
Status: DISCONTINUED | OUTPATIENT
Start: 2025-07-07 | End: 2025-07-09

## 2025-07-07 RX ORDER — LOSARTAN POTASSIUM 50 MG/1
50 TABLET ORAL DAILY
Status: DISCONTINUED | OUTPATIENT
Start: 2025-07-07 | End: 2025-07-11 | Stop reason: HOSPADM

## 2025-07-07 RX ADMIN — WATER 1000 MG: 1 INJECTION INTRAMUSCULAR; INTRAVENOUS; SUBCUTANEOUS at 12:56

## 2025-07-07 RX ADMIN — CALCIUM GLUCONATE: 98 INJECTION INTRAVENOUS at 17:29

## 2025-07-07 RX ADMIN — PIPERACILLIN AND TAZOBACTAM 4500 MG: 4; .5 INJECTION, POWDER, LYOPHILIZED, FOR SOLUTION INTRAVENOUS at 04:35

## 2025-07-07 RX ADMIN — FAMOTIDINE 20 MG: 10 INJECTION, SOLUTION INTRAVENOUS at 09:01

## 2025-07-07 RX ADMIN — METRONIDAZOLE 500 MG: 500 INJECTION, SOLUTION INTRAVENOUS at 22:48

## 2025-07-07 RX ADMIN — I.V. FAT EMULSION 250 ML: 20 EMULSION INTRAVENOUS at 17:28

## 2025-07-07 RX ADMIN — APIXABAN 5 MG: 5 TABLET, FILM COATED ORAL at 09:00

## 2025-07-07 RX ADMIN — SOTALOL HYDROCHLORIDE 80 MG: 80 TABLET ORAL at 09:00

## 2025-07-07 RX ADMIN — LEVETIRACETAM 500 MG: 100 INJECTION INTRAVENOUS at 20:58

## 2025-07-07 RX ADMIN — ARFORMOTEROL TARTRATE: 15 SOLUTION RESPIRATORY (INHALATION) at 08:21

## 2025-07-07 RX ADMIN — SODIUM CHLORIDE, PRESERVATIVE FREE 10 ML: 5 INJECTION INTRAVENOUS at 13:06

## 2025-07-07 RX ADMIN — ARFORMOTEROL TARTRATE: 15 SOLUTION RESPIRATORY (INHALATION) at 19:35

## 2025-07-07 RX ADMIN — APIXABAN 5 MG: 5 TABLET, FILM COATED ORAL at 17:25

## 2025-07-07 RX ADMIN — LEVETIRACETAM 500 MG: 100 INJECTION INTRAVENOUS at 09:06

## 2025-07-07 RX ADMIN — FAMOTIDINE 20 MG: 10 INJECTION, SOLUTION INTRAVENOUS at 20:58

## 2025-07-07 RX ADMIN — SODIUM CHLORIDE, PRESERVATIVE FREE 10 ML: 5 INJECTION INTRAVENOUS at 20:58

## 2025-07-07 RX ADMIN — METRONIDAZOLE 500 MG: 500 INJECTION, SOLUTION INTRAVENOUS at 13:00

## 2025-07-07 RX ADMIN — LOSARTAN POTASSIUM 50 MG: 50 TABLET, FILM COATED ORAL at 09:00

## 2025-07-07 NOTE — PROGRESS NOTES
Progress Note    Date:2025       Room:[facility]  Patient Name:Marcelina Collins     YOB: 1940     Age:85 y.o.  Admit Date: 2025    POD 5 Days Post-Op    Procedure:  Procedure(s):  LAPAROSCOPY ROBOTIC BOWEL RESECTION      Subjective     25: Pt awake in bed. No new issues or acute events overnight. Pt feeling tired today. PPN in progress. Pain is well-controlled with current regimen. Drain site C/D/I. NG tube out yesterday. No nausea or vomiting. Tolerating Clears. Patient reports +flatus/-BM.       HPI:  Marcelina Collins is a 85 y.o. female with a history of CABG and aortic valve repair presents with a history of a single day of abdominal pain with nausea and emesis.  CT is significant for what appears to be a closed-loop obstruction with 2 transition points.  White blood cell count is 14.6 and the lactic acid was above 3 on arrival and 2.3 currently.  She has a history of cholecystectomy and hysterectomy as well as the bypass surgery.  She is on Eliquis and a baby aspirin.  I have initiated giving her a PCC-based reversal agent prior to surgery.  Hopefully we are able to perform her procedure before she has any kanu ischemia or necrosis and will avoid a bowel resection.  She is relatively stable hemodynamically and her pain is also relatively well-managed.  I think she is a good candidate for a minimally invasive robotic approach, at least to ascertain what is going on, as opposed to an automatic exploratory laparotomy.  It is fairly likely she will require a small bowel resection however.  I had a full discussion with her and the family regarding her age, the operation and the options available to her.       Physical Examination      Vitals:  BP (!) 152/56   Pulse 73   Temp 97.9 °F (36.6 °C) (Oral)   Resp 17   Ht 1.702 m (5' 7.01\")   Wt 67.3 kg (148 lb 5.9 oz)   SpO2 98%   BMI 23.23 kg/m²   Temp (24hrs), Av.1 °F (36.7 °C), Min:97.9 °F (36.6 °C), Max:98.2 °F (36.8 °C)      I/O

## 2025-07-07 NOTE — PROGRESS NOTES
SPIRITUAL HEALTH  Note for Med/Surg Visit                  Room # 223/01    Name: Marcelina Collins           Age: 85 y.o.    Gender: female          MRN: [id number]  Baptism: Buddhism       Preferred Language: English    Date: 07/07/25  Visit Time: Begin Time: (P) 1550 End Time : (P) 1600 Complexity of Encounter: (P) Low      Visit Summary:  met with patient through regular rounds. Patient expressed that she is Buddhism and her Baptism home is supportive. Patient's social support includes her children and Jewish.  provided active listening, discussion of illness, space for expression of feelings and spiritual support with prayer.  will follow up as necessary or at patient's request.      Referral/Consult From: (P) Rounding  Encounter Overview/Reason: (P) Spiritual/Emotional Needs  Encounter Code:     Crisis (if applicable):    Service Provided For: (P) Patient     Patient was available.    [place], Belief, Meaning:   Patient identifies as spiritual  is connected with a mariola tradition or spiritual practice  has beliefs or practices that help with coping during difficult times  Family/Friends No family/friends present  Rituals (if applicable)      Importance and Influence:  Patient does not have spiritual/personal beliefs that influence decisions regarding their health  Family/Friends No family/friends present    Community:  Patient   is connected with a spiritual community  Support System Includes   (P) Children, Latter-day/mariola community   Family/Friends   No family/ friends present.    Assessment and Plan of Care:   Emotions Expressed by Patient:   Assessment: (P) Calm    Interventions by :   Intervention: (P) Active listening, Sustaining Presence/Ministry of presence, Explored/Affirmed feelings, thoughts, concerns, Discussed illness injury and it’s impact, Discussed belief system/Amish practices/mariola, Explored Coping Skills/Resources, Prayer (assurance of)/[place]

## 2025-07-07 NOTE — CARE COORDINATION
CM team continuing to follow for ongoing discharge planning. Previous CM documentation reviewed. Last therapy documentation from 07/04 reviewed for STR and awaiting updated PT documentation from today. Per attending, patient is anticipated to undergo exploratory laparotomy with anticipation of a small bowel resection. CM team will continue to follow for ongoing discharge planning.

## 2025-07-07 NOTE — PROGRESS NOTES
Nutrition Assessment  Assessment Type: Reassess  Reason for visit:  Best Practice Alert: Malnutrition Screening Tool   Malnutrition Screening Tool Score: 2  Unintentional weight loss PTA: 2 to 13 pounds (1 point)  Eating poorly due to decreased appetite: Yes (1 point)  Parenteral Nutrition Management (Surgery)7/3    Nutrition Intervention:   Food and/or Nutrient Delivery:   Parenteral Nutrition:  Peripheral parenteral nutrition (Osmolarity 880)   Peripheral line infusion  Continue: Dex 5%, 4.25% AA 2 L (85ml/hr)   Continue 250 ml 20% lipids daily  Electrolytes: No changes indicated today  Sodium (80 mEq/L sodium chloride), Potassium (15 mmol/L potassium phosphate, 5 mEq/L potassium chloride), Calcium gluconate (4.5 mEq/L), Magnesium sulfate 6 mEq/L   Additives:   Adult multivitamin with vit K  Trace Elements  Thiamine 100 mg daily x 7 days (EOT 7/9)  Folic Acid 1 mg daily x 7 days (EOT 7/9)  PN regimen provides per 24 hours: 1180 kcal/day (85% of needs), 85 grams of protein/day (100% of needs), 100 grams of CHO/24 hours and ~2250 ml of total volume/24 hours.   Above regimen: Unable to meet calorie goal related to PPN  Biochemical Data:   Basic Metabolic Panel, Hepatic Function Panel, Magnesium and Phosphorus active per nutrition parameters  Triglyceride weekly on Thursday  POC Glucoses/SSI Not indicated  Nutrition Related Medication Management:  Electrolyte Replacement:   Continue prn protocol Magnesium, Potassium, and Phosphorus   Intravenous fluids:  Not applicable   Meals and Snacks:  Diet: Continue current diet and advance as medically appropriate  Medical Food Supplements:   Medical food supplement therapy:  Initiate Ensure Clear (clear liquid oral supplement) 240 calories, 8 grams protein per 8 ounce serving   Coordination of Nutrition Care:  Coordination with health care provider Provider, Sherry Childress, NP, RN, Mara, and Bea BHTATI     Malnutrition Assessment:  Malnutrition Status: Moderate  Goal(s) Achieved  Active Goal:  (Tolerate maximum provisions of PPN until able to advance diet vs obtain central access)  Type of Goal: Continue current goal    Discharge Planning:    Too soon to determine    Nicole Sanford RD

## 2025-07-07 NOTE — PROGRESS NOTES
END OF SHIFT NOTE:    INTAKE/OUTPUT  07/06 0701 - 07/07 0700  In: 8526.1   Out: 2150 [Urine:1900; Drains:250]  Voiding: Yes  Catheter: No  Drain:   Closed/Suction Drain Lateral LLQ Bulb (Active)   Site Description Clean, dry & intact 07/07/25 0800   Dressing Status Old drainage noted 07/07/25 0525   Drainage Appearance Serosanguinous 07/07/25 0800   Drain Status Compressed;To bulb suction 07/07/25 0800   Output (ml) 70 ml 07/07/25 0525       External Urinary Catheter (Active)   Site Assessment Clean,dry & intact 07/07/25 0404   Placement Initiated 07/07/25 0404   Securement Method Securing device (Describe) 07/07/25 0404   Catheter Care Catheter/Wick replaced 07/07/25 0404   Perineal Care Yes 07/07/25 0404   Suction 40 mmgHg continuous 07/07/25 0404   Urine Color Yellow 07/07/25 0814   Urine Appearance Clear 07/07/25 0814   Urine Odor Malodorous 07/07/25 0404   Output (mL) 200 mL 07/07/25 1135               Flatus: Patient does have flatus present.    Stool: 1  occurrences.    Characteristics: loose, brown, small.           Stool Assessment  Last BM (including prior to admit): 07/01/25 (prior to admission/surgery)    Emesis: 0 occurrences.    Characteristics:        VITAL SIGNS  Patient Vitals for the past 12 hrs:   Temp Pulse Resp BP SpO2   07/07/25 1536 98.1 °F (36.7 °C) 72 18 (!) 159/58 98 %   07/07/25 1203 -- 76 -- -- --   07/07/25 0823 -- -- -- -- 98 %   07/07/25 0743 -- 73 -- (!) 152/56 97 %   07/07/25 0741 97.9 °F (36.6 °C) 71 17 (!) 161/62 96 %       Pain Assessment  Pain Level: 0 (07/06/25 0859)  Pain Location: Abdomen  Patient's Stated Pain Goal: 0 - No pain    Ambulating  Yes    Shift report given to oncoming nurse at the bedside.    Mara Mahajan, RN

## 2025-07-07 NOTE — PLAN OF CARE
Problem: Pain  Goal: Verbalizes/displays adequate comfort level or baseline comfort level  Outcome: Progressing     Problem: Safety - Adult  Goal: Free from fall injury  Outcome: Progressing     Problem: Skin/Tissue Integrity  Goal: Skin integrity remains intact  Description: 1.  Monitor for areas of redness and/or skin breakdown  2.  Assess vascular access sites hourly  3.  Every 4-6 hours minimum:  Change oxygen saturation probe site  4.  Every 4-6 hours:  If on nasal continuous positive airway pressure, respiratory therapy assess nares and determine need for appliance change or resting period  Outcome: Progressing     Problem: Chronic Conditions and Co-morbidities  Goal: Patient's chronic conditions and co-morbidity symptoms are monitored and maintained or improved  Outcome: Progressing     Problem: Discharge Planning  Goal: Discharge to home or other facility with appropriate resources  Outcome: Progressing     Problem: ABCDS Injury Assessment  Goal: Absence of physical injury  Outcome: Progressing     Problem: Respiratory - Adult  Goal: Achieves optimal ventilation and oxygenation  Outcome: Progressing

## 2025-07-08 LAB
ANION GAP SERPL CALC-SCNC: 7 MMOL/L (ref 7–16)
BUN SERPL-MCNC: 19 MG/DL (ref 8–23)
CALCIUM SERPL-MCNC: 8.2 MG/DL (ref 8.8–10.2)
CHLORIDE SERPL-SCNC: 108 MMOL/L (ref 98–107)
CO2 SERPL-SCNC: 19 MMOL/L (ref 20–29)
CREAT SERPL-MCNC: 0.42 MG/DL (ref 0.6–1.1)
GLUCOSE SERPL-MCNC: 104 MG/DL (ref 70–99)
LEVETIRACETAM SERPL-MCNC: <2 UG/ML (ref 10–40)
MAGNESIUM SERPL-MCNC: 2.1 MG/DL (ref 1.8–2.4)
PHOSPHATE SERPL-MCNC: 3.1 MG/DL (ref 2.5–4.5)
POTASSIUM SERPL-SCNC: 4.1 MMOL/L (ref 3.5–5.1)
SODIUM SERPL-SCNC: 134 MMOL/L (ref 136–145)

## 2025-07-08 PROCEDURE — 84100 ASSAY OF PHOSPHORUS: CPT

## 2025-07-08 PROCEDURE — 94640 AIRWAY INHALATION TREATMENT: CPT

## 2025-07-08 PROCEDURE — 2580000003 HC RX 258: Performed by: NURSE PRACTITIONER

## 2025-07-08 PROCEDURE — 2500000003 HC RX 250 WO HCPCS: Performed by: NURSE PRACTITIONER

## 2025-07-08 PROCEDURE — 80048 BASIC METABOLIC PNL TOTAL CA: CPT

## 2025-07-08 PROCEDURE — 94761 N-INVAS EAR/PLS OXIMETRY MLT: CPT

## 2025-07-08 PROCEDURE — 6360000002 HC RX W HCPCS: Performed by: NURSE PRACTITIONER

## 2025-07-08 PROCEDURE — 6360000002 HC RX W HCPCS: Performed by: HOSPITALIST

## 2025-07-08 PROCEDURE — 94760 N-INVAS EAR/PLS OXIMETRY 1: CPT

## 2025-07-08 PROCEDURE — 83735 ASSAY OF MAGNESIUM: CPT

## 2025-07-08 PROCEDURE — 36415 COLL VENOUS BLD VENIPUNCTURE: CPT

## 2025-07-08 PROCEDURE — 6360000002 HC RX W HCPCS: Performed by: STUDENT IN AN ORGANIZED HEALTH CARE EDUCATION/TRAINING PROGRAM

## 2025-07-08 PROCEDURE — 6370000000 HC RX 637 (ALT 250 FOR IP)

## 2025-07-08 PROCEDURE — 97530 THERAPEUTIC ACTIVITIES: CPT

## 2025-07-08 PROCEDURE — 1100000000 HC RM PRIVATE

## 2025-07-08 PROCEDURE — 6370000000 HC RX 637 (ALT 250 FOR IP): Performed by: INTERNAL MEDICINE

## 2025-07-08 PROCEDURE — 6370000000 HC RX 637 (ALT 250 FOR IP): Performed by: HOSPITALIST

## 2025-07-08 RX ADMIN — SOTALOL HYDROCHLORIDE 80 MG: 80 TABLET ORAL at 09:19

## 2025-07-08 RX ADMIN — LOSARTAN POTASSIUM 50 MG: 50 TABLET, FILM COATED ORAL at 09:19

## 2025-07-08 RX ADMIN — FAMOTIDINE 20 MG: 10 INJECTION, SOLUTION INTRAVENOUS at 09:21

## 2025-07-08 RX ADMIN — ARFORMOTEROL TARTRATE: 15 SOLUTION RESPIRATORY (INHALATION) at 19:58

## 2025-07-08 RX ADMIN — LEVETIRACETAM 500 MG: 100 INJECTION INTRAVENOUS at 20:02

## 2025-07-08 RX ADMIN — WATER 1000 MG: 1 INJECTION INTRAMUSCULAR; INTRAVENOUS; SUBCUTANEOUS at 11:44

## 2025-07-08 RX ADMIN — LEVETIRACETAM 500 MG: 100 INJECTION INTRAVENOUS at 09:19

## 2025-07-08 RX ADMIN — SODIUM CHLORIDE, PRESERVATIVE FREE 10 ML: 5 INJECTION INTRAVENOUS at 09:22

## 2025-07-08 RX ADMIN — CALCIUM GLUCONATE: 98 INJECTION, SOLUTION INTRAVENOUS at 17:01

## 2025-07-08 RX ADMIN — METRONIDAZOLE 500 MG: 500 INJECTION, SOLUTION INTRAVENOUS at 11:45

## 2025-07-08 RX ADMIN — APIXABAN 5 MG: 5 TABLET, FILM COATED ORAL at 09:19

## 2025-07-08 RX ADMIN — APIXABAN 5 MG: 5 TABLET, FILM COATED ORAL at 17:00

## 2025-07-08 RX ADMIN — SODIUM CHLORIDE, PRESERVATIVE FREE 20 ML: 5 INJECTION INTRAVENOUS at 20:10

## 2025-07-08 RX ADMIN — ARFORMOTEROL TARTRATE: 15 SOLUTION RESPIRATORY (INHALATION) at 07:33

## 2025-07-08 RX ADMIN — METRONIDAZOLE 500 MG: 500 INJECTION, SOLUTION INTRAVENOUS at 22:37

## 2025-07-08 RX ADMIN — FAMOTIDINE 20 MG: 10 INJECTION, SOLUTION INTRAVENOUS at 20:03

## 2025-07-08 ASSESSMENT — PAIN SCALES - GENERAL: PAINLEVEL_OUTOF10: 0

## 2025-07-08 NOTE — PROGRESS NOTES
Nutrition Assessment  Assessment Type: Reassess  Reason for visit:  Best Practice Alert: Malnutrition Screening Tool   Malnutrition Screening Tool Score: 2  Unintentional weight loss PTA: 2 to 13 pounds (1 point)  Eating poorly due to decreased appetite: Yes (1 point)  Parenteral Nutrition Management (Surgery)7/3    Nutrition Intervention:   Food and/or Nutrient Delivery:   Parenteral Nutrition:  Peripheral parenteral nutrition (Osmolarity 880)   Peripheral line infusion  Decrease: Dex 5%, 4.25% AA 1 L (45ml/hr)   Discontinue 250 ml 20% lipids daily  Electrolytes: No electrolyte changes indicated today  Sodium (80 mEq/L sodium chloride), Potassium (15 mmol/L potassium phosphate, 5 mEq/L potassium chloride), Calcium gluconate (4.5 mEq/L), Magnesium sulfate 6 mEq/L   Additives:   Adult multivitamin with vit K  Trace Elements  Thiamine 100 mg daily x 7 days (EOT 7/9)  Folic Acid 1 mg daily x 7 days (EOT 7/9)  PN regimen provides per 24 hours: 340 kcal/day (24% of needs), 43 grams of protein/day (61% of needs), 50 grams of CHO/24 hours and ~1008 ml of total volume/24 hours.   Above regimen: Not intended to meet nutrition needs; tapering PN due to diet advancement.   Biochemical Data:   Basic Metabolic Panel, Hepatic Function Panel, Magnesium and Phosphorus active per nutrition parameters  Triglyceride weekly on Thursday  POC Glucoses/SSI Not indicated  Nutrition Related Medication Management:  Electrolyte Replacement:   Continue prn protocol Magnesium, Potassium, and Phosphorus   Intravenous fluids:  Not applicable   Meals and Snacks:  Diet: Initiate Full Liquid Diet per discussion with provider Sherry Childress NP  Medical Food Supplements:   Medical food supplement therapy:  Initiate Ensure Plus High Protein (high calorie high protein) 350 calories, 20 grams protein per 8 ounce serving    Coordination of Nutrition Care:  Coordination with health care provider Provider, Sherry Childress NP and RNJanae  AM     No results found for: \"LABA1C\"  Lab Results   Component Value Date/Time    ALKPHOS 81 07/04/2025 05:32 AM    ALKPHOS 113 01/05/2022 09:04 PM    ALT 14 07/04/2025 05:32 AM    AST 23 07/04/2025 05:32 AM    BILITOT 0.5 07/04/2025 05:32 AM    BILIDIR 0.3 07/04/2025 05:32 AM     Remarkable for: Mild hyponatremia continues (stable), K/Mg/Phos remains WNL. Cl mildly elevated toady.   7/4: Trig WNL; hepatic panel WNL     Current Nutrition Therapies:  ADULT DIET; Clear Liquid  PN-Adult Premix 4.25/5 - Peripheral Line  ADULT ORAL NUTRITION SUPPLEMENT; Breakfast, Lunch, Dinner; Clear Liquid Oral Supplement  Active Parenteral Nutrition Order:  Composition: Premix Peripheral (D5AA4.25)  Lipids: 250ml, Daily  Duration: Continuous  Volume/Rate: 85 mL/hr (2L)  Provides/24 hours: 1180 kcal/day (85% of needs), 85 grams of protein/day (100% of needs), 100 grams of CHO/24 hours and ~2250 ml of total volume/24 hours.    Current Intake:   Average Meal Intake:  (100% CLD) Average Supplements Intake: Unable to assess      Anthropometric Measures:  Height: 170.2 cm (5' 7.01\")  Current Body Wt: 67.3 kg (148 lb 5.9 oz) (7/5), Weight source: Bed scale  BMI: 23.2, Normal Weight (BMI 22.0 to 24.9) age over 65  Admission Body Weight: 61.2 kg (134 lb 14.7 oz) (7/2 stated)  Ideal Body Weight (Kg) (Calculated): 61 kg (135 lbs), 113.7 %  BMI Category Normal Weight (BMI 22.0 to 24.9) age over 65    Comparative Standards:  Energy (kcal/day): 1322-0519 (20-25 kcals/kg) (Kcal/kg Weight used: 69.6 kg Current  Protein (g/day): 70-84 (1-1.2 g/kg) Weight Used: (Current) 69.6 kg  Fluid (ml/day):   (1 ml/kcal)    Nutrition Diagnosis:   Inadequate oral intake related to altered GI function as evidenced by NPO or clear liquid status due to medical condition  Moderate malnutrition, in context of chronic illness related to inadequate protein-energy intake as evidenced by criteria as identified in malnutrition assessment    Nutrition Goal(s):   Previous Goal

## 2025-07-08 NOTE — PROGRESS NOTES
Progress Note    Date:2025       Room:[facility]/  Patient Name:Marcelina Collins     YOB: 1940     Age:85 y.o.  Admit Date: 2025    POD 6 Days Post-Op    Procedure:  Procedure(s):  LAPAROSCOPY ROBOTIC BOWEL RESECTION      Subjective     25: Pt OOB in recliner. No new issues or acute events overnight. BM x 2- liquid.  PPN in progress. Pain is well-controlled with current regimen. Drain site leaking sss.  No nausea or vomiting-->Tolerating Clears. Patient reports +flatus/+BM.       HPI:  Marcelina Collins is a 85 y.o. female with a history of CABG and aortic valve repair presents with a history of a single day of abdominal pain with nausea and emesis.  CT is significant for what appears to be a closed-loop obstruction with 2 transition points.  White blood cell count is 14.6 and the lactic acid was above 3 on arrival and 2.3 currently.  She has a history of cholecystectomy and hysterectomy as well as the bypass surgery.  She is on Eliquis and a baby aspirin.  I have initiated giving her a PCC-based reversal agent prior to surgery.  Hopefully we are able to perform her procedure before she has any kanu ischemia or necrosis and will avoid a bowel resection.  She is relatively stable hemodynamically and her pain is also relatively well-managed.  I think she is a good candidate for a minimally invasive robotic approach, at least to ascertain what is going on, as opposed to an automatic exploratory laparotomy.  It is fairly likely she will require a small bowel resection however.  I had a full discussion with her and the family regarding her age, the operation and the options available to her.       Physical Examination      Vitals:  BP (!) 178/66   Pulse 81   Temp 97.5 °F (36.4 °C) (Oral)   Resp 17   Ht 1.702 m (5' 7.01\")   Wt 67.3 kg (148 lb 5.9 oz)   SpO2 99%   BMI 23.23 kg/m²   Temp (24hrs), Av °F (36.7 °C), Min:97.5 °F (36.4 °C), Max:98.8 °F (37.1 °C)      I/O (24Hr):    Intake/Output Summary

## 2025-07-08 NOTE — PROGRESS NOTES
ACUTE PHYSICAL THERAPY GOALS:   (Developed with and agreed upon by patient and/or caregiver.)    LTG:  (1.)Ms. Collins will move from supine to sit and sit to supine , scoot up and down, and roll side to side in bed with SUPERVISION within 7 treatment day(s).    (2.)Ms. Collins will transfer from bed to chair and chair to bed with SUPERVISION using the least restrictive device within 7 treatment day(s).    (3.)Ms. Collins will ambulate with SUPERVISION for 150 feet with the least restrictive device within 7 treatment day(s).  (4.)Ms. Collins will tolerate at least 30 min of dynamic standing activity to assist standing ADLs with the least restrictive device within 7 treatment days.    PHYSICAL THERAPY: Daily Note PM   (Link to Caseload Tracking: PT Visit Days : 2  Time In/Out PT Charge Capture  Rehab Caseload Tracker  Orders    Marcelina Collins is a 85 y.o. female   PRIMARY DIAGNOSIS: SBO (small bowel obstruction) (HCC)  SBO (small bowel obstruction) (HCC) [K56.609]  Essential hypertension [I10]  Generalized abdominal pain [R10.84]  Intestinal obstruction, unspecified cause, unspecified whether partial or complete (HCC) [K56.609]  Procedure(s) (LRB):  LAPAROSCOPY ROBOTIC BOWEL RESECTION (N/A)  6 Days Post-Op  Inpatient: Payor: [facility] MEDICARE / Plan: [employer] MEDICARE-ADVANTAGE PPO / Product Type: Medicare /     ASSESSMENT:     REHAB RECOMMENDATIONS:   Recommendation to date pending progress:  Setting:  Short-term Rehab    Equipment:    To Be Determined     ASSESSMENT:  Ms. Collins is supine in bed and agreeable.  States that she needs to use the bathroom rather quickly.  Bed mobility is with stand by assist to get to the edge of the bed, sitting balance is good.  Sit to stand and gait training with hand held assist to/from the bathroom.  Patient stops by the sink and washes her hands and then returns to bed and is positioned for comfort with needs within reach.  Visitors are allowed back in the room.  Good session and progress demonstrated

## 2025-07-08 NOTE — CARE COORDINATION
Pt chart reviewed for continued stay. LOS 6 days. Pt admitted with SBO, essential HTN, generalized abdominal pain, and intestinal obstruction. Surgery primary. Per MD, pt s/p Day 6 laparoscopy robotic bowel resection; tolerating CLD, positive for BM and flatus; plan to advance to FLD today and wean PPN/TPN; PT/OT to reconsult as pt not seen since in ICU.     CM ensured PT/OT orders reflect today's date to re-eval.     Pt and family requested CM to visit. CM met with pt, son, and friends at bedside. Pt and son with questions regarding STR progress. CM discussed pt need for new PT/OT evals for recommendations and assist with insurance prior authorization. CM provided pt and son with STR choice list with quality metrics.     Pt and son requested referrals be sent in the following order once PT/OT re-evals pt:  #1: [facility]  #2: [facility]  #3: [facility].    CM to follow PT/OT evals and send STR referrals once evals completed.     CM team to continue to follow for any needs that may arise.     Jihan Lemos, MSW, LBSW   Care Manager

## 2025-07-08 NOTE — PLAN OF CARE
Problem: Pain  Goal: Verbalizes/displays adequate comfort level or baseline comfort level  Outcome: Progressing     Problem: Safety - Adult  Goal: Free from fall injury  Outcome: Progressing  Flowsheets (Taken 7/7/2025 1220 by Mara Mahajan, RN)  Free From Fall Injury: Instruct family/caregiver on patient safety     Problem: Skin/Tissue Integrity  Goal: Skin integrity remains intact  Description: 1.  Monitor for areas of redness and/or skin breakdown  2.  Assess vascular access sites hourly  3.  Every 4-6 hours minimum:  Change oxygen saturation probe site  4.  Every 4-6 hours:  If on nasal continuous positive airway pressure, respiratory therapy assess nares and determine need for appliance change or resting period  Outcome: Progressing  Flowsheets (Taken 7/7/2025 1220 by Mara Mahajan RN)  Skin Integrity Remains Intact: Monitor for areas of redness and/or skin breakdown     Problem: Chronic Conditions and Co-morbidities  Goal: Patient's chronic conditions and co-morbidity symptoms are monitored and maintained or improved  Outcome: Progressing     Problem: Discharge Planning  Goal: Discharge to home or other facility with appropriate resources  Outcome: Progressing     Problem: ABCDS Injury Assessment  Goal: Absence of physical injury  Outcome: Progressing     Problem: Respiratory - Adult  Goal: Achieves optimal ventilation and oxygenation  Outcome: Progressing

## 2025-07-09 LAB
ANION GAP SERPL CALC-SCNC: 8 MMOL/L (ref 7–16)
BASOPHILS # BLD: 0.06 K/UL (ref 0–0.2)
BASOPHILS NFR BLD: 0.7 % (ref 0–2)
BUN SERPL-MCNC: 19 MG/DL (ref 8–23)
CALCIUM SERPL-MCNC: 8.2 MG/DL (ref 8.8–10.2)
CHLORIDE SERPL-SCNC: 107 MMOL/L (ref 98–107)
CO2 SERPL-SCNC: 19 MMOL/L (ref 20–29)
CREAT SERPL-MCNC: 0.42 MG/DL (ref 0.6–1.1)
DIFFERENTIAL METHOD BLD: ABNORMAL
EOSINOPHIL # BLD: 0.78 K/UL (ref 0–0.8)
EOSINOPHIL NFR BLD: 8.5 % (ref 0.5–7.8)
ERYTHROCYTE [DISTWIDTH] IN BLOOD BY AUTOMATED COUNT: 15.9 % (ref 11.9–14.6)
GLUCOSE SERPL-MCNC: 101 MG/DL (ref 70–99)
HCT VFR BLD AUTO: 24 % (ref 35.8–46.3)
HGB BLD-MCNC: 7.7 G/DL (ref 11.7–15.4)
IMM GRANULOCYTES # BLD AUTO: 0.04 K/UL (ref 0–0.5)
IMM GRANULOCYTES NFR BLD AUTO: 0.4 % (ref 0–5)
LYMPHOCYTES # BLD: 1.57 K/UL (ref 0.5–4.6)
LYMPHOCYTES NFR BLD: 17.1 % (ref 13–44)
MAGNESIUM SERPL-MCNC: 2 MG/DL (ref 1.8–2.4)
MCH RBC QN AUTO: 26.9 PG (ref 26.1–32.9)
MCHC RBC AUTO-ENTMCNC: 32.1 G/DL (ref 31.4–35)
MCV RBC AUTO: 83.9 FL (ref 82–102)
MONOCYTES # BLD: 1.44 K/UL (ref 0.1–1.3)
MONOCYTES NFR BLD: 15.7 % (ref 4–12)
NEUTS SEG # BLD: 5.31 K/UL (ref 1.7–8.2)
NEUTS SEG NFR BLD: 57.6 % (ref 43–78)
NRBC # BLD: 0 K/UL (ref 0–0.2)
PHOSPHATE SERPL-MCNC: 3 MG/DL (ref 2.5–4.5)
PLATELET # BLD AUTO: 243 K/UL (ref 150–450)
PMV BLD AUTO: 9.2 FL (ref 9.4–12.3)
POTASSIUM SERPL-SCNC: 4.2 MMOL/L (ref 3.5–5.1)
RBC # BLD AUTO: 2.86 M/UL (ref 4.05–5.2)
SODIUM SERPL-SCNC: 133 MMOL/L (ref 136–145)
WBC # BLD AUTO: 9.2 K/UL (ref 4.3–11.1)

## 2025-07-09 PROCEDURE — 94640 AIRWAY INHALATION TREATMENT: CPT

## 2025-07-09 PROCEDURE — 83735 ASSAY OF MAGNESIUM: CPT

## 2025-07-09 PROCEDURE — 6370000000 HC RX 637 (ALT 250 FOR IP): Performed by: HOSPITALIST

## 2025-07-09 PROCEDURE — 6370000000 HC RX 637 (ALT 250 FOR IP)

## 2025-07-09 PROCEDURE — 6360000002 HC RX W HCPCS: Performed by: STUDENT IN AN ORGANIZED HEALTH CARE EDUCATION/TRAINING PROGRAM

## 2025-07-09 PROCEDURE — 80048 BASIC METABOLIC PNL TOTAL CA: CPT

## 2025-07-09 PROCEDURE — 97116 GAIT TRAINING THERAPY: CPT

## 2025-07-09 PROCEDURE — 85025 COMPLETE CBC W/AUTO DIFF WBC: CPT

## 2025-07-09 PROCEDURE — 6360000002 HC RX W HCPCS: Performed by: HOSPITALIST

## 2025-07-09 PROCEDURE — 94760 N-INVAS EAR/PLS OXIMETRY 1: CPT

## 2025-07-09 PROCEDURE — 1100000000 HC RM PRIVATE

## 2025-07-09 PROCEDURE — 2500000003 HC RX 250 WO HCPCS: Performed by: NURSE PRACTITIONER

## 2025-07-09 PROCEDURE — 97530 THERAPEUTIC ACTIVITIES: CPT

## 2025-07-09 PROCEDURE — 84100 ASSAY OF PHOSPHORUS: CPT

## 2025-07-09 PROCEDURE — 6370000000 HC RX 637 (ALT 250 FOR IP): Performed by: INTERNAL MEDICINE

## 2025-07-09 PROCEDURE — 6360000002 HC RX W HCPCS: Performed by: NURSE PRACTITIONER

## 2025-07-09 PROCEDURE — 36415 COLL VENOUS BLD VENIPUNCTURE: CPT

## 2025-07-09 PROCEDURE — 97535 SELF CARE MNGMENT TRAINING: CPT

## 2025-07-09 RX ADMIN — ARFORMOTEROL TARTRATE: 15 SOLUTION RESPIRATORY (INHALATION) at 21:13

## 2025-07-09 RX ADMIN — ARFORMOTEROL TARTRATE: 15 SOLUTION RESPIRATORY (INHALATION) at 07:51

## 2025-07-09 RX ADMIN — APIXABAN 5 MG: 5 TABLET, FILM COATED ORAL at 07:35

## 2025-07-09 RX ADMIN — SOTALOL HYDROCHLORIDE 80 MG: 80 TABLET ORAL at 07:35

## 2025-07-09 RX ADMIN — FAMOTIDINE 20 MG: 10 INJECTION, SOLUTION INTRAVENOUS at 07:35

## 2025-07-09 RX ADMIN — LEVETIRACETAM 500 MG: 100 INJECTION INTRAVENOUS at 20:11

## 2025-07-09 RX ADMIN — LEVETIRACETAM 500 MG: 100 INJECTION INTRAVENOUS at 07:36

## 2025-07-09 RX ADMIN — SODIUM CHLORIDE, PRESERVATIVE FREE 10 ML: 5 INJECTION INTRAVENOUS at 20:12

## 2025-07-09 RX ADMIN — LOSARTAN POTASSIUM 50 MG: 50 TABLET, FILM COATED ORAL at 07:35

## 2025-07-09 RX ADMIN — SODIUM CHLORIDE, PRESERVATIVE FREE 10 ML: 5 INJECTION INTRAVENOUS at 07:39

## 2025-07-09 RX ADMIN — FAMOTIDINE 20 MG: 10 INJECTION, SOLUTION INTRAVENOUS at 20:12

## 2025-07-09 RX ADMIN — APIXABAN 5 MG: 5 TABLET, FILM COATED ORAL at 16:54

## 2025-07-09 ASSESSMENT — PAIN SCALES - GENERAL
PAINLEVEL_OUTOF10: 0

## 2025-07-09 NOTE — PLAN OF CARE
Respiratory - Adult  Goal: Achieves optimal ventilation and oxygenation  7/8/2025 2327 by Rebekah Farrar RN  Outcome: Progressing  7/8/2025 2320 by Rebekah Farrar RN  Outcome: Progressing  7/8/2025 1439 by Janae Farrar RN  Outcome: Progressing     Problem: Musculoskeletal - Adult  Goal: Return mobility to safest level of function  Outcome: Progressing     Problem: Metabolic/Fluid and Electrolytes - Adult  Goal: Electrolytes maintained within normal limits  Outcome: Progressing  Goal: Hemodynamic stability and optimal renal function maintained  Outcome: Progressing  Goal: Glucose maintained within prescribed range  Outcome: Progressing     Problem: Skin/Tissue Integrity - Adult  Goal: Skin integrity remains intact  Description: 1.  Monitor for areas of redness and/or skin breakdown  2.  Assess vascular access sites hourly  3.  Every 4-6 hours minimum:  Change oxygen saturation probe site  4.  Every 4-6 hours:  If on nasal continuous positive airway pressure, respiratory therapy assess nares and determine need for appliance change or resting period  7/8/2025 2327 by Rebekah Farrar RN  Outcome: Progressing  7/8/2025 2320 by Rebekah Farrar RN  Outcome: Progressing  7/8/2025 1439 by Janae Farrar RN  Outcome: Progressing  Goal: Incisions, wounds, or drain sites healing without S/S of infection  Outcome: Progressing  Goal: Oral mucous membranes remain intact  Outcome: Progressing

## 2025-07-09 NOTE — CARE COORDINATION
CM reviewed chart.     PT evaluation and note completed on 7/8/25. OT note is still pending at this time.     CM sent referrals to patient and son's requested facilities, per CM note from 7/8/25: 1. [facility], 2. [facility], 3. [facility]. Awaiting responses.     CM will add OT note to referrals once OT note becomes available.

## 2025-07-09 NOTE — PROGRESS NOTES
Progress Note    Date:2025       Room:[facility]  Patient Name:Marcelina Collins     YOB: 1940     Age:85 y.o.  Admit Date: 2025    POD 7 Days Post-Op    Procedure:  Procedure(s):  LAPAROSCOPY ROBOTIC BOWEL RESECTION      Subjective     25: Pt OOB w assist x 1 to BR. No new issues or acute events overnight. +BM's.   PPN in progress- weaning. Pain is well-controlled with current regimen. Drain site leaking ss.  No nausea or vomiting-->Tolerating FLD.     HPI:  Marcelina Collins is a 85 y.o. female with a history of CABG and aortic valve repair presents with a history of a single day of abdominal pain with nausea and emesis.  CT is significant for what appears to be a closed-loop obstruction with 2 transition points.  White blood cell count is 14.6 and the lactic acid was above 3 on arrival and 2.3 currently.  She has a history of cholecystectomy and hysterectomy as well as the bypass surgery.  She is on Eliquis and a baby aspirin.  I have initiated giving her a PCC-based reversal agent prior to surgery.  Hopefully we are able to perform her procedure before she has any kanu ischemia or necrosis and will avoid a bowel resection.  She is relatively stable hemodynamically and her pain is also relatively well-managed.  I think she is a good candidate for a minimally invasive robotic approach, at least to ascertain what is going on, as opposed to an automatic exploratory laparotomy.  It is fairly likely she will require a small bowel resection however.  I had a full discussion with her and the family regarding her age, the operation and the options available to her.       Physical Examination      Vitals:  BP (!) 155/59   Pulse 77   Temp 98.4 °F (36.9 °C) (Oral)   Resp 16   Ht 1.702 m (5' 7.01\")   Wt 66 kg (145 lb 8.1 oz)   SpO2 98%   BMI 22.78 kg/m²   Temp (24hrs), Av.2 °F (36.8 °C), Min:97.7 °F (36.5 °C), Max:98.6 °F (37 °C)      I/O (24Hr):    Intake/Output Summary (Last 24 hours) at 2025

## 2025-07-09 NOTE — PROGRESS NOTES
have improved over the last few days and demonstrated good prolonged standing balance at sink while performing grooming and bathing tasks. Making great progress with therapy. Continue per OT plan of care.       SUBJECTIVE:     Ms. Collins states, \"Hello!\"     Social/Functional Lives With: Family, Son  Type of Home: House  Home Layout: One level, Work area in basement  Home Access: Stairs to enter without rails  Entrance Stairs - Number of Steps: 1-2 steps  Bathroom Shower/Tub: Tub/Shower unit  Bathroom Toilet: Standard  Bathroom Equipment: Tub transfer bench, Toilet raiser  Home Equipment: Cane, Walker - Rolling, Rollator  Receives Help From: Family  Prior Level of Assist for ADLs: Independent  Prior Level of Assist for Homemaking: Independent  Homemaking Responsibilities: Yes  Prior Level of Assist for Transfers: Independent  Active : No  Patient's  Info: patient's family  Occupation: Retired    OBJECTIVE:     LINES / DRAINS / AIRWAY: IV and TIM Drain    RESTRICTIONS/PRECAUTIONS:           PAIN: VITALS / O2:   Pre Treatment:    none        Post Treatment: none reported  Vitals          Oxygen   Room air     MOBILITY: I Mod I S SBA CGA Min Mod Max Total  NT x2 Comments:   Bed Mobility    Rolling [] [] [] [] [] [] [] [] [] [] []    Supine to Sit [] [] [] [x] [] [] [] [] [] [] []    Scooting [] [] [] [x] [] [] [] [] [] [] []    Sit to Supine [] [] [] [] [] [] [] [] [] [] []    Transfers    Sit to Stand [] [] [] [x] [] [] [] [] [] [] []    Bed to Chair [] [] [] [x] [] [] [] [] [] [] [] RW   Stand to Sit [] [] [] [x] [] [] [] [] [] [] []    Tub/Shower [] [] [] [] [] [] [] [] [] [] []     Toilet [] [] [] [] [] [] [] [] [] [] []      [] [] [] [] [] [] [] [] [] [] []    I=Independent, Mod I=Modified Independent, S=Supervision/Setup, SBA=Standby Assistance, CGA=Contact Guard Assistance, Min=Minimal Assistance, Mod=Moderate Assistance, Max=Maximal Assistance, Total=Total Assistance, NT=Not Tested    ACTIVITIES OF  DAILY LIVING: I Mod I S SBA CGA Min Mod Max Total NT Comments   BASIC ADLs:              Upper Body   Bathing [] [] [] [x] [] [] [] [] [] []  Sitting at sink   Lower Body Bathing [] [] [] [x] [] [] [] [] [] []  Able to manage in sitting and standing at sink   Toileting [] [] [] [] [] [] [] [] [] []    Upper Body Dressing [] [] [] [x] [] [] [] [] [] [] New gown   Lower Body Dressing [] [] [] [x] [] [] [] [] [] [] [place] pull-up, donning pull-up   Feeding [] [] [] [] [] [] [] [] [] []    Grooming [] [] [] [x] [] [] [] [] [] [] Prolonged time standing at sink   Personal Device Care [] [] [] [] [] [] [] [] [] []    Functional Mobility [] [] [] [x] [] [] [] [] [] [] RW   I=Independent, Mod I=Modified Independent, S=Supervision/Setup, SBA=Standby Assistance, CGA=Contact Guard Assistance, Min=Minimal Assistance, Mod=Moderate Assistance, Max=Maximal Assistance, Total=Total Assistance, NT=Not Tested    BALANCE: Good Fair+ Fair Fair- Poor NT Comments   Sitting Static [x] [] [] [] [] []    Sitting Dynamic [x] [] [] [] [] []              Standing Static [] [x] [] [] [] []    Standing Dynamic [] [] [x] [] [] []        PLAN:     FREQUENCY/DURATION   OT Plan of Care: 3 times/week for duration of hospital stay or until stated goals are met, whichever comes first.    TREATMENT:     TREATMENT:   Co-Treatment between OT & PT necessary due to patient's decreased overall endurance/tolerance levels, as well as need for high level skilled assistance to complete functional transfers/mobility and functional tasks  Self Care (36 minutes): Patient participated in upper body bathing, lower body bathing, upper body dressing, lower body dressing, grooming, functional mobility, bed mobility, functional transfer, bathroom mobility, energy conservation, assistive device, and compensatory technique in unsupported sitting and standing with minimal verbal, manual, and tactile cueing to increase independence, increase activity tolerance, and increase

## 2025-07-09 NOTE — PROGRESS NOTES
Nutrition Assessment  Assessment Type: Reassess  Reason for visit:  Best Practice Alert: Malnutrition Screening Tool   Malnutrition Screening Tool Score: 2  Unintentional weight loss PTA: 2 to 13 pounds (1 point)  Eating poorly due to decreased appetite: Yes (1 point)  Parenteral Nutrition Management (Surgery)7/3    Nutrition Intervention:   Food and/or Nutrient Delivery:   Parenteral Nutrition:  RD will not reorder PN tonight with tolerance of diet advancement.   Continue current bag of PPN to run until 1800 tonight.   Related orders discontinued.   Meals and Snacks:  Diet: Continue current diet and advance as medically appropriate  Medical Food Supplements:   Medical food supplement therapy:  Continue Ensure Plus High Protein (high calorie high protein) 350 calories, 20 grams protein per 8 ounce serving  (no strawberry - no red dye; prefers vanilla)  Coordination of Nutrition Care:  Coordination with health care provider Provider, ROSEMARY Weeks     Malnutrition Assessment:  Malnutrition Status: Moderate malnutrition  Context: Chronic Illness  Findings of clinical characteristics of malnutrition:   Energy Intake:  Unable to assess (variable po at baseline)  Weight Loss:  Mild weight loss (5.6% weight loss x6 mo)     Body Fat Loss:  Mild body fat loss Triceps, Orbital   Muscle Mass Loss:  Mild muscle mass loss Temples (temporalis), Clavicles (pectoralis & deltoids), Scapula (trapezius), Hand (interosseous)    Nutrition Assessment:  Food/Nutrition Related History:   Patient states at baseline her oral intakes are highly variable. Says her appetite is impacted most by shortness of breath. Some days she is able to eat 3 meals/day, other days she's not. She has to eat small meals as big meals make her GERD worse. She doesn't follow a specific diet at baseline - says her cardiologist told her she could eat whatever she wants. She does have issues swallowing large pills, and likes softer foods likes soups etc. She denies

## 2025-07-09 NOTE — PLAN OF CARE
Problem: Respiratory - Adult  Goal: Achieves optimal ventilation and oxygenation  7/9/2025 0751 by Jorden Merrill, RCP  Outcome: Progressing  Flowsheets (Taken 7/9/2025 0751)  Achieves optimal ventilation and oxygenation:   Assess for changes in respiratory status   Position to facilitate oxygenation and minimize respiratory effort   Respiratory therapy support as indicated   Assess for changes in mentation and behavior   Oxygen supplementation based on oxygen saturation or arterial blood gases   Assess and instruct to report shortness of breath or any respiratory difficulty   Encourage broncho-pulmonary hygiene including cough, deep breathe, incentive spirometry

## 2025-07-09 NOTE — PROGRESS NOTES
ACUTE PHYSICAL THERAPY GOALS:   (Developed with and agreed upon by patient and/or caregiver.)    LTG:  (1.)Ms. Collins will move from supine to sit and sit to supine , scoot up and down, and roll side to side in bed with SUPERVISION within 7 treatment day(s).    (2.)Ms. Collins will transfer from bed to chair and chair to bed with SUPERVISION using the least restrictive device within 7 treatment day(s).    (3.)Ms. Collins will ambulate with SUPERVISION for 150 feet with the least restrictive device within 7 treatment day(s).  (4.)Ms. Collins will tolerate at least 30 min of dynamic standing activity to assist standing ADLs with the least restrictive device within 7 treatment days.    PHYSICAL THERAPY: Daily Note PM   (Link to Caseload Tracking: PT Visit Days : 3  Time In/Out PT Charge Capture  Rehab Caseload Tracker  Orders    Marcelina Collins is a 85 y.o. female   PRIMARY DIAGNOSIS: SBO (small bowel obstruction) (HCC)  SBO (small bowel obstruction) (HCC) [K56.609]  Essential hypertension [I10]  Generalized abdominal pain [R10.84]  Intestinal obstruction, unspecified cause, unspecified whether partial or complete (HCC) [K56.609]  Procedure(s) (LRB):  LAPAROSCOPY ROBOTIC BOWEL RESECTION (N/A)  7 Days Post-Op  Inpatient: Payor: [facility] MEDICARE / Plan: [employer] MEDICARE-ADVANTAGE PPO / Product Type: Medicare /     ASSESSMENT:     REHAB RECOMMENDATIONS:   Recommendation to date pending progress:  Setting:  Home Health Therapy    Equipment:    To Be Determined     ASSESSMENT:  Ms. Collins found supine in bed in no apparent distress upon PT/OT arrival with IV and TIM drain intact. Patient denies pain.   Guided thru sup>sit with SBA using bed rails, cues for hand placement sequencing with good EOB static/dynamic sitting balance training for several min with SBA assist. Next requires SBA for STS with RW , cues for hand placement and amb with RW SBA x 3 ft,to sink where she spends extended time standing and sitting with SBA no back support

## 2025-07-10 LAB
ANION GAP SERPL CALC-SCNC: 9 MMOL/L (ref 7–16)
BUN SERPL-MCNC: 15 MG/DL (ref 8–23)
CALCIUM SERPL-MCNC: 8.6 MG/DL (ref 8.8–10.2)
CHLORIDE SERPL-SCNC: 107 MMOL/L (ref 98–107)
CO2 SERPL-SCNC: 20 MMOL/L (ref 20–29)
CREAT SERPL-MCNC: 0.53 MG/DL (ref 0.6–1.1)
GLUCOSE SERPL-MCNC: 94 MG/DL (ref 70–99)
POTASSIUM SERPL-SCNC: 4.1 MMOL/L (ref 3.5–5.1)
SODIUM SERPL-SCNC: 136 MMOL/L (ref 136–145)

## 2025-07-10 PROCEDURE — 80048 BASIC METABOLIC PNL TOTAL CA: CPT

## 2025-07-10 PROCEDURE — 6370000000 HC RX 637 (ALT 250 FOR IP): Performed by: INTERNAL MEDICINE

## 2025-07-10 PROCEDURE — 6360000002 HC RX W HCPCS: Performed by: NURSE PRACTITIONER

## 2025-07-10 PROCEDURE — 97530 THERAPEUTIC ACTIVITIES: CPT

## 2025-07-10 PROCEDURE — 97116 GAIT TRAINING THERAPY: CPT

## 2025-07-10 PROCEDURE — 6370000000 HC RX 637 (ALT 250 FOR IP): Performed by: HOSPITALIST

## 2025-07-10 PROCEDURE — 6370000000 HC RX 637 (ALT 250 FOR IP)

## 2025-07-10 PROCEDURE — 6360000002 HC RX W HCPCS: Performed by: HOSPITALIST

## 2025-07-10 PROCEDURE — 94760 N-INVAS EAR/PLS OXIMETRY 1: CPT

## 2025-07-10 PROCEDURE — 36415 COLL VENOUS BLD VENIPUNCTURE: CPT

## 2025-07-10 PROCEDURE — 1100000000 HC RM PRIVATE

## 2025-07-10 PROCEDURE — 94640 AIRWAY INHALATION TREATMENT: CPT

## 2025-07-10 PROCEDURE — 6360000002 HC RX W HCPCS: Performed by: STUDENT IN AN ORGANIZED HEALTH CARE EDUCATION/TRAINING PROGRAM

## 2025-07-10 PROCEDURE — 2500000003 HC RX 250 WO HCPCS: Performed by: NURSE PRACTITIONER

## 2025-07-10 RX ADMIN — FAMOTIDINE 20 MG: 10 INJECTION, SOLUTION INTRAVENOUS at 21:00

## 2025-07-10 RX ADMIN — SODIUM CHLORIDE, PRESERVATIVE FREE 10 ML: 5 INJECTION INTRAVENOUS at 21:02

## 2025-07-10 RX ADMIN — FAMOTIDINE 20 MG: 10 INJECTION, SOLUTION INTRAVENOUS at 08:13

## 2025-07-10 RX ADMIN — LOSARTAN POTASSIUM 50 MG: 50 TABLET, FILM COATED ORAL at 08:14

## 2025-07-10 RX ADMIN — LEVETIRACETAM 500 MG: 100 INJECTION INTRAVENOUS at 08:13

## 2025-07-10 RX ADMIN — APIXABAN 5 MG: 5 TABLET, FILM COATED ORAL at 08:13

## 2025-07-10 RX ADMIN — SOTALOL HYDROCHLORIDE 80 MG: 80 TABLET ORAL at 08:14

## 2025-07-10 RX ADMIN — ARFORMOTEROL TARTRATE: 15 SOLUTION RESPIRATORY (INHALATION) at 07:19

## 2025-07-10 RX ADMIN — SODIUM CHLORIDE, PRESERVATIVE FREE 10 ML: 5 INJECTION INTRAVENOUS at 08:14

## 2025-07-10 RX ADMIN — ARFORMOTEROL TARTRATE: 15 SOLUTION RESPIRATORY (INHALATION) at 20:48

## 2025-07-10 RX ADMIN — APIXABAN 5 MG: 5 TABLET, FILM COATED ORAL at 17:00

## 2025-07-10 RX ADMIN — LEVETIRACETAM 500 MG: 100 INJECTION INTRAVENOUS at 21:00

## 2025-07-10 NOTE — DISCHARGE SUMMARY
[place] [address]  [phone]   Discharge Summary     Marcelina Collins  MRN: [id number]     : 1940     Age: 85 y.o.                          Admit date: 2025     Discharge date: 2025  Attending Physician: Marcell Schumacher MD  Primary Discharge Diagnosis:   Principal Problem:    SBO (small bowel obstruction) (HCC)  Active Problems:    Secondary hypercoagulable state    Chronic diastolic congestive heart failure (HCC)    Essential hypertension    Paroxysmal atrial fibrillation (HCC)    Dyslipidemia    S/P AVR    Cardiac pacemaker    Chronic cough    Mycobacterium avium complex (HCC)    Intestinal obstruction (HCC)    Moderate protein-calorie malnutrition  Resolved Problems:    * No resolved hospital problems. *    Primary Operations or Procedures Performed :  Procedure(s):  Robotic diagnostic laparoscopy with resection of small bowel 25 Dr Schumacher    Brief History and Reason for Admission: Marcelina Collins was admitted with the following history of present illness:  Marcelina Collins is a 85 y.o. female with a history of CABG and aortic valve repair presents with a history of a single day of abdominal pain with nausea and emesis.  CT is significant for what appears to be a closed-loop obstruction with 2 transition points.  White blood cell count is 14.6 and the lactic acid was above 3 on arrival and 2.3 currently.  She has a history of cholecystectomy and hysterectomy as well as the bypass surgery.  She is on Eliquis and a baby aspirin.  I have initiated giving her a PCC-based reversal agent prior to surgery.  Hopefully we are able to perform her procedure before she has any kanu ischemia or necrosis and will avoid a bowel resection.  She is relatively stable hemodynamically and her pain is also relatively well-managed.  I think she is a good candidate for a minimally invasive robotic approach, at least to ascertain what is going on, as opposed

## 2025-07-10 NOTE — PLAN OF CARE
Problem: Respiratory - Adult  Goal: Achieves optimal ventilation and oxygenation  7/10/2025 0719 by Jorden Merrill, RCP  Outcome: Progressing  Flowsheets (Taken 7/10/2025 0719)  Achieves optimal ventilation and oxygenation:   Assess for changes in respiratory status   Position to facilitate oxygenation and minimize respiratory effort   Respiratory therapy support as indicated   Assess for changes in mentation and behavior   Encourage broncho-pulmonary hygiene including cough, deep breathe, incentive spirometry   Oxygen supplementation based on oxygen saturation or arterial blood gases   Assess and instruct to report shortness of breath or any respiratory difficulty

## 2025-07-10 NOTE — PROGRESS NOTES
ACUTE PHYSICAL THERAPY GOALS:   (Developed with and agreed upon by patient and/or caregiver.)    LTG:  (1.)Ms. Collins will move from supine to sit and sit to supine , scoot up and down, and roll side to side in bed with SUPERVISION within 7 treatment day(s).    (2.)Ms. Collins will transfer from bed to chair and chair to bed with SUPERVISION using the least restrictive device within 7 treatment day(s).    (3.)Ms. Collins will ambulate with SUPERVISION for 150 feet with the least restrictive device within 7 treatment day(s).  (4.)Ms. Collins will tolerate at least 30 min of dynamic standing activity to assist standing ADLs with the least restrictive device within 7 treatment days.    PHYSICAL THERAPY: Daily Note PM   (Link to Caseload Tracking: PT Visit Days : 4  Time In/Out PT Charge Capture  Rehab Caseload Tracker  Orders    Marcelina Collins is a 85 y.o. female   PRIMARY DIAGNOSIS: SBO (small bowel obstruction) (HCC)  SBO (small bowel obstruction) (HCC) [K56.609]  Essential hypertension [I10]  Generalized abdominal pain [R10.84]  Intestinal obstruction, unspecified cause, unspecified whether partial or complete (HCC) [K56.609]  Procedure(s) (LRB):  LAPAROSCOPY ROBOTIC BOWEL RESECTION (N/A)  8 Days Post-Op  Inpatient: Payor: [facility] MEDICARE / Plan: AET MEDICARE-ADVANTAGE PPO / Product Type: Medicare /     ASSESSMENT:     REHAB RECOMMENDATIONS:   Recommendation to date pending progress:  Setting:  Home Health Therapy    Equipment:    To Be Determined     ASSESSMENT:  Ms. Collins found seated in recliner in no apparent distress upon PT arrival with TIM drain and IV intact but not connected, agreeable to session. Discussed DC plans with patient, patient family, and CM. Home safety, setup, and HHPT. Next, patient completes STS SBA with RW and amb SBA with RW a few ft to bathroom, then amb no AD SBA a few ft to toilet and completes toilet transfer and toileting hygiene SBA. She is able to don pull up in standing holding onto grab bar

## 2025-07-10 NOTE — CARE COORDINATION
CM informed patient that she has been accepted to [facility] and [facility].     CM also informed patient, per chart review, now PT/OT are recommending home health care. Patient stated she is in agreement with home health care. CM called patient's son Rip, as requested by patient who is also in agreement with home health care and asked CM to send referral to [facility]. Rip stated that patient will be discharged to his house located at: [address] [place] [address].     CM sent referral to [facility] as requested. Awaiting response.

## 2025-07-10 NOTE — CARE COORDINATION
CM reviewed chart.     Patient has been accepted for [facility] by [facility] services.

## 2025-07-10 NOTE — PROGRESS NOTES
Progress Note    Date:7/10/2025       Room:[facility]  Patient Name:Marcelina Collins     YOB: 1940     Age:85 y.o.  Admit Date: 2025    POD 8 Days Post-Op    Procedure:  Procedure(s):  LAPAROSCOPY ROBOTIC BOWEL RESECTION      Subjective     7/10/25: Pt OOB w assist of roller walker. No new issues or acute events overnight. +BM's.  Pain is well-controlled with current regimen. Drain site leaking ss.  No nausea or vomiting-->Tolerating Soft diet.  She has been accepted by  for DC.      HPI:  Marcelina Collins is a 85 y.o. female with a history of CABG and aortic valve repair presents with a history of a single day of abdominal pain with nausea and emesis.  CT is significant for what appears to be a closed-loop obstruction with 2 transition points.  White blood cell count is 14.6 and the lactic acid was above 3 on arrival and 2.3 currently.  She has a history of cholecystectomy and hysterectomy as well as the bypass surgery.  She is on Eliquis and a baby aspirin.  I have initiated giving her a PCC-based reversal agent prior to surgery.  Hopefully we are able to perform her procedure before she has any kanu ischemia or necrosis and will avoid a bowel resection.  She is relatively stable hemodynamically and her pain is also relatively well-managed.  I think she is a good candidate for a minimally invasive robotic approach, at least to ascertain what is going on, as opposed to an automatic exploratory laparotomy.  It is fairly likely she will require a small bowel resection however.  I had a full discussion with her and the family regarding her age, the operation and the options available to her.       Physical Examination      Vitals:  BP (!) 128/52   Pulse 76   Temp 98.4 °F (36.9 °C) (Oral)   Resp 16   Ht 1.702 m (5' 7.01\")   Wt 66 kg (145 lb 8.1 oz)   SpO2 97%   BMI 22.78 kg/m²   Temp (24hrs), Av.2 °F (36.8 °C), Min:97.9 °F (36.6 °C), Max:98.4 °F (36.9 °C)      I/O (24Hr):    Intake/Output Summary

## 2025-07-11 ENCOUNTER — TELEPHONE (OUTPATIENT)
Dept: FAMILY MEDICINE CLINIC | Facility: CLINIC | Age: 85
End: 2025-07-11

## 2025-07-11 VITALS
WEIGHT: 145.5 LBS | DIASTOLIC BLOOD PRESSURE: 56 MMHG | SYSTOLIC BLOOD PRESSURE: 133 MMHG | RESPIRATION RATE: 16 BRPM | OXYGEN SATURATION: 97 % | HEART RATE: 81 BPM | BODY MASS INDEX: 22.84 KG/M2 | TEMPERATURE: 98.4 F | HEIGHT: 67 IN

## 2025-07-11 LAB
ANION GAP SERPL CALC-SCNC: 7 MMOL/L (ref 7–16)
BASOPHILS # BLD: 0.09 K/UL (ref 0–0.2)
BASOPHILS NFR BLD: 0.9 % (ref 0–2)
BUN SERPL-MCNC: 18 MG/DL (ref 8–23)
CALCIUM SERPL-MCNC: 8.6 MG/DL (ref 8.8–10.2)
CHLORIDE SERPL-SCNC: 107 MMOL/L (ref 98–107)
CO2 SERPL-SCNC: 21 MMOL/L (ref 20–29)
CREAT SERPL-MCNC: 0.5 MG/DL (ref 0.6–1.1)
DIFFERENTIAL METHOD BLD: ABNORMAL
EOSINOPHIL # BLD: 0.8 K/UL (ref 0–0.8)
EOSINOPHIL NFR BLD: 8 % (ref 0.5–7.8)
ERYTHROCYTE [DISTWIDTH] IN BLOOD BY AUTOMATED COUNT: 15.9 % (ref 11.9–14.6)
GLUCOSE SERPL-MCNC: 114 MG/DL (ref 70–99)
HCT VFR BLD AUTO: 24.9 % (ref 35.8–46.3)
HGB BLD-MCNC: 8.1 G/DL (ref 11.7–15.4)
IMM GRANULOCYTES # BLD AUTO: 0.05 K/UL (ref 0–0.5)
IMM GRANULOCYTES NFR BLD AUTO: 0.5 % (ref 0–5)
LYMPHOCYTES # BLD: 1.92 K/UL (ref 0.5–4.6)
LYMPHOCYTES NFR BLD: 19.2 % (ref 13–44)
MCH RBC QN AUTO: 27.4 PG (ref 26.1–32.9)
MCHC RBC AUTO-ENTMCNC: 32.5 G/DL (ref 31.4–35)
MCV RBC AUTO: 84.1 FL (ref 82–102)
MONOCYTES # BLD: 1.61 K/UL (ref 0.1–1.3)
MONOCYTES NFR BLD: 16.1 % (ref 4–12)
NEUTS SEG # BLD: 5.54 K/UL (ref 1.7–8.2)
NEUTS SEG NFR BLD: 55.3 % (ref 43–78)
NRBC # BLD: 0 K/UL (ref 0–0.2)
PLATELET # BLD AUTO: 290 K/UL (ref 150–450)
PMV BLD AUTO: 9.2 FL (ref 9.4–12.3)
POTASSIUM SERPL-SCNC: 4.3 MMOL/L (ref 3.5–5.1)
RBC # BLD AUTO: 2.96 M/UL (ref 4.05–5.2)
SODIUM SERPL-SCNC: 135 MMOL/L (ref 136–145)
WBC # BLD AUTO: 10 K/UL (ref 4.3–11.1)

## 2025-07-11 PROCEDURE — 6360000002 HC RX W HCPCS: Performed by: HOSPITALIST

## 2025-07-11 PROCEDURE — 94640 AIRWAY INHALATION TREATMENT: CPT

## 2025-07-11 PROCEDURE — 97530 THERAPEUTIC ACTIVITIES: CPT

## 2025-07-11 PROCEDURE — 6370000000 HC RX 637 (ALT 250 FOR IP): Performed by: INTERNAL MEDICINE

## 2025-07-11 PROCEDURE — 36415 COLL VENOUS BLD VENIPUNCTURE: CPT

## 2025-07-11 PROCEDURE — 6360000002 HC RX W HCPCS: Performed by: NURSE PRACTITIONER

## 2025-07-11 PROCEDURE — 80048 BASIC METABOLIC PNL TOTAL CA: CPT

## 2025-07-11 PROCEDURE — 6370000000 HC RX 637 (ALT 250 FOR IP): Performed by: HOSPITALIST

## 2025-07-11 PROCEDURE — 97535 SELF CARE MNGMENT TRAINING: CPT

## 2025-07-11 PROCEDURE — 2500000003 HC RX 250 WO HCPCS: Performed by: NURSE PRACTITIONER

## 2025-07-11 PROCEDURE — 6370000000 HC RX 637 (ALT 250 FOR IP)

## 2025-07-11 PROCEDURE — 97110 THERAPEUTIC EXERCISES: CPT

## 2025-07-11 PROCEDURE — 94760 N-INVAS EAR/PLS OXIMETRY 1: CPT

## 2025-07-11 PROCEDURE — 6360000002 HC RX W HCPCS: Performed by: STUDENT IN AN ORGANIZED HEALTH CARE EDUCATION/TRAINING PROGRAM

## 2025-07-11 PROCEDURE — 85025 COMPLETE CBC W/AUTO DIFF WBC: CPT

## 2025-07-11 RX ADMIN — LOSARTAN POTASSIUM 50 MG: 50 TABLET, FILM COATED ORAL at 08:09

## 2025-07-11 RX ADMIN — SOTALOL HYDROCHLORIDE 80 MG: 80 TABLET ORAL at 08:09

## 2025-07-11 RX ADMIN — ARFORMOTEROL TARTRATE: 15 SOLUTION RESPIRATORY (INHALATION) at 07:37

## 2025-07-11 RX ADMIN — LEVETIRACETAM 500 MG: 100 INJECTION INTRAVENOUS at 08:09

## 2025-07-11 RX ADMIN — FAMOTIDINE 20 MG: 10 INJECTION, SOLUTION INTRAVENOUS at 08:09

## 2025-07-11 RX ADMIN — SODIUM CHLORIDE, PRESERVATIVE FREE 10 ML: 5 INJECTION INTRAVENOUS at 08:10

## 2025-07-11 RX ADMIN — APIXABAN 5 MG: 5 TABLET, FILM COATED ORAL at 08:09

## 2025-07-11 NOTE — PROGRESS NOTES
Progress Note    Date:2025       Room:[facility]  Patient Name:Marcelina Collins     YOB: 1940     Age:85 y.o.  Admit Date: 2025    POD 9 Days Post-Op    Procedure:  Procedure(s):  LAPAROSCOPY ROBOTIC BOWEL RESECTION      Subjective     25: Pt OOB w assist of roller walker to bathroom. OT in room to work with pt. No new issues or acute events overnight. +BM's.  Pain is well-controlled with current regimen. Drain site leaking ss. Tolerating Soft diet. No nausea or vomiting. Family members x 2 at bedside She has been accepted by HH. Planning DC home today.      HPI:  Marcelina Collins is a 85 y.o. female with a history of CABG and aortic valve repair presents with a history of a single day of abdominal pain with nausea and emesis.  CT is significant for what appears to be a closed-loop obstruction with 2 transition points.  White blood cell count is 14.6 and the lactic acid was above 3 on arrival and 2.3 currently.  She has a history of cholecystectomy and hysterectomy as well as the bypass surgery.  She is on Eliquis and a baby aspirin.  I have initiated giving her a PCC-based reversal agent prior to surgery.  Hopefully we are able to perform her procedure before she has any kanu ischemia or necrosis and will avoid a bowel resection.  She is relatively stable hemodynamically and her pain is also relatively well-managed.  I think she is a good candidate for a minimally invasive robotic approach, at least to ascertain what is going on, as opposed to an automatic exploratory laparotomy.  It is fairly likely she will require a small bowel resection however.  I had a full discussion with her and the family regarding her age, the operation and the options available to her.       Physical Examination      Vitals:  BP (!) 133/56   Pulse 81   Temp 98.4 °F (36.9 °C) (Oral)   Resp 16   Ht 1.702 m (5' 7.01\")   Wt 66 kg (145 lb 8.1 oz)   SpO2 97%   BMI 22.78 kg/m²   Temp (24hrs), Av.2 °F (36.8 °C),

## 2025-07-11 NOTE — PLAN OF CARE
Problem: Respiratory - Adult  Goal: Achieves optimal ventilation and oxygenation  7/11/2025 0740 by Judith Wilson RCP  Outcome: Progressing  Flowsheets (Taken 7/11/2025 0740)  Achieves optimal ventilation and oxygenation:   Assess for changes in respiratory status   Position to facilitate oxygenation and minimize respiratory effort   Respiratory therapy support as indicated   Assess for changes in mentation and behavior   Oxygen supplementation based on oxygen saturation or arterial blood gases   Encourage broncho-pulmonary hygiene including cough, deep breathe, incentive spirometry   Assess and instruct to report shortness of breath or any respiratory difficulty

## 2025-07-11 NOTE — PROGRESS NOTES
ACUTE OCCUPATIONAL THERAPY GOALS:   (Developed with and agreed upon by patient and/or caregiver.)  1. Patient will complete lower body bathing and dressing with MINIMAL ASSIST and adaptive equipment as needed.     2. Patient will complete toileting with CONTACT GUARD ASSIST.  3. Patient will complete grooming ADL standing edge of sink with STAND BY ASSIST.  4. Patient will tolerate 25 minutes of OT treatment with 1-2 rest breaks to increase activity tolerance for ADLs.   5. Patient will complete functional transfers with STAND BY ASSIST and adaptive equipment as needed.   6. Patient will tolerate 10 minutes BUE exercises to increase strength for safe, functional transfers.   7. Patient will perform log roll in bed with MINIMAL ASSIST and 1-2 verbal cues from therapist.      Timeframe: 7 visits    OCCUPATIONAL THERAPY: Daily Note AM   OT Visit Days: 2   Time In/Out  OT Charge Capture  Rehab Caseload Tracker  OT Orders    Marcelina Collins is a 85 y.o. female   PRIMARY DIAGNOSIS: SBO (small bowel obstruction) (HCC)  SBO (small bowel obstruction) (HCC) [K56.609]  Essential hypertension [I10]  Generalized abdominal pain [R10.84]  Intestinal obstruction, unspecified cause, unspecified whether partial or complete (HCC) [K56.609]  Procedure(s) (LRB):  LAPAROSCOPY ROBOTIC BOWEL RESECTION (N/A)  9 Days Post-Op  Inpatient: Payor: [facility] MEDICARE / Plan: AET MEDICARE-ADVANTAGE PPO / Product Type: Medicare /     ASSESSMENT:     REHAB RECOMMENDATIONS:   Recommendation to date pending progress:  Setting:  Home Health Therapy    Equipment:    None     ASSESSMENT:  Ms. Collins is doing well today. Pt presents supine upon arrival. Pt able to perform bed mobility/transfers/functional mobility with supervision this session. Entered bathroom and did the same with toileting. Performed functional mobility in hallway with rolling walker. No loss of balance or fatigue noticed. Pt was also instructed in UE exercises and educated on energy

## 2025-07-11 NOTE — DISCHARGE INSTRUCTIONS
Discharge Instructions/Follow-up Plans:   MD Instructions:     Follow-up with Dr. Schumacher in 2 weeks  Keep incisions clean and dry, may remain uncovered.  Do not apply lotions, creams or ointments to incisions.     Diet - as tolerated - Soft foods diet.  Activity - ambulate - as tolerated - no heavy lifting >10lb.  May shower - no tub baths or soaking/submerging.     No driving while taking narcotics.  Do not drink alcohol while taking narcotics.  Resume other home medications.      If problems or questions arise, please call our office at [phone].

## 2025-07-11 NOTE — TELEPHONE ENCOUNTER
Care Transitions Initial Follow Up Call    Outreach made within 2 business days of discharge: Yes    Patient: Marcelina Collins Patient : 1940   MRN: [id number]  Reason for Admission: Small Bowel Obstruction  Discharge Date: 25       Spoke with: Rip Collins     Discharge department/facility: [facility] Interactive Patient Contact:  Was patient able to fill all prescriptions: Yes  Was patient instructed to bring all medications to the follow-up visit: Yes  Is patient taking all medications as directed in the discharge summary? Yes  Does patient understand their discharge instructions: Yes  Does patient have questions or concerns that need addressed prior to 7-14 day follow up office visit: no    Additional needs identified to be addressed with provider  No needs identified             Scheduled appointment with PCP within 7-14 days    Follow Up  Future Appointments   Date Time Provider Department Center   2025 11:30 AM Francisco Javier Aguilar Jr., MD [facility] DEP   2025  1:30 PM PERIPHERAL [facility]   2025 11:30 AM Blanche Huerta MD [facility] GVL AMB   2025  8:00 AM \[facility]\"" LAB RESOURCE [facility] DEP   8/15/2025  8:00 AM Francisco Javier Aguilar Jr., MD [facility] DEP   2025 11:30 AM [facility]   2025  9:30 AM Olayinka Travis MD [facility]       Jordyn Stewart MA

## 2025-07-11 NOTE — CARE COORDINATION
Patient has discharge orders to go to her son's house with [facility]. Patient and patient's son are aware and in agreement with this discharge today. CM informed [facility] that patient will be discharged today.     No CM needs voiced or noted.     ASSESSMENT NOTE    Attending Physician: Marcell Schumacher MD  Admit Problem: SBO (small bowel obstruction) ([facility]) [K56.609]  Essential hypertension [I10]  Generalized abdominal pain [R10.84]  Intestinal obstruction, unspecified cause, unspecified whether partial or complete ([facility]) [K56.609]  Date/Time of Admission: 7/2/2025 12:11 AM  Problem List:  Patient Active Problem List   Diagnosis    Essential hypertension    Paroxysmal atrial fibrillation (HCC)    Anemia, unspecified    Spinal stenosis    Osteoarthritis of right knee    Dyslipidemia    Chronic pain    Arthritis    Coronary artery disease of native artery of native heart with stable angina pectoris    Gastroesophageal reflux disease with esophagitis without hemorrhage    Obesity (BMI 30-39.9)    S/P AVR    Abnormal CT of the chest    Cardiac pacemaker    Peripheral neuropathy    Status post total right knee replacement    Hyponatremia    S/P dilatation of esophageal stricture    Short-segment Liriano's esophagus    Allergic rhinitis    Hypomagnesemia    History of CVA with residual deficit    Chronic diastolic congestive heart failure (HCC)    Secondary hypercoagulable state    Bilateral carotid artery stenosis    Transient alteration of awareness    Stenosis of left internal carotid artery    Leukocytosis    History of stroke with residual deficit    SBO (small bowel obstruction) ([facility])    Chronic cough    Mycobacterium avium complex (HCC)    Intestinal obstruction (HCC)    Moderate protein-calorie malnutrition       Service Assessment  Patient Orientation Alert and Oriented   Cognition Alert   History Provided By Patient   Primary Caregiver Self   Accompanied    Education     Occupation Retired   Type of Occupation       Discharge Planning   Type of Residence House   Living Arrangements Spouse/Significant Other, Children, Family Members   Support Systems Family Members, Children   Current Services Prior To Admission Durable Medical Equipment   Potential Assistance Needed Other (Comment) (TBD by clinicals)   Alonzo Green, Other (Comment) (rollator)   DME     DME Ordered? No   Potential Assistance Purchasing Medications No   Meds-to-Beds: Does the patient want to have any new prescriptions delivered to bedside prior to discharge?     Type of Home Care Services None   Patient expects to be discharged to: House   Follow Up Appointment: Best Day/Time Monday AM   One/Two Story Residence: One story   # of Interior Steps     Height of Each Step (in)     Interior Rails     Lift Chair Available     History of Falls? No     Services At/After Discharge  Transition of Care Consult (CM Consult): Discharge Planning   Internal Home Health     Internal Hospice     Reason Outside Agency Chosen     Partner SNF     Reason Why Partner SNF Not Chosen     Internal Comfort Care     Reason Outside Comfort Care Chosen     Services At/After Discharge     [facility] Resource Information Provided? No   Mode of Transport at Discharge Other (see comment) (son)   Hospital Transport Time of Discharge     Confirm Follow Up Transport Family     Condition of Participation: Discharge Planning  The plan for Transition of Care is related to the following treatment goals: pending   The Patient and/or Patient Representative was provided with a Choice of Provider? Patient   Name of the Patient Representative who was provided with the Choice of Provider and agrees with the Discharge Plan?     The Patient and/or Patient Representative Agree with the Discharge Plan? Yes   Freedom of Choice list was provided with basic dialogue that supports the individualized plan of care/goals, treatment preferences, and shares

## 2025-07-11 NOTE — PLAN OF CARE
Problem: Pain  Goal: Verbalizes/displays adequate comfort level or baseline comfort level  Outcome: Progressing     Problem: Safety - Adult  Goal: Free from fall injury  Outcome: Progressing     Problem: Skin/Tissue Integrity  Goal: Skin integrity remains intact  Description: 1.  Monitor for areas of redness and/or skin breakdown  2.  Assess vascular access sites hourly  3.  Every 4-6 hours minimum:  Change oxygen saturation probe site  4.  Every 4-6 hours:  If on nasal continuous positive airway pressure, respiratory therapy assess nares and determine need for appliance change or resting period  Outcome: Progressing     Problem: Chronic Conditions and Co-morbidities  Goal: Patient's chronic conditions and co-morbidity symptoms are monitored and maintained or improved  Outcome: Progressing     Problem: Discharge Planning  Goal: Discharge to home or other facility with appropriate resources  Outcome: Progressing     Problem: ABCDS Injury Assessment  Goal: Absence of physical injury  Outcome: Progressing     Problem: Respiratory - Adult  Goal: Achieves optimal ventilation and oxygenation  Outcome: Progressing     Problem: Musculoskeletal - Adult  Goal: Return mobility to safest level of function  Outcome: Progressing     Problem: Metabolic/Fluid and Electrolytes - Adult  Goal: Electrolytes maintained within normal limits  Outcome: Progressing  Goal: Hemodynamic stability and optimal renal function maintained  Outcome: Progressing  Goal: Glucose maintained within prescribed range  Outcome: Progressing     Problem: Skin/Tissue Integrity - Adult  Goal: Skin integrity remains intact  Description: 1.  Monitor for areas of redness and/or skin breakdown  2.  Assess vascular access sites hourly  3.  Every 4-6 hours minimum:  Change oxygen saturation probe site  4.  Every 4-6 hours:  If on nasal continuous positive airway pressure, respiratory therapy assess nares and determine need for appliance change or resting  period  Outcome: Progressing  Goal: Incisions, wounds, or drain sites healing without S/S of infection  Outcome: Progressing  Goal: Oral mucous membranes remain intact  Outcome: Progressing

## 2025-07-17 ENCOUNTER — TELEPHONE (OUTPATIENT)
Dept: RADIATION ONCOLOGY | Age: 85
End: 2025-07-17

## 2025-07-17 NOTE — TELEPHONE ENCOUNTER
Physician provider: Dr. Huerta  Reason for today's call (Please detail here patients chief complaint): Deanne with home health nurse is offering to draw pt's labs to save her a trip from coming to the office, if someone could call her to give her lab orders, she is happy to take care of that for pt.     Last office visit:n/a  Patient Callback Number: [phone]  Was callback number verified?: Yes  Preferred pharmacy (If refill request): n/a  Veriified that patient confirmed no refills left at pharmacy? :No  Has the patient called the office for this concern within the last 48 hours?:No    Red Word Mentioned  Warm Transfer Phone Call to (Name): n/a    Patient notified that their information will be routed to the appropriate team for review. Patient is advised that they will receive a phone call from the appropriate department. If awaiting a call from the triage department and symptoms worsen before receiving a call back, the patient has been advised to proceed to the nearest ED.

## 2025-07-22 ENCOUNTER — OFFICE VISIT (OUTPATIENT)
Dept: FAMILY MEDICINE CLINIC | Facility: CLINIC | Age: 85
End: 2025-07-22
Payer: MEDICARE

## 2025-07-22 ENCOUNTER — HOSPITAL ENCOUNTER (OUTPATIENT)
Dept: LAB | Age: 85
Discharge: HOME | End: 2025-07-22
Payer: MEDICARE

## 2025-07-22 VITALS
DIASTOLIC BLOOD PRESSURE: 70 MMHG | SYSTOLIC BLOOD PRESSURE: 126 MMHG | OXYGEN SATURATION: 95 % | TEMPERATURE: 97.2 F | HEART RATE: 67 BPM | BODY MASS INDEX: 21.77 KG/M2 | WEIGHT: 139 LBS

## 2025-07-22 DIAGNOSIS — I48.0 PAROXYSMAL ATRIAL FIBRILLATION (HCC): ICD-10-CM

## 2025-07-22 DIAGNOSIS — K21.9 GASTROESOPHAGEAL REFLUX DISEASE WITHOUT ESOPHAGITIS: ICD-10-CM

## 2025-07-22 DIAGNOSIS — Z09 HOSPITAL DISCHARGE FOLLOW-UP: Primary | ICD-10-CM

## 2025-07-22 DIAGNOSIS — I10 PRIMARY HYPERTENSION: ICD-10-CM

## 2025-07-22 DIAGNOSIS — D72.821 MONOCYTOSIS: ICD-10-CM

## 2025-07-22 DIAGNOSIS — E87.1 HYPONATREMIA: ICD-10-CM

## 2025-07-22 DIAGNOSIS — D72.829 LEUKOCYTOSIS, UNSPECIFIED TYPE: ICD-10-CM

## 2025-07-22 DIAGNOSIS — K56.609 SBO (SMALL BOWEL OBSTRUCTION) (HCC): ICD-10-CM

## 2025-07-22 DIAGNOSIS — I25.10 CORONARY ARTERY DISEASE INVOLVING NATIVE CORONARY ARTERY OF NATIVE HEART WITHOUT ANGINA PECTORIS: ICD-10-CM

## 2025-07-22 DIAGNOSIS — R05.3 CHRONIC COUGH: ICD-10-CM

## 2025-07-22 DIAGNOSIS — R71.0 DECREASED HEMOGLOBIN: ICD-10-CM

## 2025-07-22 LAB
BASOPHILS # BLD: 0.1 K/UL (ref 0–0.2)
BASOPHILS NFR BLD: 1.5 % (ref 0–2)
DIFFERENTIAL METHOD BLD: ABNORMAL
EOSINOPHIL # BLD: 0.54 K/UL (ref 0–0.8)
EOSINOPHIL NFR BLD: 8 % (ref 0.5–7.8)
ERYTHROCYTE [DISTWIDTH] IN BLOOD BY AUTOMATED COUNT: 14.8 % (ref 11.9–14.6)
HCT VFR BLD AUTO: 30 % (ref 35.8–46.3)
HGB BLD-MCNC: 9.2 G/DL (ref 11.7–15.4)
IMM GRANULOCYTES # BLD AUTO: 0.02 K/UL (ref 0–0.5)
IMM GRANULOCYTES NFR BLD AUTO: 0.3 % (ref 0–5)
LYMPHOCYTES # BLD: 1.61 K/UL (ref 0.5–4.6)
LYMPHOCYTES NFR BLD: 23.7 % (ref 13–44)
Lab: NORMAL
Lab: NORMAL
MCH RBC QN AUTO: 25.5 PG (ref 26.1–32.9)
MCHC RBC AUTO-ENTMCNC: 30.7 G/DL (ref 31.4–35)
MCV RBC AUTO: 83.1 FL (ref 82–102)
MONOCYTES # BLD: 0.81 K/UL (ref 0.1–1.3)
MONOCYTES NFR BLD: 11.9 % (ref 4–12)
NEUTS SEG # BLD: 3.71 K/UL (ref 1.7–8.2)
NEUTS SEG NFR BLD: 54.6 % (ref 43–78)
NRBC # BLD: 0 K/UL (ref 0–0.2)
PLATELET # BLD AUTO: 341 K/UL (ref 150–450)
PMV BLD AUTO: 9 FL (ref 9.4–12.3)
RBC # BLD AUTO: 3.61 M/UL (ref 4.05–5.2)
REFERENCE LAB: NORMAL
WBC # BLD AUTO: 6.8 K/UL (ref 4.3–11.1)

## 2025-07-22 PROCEDURE — 99214 OFFICE O/P EST MOD 30 MIN: CPT | Performed by: FAMILY MEDICINE

## 2025-07-22 PROCEDURE — 1159F MED LIST DOCD IN RCRD: CPT | Performed by: FAMILY MEDICINE

## 2025-07-22 PROCEDURE — 1123F ACP DISCUSS/DSCN MKR DOCD: CPT | Performed by: FAMILY MEDICINE

## 2025-07-22 PROCEDURE — 85025 COMPLETE CBC W/AUTO DIFF WBC: CPT

## 2025-07-22 ASSESSMENT — ENCOUNTER SYMPTOMS
GASTROINTESTINAL NEGATIVE: 1
WHEEZING: 0
COUGH: 0
EYES NEGATIVE: 1
SHORTNESS OF BREATH: 0
RESPIRATORY NEGATIVE: 1

## 2025-07-22 ASSESSMENT — PATIENT HEALTH QUESTIONNAIRE - PHQ9
1. LITTLE INTEREST OR PLEASURE IN DOING THINGS: NOT AT ALL
SUM OF ALL RESPONSES TO PHQ QUESTIONS 1-9: 0
2. FEELING DOWN, DEPRESSED OR HOPELESS: NOT AT ALL
SUM OF ALL RESPONSES TO PHQ QUESTIONS 1-9: 0

## 2025-07-22 NOTE — PROGRESS NOTES
medication    H/O maze procedure 09/30/2015    AVR with maze    History of Bell's palsy 2013    History of TIA (transient ischemic attack) 2013    pt reports bells palsy started at same time    Hx of blood clots     Hypertension     medication    Hyponatremia 9/7/2013    Menopause     Metabolic encephalopathy 9/7/2013    Neuropathy     Osteoarthritis     Pancreatic cyst     Paroxysmal atrial fibrillation (HCC) 8/14/2015    Prolonged emergence from general anesthesia     Pulmonary nodule     benign per pulm note (1/2018)    S/P dilatation of esophageal stricture 9/24/2022 7/2022     Short-segment Liriano's esophagus 9/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    HENT: Negative.     Eyes: Negative.    Respiratory: Negative.  Negative for cough (resolved), shortness of breath and wheezing.    Cardiovascular: Negative.    Gastrointestinal: Negative.    Genitourinary: Negative.    Musculoskeletal: Negative.    Neurological: Negative.    Psychiatric/Behavioral: Negative.        Blood pressure 126/70, pulse 67, temperature 97.2 °F (36.2 °C), weight 63 kg (139 lb), SpO2 95%.    Physical Exam  Vitals and nursing note reviewed.   Constitutional:       Appearance: Normal appearance.   HENT:      Mouth/Throat:      Mouth: Mucous membranes are moist.      Pharynx: Oropharynx is clear.   Eyes:      Conjunctiva/sclera: Conjunctivae normal.   Neck:      Vascular: No carotid bruit.      Comments: No JVD  Cardiovascular:      Rate and Rhythm: Normal rate and regular rhythm.      Heart sounds: Normal heart sounds.   Pulmonary:      Breath sounds: Normal breath sounds.   Abdominal:      General: Bowel sounds are

## 2025-07-28 ENCOUNTER — OFFICE VISIT (OUTPATIENT)
Dept: SURGERY | Age: 85
End: 2025-07-28

## 2025-07-28 DIAGNOSIS — K55.029 ISCHEMIC NECROSIS OF SMALL BOWEL: Primary | ICD-10-CM

## 2025-07-28 LAB
FLOW CYTOMETRY RESULTS: NORMAL
SPECIMEN SOURCE: NORMAL
TEST ORDERED: NORMAL

## 2025-07-28 PROCEDURE — 99024 POSTOP FOLLOW-UP VISIT: CPT | Performed by: SURGERY

## 2025-07-28 NOTE — PROGRESS NOTES
MI      Unit Number [id number]     Product Code Blood Bank RC LR     Unit Divison 00     Dispense Status Blood Bank TRANSFUSED     Unit Issue Date/Time [id number]     Product Code Blood Bank [id number]     Blood Bank Unit Type and Rh A POS     Blood Bank ISBT Product Blood Type 6200     Blood Bank Blood Product Expiration Date 202507202359     Crossmatch Result Compatible     Unit Number [id number]     Product Code Blood Bank RC LR     Unit Divison 00     Dispense Status Blood Bank REL FROM ALLOC     Crossmatch Result Compatible    POCT Blood Gas & Electrolytes    Collection Time: 07/02/25 11:01 AM   Result Value Ref Range    POC pH 7.30 (L) 7.35 - 7.45      POC pCO2 41.9 35 - 45 MMHG    POC PO2 282 (H) 75 - 100 MMHG    POC Sodium 140 136 - 145 MMOL/L    POC Potassium 4.6 3.5 - 5.1 MMOL/L    POC Ionized Calcium 1.21 1.12 - 1.32 mmol/L    POC Glucose 132 (H) 65 - 100 MG/DL    Base Deficit (POC) 5.3 mmol/L    POC HCO3 20.7 (L) 21 - 28 MMOL/L    POC TCO2 20 13 - 23 MMOL/L    POC O2  %    Source ARTERIAL      Performed by: Nickolas     Anion Gap, POC Cannot be calculated mmol/L    eGFR, POC Cannot be calculated >60 ml/min/1.73m2   CBC    Collection Time: 07/02/25 11:42 AM   Result Value Ref Range    WBC 19.0 (H) 4.3 - 11.1 K/uL    RBC 2.86 (L) 4.05 - 5.2 M/uL    Hemoglobin 7.6 (L) 11.7 - 15.4 g/dL    Hematocrit 23.7 (L) 35.8 - 46.3 %    MCV 82.9 82 - 102 FL    MCH 26.6 26.1 - 32.9 PG    MCHC 32.1 31.4 - 35.0 g/dL    RDW 15.2 (H) 11.9 - 14.6 %    Platelets 239 150 - 450 K/uL    MPV 9.1 (L) 9.4 - 12.3 FL    nRBC 0.00 0.0 - 0.2 K/uL   Hemoglobin and Hematocrit    Collection Time: 07/02/25  5:22 PM   Result Value Ref Range    Hemoglobin 10.5 (L) 11.7 - 15.4 g/dL    Hematocrit 32.3 (L) 35.8 - 46.3 %   CBC with Auto Differential    Collection Time: 07/03/25  3:53 AM   Result Value Ref Range    WBC 25.1 (H) 4.3 - 11.1 K/uL    RBC 3.26 (L) 4.05 - 5.2 M/uL    Hemoglobin 8.8 (L) 11.7 - 15.4 g/dL    Hematocrit

## 2025-07-29 ENCOUNTER — OFFICE VISIT (OUTPATIENT)
Dept: ONCOLOGY | Age: 85
End: 2025-07-29
Payer: MEDICARE

## 2025-07-29 VITALS
HEART RATE: 76 BPM | TEMPERATURE: 97.7 F | HEIGHT: 67 IN | OXYGEN SATURATION: 100 % | RESPIRATION RATE: 16 BRPM | SYSTOLIC BLOOD PRESSURE: 153 MMHG | WEIGHT: 136.8 LBS | BODY MASS INDEX: 21.47 KG/M2 | DIASTOLIC BLOOD PRESSURE: 64 MMHG

## 2025-07-29 DIAGNOSIS — D64.9 ANEMIA, UNSPECIFIED TYPE: Primary | ICD-10-CM

## 2025-07-29 DIAGNOSIS — D72.821 MONOCYTOSIS: ICD-10-CM

## 2025-07-29 DIAGNOSIS — D72.829 LEUKOCYTOSIS, UNSPECIFIED TYPE: ICD-10-CM

## 2025-07-29 PROCEDURE — 1126F AMNT PAIN NOTED NONE PRSNT: CPT | Performed by: STUDENT IN AN ORGANIZED HEALTH CARE EDUCATION/TRAINING PROGRAM

## 2025-07-29 PROCEDURE — 99214 OFFICE O/P EST MOD 30 MIN: CPT | Performed by: STUDENT IN AN ORGANIZED HEALTH CARE EDUCATION/TRAINING PROGRAM

## 2025-07-29 PROCEDURE — 1123F ACP DISCUSS/DSCN MKR DOCD: CPT | Performed by: STUDENT IN AN ORGANIZED HEALTH CARE EDUCATION/TRAINING PROGRAM

## 2025-07-29 ASSESSMENT — PATIENT HEALTH QUESTIONNAIRE - PHQ9
SUM OF ALL RESPONSES TO PHQ QUESTIONS 1-9: 0
SUM OF ALL RESPONSES TO PHQ QUESTIONS 1-9: 0
2. FEELING DOWN, DEPRESSED OR HOPELESS: NOT AT ALL
1. LITTLE INTEREST OR PLEASURE IN DOING THINGS: NOT AT ALL
SUM OF ALL RESPONSES TO PHQ QUESTIONS 1-9: 0
SUM OF ALL RESPONSES TO PHQ QUESTIONS 1-9: 0

## 2025-07-29 NOTE — PROGRESS NOTES
[facility]: Office Visit Established Patient    Chief Complaint:    Chief Complaint   Patient presents with    Follow-up     Hematology diagnosis:  Leukocytosis  Anemia      Hematology treatment:  None-observation    History of Present Illness:  Ms. Collins is a 85 y.o. female who presents today for evaluation regarding leucocytosis.    Patient has had recent 3 ER visits as described below. She has multiple medical problems. Has had AVR and is on Eliquis 5 mg bid. She also has pacemaker. She has had syncope like episodes so follows up with Neurology. EEG done on 10/13/2024 was normal in a week and drowsy state. Has chronic hyponatremia. Patient's follows up with vascular surgery for asymptomatic left ICA stenosis. She also recently had respiratory infection for which she just finished doxycyline today. She also received steroids prednisone 10 mg for 5-6 days and finished it last week on Thursday.   She is doing overall ok and denies night sweats, weight loss or LNpathy.   Denies smoking  Denies autoimmune history.         Patient went to ER on 10/17/2024 after suspected mechanical fall.  Noncontrast CT head and cervical spine was negative for acute/emergent abnormalities.  She does have chronic hyponatremia.  She did not have any signs of infection at the time she was discharged after workup.  She was also found to have WBC count of 22.1 which was suspected from recent steroid use.  Patient's hemoglobin has been normal.  Mildly elevated platelet count noted at 472 on 10/17/24.  Patient is differential has been mainly neutrophils.      Patient also went to the ER on 9/8/2024 after she suffered an episode of recurrent syncope.  She was discharged from the ER and was referred to outpatient neurology for workup for seizure disorder.  Her pacer was interrogated and showed no evidence of cardiac dysrhythmia.    Patient again had ER visit on 8/28/2024 for blurry vision bilateral.  Patient has had a

## 2025-07-29 NOTE — PATIENT INSTRUCTIONS
Patient Information from Today's Visit    Diagnosis: Leukocytosis      Follow Up Instructions:   -Reviewed labs  -Please have your lab work drawn at one of our outreach lab locations 24-72 hours prior to your upcoming follow up appointment scheduled at [facility].      You may visit any of the 5 outreach lab locations, during hours of operation, at a time that is convenient for you as long as it is within the '24-72 hour prior' window.      1)   [facility]  [address]  Phone: [phone]  Mon - Fri 730am - 430pm     2)   [facility]  [address] , [address]  Phone: [phone]  Mon - Fri 730am - 430pm     3)   [facility]  [address]  Phone: [phone]  Mon-Fri, 7:30 AM - 4:30 PM     4)   [facility]   [address], [address]  Phone: [phone]  Mon - Fri 730am - 430pm    5)  [facility]   [address] [place] [address]  Phone [phone], Fax [phone]  Mon.-Fri. 7:30 a.m. - 4:30 p.m    Thank you!       Treatment Summary has been discussed and given to patient: N/A      Current Labs:   No visits with results within 7 Day(s) from this visit.   Latest known visit with results is:   Hospital Outpatient Visit on 07/22/2025   Component Date Value Ref Range Status    Test Ordered 07/22/2025 FLOW CYTOMETRY - PERIPHERAL BLOOD    Final    Source 07/22/2025 FLOW CYTOMETRY - PERIPHERAL BLOOD    Final    Flow Cyometry Results 07/22/2025 Test results scanned into EPIC    Final    Test Description: 07/22/2025 NEOGENOMICS: BCR   Final    ABL1 STANDARD P210, P190    Reference Lab 07/22/2025 BLD   Final    Results: 07/22/2025 COLLECT FOR [facility]   Final    WBC 07/22/2025 6.8  4.3 - 11.1 K/uL Final    RBC 07/22/2025 3.61 (L)  4.05 - 5.2 M/uL Final    Hemoglobin 07/22/2025 9.2 (L)  11.7 - 15.4 g/dL Final    Hematocrit 07/22/2025 30.0 (L)  35.8 - 46.3 %

## 2025-08-07 DIAGNOSIS — I10 PRIMARY HYPERTENSION: Primary | ICD-10-CM

## 2025-08-07 DIAGNOSIS — Z00.00 MEDICARE ANNUAL WELLNESS VISIT, SUBSEQUENT: ICD-10-CM

## 2025-08-07 DIAGNOSIS — Z13.89 SCREENING FOR BLOOD OR PROTEIN IN URINE: ICD-10-CM

## 2025-08-07 DIAGNOSIS — R71.0 DECREASED HEMOGLOBIN: ICD-10-CM

## 2025-08-07 DIAGNOSIS — E87.1 HYPONATREMIA: ICD-10-CM

## 2025-08-08 ENCOUNTER — LAB (OUTPATIENT)
Dept: FAMILY MEDICINE CLINIC | Facility: CLINIC | Age: 85
End: 2025-08-08

## 2025-08-08 DIAGNOSIS — I10 PRIMARY HYPERTENSION: ICD-10-CM

## 2025-08-08 DIAGNOSIS — Z00.00 MEDICARE ANNUAL WELLNESS VISIT, SUBSEQUENT: ICD-10-CM

## 2025-08-08 DIAGNOSIS — Z13.89 SCREENING FOR BLOOD OR PROTEIN IN URINE: ICD-10-CM

## 2025-08-08 DIAGNOSIS — E87.1 HYPONATREMIA: ICD-10-CM

## 2025-08-08 DIAGNOSIS — R71.0 DECREASED HEMOGLOBIN: ICD-10-CM

## 2025-08-08 LAB
ALBUMIN SERPL-MCNC: 3.2 G/DL (ref 3.2–4.6)
ALBUMIN/GLOB SERPL: 0.9 (ref 1–1.9)
ALP SERPL-CCNC: 116 U/L (ref 35–104)
ALT SERPL-CCNC: 20 U/L (ref 8–45)
ANION GAP SERPL CALC-SCNC: 13 MMOL/L (ref 7–16)
APPEARANCE UR: CLEAR
AST SERPL-CCNC: 38 U/L (ref 15–37)
BACTERIA URNS QL MICRO: NEGATIVE /HPF
BASOPHILS # BLD: 0.09 K/UL (ref 0–0.2)
BASOPHILS NFR BLD: 1.4 % (ref 0–2)
BILIRUB SERPL-MCNC: 0.4 MG/DL (ref 0–1.2)
BILIRUB UR QL: NEGATIVE
BUN SERPL-MCNC: 17 MG/DL (ref 8–23)
CALCIUM SERPL-MCNC: 9.1 MG/DL (ref 8.8–10.2)
CASTS URNS QL MICRO: 0 /LPF
CHLORIDE SERPL-SCNC: 101 MMOL/L (ref 98–107)
CHOLEST SERPL-MCNC: 104 MG/DL (ref 0–200)
CO2 SERPL-SCNC: 19 MMOL/L (ref 20–29)
COLOR UR: ABNORMAL
CREAT SERPL-MCNC: 0.66 MG/DL (ref 0.6–1.1)
CRYSTALS URNS QL MICRO: 0 /LPF
DIFFERENTIAL METHOD BLD: ABNORMAL
EOSINOPHIL # BLD: 0.48 K/UL (ref 0–0.8)
EOSINOPHIL NFR BLD: 7.6 % (ref 0.5–7.8)
EPI CELLS #/AREA URNS HPF: ABNORMAL /HPF (ref 0–5)
ERYTHROCYTE [DISTWIDTH] IN BLOOD BY AUTOMATED COUNT: 15 % (ref 11.9–14.6)
GLOBULIN SER CALC-MCNC: 3.4 G/DL (ref 2.3–3.5)
GLUCOSE SERPL-MCNC: 87 MG/DL (ref 70–99)
GLUCOSE UR STRIP.AUTO-MCNC: NEGATIVE MG/DL
HCT VFR BLD AUTO: 27.7 % (ref 35.8–46.3)
HDLC SERPL-MCNC: 60 MG/DL (ref 40–60)
HDLC SERPL: 1.7 (ref 0–5)
HGB BLD-MCNC: 8.8 G/DL (ref 11.7–15.4)
HGB UR QL STRIP: NEGATIVE
HYALINE CASTS URNS QL MICRO: ABNORMAL /LPF
IMM GRANULOCYTES # BLD AUTO: 0.01 K/UL (ref 0–0.5)
IMM GRANULOCYTES NFR BLD AUTO: 0.2 % (ref 0–5)
KETONES UR QL STRIP.AUTO: NEGATIVE MG/DL
LDLC SERPL CALC-MCNC: 32 MG/DL (ref 0–100)
LEUKOCYTE ESTERASE UR QL STRIP.AUTO: ABNORMAL
LYMPHOCYTES # BLD: 1.72 K/UL (ref 0.5–4.6)
LYMPHOCYTES NFR BLD: 27.1 % (ref 13–44)
MCH RBC QN AUTO: 26.4 PG (ref 26.1–32.9)
MCHC RBC AUTO-ENTMCNC: 31.8 G/DL (ref 31.4–35)
MCV RBC AUTO: 83.2 FL (ref 82–102)
MONOCYTES # BLD: 0.84 K/UL (ref 0.1–1.3)
MONOCYTES NFR BLD: 13.2 % (ref 4–12)
MUCOUS THREADS URNS QL MICRO: 0 /LPF
NEUTS SEG # BLD: 3.2 K/UL (ref 1.7–8.2)
NEUTS SEG NFR BLD: 50.5 % (ref 43–78)
NITRITE UR QL STRIP.AUTO: NEGATIVE
NRBC # BLD: 0 K/UL (ref 0–0.2)
PH UR STRIP: 6.5 (ref 5–9)
PLATELET # BLD AUTO: 232 K/UL (ref 150–450)
PMV BLD AUTO: 10.4 FL (ref 9.4–12.3)
POTASSIUM SERPL-SCNC: 5.3 MMOL/L (ref 3.5–5.1)
PROT SERPL-MCNC: 6.6 G/DL (ref 6.3–8.2)
PROT UR STRIP-MCNC: NEGATIVE MG/DL
RBC # BLD AUTO: 3.33 M/UL (ref 4.05–5.2)
RBC #/AREA URNS HPF: ABNORMAL /HPF (ref 0–5)
SODIUM SERPL-SCNC: 133 MMOL/L (ref 136–145)
SP GR UR REFRACTOMETRY: 1.01 (ref 1–1.02)
TRIGL SERPL-MCNC: 58 MG/DL (ref 0–150)
TSH W FREE THYROID IF ABNORMAL: 2.58 UIU/ML (ref 0.27–4.2)
URINE CULTURE IF INDICATED: ABNORMAL
UROBILINOGEN UR QL STRIP.AUTO: 0.2 EU/DL (ref 0.2–1)
VLDLC SERPL CALC-MCNC: 12 MG/DL (ref 6–23)
WBC # BLD AUTO: 6.3 K/UL (ref 4.3–11.1)
WBC URNS QL MICRO: ABNORMAL /HPF (ref 0–4)

## 2025-08-12 SDOH — HEALTH STABILITY: PHYSICAL HEALTH: ON AVERAGE, HOW MANY MINUTES DO YOU ENGAGE IN EXERCISE AT THIS LEVEL?: 20 MIN

## 2025-08-12 SDOH — HEALTH STABILITY: PHYSICAL HEALTH: ON AVERAGE, HOW MANY DAYS PER WEEK DO YOU ENGAGE IN MODERATE TO STRENUOUS EXERCISE (LIKE A BRISK WALK)?: 2 DAYS

## 2025-08-12 ASSESSMENT — LIFESTYLE VARIABLES
HOW OFTEN DO YOU HAVE SIX OR MORE DRINKS ON ONE OCCASION: 1
HOW MANY STANDARD DRINKS CONTAINING ALCOHOL DO YOU HAVE ON A TYPICAL DAY: PATIENT DOES NOT DRINK
HOW OFTEN DO YOU HAVE A DRINK CONTAINING ALCOHOL: NEVER
HOW OFTEN DO YOU HAVE A DRINK CONTAINING ALCOHOL: 1
HOW MANY STANDARD DRINKS CONTAINING ALCOHOL DO YOU HAVE ON A TYPICAL DAY: 0

## 2025-08-12 ASSESSMENT — PATIENT HEALTH QUESTIONNAIRE - PHQ9
SUM OF ALL RESPONSES TO PHQ QUESTIONS 1-9: 0
2. FEELING DOWN, DEPRESSED OR HOPELESS: NOT AT ALL
SUM OF ALL RESPONSES TO PHQ QUESTIONS 1-9: 0
1. LITTLE INTEREST OR PLEASURE IN DOING THINGS: NOT AT ALL
SUM OF ALL RESPONSES TO PHQ QUESTIONS 1-9: 0
SUM OF ALL RESPONSES TO PHQ QUESTIONS 1-9: 0

## 2025-08-15 ENCOUNTER — OFFICE VISIT (OUTPATIENT)
Dept: FAMILY MEDICINE CLINIC | Facility: CLINIC | Age: 85
End: 2025-08-15

## 2025-08-15 VITALS
HEIGHT: 67 IN | WEIGHT: 139.2 LBS | SYSTOLIC BLOOD PRESSURE: 136 MMHG | DIASTOLIC BLOOD PRESSURE: 84 MMHG | BODY MASS INDEX: 21.85 KG/M2

## 2025-08-15 DIAGNOSIS — K21.9 GASTROESOPHAGEAL REFLUX DISEASE WITHOUT ESOPHAGITIS: ICD-10-CM

## 2025-08-15 DIAGNOSIS — E87.1 HYPONATREMIA: ICD-10-CM

## 2025-08-15 DIAGNOSIS — R71.0 DECREASED HEMOGLOBIN: ICD-10-CM

## 2025-08-15 DIAGNOSIS — I10 PRIMARY HYPERTENSION: ICD-10-CM

## 2025-08-15 DIAGNOSIS — E78.5 HYPERLIPIDEMIA, UNSPECIFIED HYPERLIPIDEMIA TYPE: ICD-10-CM

## 2025-08-15 DIAGNOSIS — Z00.00 ENCOUNTER FOR SUBSEQUENT ANNUAL WELLNESS VISIT (AWV) IN MEDICARE PATIENT: Primary | ICD-10-CM

## 2025-08-15 DIAGNOSIS — Z95.0 PACEMAKER: ICD-10-CM

## 2025-08-15 DIAGNOSIS — I50.32 CHRONIC DIASTOLIC CONGESTIVE HEART FAILURE (HCC): ICD-10-CM

## 2025-08-15 DIAGNOSIS — E53.9 VITAMIN B DEFICIENCY: ICD-10-CM

## 2025-08-15 DIAGNOSIS — I48.0 PAROXYSMAL ATRIAL FIBRILLATION (HCC): ICD-10-CM

## 2025-08-15 PROCEDURE — 93296 REM INTERROG EVL PM/IDS: CPT | Performed by: INTERNAL MEDICINE

## 2025-08-15 PROCEDURE — 93294 REM INTERROG EVL PM/LDLS PM: CPT | Performed by: INTERNAL MEDICINE

## 2025-08-15 RX ORDER — CYANOCOBALAMIN 1000 UG/ML
1000 INJECTION, SOLUTION INTRAMUSCULAR; SUBCUTANEOUS ONCE
Status: COMPLETED | OUTPATIENT
Start: 2025-08-15 | End: 2025-08-15

## 2025-08-15 RX ADMIN — CYANOCOBALAMIN 1000 MCG: 1000 INJECTION, SOLUTION INTRAMUSCULAR; SUBCUTANEOUS at 08:39

## 2025-08-15 ASSESSMENT — PATIENT HEALTH QUESTIONNAIRE - PHQ9
SUM OF ALL RESPONSES TO PHQ QUESTIONS 1-9: 0
SUM OF ALL RESPONSES TO PHQ QUESTIONS 1-9: 0
1. LITTLE INTEREST OR PLEASURE IN DOING THINGS: NOT AT ALL
SUM OF ALL RESPONSES TO PHQ QUESTIONS 1-9: 0
2. FEELING DOWN, DEPRESSED OR HOPELESS: NOT AT ALL
SUM OF ALL RESPONSES TO PHQ QUESTIONS 1-9: 0

## 2025-08-15 ASSESSMENT — ENCOUNTER SYMPTOMS
COUGH: 1
APNEA: 0
EYES NEGATIVE: 1
WHEEZING: 0
SHORTNESS OF BREATH: 1
CHOKING: 0
GASTROINTESTINAL NEGATIVE: 1
CHEST TIGHTNESS: 0

## (undated) DEVICE — REM POLYHESIVE ADULT PATIENT RETURN ELECTRODE: Brand: VALLEYLAB

## (undated) DEVICE — FCPS BIOP PULM RAD JAW 100CML -- BX/10 M00515180

## (undated) DEVICE — YANKAUER,BULB TIP,W/O VENT,RIGID,STERILE: Brand: MEDLINE

## (undated) DEVICE — CONNECTOR TBNG OD5-7MM O2 END DISP

## (undated) DEVICE — FAN SPRAY KIT: Brand: PULSAVAC®

## (undated) DEVICE — NEEDLE HYPO 25GA L1.5IN BLU POLYPR HUB S STL REG BVL STR

## (undated) DEVICE — SUTURE STRATAFIX SYMMETRIC PDS + SZ 2-0 L18IN ABSRB VLT SXPP1A403

## (undated) DEVICE — CANNULA NSL ORAL AD FOR CAPNOFLEX CO2 O2 AIRLFE

## (undated) DEVICE — Z DISCONTINUED USE 2744636  DRESSING AQUACEL 14 IN ALG W3.5XL14IN POLYUR FLM CVR W/ HYDRCOLL

## (undated) DEVICE — TRAY PREP DRY W/ PREM GLV 2 APPL 6 SPNG 2 UNDPD 1 OVERWRAP

## (undated) DEVICE — SYRINGE, LUER SLIP, STERILE, 60ML: Brand: MEDLINE

## (undated) DEVICE — CURETTE BNE CEM 10IN DISP --

## (undated) DEVICE — SYRINGE CATH TIP 50ML

## (undated) DEVICE — BLOCK BITE AD 60FR W/ VELC STRP ADDRESSES MOST PT AND

## (undated) DEVICE — PACK PROCEDURE SURG TOT KNEE

## (undated) DEVICE — (D)PREP SKN CHLRAPRP APPL 26ML -- CONVERT TO ITEM 371833

## (undated) DEVICE — CANISTER, RIGID, 2000CC: Brand: MEDLINE INDUSTRIES, INC.

## (undated) DEVICE — SNARE POLYP SM W13MMXL240CM SHTH DIA2.4MM OVL FLX DISP

## (undated) DEVICE — KIT SZ 5 TIB POST STBL SGL USE INSTR

## (undated) DEVICE — SYR 5ML 1/5 GRAD LL NSAF LF --

## (undated) DEVICE — VESSEL SEALER EXTEND: Brand: ENDOWRIST

## (undated) DEVICE — SOLUTION IRRIG 1000ML H2O PIC PLAS SHATTERPROOF CONTAINER

## (undated) DEVICE — LUBE JELLY FOIL PACK 1.4 OZ: Brand: MEDLINE INDUSTRIES, INC.

## (undated) DEVICE — CONTAINER PREFIL FRMLN 40ML --

## (undated) DEVICE — STAPLER INT L75MM CUT LN L73MM STPL LN L77MM BLU B FRM 8

## (undated) DEVICE — CONTAINER FORMALIN PFILLED 10% NBF 40ML

## (undated) DEVICE — SUTURE PDS II SZ 1 L96IN ABSRB VLT TP-1 L65MM 1/2 CIR Z880G

## (undated) DEVICE — SUTURE MONOCRYL SZ 4-0 L27IN ABSRB UD L19MM PS-2 1/2 CIR PRIM Y426H

## (undated) DEVICE — GOWN SURG 2XL 49 IN AAMI LEVEL 3 ORBIS

## (undated) DEVICE — SINGLE PORT MANIFOLD: Brand: NEPTUNE 2

## (undated) DEVICE — COLUMN DRAPE

## (undated) DEVICE — Device: Brand: POWER-FLO®

## (undated) DEVICE — DISPOSABLE GRASPER CARTRIDGE: Brand: DIRECT DRIVE REPOSABLE GRASPERS

## (undated) DEVICE — BUTTON SWITCH PENCIL BLADE ELECTRODE, HOLSTER: Brand: EDGE

## (undated) DEVICE — SUTURE VICRYL SZ 3-0 L18IN ABSRB VLT L26MM SH 1/2 CIR J774D

## (undated) DEVICE — DRAPE TOWEL: Brand: CONVERTORS

## (undated) DEVICE — 3000CC GUARDIAN II: Brand: GUARDIAN

## (undated) DEVICE — X-LARGE COTTON GLOVE: Brand: DEROYAL

## (undated) DEVICE — GAUZE,SPONGE,4"X4",12PLY,WOVEN,NS,LF: Brand: MEDLINE

## (undated) DEVICE — SYRINGE MED 10ML LUERLOCK TIP W/O SFTY DISP

## (undated) DEVICE — SUTURE VICRYL + SZ 0 L27IN ABSRB VLT L26MM UR-6 5/8 CIR VCP603H

## (undated) DEVICE — SUTURE V-LOC 180 SZ 2-0 L12IN ABSRB VLT GS-21 L37MM 1/2 CIR VLOCM0315

## (undated) DEVICE — TRAY CATH 16F DRN BG LTX -- CONVERT TO ITEM 363158

## (undated) DEVICE — STAPLER 60 RELOAD BLUE: Brand: SUREFORM

## (undated) DEVICE — STAPLER SHEATH: Brand: ENDOWRIST

## (undated) DEVICE — BIPOLAR SEALER 23-112-1 AQM 6.0: Brand: AQUAMANTYS ®

## (undated) DEVICE — INTENDED FOR TISSUE SEPARATION, AND OTHER PROCEDURES THAT REQUIRE A SHARP SURGICAL BLADE TO PUNCTURE OR CUT.: Brand: BARD-PARKER SAFETY BLADES SIZE 15, STERILE

## (undated) DEVICE — ARM DRAPE

## (undated) DEVICE — Device

## (undated) DEVICE — SOLUTION IV DEXTROSE/SALINE 5%-0.9% 500ML - 500ML

## (undated) DEVICE — SEAL

## (undated) DEVICE — BANDAGE COMPR SELF ADH 5 YDX4 IN TAN STRL PREMIERPRO LF

## (undated) DEVICE — SPONGE,LAP,18"X18",STD,XR,ST,5/PK,40PK/C: Brand: MEDLINE

## (undated) DEVICE — SUT ETHBND 2 30IN LR DA GRN --

## (undated) DEVICE — SINGLE USE BIOPSY VALVE MAJ-210: Brand: SINGLE USE BIOPSY VALVE (STERILE)

## (undated) DEVICE — STAPLER 60: Brand: SUREFORM

## (undated) DEVICE — KIT SZ 4 FEM POST STBL SGL USE INTRUMENT

## (undated) DEVICE — APPLICATOR MEDICATED 26 CC SOLUTION HI LT ORNG CHLORAPREP

## (undated) DEVICE — SUCTION IRRIGATOR: Brand: ENDOWRIST

## (undated) DEVICE — FORCEPS BX L240CM JAW DIA2.8MM L CAP W/ NDL MIC MESH TOOTH

## (undated) DEVICE — SYR 10ML LUER LOK 1/5ML GRAD --

## (undated) DEVICE — SUTURE ABSORBABLE BRAIDED 2-0 CT-1 27 IN UD VICRYL J259H

## (undated) DEVICE — DRAPE,TOP,102X53,STERILE: Brand: MEDLINE

## (undated) DEVICE — 1LYRTR 16FR10ML 100%SILI SNAP: Brand: MEDLINE INDUSTRIES, INC.

## (undated) DEVICE — BLADE SAW PAT RMR PILT H 46MM --

## (undated) DEVICE — SUTURE PERMAHAND SZ 2-0 L30IN NONABSORBABLE BLK L17MM BB K883H

## (undated) DEVICE — PRECISION FALCON OSCILLATING TIP SAW CARTRIDGE: Brand: PRECISION FALCON

## (undated) DEVICE — SOLUTION IV 1000ML 0.9% SOD CHL

## (undated) DEVICE — SOLUTION IRRIG 3000ML 0.9% SOD CHL FLX CONT 0797208] ICU MEDICAL INC]

## (undated) DEVICE — PUMP SUC IRR TBNG L10FT W/ HNDPC ASSEMB STRYKEFLOW 2

## (undated) DEVICE — GENERAL LAPAROSCOPY: Brand: MEDLINE INDUSTRIES, INC.

## (undated) DEVICE — NEPTUNE E-SEP SMOKE EVACUATION PENCIL, COATED, 70MM BLADE, PUSH BUTTON SWITCH: Brand: NEPTUNE E-SEP

## (undated) DEVICE — THE TORRENT IRRIGATION TUBING IS INTENDED TO PROVIDE IRRIGATION VIA IRRIGATION FLUIDS, SUCH AS STERILE WATER, DURING GASTROINTESTINAL ENDOSCOPIC PROCEDURES WHEN USED IN CONJUNCTION WITH AN IRRIGATION PUMP OR ELECTROSURGICAL UNIT.: Brand: TORRENT

## (undated) DEVICE — KENDALL RADIOLUCENT FOAM MONITORING ELECTRODE RECTANGULAR SHAPE: Brand: KENDALL

## (undated) DEVICE — SUTURE PERMA-HAND SZ 2-0 L30IN NONABSORBABLE BLK L26MM SH K833H

## (undated) DEVICE — SYRINGE MED 3ML CLR PLAS STD N CTRL LUERLOCK TIP DISP

## (undated) DEVICE — NDL PRT INJ NSAF BLNT 18GX1.5 --

## (undated) DEVICE — SYR 3ML LL TIP 1/10ML GRAD --

## (undated) DEVICE — RESERVOIR,SUCTION,100CC,SILICONE: Brand: MEDLINE

## (undated) DEVICE — MEDI-VAC YANKAUER SUCTION HANDLE W/BULBOUS TIP: Brand: CARDINAL HEALTH

## (undated) DEVICE — DRAIN SURG 15FR SIL RND CHN W/ TRCR FULL FLUT DBL WRP TRAD

## (undated) DEVICE — SYR LR LCK 1ML GRAD NSAF 30ML --

## (undated) DEVICE — GOWN,REINF,POLY,ECL,PP SLV,XL: Brand: MEDLINE

## (undated) DEVICE — TROCAR: Brand: KII FIOS FIRST ENTRY

## (undated) DEVICE — SINGLE USE SUCTION VALVE MAJ-209: Brand: SINGLE USE SUCTION VALVE (STERILE)

## (undated) DEVICE — SOLUTION ANTIFOG VIS SYS CLEARIFY LAPSCP

## (undated) DEVICE — AIRLIFE™ OXYGEN TUBING 7 FEET (2.1 M) CRUSH RESISTANT OXYGEN TUBING, VINYL TIPPED: Brand: AIRLIFE™

## (undated) DEVICE — SUTURE VCRL SZ 1 L27IN ABSRB UD L36MM CP-1 1/2 CIR REV CUT J268H

## (undated) DEVICE — GLOVE SURG SZ 7 L11.33IN FNGR THK9.8MIL STRW LTX POLYMER

## (undated) DEVICE — 2000CC GUARDIAN II: Brand: GUARDIAN

## (undated) DEVICE — SKIN STAPLER: Brand: SIGNET

## (undated) DEVICE — REDUCER: Brand: ENDOWRIST

## (undated) DEVICE — SYR 50ML LR LCK 1ML GRAD NSAF --

## (undated) DEVICE — BLADELESS OBTURATOR: Brand: WECK VISTA

## (undated) DEVICE — CARDINAL HEALTH FLEXIBLE LIGHT HANDLE COVER: Brand: CARDINAL HEALTH

## (undated) DEVICE — NEEDLE SYR 18GA L1.5IN RED PLAS HUB S STL BLNT FILL W/O

## (undated) DEVICE — 3M™ STERI-DRAPE™ INSTRUMENT POUCH 1018: Brand: STERI-DRAPE™

## (undated) DEVICE — SUTURE ABSRB X-1 REV CUT 1/2 CIR 22MM UD BRAID 27IN SZ 3-0 J458H

## (undated) DEVICE — SOL IRR SOD CHL 0.9% TITAN XL CNTNR 3000ML

## (undated) DEVICE — T4 HOOD

## (undated) DEVICE — SIZER SURG TIB KT DISP TRIATHLON PRECIS

## (undated) DEVICE — RELOAD STPL L75MM OPN H3.8MM CLS 1.5MM WIRE DIA0.2MM REG

## (undated) DEVICE — SUTURE VICRYL SZ 2-0 L27IN ABSRB UD L26MM SH 1/2 CIR J417H

## (undated) DEVICE — BRONCHOSCOPY PACK: Brand: MEDLINE INDUSTRIES, INC.